# Patient Record
Sex: FEMALE | Race: BLACK OR AFRICAN AMERICAN | NOT HISPANIC OR LATINO | Employment: OTHER | URBAN - METROPOLITAN AREA
[De-identification: names, ages, dates, MRNs, and addresses within clinical notes are randomized per-mention and may not be internally consistent; named-entity substitution may affect disease eponyms.]

---

## 2017-01-11 ENCOUNTER — HOSPITAL ENCOUNTER (EMERGENCY)
Facility: HOSPITAL | Age: 37
Discharge: HOME | End: 2017-01-12
Attending: EMERGENCY MEDICINE
Payer: MEDICARE

## 2017-01-11 DIAGNOSIS — N30.01 ACUTE CYSTITIS WITH HEMATURIA: Primary | ICD-10-CM

## 2017-01-11 LAB
B-HCG UR QL: NEGATIVE
BACTERIA #/AREA URNS HPF: ABNORMAL /HPF
BILIRUB UR QL STRIP: NEGATIVE
CLARITY UR: ABNORMAL
COLOR UR: YELLOW
CTP QC/QA: YES
GLUCOSE UR QL STRIP: NEGATIVE
HGB UR QL STRIP: ABNORMAL
HYALINE CASTS #/AREA URNS LPF: 0 /LPF
KETONES UR QL STRIP: NEGATIVE
LEUKOCYTE ESTERASE UR QL STRIP: ABNORMAL
MICROSCOPIC COMMENT: ABNORMAL
NITRITE UR QL STRIP: NEGATIVE
PH UR STRIP: 6 [PH] (ref 5–8)
PROT UR QL STRIP: ABNORMAL
RBC #/AREA URNS HPF: >100 /HPF (ref 0–4)
SP GR UR STRIP: 1.01 (ref 1–1.03)
SQUAMOUS #/AREA URNS HPF: 10 /HPF
URN SPEC COLLECT METH UR: ABNORMAL
UROBILINOGEN UR STRIP-ACNC: NEGATIVE EU/DL
WBC #/AREA URNS HPF: >100 /HPF (ref 0–5)

## 2017-01-11 PROCEDURE — 25000003 PHARM REV CODE 250: Performed by: EMERGENCY MEDICINE

## 2017-01-11 PROCEDURE — 81000 URINALYSIS NONAUTO W/SCOPE: CPT

## 2017-01-11 PROCEDURE — 96367 TX/PROPH/DG ADDL SEQ IV INF: CPT

## 2017-01-11 PROCEDURE — 96376 TX/PRO/DX INJ SAME DRUG ADON: CPT

## 2017-01-11 PROCEDURE — 63600175 PHARM REV CODE 636 W HCPCS: Performed by: EMERGENCY MEDICINE

## 2017-01-11 PROCEDURE — 96361 HYDRATE IV INFUSION ADD-ON: CPT

## 2017-01-11 PROCEDURE — 96375 TX/PRO/DX INJ NEW DRUG ADDON: CPT

## 2017-01-11 PROCEDURE — 81025 URINE PREGNANCY TEST: CPT | Performed by: EMERGENCY MEDICINE

## 2017-01-11 PROCEDURE — 99285 EMERGENCY DEPT VISIT HI MDM: CPT | Mod: 25

## 2017-01-11 PROCEDURE — 96365 THER/PROPH/DIAG IV INF INIT: CPT

## 2017-01-11 RX ORDER — HYDROMORPHONE HYDROCHLORIDE 2 MG/ML
1 INJECTION, SOLUTION INTRAMUSCULAR; INTRAVENOUS; SUBCUTANEOUS
Status: COMPLETED | OUTPATIENT
Start: 2017-01-11 | End: 2017-01-11

## 2017-01-11 RX ORDER — ONDANSETRON 2 MG/ML
4 INJECTION INTRAMUSCULAR; INTRAVENOUS
Status: COMPLETED | OUTPATIENT
Start: 2017-01-11 | End: 2017-01-11

## 2017-01-11 RX ORDER — CIPROFLOXACIN 2 MG/ML
400 INJECTION, SOLUTION INTRAVENOUS
Status: COMPLETED | OUTPATIENT
Start: 2017-01-12 | End: 2017-01-12

## 2017-01-11 RX ADMIN — HYDROMORPHONE HYDROCHLORIDE 1 MG: 2 INJECTION INTRAMUSCULAR; INTRAVENOUS; SUBCUTANEOUS at 11:01

## 2017-01-11 RX ADMIN — ONDANSETRON 4 MG: 2 INJECTION INTRAMUSCULAR; INTRAVENOUS at 11:01

## 2017-01-11 RX ADMIN — SODIUM CHLORIDE 1000 ML: 0.9 INJECTION, SOLUTION INTRAVENOUS at 11:01

## 2017-01-11 NOTE — ED AVS SNAPSHOT
[facility]  [address] [place] [address]               Cesilia Dozier   2017 11:25 PM   ED    Description:  Female : 1980   Department:  [facility]           Your Care was Coordinated By:     Provider Role From To    Estefanía Jim MD Attending Provider 17 [phone] --      Reason for Visit     Flank Pain           Diagnoses this Visit        Comments    Acute cystitis with hematuria    -  Primary       ED Disposition     None           To Do List           Follow-up Information     Follow up with Lulu Ball MD. Schedule an appointment as soon as possible for a visit in 2 days.    Specialty:  Urology    Contact information:    [address] [facility]  [address]  [place] [address]  [phone]         These Medications        Disp Refills Start End    ciprofloxacin HCl (CIPRO) 500 MG tablet 20 tablet 0 2017    Take 1 tablet (500 mg total) by mouth 2 (two) times daily. - Oral    Pharmacy: [facility] [address] Ph #: [phone]       oxycodone-acetaminophen (PERCOCET) 5-325 mg per tablet 18 tablet 0 2017     Take 1 tablet by mouth every 4 (four) hours as needed for Pain (Do not drink alcohol or drive while taking this medication. Do not take with other sedating medications.). - Oral    Pharmacy: [facility] [address] Ph #: [phone]       promethazine (PHENERGAN) 25 MG tablet 15 tablet 0 2017     Take 1 tablet (25 mg total) by mouth every 6 (six) hours as needed for Nausea. - Oral    Pharmacy: [facility] [address] Ph #: [phone]         [facility] On [facility]     [facility] On Call Nurse Care Line -  Assistance  Registered nurses in the [facility] provide clinical advisement, health education,  appointment booking, and other advisory services.  Call for this free service at [phone].             Medications           Message regarding Medications     Verify the changes and/or additions to your medication regime listed below are the same as discussed with your clinician today.  If any of these changes or additions are incorrect, please notify your healthcare provider.        START taking these NEW medications        Refills    ciprofloxacin HCl (CIPRO) 500 MG tablet 0    Sig: Take 1 tablet (500 mg total) by mouth 2 (two) times daily.    Class: Print    Route: Oral    oxycodone-acetaminophen (PERCOCET) 5-325 mg per tablet 0    Sig: Take 1 tablet by mouth every 4 (four) hours as needed for Pain (Do not drink alcohol or drive while taking this medication. Do not take with other sedating medications.).    Class: Print    Route: Oral    promethazine (PHENERGAN) 25 MG tablet 0    Sig: Take 1 tablet (25 mg total) by mouth every 6 (six) hours as needed for Nausea.    Class: Print    Route: Oral      These medications were administered today        Dose Freq    hydromorphone (PF) injection 1 mg 1 mg ED 1 Time    Sig: Inject 0.5 mLs (1 mg total) into the vein ED 1 Time.    Class: Normal    Route: Intravenous    ondansetron injection 4 mg 4 mg ED 1 Time    Sig: Inject 4 mg into the vein ED 1 Time.    Class: Normal    Route: Intravenous    sodium chloride 0.9% bolus 1,000 mL 1,000 mL ED 1 Time    Sig: Inject 1,000 mLs into the vein ED 1 Time.    Class: Normal    Route: Intravenous    ciprofloxacin (CIPRO)400mg/200ml D5W IVPB 400 mg 400 mg ED 1 Time    Starting on: 1/12/2017    Sig: Inject 200 mLs (400 mg total) into the vein ED 1 Time.    Class: Normal    Route: Intravenous    hydromorphone (PF) injection 1 mg 1 mg ED 1 Time    Sig: Inject 0.5 mLs (1 mg total) into the vein ED 1 Time.    Class: Normal    Route: Intravenous    promethazine (PHENERGAN) 25 mg in dextrose 5 % 50 mL IVPB 25 mg ED 1 Time    Sig: Inject  "25 mg into the vein ED 1 Time.    Class: Normal    Route: Intravenous      STOP taking these medications     hydrocodone-acetaminophen 5-325mg (NORCO) 5-325 mg per tablet Take 1 tablet by mouth every 4 (four) hours as needed.    oxycodone-acetaminophen (PERCOCET)  mg per tablet Take 1 tablet by mouth every 4 (four) hours as needed.           Verify that the below list of medications is an accurate representation of the medications you are currently taking.  If none reported, the list may be blank. If incorrect, please contact your healthcare provider. Carry this list with you in case of emergency.           Current Medications     ciprofloxacin HCl (CIPRO) 500 MG tablet Take 1 tablet (500 mg total) by mouth 2 (two) times daily.    ferrous gluconate (FERGON) 324 MG tablet Take 1 tablet (324 mg total) by mouth 2 (two) times daily with meals.    oxybutynin (DITROPAN) 5 MG Tab Take 1 tablet (5 mg total) by mouth 3 (three) times daily.    oxycodone-acetaminophen (PERCOCET) 5-325 mg per tablet Take 1 tablet by mouth every 4 (four) hours as needed for Pain (Do not drink alcohol or drive while taking this medication. Do not take with other sedating medications.).    promethazine (PHENERGAN) 25 MG suppository Place 1 suppository (25 mg total) rectally every 6 (six) hours as needed for Nausea.    promethazine (PHENERGAN) 25 MG tablet Take 1 tablet (25 mg total) by mouth every 6 (six) hours as needed for Nausea.    promethazine (PHENERGAN) 25 MG tablet Take 1 tablet (25 mg total) by mouth every 6 (six) hours as needed for Nausea.           Clinical Reference Information           Your Vitals Were     BP Pulse Temp Resp Height Weight    109/73 106 99.1 °F (37.3 °C) (Oral) 18 5' 6" (1.676 m) 74.8 kg (165 lb)    SpO2 BMI             100% 26.63 kg/m2         Allergies as of 1/12/2017        Reactions    Pcn [Penicillins] Anaphylaxis      Immunizations Administered on Date of Encounter - 1/12/2017     None      ED Micro, Lab, " POCT     Start Ordered       Status Ordering Provider    01/11/17 2339 01/11/17 2339  CBC auto differential  STAT      Final result     01/11/17 2339 01/11/17 2339  Comprehensive metabolic panel  STAT      Final result     01/11/17 2339 01/11/17 2339    STAT,   Status:  Canceled      Canceled     01/11/17 2252 01/11/17 2252  Urinalysis  Once      Final result     01/11/17 2252 01/11/17 2252  POCT urine pregnancy  Once     Comments:  For women of childbearing age w/o hysterectomy.    Final result     01/11/17 2252 01/11/17 2252  Urinalysis Microscopic  Once      Final result       ED Imaging Orders     Start Ordered       Status Ordering Provider    01/11/17 2340 01/11/17 2339  US Retroperitoneal Complete  1 time imaging      Final result       Discharge References/Attachments     URINARY TRACT INFECTIONS (UTIS), UNDERSTANDING (ENGLISH)      Your Scheduled Appointments     Jan 24, 2017  3:00 PM CST   Consult with Turner Bocanegra MD   [facility] ([facility])    [address] [place] [address], [address] [id number] [phone]               [facility] complies with applicable Federal civil rights laws and does not discriminate on the basis of race, color, national origin, age, disability, or sex.        Language Assistance Services     ATTENTION: Language assistance services are available, free of charge. Please call [phone].      ATENCIÓN: Si habla español, tiene a batista disposición servicios gratuitos de asistencia lingüística. Llame al [phone].     CHÚ Ý: N?u b?n nói Ti?ng Vi?t, có các d?ch v? h? tr? ngôn ng? mi?n phí dành cho b?n. G?i s? [phone].

## 2017-01-12 VITALS
OXYGEN SATURATION: 100 % | DIASTOLIC BLOOD PRESSURE: 63 MMHG | WEIGHT: 165 LBS | HEART RATE: 92 BPM | HEIGHT: 66 IN | SYSTOLIC BLOOD PRESSURE: 106 MMHG | BODY MASS INDEX: 26.52 KG/M2 | RESPIRATION RATE: 18 BRPM | TEMPERATURE: 99 F

## 2017-01-12 LAB
ALBUMIN SERPL BCP-MCNC: 3.6 G/DL
ALP SERPL-CCNC: 73 U/L
ALT SERPL W/O P-5'-P-CCNC: 10 U/L
ANION GAP SERPL CALC-SCNC: 10 MMOL/L
AST SERPL-CCNC: 13 U/L
BASOPHILS # BLD AUTO: 0.02 K/UL
BASOPHILS NFR BLD: 0.4 %
BILIRUB SERPL-MCNC: 0.5 MG/DL
BUN SERPL-MCNC: 14 MG/DL
CALCIUM SERPL-MCNC: 9.6 MG/DL
CHLORIDE SERPL-SCNC: 108 MMOL/L
CO2 SERPL-SCNC: 26 MMOL/L
CREAT SERPL-MCNC: 1.9 MG/DL
DIFFERENTIAL METHOD: ABNORMAL
EOSINOPHIL # BLD AUTO: 0.3 K/UL
EOSINOPHIL NFR BLD: 5.3 %
ERYTHROCYTE [DISTWIDTH] IN BLOOD BY AUTOMATED COUNT: 14.9 %
EST. GFR  (AFRICAN AMERICAN): 38 ML/MIN/1.73 M^2
EST. GFR  (NON AFRICAN AMERICAN): 33 ML/MIN/1.73 M^2
GLUCOSE SERPL-MCNC: 91 MG/DL
HCT VFR BLD AUTO: 30.4 %
HGB BLD-MCNC: 10.2 G/DL
LYMPHOCYTES # BLD AUTO: 1.6 K/UL
LYMPHOCYTES NFR BLD: 29.4 %
MCH RBC QN AUTO: 31.2 PG
MCHC RBC AUTO-ENTMCNC: 33.6 %
MCV RBC AUTO: 93 FL
MONOCYTES # BLD AUTO: 0.4 K/UL
MONOCYTES NFR BLD: 7.3 %
NEUTROPHILS # BLD AUTO: 3.2 K/UL
NEUTROPHILS NFR BLD: 57.6 %
PLATELET # BLD AUTO: 185 K/UL
PMV BLD AUTO: 10.4 FL
POTASSIUM SERPL-SCNC: 4.4 MMOL/L
PROT SERPL-MCNC: 6.6 G/DL
RBC # BLD AUTO: 3.27 M/UL
SODIUM SERPL-SCNC: 144 MMOL/L
WBC # BLD AUTO: 5.51 K/UL

## 2017-01-12 PROCEDURE — 25000003 PHARM REV CODE 250: Performed by: EMERGENCY MEDICINE

## 2017-01-12 PROCEDURE — 63600175 PHARM REV CODE 636 W HCPCS: Performed by: EMERGENCY MEDICINE

## 2017-01-12 PROCEDURE — 85025 COMPLETE CBC W/AUTO DIFF WBC: CPT

## 2017-01-12 PROCEDURE — 80053 COMPREHEN METABOLIC PANEL: CPT

## 2017-01-12 RX ORDER — OXYCODONE AND ACETAMINOPHEN 5; 325 MG/1; MG/1
1 TABLET ORAL EVERY 4 HOURS PRN
Qty: 18 TABLET | Refills: 0 | Status: SHIPPED | OUTPATIENT
Start: 2017-01-12 | End: 2017-01-18 | Stop reason: SDUPTHER

## 2017-01-12 RX ORDER — PROMETHAZINE HYDROCHLORIDE 25 MG/1
25 TABLET ORAL EVERY 6 HOURS PRN
Qty: 15 TABLET | Refills: 0 | Status: SHIPPED | OUTPATIENT
Start: 2017-01-12 | End: 2017-01-18 | Stop reason: ALTCHOICE

## 2017-01-12 RX ORDER — CIPROFLOXACIN 500 MG/1
500 TABLET ORAL 2 TIMES DAILY
Qty: 20 TABLET | Refills: 0 | Status: SHIPPED | OUTPATIENT
Start: 2017-01-12 | End: 2017-01-22

## 2017-01-12 RX ORDER — HYDROMORPHONE HYDROCHLORIDE 2 MG/ML
1 INJECTION, SOLUTION INTRAMUSCULAR; INTRAVENOUS; SUBCUTANEOUS
Status: COMPLETED | OUTPATIENT
Start: 2017-01-12 | End: 2017-01-12

## 2017-01-12 RX ADMIN — CIPROFLOXACIN 400 MG: 2 INJECTION, SOLUTION INTRAVENOUS at 12:01

## 2017-01-12 RX ADMIN — PROMETHAZINE HYDROCHLORIDE 25 MG: 25 INJECTION INTRAMUSCULAR; INTRAVENOUS at 01:01

## 2017-01-12 RX ADMIN — HYDROMORPHONE HYDROCHLORIDE 1 MG: 2 INJECTION INTRAMUSCULAR; INTRAVENOUS; SUBCUTANEOUS at 01:01

## 2017-01-12 NOTE — ED TRIAGE NOTES
Pt c/o left lower back pain that started today. Pt also reports lower abd pain. Pt thinks the pain ir r/t her bladder. Pt reports having to force herself to urinate. Pt also c/o N&V. Pain rating 10/10

## 2017-01-12 NOTE — ED PROVIDER NOTES
"Encounter Date: 1/11/2017    SCRIBE #1 NOTE: I, Shelbi Villela, am scribing for, and in the presence of,  Estefanía Jim MD. I have scribed the following portions of the note - Other sections scribed: HPI, ROS.       History     Chief Complaint   Patient presents with    Flank Pain     left lower back with pain after emptying bladder x 1 day     Review of patient's allergies indicates:   Allergen Reactions    Pcn [penicillins] Anaphylaxis     HPI Comments: CC: Flank Pain    HPI: 36 year old female with a history of cervical cancer, pyelonephritis, hydronephrosis, and PONV presents to the ED with a sudden onset of left flank pain which started today.  Patient also complains of suprapubic pain described as a "pressure" as well as difficulty urinating and dysuria since earlier today.  Patient states her pain is severe 10/10 and worse with palpation. Patient otherwise denies fever, chest pain, shortness of breath, nausea, vomiting, and diarrhea at this time. Symptoms are acute and constant. No prior treatment.    The history is provided by the patient.     Past Medical History   Diagnosis Date    Cervical cancer      cervical    Hydronephrosis     PONV (postoperative nausea and vomiting)     Pyelonephritis      No past medical history pertinent negatives.  Past Surgical History   Procedure Laterality Date    Hysterectomy       Partial    Tubal ligation      Ureteral stent placement  07/2016    Colonoscopy N/A 9/22/2016     Procedure: COLONOSCOPY;  Surgeon: Joe Moe MD;  Location: [facility];  Service: Endoscopy;  Laterality: N/A;     Family History   Problem Relation Age of Onset    No Known Problems Mother     Drug abuse Father     No Known Problems Sister     Asthma Brother      Social History   Substance Use Topics    Smoking status: Never Smoker    Smokeless tobacco: Never Used    Alcohol use No     Review of Systems   Constitutional: Negative for fever.   HENT: Negative for sore throat.  "   Eyes: Negative for visual disturbance.   Respiratory: Negative for shortness of breath.    Cardiovascular: Negative for chest pain.   Gastrointestinal: Positive for abdominal pain (suprapubic). Negative for diarrhea, nausea and vomiting.   Genitourinary: Positive for difficulty urinating, dysuria and flank pain.   Musculoskeletal: Negative for neck pain.   Skin: Negative for rash.   Neurological: Negative for headaches.       Physical Exam   Initial Vitals   BP Pulse Resp Temp SpO2   01/11/17 2251 01/11/17 2251 01/11/17 2251 01/11/17 2251 01/11/17 2251   112/77 117 18 99.1 °F (37.3 °C) 97 %     Physical Exam    Nursing note and vitals reviewed.  Constitutional: She appears well-developed and well-nourished.   HENT:   Head: Normocephalic and atraumatic.   Eyes: EOM are normal. Pupils are equal, round, and reactive to light.   Neck: Normal range of motion. Neck supple.   Cardiovascular: Tachycardia present.  Exam reveals no gallop and no friction rub.    No murmur heard.  Pulmonary/Chest: Breath sounds normal. No respiratory distress. She has no wheezes. She has no rhonchi. She has no rales. She exhibits no tenderness.   Abdominal: Soft. Normal appearance. There is tenderness in the suprapubic area.   Musculoskeletal: Normal range of motion.   Left CVA tenderness   Neurological: She is alert and oriented to person, place, and time.   Skin: Skin is warm and dry.   Psychiatric: She has a normal mood and affect. Her behavior is normal. Judgment and thought content normal.         ED Course   Procedures  Labs Reviewed   URINALYSIS - Abnormal; Notable for the following:        Result Value    Appearance, UA Cloudy (*)     Protein, UA 2+ (*)     Occult Blood UA 3+ (*)     Leukocytes, UA 3+ (*)     All other components within normal limits   URINALYSIS MICROSCOPIC - Abnormal; Notable for the following:     RBC, UA >100 (*)     WBC, UA >100 (*)     Bacteria, UA Moderate (*)     All other components within normal limits    CBC W/ AUTO DIFFERENTIAL - Abnormal; Notable for the following:     RBC 3.27 (*)     Hemoglobin 10.2 (*)     Hematocrit 30.4 (*)     MCH 31.2 (*)     RDW 14.9 (*)     All other components within normal limits   COMPREHENSIVE METABOLIC PANEL - Abnormal; Notable for the following:     Creatinine 1.9 (*)     eGFR if  38 (*)     eGFR if non  33 (*)     All other components within normal limits   POCT URINE PREGNANCY             Medical Decision Making:   History:   Old Medical Records: I decided to obtain old medical records.  Initial Assessment:   This is an emergent evaluation of a 36-year-old woman who presented to the emergency department today secondary to urinary frequency, dysuria, and suprapubic abdominal pain.  Differential Diagnosis:   Differential diagnoses includes recurrence of her urinary tract infection, cystitis, pyelonephritis, amongst others.  Independently Interpreted Test(s):   I have ordered and independently interpreted EKG Reading(s) - see summary below       <> Summary of EKG Reading(s): Sinus tachycardia at 112 bpm without STEMI  Clinical Tests:   Lab Tests: Ordered and Reviewed  The following lab test(s) were unremarkable: CBC, Urinalysis, CMP and UPT  Radiological Study: Ordered and Reviewed  Medical Tests: Ordered and Reviewed  ED Management:  On physical examination, patient was in no acute distress.  Lung sounds were clear to auscultation bilaterally and heart sounds revealed tachycardia.  Abdomen was soft, nondistended, with tenderness over the suprapubic abdomen only.  There is mild tenderness over the left CVA region of the back.  There was no tenderness over McBurney's or Barrett's points.  EKG showed sinus tachycardia without evidence for STEMI.  Labs showed a mild anemia with a hemoglobin and hematocrit 10.2 and 30.4.  CMP showed an elevated creatinine at 1.9 however in review of her previous records this shows a similar value.  Urinalysis is  concerning for urinary tract infection with 3+ leukocytes, moderate bacteria, greater than 100 red blood cells and greater than 100 white blood cells.  This was sent for a urine culture and patient was treated with IV ciprofloxacin in the emergency department.  Retroperitoneal ultrasound showed bilateral ureteral stents in place with mild right-sided hydronephrosis versus similar to recent CT scan.  There was no left hydronephrosis.  Patient reported improvement in symptoms in the emergency department after receiving pain medication and antibiotics.  She will be discharged home therefore with oral ciprofloxacin, close follow-up with her urologist-she was advised to call the morning to make an appointment.  She was additionally given strict return precautions including any development of fever, chills, worsening symptoms, inability urinate, or for any other concerns.    Estefanía Jim MD  2:59 AM  1/12/2017              Scribe Attestation:   Scribe #1: I performed the above scribed service and the documentation accurately describes the services I performed. I attest to the accuracy of the note.    Attending Attestation:           Physician Attestation for Scribe:  Physician Attestation Statement for Scribe #1: I, Estefanía Jim MD, reviewed documentation, as scribed by Shelbi Villela in my presence, and it is both accurate and complete.                 ED Course     Clinical Impression:   The encounter diagnosis was Acute cystitis with hematuria.    Disposition:   Disposition: Discharged  Condition: Stable       Estefanía Jim MD  01/12/17 0259

## 2017-01-16 ENCOUNTER — HOSPITAL ENCOUNTER (EMERGENCY)
Facility: HOSPITAL | Age: 37
Discharge: HOME | End: 2017-01-16
Attending: EMERGENCY MEDICINE
Payer: MEDICAID

## 2017-01-16 VITALS
WEIGHT: 165 LBS | HEIGHT: 66 IN | DIASTOLIC BLOOD PRESSURE: 65 MMHG | RESPIRATION RATE: 18 BRPM | SYSTOLIC BLOOD PRESSURE: 109 MMHG | TEMPERATURE: 99 F | OXYGEN SATURATION: 100 % | HEART RATE: 88 BPM | BODY MASS INDEX: 26.52 KG/M2

## 2017-01-16 DIAGNOSIS — T83.84XA: ICD-10-CM

## 2017-01-16 LAB
B-HCG UR QL: NEGATIVE
BACTERIA #/AREA URNS HPF: ABNORMAL /HPF
BILIRUB UR QL STRIP: NEGATIVE
CLARITY UR: ABNORMAL
COLOR UR: YELLOW
CTP QC/QA: YES
GLUCOSE UR QL STRIP: NEGATIVE
HGB UR QL STRIP: ABNORMAL
HYALINE CASTS #/AREA URNS LPF: 0 /LPF
KETONES UR QL STRIP: NEGATIVE
LEUKOCYTE ESTERASE UR QL STRIP: ABNORMAL
MICROSCOPIC COMMENT: ABNORMAL
NITRITE UR QL STRIP: NEGATIVE
PH UR STRIP: 6 [PH] (ref 5–8)
PROT UR QL STRIP: ABNORMAL
RBC #/AREA URNS HPF: >100 /HPF (ref 0–4)
SP GR UR STRIP: 1.01 (ref 1–1.03)
SQUAMOUS #/AREA URNS HPF: 4 /HPF
URN SPEC COLLECT METH UR: ABNORMAL
UROBILINOGEN UR STRIP-ACNC: NEGATIVE EU/DL
WBC #/AREA URNS HPF: >100 /HPF (ref 0–5)
WBC CLUMPS URNS QL MICRO: ABNORMAL

## 2017-01-16 PROCEDURE — 87086 URINE CULTURE/COLONY COUNT: CPT

## 2017-01-16 PROCEDURE — 99284 EMERGENCY DEPT VISIT MOD MDM: CPT | Mod: 25

## 2017-01-16 PROCEDURE — 96372 THER/PROPH/DIAG INJ SC/IM: CPT

## 2017-01-16 PROCEDURE — 25000003 PHARM REV CODE 250: Performed by: EMERGENCY MEDICINE

## 2017-01-16 PROCEDURE — 63600175 PHARM REV CODE 636 W HCPCS: Performed by: EMERGENCY MEDICINE

## 2017-01-16 PROCEDURE — 87205 SMEAR GRAM STAIN: CPT

## 2017-01-16 PROCEDURE — 81000 URINALYSIS NONAUTO W/SCOPE: CPT

## 2017-01-16 PROCEDURE — 81025 URINE PREGNANCY TEST: CPT | Performed by: EMERGENCY MEDICINE

## 2017-01-16 RX ORDER — OXYCODONE HYDROCHLORIDE 5 MG/1
10 TABLET ORAL
Status: COMPLETED | OUTPATIENT
Start: 2017-01-16 | End: 2017-01-16

## 2017-01-16 RX ORDER — HYDROMORPHONE HYDROCHLORIDE 2 MG/ML
2 INJECTION, SOLUTION INTRAMUSCULAR; INTRAVENOUS; SUBCUTANEOUS
Status: COMPLETED | OUTPATIENT
Start: 2017-01-16 | End: 2017-01-16

## 2017-01-16 RX ADMIN — HYDROMORPHONE HYDROCHLORIDE 2 MG: 2 INJECTION INTRAMUSCULAR; INTRAVENOUS; SUBCUTANEOUS at 06:01

## 2017-01-16 RX ADMIN — OXYCODONE HYDROCHLORIDE 10 MG: 5 TABLET ORAL at 08:01

## 2017-01-16 NOTE — ED AVS SNAPSHOT
[facility]  [address] [place] [address]               Cesilia Dozier   2017  5:59 PM   ED    Description:  Female : 1980   Department:  [facility]           Your Care was Coordinated By:     Provider Role From To    Mushtaq William MD Attending Provider 17 1806 --    SHELBI Page Physician Assistant 17 1806 17 1808      Reason for Visit     Flank Pain           Diagnoses this Visit        Comments    Pain due to ureteral stent           ED Disposition     ED Disposition Condition Comment    Discharge             To Do List           Follow-up Information     Schedule an appointment as soon as possible for a visit with Lulu Ball MD.    Specialty:  Urology    Contact information:    [address] [facility]  [address]  [place] [address]  [phone]          Follow up with [facility].    Specialty:  Emergency Medicine    Why:  As needed, If symptoms worsen    Contact information:    [address] [place] [id number] [phone]      [facility]     [facility] Care [facility] -  Assistance  Registered nurses in the [facility] provide clinical advisement, health education, appointment booking, and other advisory services.  Call for this free service at [phone].             Medications           Message regarding Medications     Verify the changes and/or additions to your medication regime listed below are the same as discussed with your clinician today.  If any of these changes or additions are incorrect, please notify your healthcare provider.        These medications were administered today        Dose Freq    hydromorphone (PF) injection 2 mg 2 mg ED 1 Time    Sig: Inject 1 mL (2 mg total) into the muscle ED 1 Time.    Class: Normal    Route: Intramuscular    oxycodone immediate release tablet 10 mg 10 mg ED 1 Time    Sig: Take 2 tablets  "(10 mg total) by mouth ED 1 Time.    Class: Normal    Route: Oral           Verify that the below list of medications is an accurate representation of the medications you are currently taking.  If none reported, the list may be blank. If incorrect, please contact your healthcare provider. Carry this list with you in case of emergency.           Current Medications     ciprofloxacin HCl (CIPRO) 500 MG tablet Take 1 tablet (500 mg total) by mouth 2 (two) times daily.    ferrous gluconate (FERGON) 324 MG tablet Take 1 tablet (324 mg total) by mouth 2 (two) times daily with meals.    oxycodone-acetaminophen (PERCOCET) 5-325 mg per tablet Take 1 tablet by mouth every 4 (four) hours as needed for Pain (Do not drink alcohol or drive while taking this medication. Do not take with other sedating medications.).    promethazine (PHENERGAN) 25 MG suppository Place 1 suppository (25 mg total) rectally every 6 (six) hours as needed for Nausea.    promethazine (PHENERGAN) 25 MG tablet Take 1 tablet (25 mg total) by mouth every 6 (six) hours as needed for Nausea.    oxybutynin (DITROPAN) 5 MG Tab Take 1 tablet (5 mg total) by mouth 3 (three) times daily.    oxycodone immediate release tablet 10 mg Take 2 tablets (10 mg total) by mouth ED 1 Time.           Clinical Reference Information           Your Vitals Were     BP Pulse Temp Resp Height Weight    109/65 (BP Location: Left arm, Patient Position: Sitting, BP Method: Automatic) 88 98.5 °F (36.9 °C) (Oral) 18 5' 6" (1.676 m) 74.8 kg (165 lb)    SpO2 BMI             100% 26.63 kg/m2         Allergies as of 1/16/2017        Reactions    Pcn [Penicillins] Anaphylaxis      Immunizations Administered on Date of Encounter - 1/16/2017     None      ED Micro, Lab, POCT     Start Ordered       Status Ordering Provider    01/16/17 1811 01/16/17 1810  Gram stain  STAT      In process     01/16/17 1811 01/16/17 1810  Urine culture  STAT      In process     01/16/17 1808 01/16/17 1807  " "Urinalysis  STAT      Final result     01/16/17 1807 01/16/17 1807  Urinalysis Microscopic  Once      Final result     01/16/17 1614 01/16/17 1613  POCT urine pregnancy  Once      Final result       ED Imaging Orders     Start Ordered       Status Ordering Provider    01/16/17 1823 01/16/17 1814  US Retroperitoneal Complete  1 time imaging      Final result     01/16/17 1814 01/16/17 1814    1 time imaging,   Status:  Canceled      Canceled         Discharge Instructions       Return to the Emergency Department of any acute worsening of your symptoms or for any other concern.     You should return to the ED for fever/chills, shortness of breath, chest pain, weakness or "passing out".     Pt should take all medications as prescribed.    Pt should follow up with PCP as soon as possible.    The risks associated with not taking your medications as prescribed and not following up with your Primary Care doctor or sub specialist includes worsening of your condition, pain, disability, loss of function or livelihood, and death      FRANCESCA William M.D. 8:15 PM 1/16/2017        Discharge References/Attachments     URETERAL STENTS (ENGLISH)      Your Scheduled Appointments     Jan 24, 2017  3:00 PM CST   Consult with Turner Bocanegra MD   [facility] ([facility])    [address] [place] [address], [address] [address] [phone]               [facility] complies with applicable Federal civil rights laws and does not discriminate on the basis of race, color, national origin, age, disability, or sex.        Language Assistance Services     ATTENTION: Language assistance services are available, free of charge. Please call [phone].      ATENCIÓN: Si habla español, tiene a batista disposición servicios gratuitos de asistencia lingüística. Llame al [phone].     CHÚ Ý: N?u b?n nói Ti?ng Vi?t, có các d?ch v? h? tr? ngôn ng? mi?n phí dành cho b?n. G?i s? [phone].        "

## 2017-01-17 ENCOUNTER — TELEPHONE (OUTPATIENT)
Dept: UROLOGY | Facility: HOSPITAL | Age: 37
End: 2017-01-17

## 2017-01-17 LAB
GRAM STN SPEC: NORMAL

## 2017-01-17 NOTE — ED TRIAGE NOTES
Pt co pain in left flank and lower abd today despite taking two percocet at a time- now with NV some pressure with urination

## 2017-01-17 NOTE — DISCHARGE INSTRUCTIONS
"Return to the Emergency Department of any acute worsening of your symptoms or for any other concern.     You should return to the ED for fever/chills, shortness of breath, chest pain, weakness or "passing out".     Pt should take all medications as prescribed.    Pt should follow up with PCP as soon as possible.    The risks associated with not taking your medications as prescribed and not following up with your Primary Care doctor or sub specialist includes worsening of your condition, pain, disability, loss of function or livelihood, and death      FRANCESCA William M.D. 8:15 PM 1/16/2017      "

## 2017-01-17 NOTE — TELEPHONE ENCOUNTER
----- Message from Mushtaq William MD sent at 1/16/2017  8:16 PM CST -----  Pt has come in to the ED several times for stent pain. US shows stents in place. UA looks infected, but urine culture never grows out anything. I deferred abx at this time. Pt is at high risk of returning to the ED. Please advise.     Thanks,  Best William

## 2017-01-17 NOTE — ED PROVIDER NOTES
"Encounter Date: 1/16/2017    SCRIBE #1 NOTE: I, Mireille Carvalhoter, am scribing for, and in the presence of,  Mushtaq William MD. I have scribed the following portions of the note - Other sections scribed: HPI/ROS.       History     Chief Complaint   Patient presents with    Flank Pain     "For the past 2 days the left part of my side is really in pain and the bottom part of my abdomen."      Review of patient's allergies indicates:   Allergen Reactions    Pcn [penicillins] Anaphylaxis     HPI Comments: CC: Flank Pain    HPI: This 35 yo F who has history of hydronephrosis, PONV, cervical cancer, pyelonephritis presents to the ED for evaluation of acute onset left side flank pain with associated lower abdominal pain and pressure when urinating for 2 days. She reports dark urine that looks similar to the color of a flat diet coke. The pain is severe and constant. She attempted treatment with percocet at 0800 this morning with no relief. The pt reports that she has been seen in the ED multiple times for similar symptoms. She notes that her stents are still bothering her. She was told that the next available appointment at the Urologist wasn't until the 24th. The pt denies fever, chills, N/V/D, hematuria, and any other associated symptoms. No alleviating or exacerbating factors reported.     The history is provided by the patient. No  was used.     Past Medical History   Diagnosis Date    Cervical cancer      cervical    Hydronephrosis     PONV (postoperative nausea and vomiting)     Pyelonephritis      No past medical history pertinent negatives.  Past Surgical History   Procedure Laterality Date    Hysterectomy       Partial    Tubal ligation      Ureteral stent placement  07/2016    Colonoscopy N/A 9/22/2016     Procedure: COLONOSCOPY;  Surgeon: Joe Moe MD;  Location: [facility];  Service: Endoscopy;  Laterality: N/A;     Family History   Problem Relation Age of Onset    No " Known Problems Mother     Drug abuse Father     No Known Problems Sister     Asthma Brother      Social History   Substance Use Topics    Smoking status: Never Smoker    Smokeless tobacco: Never Used    Alcohol use No     Review of Systems   Constitutional: Negative for chills and fever.   HENT: Negative for congestion and sore throat.    Eyes: Negative for visual disturbance.   Respiratory: Negative for cough and shortness of breath.    Cardiovascular: Negative for chest pain.   Gastrointestinal: Positive for abdominal pain (lower). Negative for diarrhea, nausea and vomiting.   Genitourinary: Positive for flank pain (left side). Negative for frequency and hematuria.        (+) pressure with urination  (+) dark urine   Musculoskeletal: Negative for myalgias.   Skin: Negative for rash.   Neurological: Negative for headaches.       Physical Exam   Initial Vitals   BP Pulse Resp Temp SpO2   01/16/17 1559 01/16/17 1559 01/16/17 1559 01/16/17 1559 01/16/17 1559   121/74 115 18 98.7 °F (37.1 °C) 100 %     Physical Exam    Vitals reviewed.  Constitutional: She appears well-developed and well-nourished.   HENT:   Head: Normocephalic and atraumatic.   Nose: Nose normal.   Mouth/Throat: No oropharyngeal exudate.   Eyes: EOM are normal. Pupils are equal, round, and reactive to light.   Neck: Normal range of motion. Neck supple. No JVD present.   Cardiovascular: Regular rhythm and normal heart sounds. Exam reveals no gallop and no friction rub.    No murmur heard.  Pulmonary/Chest: Breath sounds normal. No stridor. No respiratory distress. She has no wheezes. She has no rhonchi. She has no rales. She exhibits no tenderness.   Abdominal: Soft. Bowel sounds are normal. She exhibits no distension and no mass. There is no tenderness. There is CVA tenderness (left). There is no rebound and no guarding.   Musculoskeletal: Normal range of motion. She exhibits no edema or tenderness.   Neurological: She is alert and oriented to  person, place, and time. She has normal strength. No sensory deficit.   Skin: Skin is warm and dry.   Psychiatric: She has a normal mood and affect. Thought content normal.         ED Course   Procedures  Labs Reviewed   URINALYSIS - Abnormal; Notable for the following:        Result Value    Appearance, UA Cloudy (*)     Protein, UA 2+ (*)     Occult Blood UA 3+ (*)     Leukocytes, UA 3+ (*)     All other components within normal limits   URINALYSIS MICROSCOPIC - Abnormal; Notable for the following:     RBC, UA >100 (*)     WBC, UA >100 (*)     WBC Clumps, UA Moderate (*)     Bacteria, UA Few (*)     All other components within normal limits   GRAM STAIN   CULTURE, URINE   POCT URINE PREGNANCY       Medical decision-making:    The patient received a medical screening exam. If performed, the EKG was independently evaluated by me and is pending final cardiology evaluation.  If performed, all radiographic studies were independently evaluated by me and are pending final radiology evaluation. If labs were ordered, they were reviewed. Vital signs are independently assessed by me.  If performed, the pulse oximetry was independently evaluated by me.  I decided to obtain the patient's past medical record.  If available, I reviewed the patient's past medical record, including most recent labs and radiology reports.      Patient with past medical history of ureteral strictures presented to the emergency department for evaluation of left flank pain.   Patient has urostomy stents in place.  Patient has had several emergency department evaluations since her discharge in mid December for similar complaints.  It is felt that the patient is experiencing pain from her stents.  Patient has had CT scans and ultrasounds have showed and confirmed stent placement.  Patient has had multiple urine cultures, all of which revealed no growth to date.  Patient has been on antibiotic therapy.  Today, her urine once again appears infected.   However, given the consistent negative urine cultures.  I will defer antibiotics to urology.  Repeat urine cultures sent.  Ultrasound confirms the stent placement.  No greater significant hydronephrosis than previously noted.  I discussed the case with Dr. Smith who is on-call for urology.  He did not recommend any acute intervention.  Recommended taking her medications as prescribed and to follow-up in clinic.  Patient's pain was significantly improved in the emergency department.  I encouraged her medication compliance and close follow-up.  Patient stated understanding and was comfortable with that plan.    The results and physical exam findings were reviewed with the patient. Pt agrees with assessment, disposition and treatment plan and has no further questions or complaints at this time.    FRANCESCA William M.D. 9:58 PM 1/16/2017                    Scribe Attestation:   Scribe #1: I performed the above scribed service and the documentation accurately describes the services I performed. I attest to the accuracy of the note.    Attending Attestation:           Physician Attestation for Scribe:  Physician Attestation Statement for Scribe #1: I, Mushtaq William MD, reviewed documentation, as scribed by Mireille Will in my presence, and it is both accurate and complete.                 ED Course     Clinical Impression:   The encounter diagnosis was Pain due to ureteral stent.          Mushtaq William MD  01/16/17 4448

## 2017-01-18 ENCOUNTER — HOSPITAL ENCOUNTER (EMERGENCY)
Facility: HOSPITAL | Age: 37
Discharge: HOME | End: 2017-01-18
Attending: EMERGENCY MEDICINE
Payer: MEDICARE

## 2017-01-18 VITALS
HEIGHT: 67 IN | RESPIRATION RATE: 12 BRPM | HEART RATE: 90 BPM | OXYGEN SATURATION: 100 % | TEMPERATURE: 99 F | DIASTOLIC BLOOD PRESSURE: 74 MMHG | BODY MASS INDEX: 26.68 KG/M2 | WEIGHT: 170 LBS | SYSTOLIC BLOOD PRESSURE: 120 MMHG

## 2017-01-18 DIAGNOSIS — N13.30 HYDRONEPHROSIS, UNSPECIFIED HYDRONEPHROSIS TYPE: ICD-10-CM

## 2017-01-18 DIAGNOSIS — R10.9 LEFT FLANK PAIN: ICD-10-CM

## 2017-01-18 DIAGNOSIS — R10.30 LOWER ABDOMINAL PAIN: Primary | ICD-10-CM

## 2017-01-18 LAB
ANION GAP SERPL CALC-SCNC: 10 MMOL/L
B-HCG UR QL: NEGATIVE
BACTERIA #/AREA URNS HPF: ABNORMAL /HPF
BACTERIA UR CULT: NORMAL
BASOPHILS # BLD AUTO: 0 K/UL
BASOPHILS NFR BLD: 0.5 %
BILIRUB UR QL STRIP: NEGATIVE
BUN SERPL-MCNC: 14 MG/DL
CALCIUM SERPL-MCNC: 10 MG/DL
CHLORIDE SERPL-SCNC: 106 MMOL/L
CLARITY UR: ABNORMAL
CO2 SERPL-SCNC: 25 MMOL/L
COLOR UR: YELLOW
CREAT SERPL-MCNC: 1.8 MG/DL
CTP QC/QA: YES
DIFFERENTIAL METHOD: ABNORMAL
EOSINOPHIL # BLD AUTO: 0.3 K/UL
EOSINOPHIL NFR BLD: 5.2 %
ERYTHROCYTE [DISTWIDTH] IN BLOOD BY AUTOMATED COUNT: 17.2 %
EST. GFR  (AFRICAN AMERICAN): 41 ML/MIN/1.73 M^2
EST. GFR  (NON AFRICAN AMERICAN): 35 ML/MIN/1.73 M^2
GLUCOSE SERPL-MCNC: 87 MG/DL
GLUCOSE UR QL STRIP: NEGATIVE
HCT VFR BLD AUTO: 32.3 %
HGB BLD-MCNC: 10.9 G/DL
HGB UR QL STRIP: ABNORMAL
HYALINE CASTS #/AREA URNS LPF: 0 /LPF
KETONES UR QL STRIP: NEGATIVE
LEUKOCYTE ESTERASE UR QL STRIP: ABNORMAL
LYMPHOCYTES # BLD AUTO: 1.1 K/UL
LYMPHOCYTES NFR BLD: 18.5 %
MCH RBC QN AUTO: 31.4 PG
MCHC RBC AUTO-ENTMCNC: 33.8 %
MCV RBC AUTO: 93 FL
MICROSCOPIC COMMENT: ABNORMAL
MONOCYTES # BLD AUTO: 0.2 K/UL
MONOCYTES NFR BLD: 4.2 %
NEUTROPHILS # BLD AUTO: 4.1 K/UL
NEUTROPHILS NFR BLD: 71.6 %
NITRITE UR QL STRIP: NEGATIVE
PH UR STRIP: 8 [PH] (ref 5–8)
PLATELET # BLD AUTO: 261 K/UL
PLATELET BLD QL SMEAR: ABNORMAL
PMV BLD AUTO: 8.2 FL
POTASSIUM SERPL-SCNC: 4.8 MMOL/L
PROT UR QL STRIP: ABNORMAL
RBC # BLD AUTO: 3.48 M/UL
RBC #/AREA URNS HPF: 40 /HPF (ref 0–4)
SODIUM SERPL-SCNC: 141 MMOL/L
SP GR UR STRIP: 1.01 (ref 1–1.03)
SQUAMOUS #/AREA URNS HPF: 2 /HPF
URN SPEC COLLECT METH UR: ABNORMAL
UROBILINOGEN UR STRIP-ACNC: NEGATIVE EU/DL
WBC # BLD AUTO: 5.8 K/UL
WBC #/AREA URNS HPF: 10 /HPF (ref 0–5)

## 2017-01-18 PROCEDURE — 81025 URINE PREGNANCY TEST: CPT | Performed by: EMERGENCY MEDICINE

## 2017-01-18 PROCEDURE — 25000003 PHARM REV CODE 250: Performed by: EMERGENCY MEDICINE

## 2017-01-18 PROCEDURE — 63600175 PHARM REV CODE 636 W HCPCS: Performed by: EMERGENCY MEDICINE

## 2017-01-18 PROCEDURE — 96372 THER/PROPH/DIAG INJ SC/IM: CPT

## 2017-01-18 PROCEDURE — 85025 COMPLETE CBC W/AUTO DIFF WBC: CPT

## 2017-01-18 PROCEDURE — 36415 COLL VENOUS BLD VENIPUNCTURE: CPT

## 2017-01-18 PROCEDURE — 80048 BASIC METABOLIC PNL TOTAL CA: CPT

## 2017-01-18 PROCEDURE — 99284 EMERGENCY DEPT VISIT MOD MDM: CPT | Mod: 25

## 2017-01-18 PROCEDURE — 81000 URINALYSIS NONAUTO W/SCOPE: CPT

## 2017-01-18 RX ORDER — ONDANSETRON 4 MG/1
4 TABLET, ORALLY DISINTEGRATING ORAL
Status: COMPLETED | OUTPATIENT
Start: 2017-01-18 | End: 2017-01-18

## 2017-01-18 RX ORDER — ONDANSETRON 4 MG/1
4 TABLET, ORALLY DISINTEGRATING ORAL EVERY 8 HOURS PRN
Qty: 12 TABLET | Refills: 0 | Status: SHIPPED | OUTPATIENT
Start: 2017-01-18 | End: 2017-02-20

## 2017-01-18 RX ORDER — ONDANSETRON 2 MG/ML
4 INJECTION INTRAMUSCULAR; INTRAVENOUS
Status: DISCONTINUED | OUTPATIENT
Start: 2017-01-18 | End: 2017-01-18

## 2017-01-18 RX ORDER — OXYCODONE AND ACETAMINOPHEN 5; 325 MG/1; MG/1
1 TABLET ORAL EVERY 6 HOURS PRN
Qty: 15 TABLET | Refills: 0 | Status: SHIPPED | OUTPATIENT
Start: 2017-01-18 | End: 2017-02-03

## 2017-01-18 RX ORDER — HYDROMORPHONE HYDROCHLORIDE 1 MG/ML
1 INJECTION, SOLUTION INTRAMUSCULAR; INTRAVENOUS; SUBCUTANEOUS EVERY 30 MIN PRN
Status: DISCONTINUED | OUTPATIENT
Start: 2017-01-18 | End: 2017-01-18

## 2017-01-18 RX ORDER — HYDROMORPHONE HYDROCHLORIDE 1 MG/ML
1 INJECTION, SOLUTION INTRAMUSCULAR; INTRAVENOUS; SUBCUTANEOUS
Status: COMPLETED | OUTPATIENT
Start: 2017-01-18 | End: 2017-01-18

## 2017-01-18 RX ADMIN — ONDANSETRON 4 MG: 4 TABLET, ORALLY DISINTEGRATING ORAL at 04:01

## 2017-01-18 RX ADMIN — HYDROMORPHONE HYDROCHLORIDE 1 MG: 1 INJECTION, SOLUTION INTRAMUSCULAR; INTRAVENOUS; SUBCUTANEOUS at 04:01

## 2017-01-18 NOTE — ED PROVIDER NOTES
Encounter Date: 1/18/2017    SCRIBE #1 NOTE: I, Satya Stallworth, am scribing for, and in the presence of, Dr. Burns.       History     Chief Complaint   Patient presents with    Flank Pain     L flank pain since this am with low abdominal pain.     Review of patient's allergies indicates:   Allergen Reactions    Pcn [penicillins] Anaphylaxis     HPI Comments: 01/18/2017  2:46 PM     Chief Complaint: L Flank pain      Cesilia Dozier is a 36 y.o. female with a PMHx of Cervical cancer; Hydronephrosis; PONV; and Pyelonephritis who is presenting to the ED with a gradual onset of acute on chronic L flank pain beginning 5 hrs ago. The pt reported she has been hospitalized multiple times for similar symptoms but her symptoms don't improve after discharge. She stated she visited the ED 2 days ago for similar symptoms but her symptoms didn't improve. The pt noted her pain is similar to previous Pyelonephritis exacerbations but her current symptoms are worse than usual. Associated symptoms of suprapubic abdominal pain, nausea, dysuria, yellow vomiting, chills and decreased appetite. She denied hematuria and fever. The pt provided percocet didn't improve her symptoms. She has a past surgical history that includes Hysterectomy; Tubal ligation; Ureteral stent placement; and Colonoscopy.    The history is provided by the patient.     Past Medical History   Diagnosis Date    Cervical cancer      cervical    Hydronephrosis     PONV (postoperative nausea and vomiting)     Pyelonephritis      No past medical history pertinent negatives.  Past Surgical History   Procedure Laterality Date    Hysterectomy       Partial    Tubal ligation      Ureteral stent placement  07/2016    Colonoscopy N/A 9/22/2016     Procedure: COLONOSCOPY;  Surgeon: Joe Moe MD;  Location: [facility];  Service: Endoscopy;  Laterality: N/A;     Family History   Problem Relation Age of Onset    No Known Problems Mother     Drug abuse Father      No Known Problems Sister     Asthma Brother      Social History   Substance Use Topics    Smoking status: Never Smoker    Smokeless tobacco: Never Used    Alcohol use No     Review of Systems   Constitutional: Positive for appetite change (decreased) and chills. Negative for fever.   HENT: Negative for sore throat.    Respiratory: Negative for shortness of breath.    Cardiovascular: Negative for chest pain.   Gastrointestinal: Positive for abdominal pain (suprapubic), nausea and vomiting (yellow).   Genitourinary: Positive for dysuria and flank pain (left). Negative for hematuria.   Musculoskeletal: Negative for back pain.   Skin: Negative for rash.   Neurological: Negative for weakness.   Hematological: Does not bruise/bleed easily.       Physical Exam   Initial Vitals   BP Pulse Resp Temp SpO2   01/18/17 1337 01/18/17 1337 01/18/17 1337 01/18/17 1337 01/18/17 1337   120/74 90 12 98.5 °F (36.9 °C) 100 %     Physical Exam    Nursing note and vitals reviewed.  Constitutional: She appears well-developed.   HENT:   Head: Normocephalic and atraumatic.   Mouth/Throat: Oropharynx is clear and moist.   Eyes: Conjunctivae are normal.   Neck: Neck supple.   Cardiovascular: Normal rate, regular rhythm, normal heart sounds and intact distal pulses. Exam reveals no gallop and no friction rub.    No murmur heard.  Pulses:       Radial pulses are 2+ on the right side, and 2+ on the left side.        Dorsalis pedis pulses are 2+ on the right side, and 2+ on the left side.        Posterior tibial pulses are 2+ on the right side, and 2+ on the left side.   Pulmonary/Chest: Breath sounds normal. She has no wheezes. She has no rhonchi. She has no rales.   Abdominal: Soft. She exhibits no distension.   Minor L flank TTP  Mild lower abdominal tenderness with no involuntary guarding and no palpable hernia present   Musculoskeletal: Normal range of motion.   Neurological: She is alert and oriented to person, place, and time.    Skin: No rash noted. No erythema.   Psychiatric: She has a normal mood and affect.         ED Course   Procedures  Labs Reviewed   URINALYSIS - Abnormal; Notable for the following:        Result Value    Appearance, UA Hazy (*)     Protein, UA 1+ (*)     Occult Blood UA 3+ (*)     Leukocytes, UA 2+ (*)     All other components within normal limits   URINALYSIS MICROSCOPIC - Abnormal; Notable for the following:     RBC, UA 40 (*)     WBC, UA 10 (*)     Bacteria, UA Few (*)     All other components within normal limits   CBC W/ AUTO DIFFERENTIAL - Abnormal; Notable for the following:     RBC 3.48 (*)     Hemoglobin 10.9 (*)     Hematocrit 32.3 (*)     MCH 31.4 (*)     RDW 17.2 (*)     MPV 8.2 (*)     Mono # 0.2 (*)     All other components within normal limits   BASIC METABOLIC PANEL - Abnormal; Notable for the following:     Creatinine 1.8 (*)     eGFR if  41 (*)     eGFR if non  35 (*)     All other components within normal limits   POCT URINE PREGNANCY                        Scribe Attestation:   Scribe #1: I performed the above scribed service and the documentation accurately describes the services I performed. I attest to the accuracy of the note.    Attending Attestation:           Physician Attestation for Scribe:  Physician Attestation Statement for Scribe #1: I, Dr. Burns, reviewed documentation, as scribed by Satya Stallworth in my presence, and it is both accurate and complete.         Cesilia Dozier is a 36 y.o. female presenting with persistent left sided and left flank abdominal pain as well as bilateral lower abdominal pain consistent with discomfort from known ureteral stents in place secondary to hydronephrosis from sequela of prior radiation therapy for cervical cancer.  She has planned follow-up with urology.  Pain was ultimately controlled with intramuscular hydromorphone as peripheral IV access was unable to be easily obtained.  Patient opted to forego IV fluids  as she is tolerating liquids here and IV access is difficult.  Other labs show no sign of significant dehydration.  CT suggests nonspecific colitis.  This may be monitored and do not think further emergent treatment is indicated.  Renal stents appear in position with no change in no hydronephrosis.  No sign of other emergent intra-abdominal process such as diverticulitis, abscess, perforation, obstruction.  Prescriptions for additional opioids and antiemetics pending urology follow-up given.  Mild positive markers and urine noted but patient has had numerous similar false positives with corresponding negative urine cultures.  I doubt pyelonephritis and do not think antibiotics are indicated.  Detailed return precautions reviewed        ED Course     Clinical Impression:   The primary encounter diagnosis was Lower abdominal pain. Diagnoses of Left flank pain and Hydronephrosis, unspecified hydronephrosis type were also pertinent to this visit.          Piotr Burns MD  01/18/17 5904

## 2017-01-18 NOTE — ED AVS SNAPSHOT
[facility] [address]  [place] [address]               Cesilia Dozier   2017  1:44 PM   ED    Description:  Female : 1980   Department:  [facility]           Your Care was Coordinated By:     Provider Role From To    Piotr Burns MD Attending Provider 17 1410 --      Reason for Visit     Flank Pain           Diagnoses this Visit        Comments    Lower abdominal pain    -  Primary     Left flank pain         Hydronephrosis, unspecified hydronephrosis type           ED Disposition     ED Disposition Condition Comment    Discharge             To Do List           Follow-up Information     Follow up with Aleisha Walsh MD.    Specialty:  Urology    Why:  or preferably previously planned urologist, 1 week    Contact information:    [address] [facility]  [address]  [place] [address]  [phone]         These Medications        Disp Refills Start End    oxycodone-acetaminophen (PERCOCET) 5-325 mg per tablet 15 tablet 0 2017     Take 1 tablet by mouth every 6 (six) hours as needed for Pain. Take no more than 4 grams of tylenol for each 24 hour period.  This medication contains tylenol. - Oral    Pharmacy: [facility] [address] [place] [address] Ph #: [phone]       ondansetron (ZOFRAN-ODT) 4 MG TbDL 12 tablet 0 2017     Take 1 tablet (4 mg total) by mouth every 8 (eight) hours as needed. - Oral    Pharmacy: [facility] [address] [place] [address] Ph #: [phone]         [facility]     [facility] -  Assistance  Registered nurses in the [facility] provide clinical advisement, health education, appointment booking, and other advisory services.  Call for this free service at [phone].             Medications           Message regarding Medications     Verify  the changes and/or additions to your medication regime listed below are the same as discussed with your clinician today.  If any of these changes or additions are incorrect, please notify your healthcare provider.        START taking these NEW medications        Refills    oxycodone-acetaminophen (PERCOCET) 5-325 mg per tablet 0    Sig: Take 1 tablet by mouth every 6 (six) hours as needed for Pain. Take no more than 4 grams of tylenol for each 24 hour period.  This medication contains tylenol.    Class: Print    Route: Oral    ondansetron (ZOFRAN-ODT) 4 MG TbDL 0    Sig: Take 1 tablet (4 mg total) by mouth every 8 (eight) hours as needed.    Class: Print    Route: Oral      These medications were administered today        Dose Freq    hydromorphone (PF) injection 1 mg 1 mg ED 1 Time    Sig: Inject 1 mL (1 mg total) into the muscle ED 1 Time.    Class: Normal    Route: Intramuscular    ondansetron disintegrating tablet 4 mg 4 mg ED 1 Time    Sig: Take 1 tablet (4 mg total) by mouth ED 1 Time.    Class: Normal    Route: Oral      STOP taking these medications     ferrous gluconate (FERGON) 324 MG tablet Take 1 tablet (324 mg total) by mouth 2 (two) times daily with meals.    promethazine (PHENERGAN) 25 MG suppository Place 1 suppository (25 mg total) rectally every 6 (six) hours as needed for Nausea.    promethazine (PHENERGAN) 25 MG tablet Take 1 tablet (25 mg total) by mouth every 6 (six) hours as needed for Nausea.           Verify that the below list of medications is an accurate representation of the medications you are currently taking.  If none reported, the list may be blank. If incorrect, please contact your healthcare provider. Carry this list with you in case of emergency.           Current Medications     ciprofloxacin HCl (CIPRO) 500 MG tablet Take 1 tablet (500 mg total) by mouth 2 (two) times daily.    hydromorphone (PF) injection 1 mg Inject 1 mL (1 mg total) into the muscle ED 1 Time.    ondansetron  "(ZOFRAN-ODT) 4 MG TbDL Take 1 tablet (4 mg total) by mouth every 8 (eight) hours as needed.    ondansetron disintegrating tablet 4 mg Take 1 tablet (4 mg total) by mouth ED 1 Time.    oxybutynin (DITROPAN) 5 MG Tab Take 1 tablet (5 mg total) by mouth 3 (three) times daily.    oxycodone-acetaminophen (PERCOCET) 5-325 mg per tablet Take 1 tablet by mouth every 6 (six) hours as needed for Pain. Take no more than 4 grams of tylenol for each 24 hour period.  This medication contains tylenol.           Clinical Reference Information           Your Vitals Were     BP Pulse Temp Resp Height Weight    120/74 (BP Location: Right arm, Patient Position: Sitting) 90 98.5 °F (36.9 °C) (Oral) 12 5' 7" (1.702 m) 77.1 kg (170 lb)    SpO2 BMI             100% 26.63 kg/m2         Allergies as of 1/18/2017        Reactions    Pcn [Penicillins] Anaphylaxis      Immunizations Administered on Date of Encounter - 1/18/2017     None      ED Micro, Lab, POCT     Start Ordered       Status Ordering Provider    01/18/17 1500 01/18/17 1500  CBC auto differential  STAT      Preliminary result     01/18/17 1500 01/18/17 1500  Basic metabolic panel  STAT      Final result     01/18/17 1403 01/18/17 1402  Urinalysis  STAT      Final result     01/18/17 1402 01/18/17 1402  Urinalysis Microscopic  Once      Final result     01/18/17 1401 01/18/17 1400  POCT urine pregnancy  Once      Final result       ED Imaging Orders     Start Ordered       Status Ordering Provider    01/18/17 1500 01/18/17 1500  CT Renal Stone Study ABD Pelvis WO  1 time imaging      Final result       Discharge References/Attachments     URETERAL STENTS (ENGLISH)      Your Scheduled Appointments     Jan 24, 2017  3:00 PM CST   Consult with Turner Bocanegra MD   [facility]    [address] [place] [address], [address] [id number] [phone]               [facility] complies with applicable Federal civil rights laws and does not " discriminate on the basis of race, color, national origin, age, disability, or sex.        Language Assistance Services     ATTENTION: Language assistance services are available, free of charge. Please call [phone].      ATENCIÓN: Si juventino sarabia, tiene a batista disposición servicios gratuitos de asistencia lingüística. Llame al [phone].     CHÚ Ý: N?u b?n nói Ti?ng Vi?t, có các d?ch v? h? tr? ngôn ng? mi?n phí dành cho b?n. G?i s? [phone].

## 2017-01-24 ENCOUNTER — OFFICE VISIT (OUTPATIENT)
Dept: UROLOGY | Facility: CLINIC | Age: 37
End: 2017-01-24
Attending: UROLOGY
Payer: MEDICAID

## 2017-01-24 VITALS
BODY MASS INDEX: 25.45 KG/M2 | SYSTOLIC BLOOD PRESSURE: 107 MMHG | HEART RATE: 97 BPM | DIASTOLIC BLOOD PRESSURE: 75 MMHG | WEIGHT: 162.13 LBS | HEIGHT: 67 IN

## 2017-01-24 DIAGNOSIS — C53.9 CERVICAL CARCINOMA: ICD-10-CM

## 2017-01-24 DIAGNOSIS — N13.30 BILATERAL HYDRONEPHROSIS: Primary | ICD-10-CM

## 2017-01-24 LAB
BILIRUB SERPL-MCNC: ABNORMAL MG/DL
BLOOD URINE, POC: 250
COLOR, POC UA: ABNORMAL
GLUCOSE UR QL STRIP: ABNORMAL
KETONES UR QL STRIP: ABNORMAL
LEUKOCYTE ESTERASE URINE, POC: ABNORMAL
NITRITE, POC UA: ABNORMAL
PH, POC UA: 8
PROTEIN, POC: ABNORMAL
SPECIFIC GRAVITY, POC UA: 1
UROBILINOGEN, POC UA: ABNORMAL

## 2017-01-24 PROCEDURE — 99214 OFFICE O/P EST MOD 30 MIN: CPT | Mod: S$PBB,,, | Performed by: UROLOGY

## 2017-01-24 PROCEDURE — 81002 URINALYSIS NONAUTO W/O SCOPE: CPT | Mod: PBBFAC | Performed by: UROLOGY

## 2017-01-24 PROCEDURE — 99213 OFFICE O/P EST LOW 20 MIN: CPT | Mod: PBBFAC | Performed by: UROLOGY

## 2017-01-24 PROCEDURE — 99999 PR PBB SHADOW E&M-EST. PATIENT-LVL III: CPT | Mod: PBBFAC,,, | Performed by: UROLOGY

## 2017-01-24 RX ORDER — TOLTERODINE 4 MG/1
4 CAPSULE, EXTENDED RELEASE ORAL DAILY
Qty: 30 CAPSULE | Refills: 11 | Status: SHIPPED | OUTPATIENT
Start: 2017-01-24 | End: 2017-07-03

## 2017-01-24 RX ORDER — TAMSULOSIN HYDROCHLORIDE 0.4 MG/1
0.4 CAPSULE ORAL DAILY
Qty: 30 CAPSULE | Refills: 11 | Status: SHIPPED | OUTPATIENT
Start: 2017-01-24 | End: 2017-02-20

## 2017-01-24 NOTE — PROGRESS NOTES
"Subjective:      Cesilia Dozier is a 36 y.o. female who returns today regarding her ureteral stents.    She has h/o cervical cancer, treated elsewhere, w/ subsequent bilateral hydro treated in the past w/ PCNs and most recently w/ stents. Stents last changed by Dr. Ball at [facility] in December. Etiology and location of obstruction is unclear.    Recently she has had severe pain d/t stents described as left flank pain and lower pelvic pain/pressure. Symptoms are not relieved w/ percocet. She has not been on ACH. She has had 2 CTs in ER since last stent placement that confirmed good position.    The following portions of the patient's history were reviewed and updated as appropriate: allergies, current medications, past family history, past medical history, past social history, past surgical history and problem list.    Review of Systems  A comprehensive multipoint review of systems was negative except as otherwise stated in the HPI.     Objective:   Vitals:   Visit Vitals    /75 (BP Location: Left arm, Patient Position: Sitting, BP Method: Automatic)    Pulse 97    Ht 5' 7" (1.702 m)    Wt 73.5 kg (162 lb 2.4 oz)    BMI 25.4 kg/m2       Physical Exam   General: alert and oriented, no acute distress  Respiratory: Symmetric expansion, non-labored breathing  Cardiovascular: no peripheral edema  Abdomen: soft, non distended  Skin: normal coloration and turgor, no rashes, no suspicious skin lesions noted  Neuro: no gross deficits  Psych: normal judgment and insight, normal mood/affect and non-anxious    Lab Review   Urinalysis demonstrates positive for red blood cells  Lab Results   Component Value Date    WBC 5.80 01/18/2017    HGB 10.9 (L) 01/18/2017    HCT 32.3 (L) 01/18/2017    MCV 93 01/18/2017     01/18/2017     Lab Results   Component Value Date    CREATININE 1.8 (H) 01/18/2017    BUN 14 01/18/2017       Imaging (all images personally reviewed; agree with report below)  Results for orders placed " during the hospital encounter of 01/18/17   CT Renal Stone Study ABD Pelvis WO    Narrative CT Abdomen and Pelvis without contrast    Comparison: 12/31/2016    Technique:  Helically acquired axial images of the abdomen and pelvis were acquired without contrast.   Reformatted coronal and sagittal images provided.    FINDINGS: The liver, spleen, gallbladder, pancreas, and adrenal glands are unremarkable.    Bilateral ureteral stents are present and remain appropriately positioned.  There is mild bilateral hydronephrosis which is unchanged.  No renal or ureteral stones are present.  The urinary bladder is unremarkable.    Small bowel is normal in caliber.  There is a small fat containing periumbilical hernia.  The appendix is normal.    The colon is largely contracted, however there is relative mild mural thickening of the transverse and descending colon, associated with pericolonic fat stranding most pronounced about the splenic flexure, compatible with a nonspecific colitis.    No free air, free fluid, or lymphadenopathy.    There has been a hysterectomy.  The bones are unremarkable.    Impression 1.  Nonspecific colitis, most pronounced along the splenic flexure.  2.  Bilateral ureteral stents in stable position, with unchanged mild bilateral hydronephrosis.      Electronically signed by: Varun Martins MD  Date:     01/18/17  Time:    15:36           Assessment and Plan:   1. Bilateral hydronephrosis  - Reinforced need for stents for now to preserve renal function  - Due for change in March; will schedule next visit  - Long term, would likely benefit from reimplant, though she will need further workup prior to proceeding.    2. Stent pain/Bladder spasms  - Likely 2/2 bladder spasms  - Discouraged narcotics which are ineffective and can make spasms worse d/t constipation  - Detrol 4mg daily  - Flomax 0.4mg daily    3. Cervical carcinoma  - Ambulatory consult to Gynecologic Oncology  - Will need to be cleared of  need for additional treatment before we can proceed w/ possible reconstructive surgery    FU 1 month

## 2017-01-31 ENCOUNTER — TELEPHONE (OUTPATIENT)
Dept: UROLOGY | Facility: CLINIC | Age: 37
End: 2017-01-31

## 2017-01-31 NOTE — TELEPHONE ENCOUNTER
----- Message from Michelle Carlson sent at 1/31/2017 12:49 PM CST -----  Contact: pt  _  1st Request  _  2nd Request  _  3rd Request        Who: pt    Why: tolterodine (DETROL LA) 4 MG 24 hr capsule -is not helping for bladder spasms. She is in pain    What Number to Call Back:[phone]    When to Expect a call back: (Before the end of the day)   -- if the call is after 12:00, the call back will be tomorrow.

## 2017-01-31 NOTE — TELEPHONE ENCOUNTER
Unable to speak with pt but she stated that the rx medication to help with bladder spasms isn't helping. Please advise.

## 2017-02-01 NOTE — TELEPHONE ENCOUNTER
Spoke with pt informed her as to what  . Pt agreed and said she will continue taking the medication and give it more time. Pt also states that if she has any changes or concerns she will give a call back.

## 2017-02-02 ENCOUNTER — HOSPITAL ENCOUNTER (EMERGENCY)
Facility: HOSPITAL | Age: 37
Discharge: HOME | End: 2017-02-03
Attending: EMERGENCY MEDICINE
Payer: MEDICAID

## 2017-02-02 ENCOUNTER — TELEPHONE (OUTPATIENT)
Dept: UROLOGY | Facility: CLINIC | Age: 37
End: 2017-02-02

## 2017-02-02 DIAGNOSIS — R10.9 CHRONIC FLANK PAIN: Primary | ICD-10-CM

## 2017-02-02 DIAGNOSIS — G89.29 CHRONIC FLANK PAIN: Primary | ICD-10-CM

## 2017-02-02 DIAGNOSIS — T83.84XA: ICD-10-CM

## 2017-02-02 DIAGNOSIS — N18.4 CKD (CHRONIC KIDNEY DISEASE) STAGE 4, GFR 15-29 ML/MIN: ICD-10-CM

## 2017-02-02 PROCEDURE — 96375 TX/PRO/DX INJ NEW DRUG ADDON: CPT

## 2017-02-02 PROCEDURE — 81025 URINE PREGNANCY TEST: CPT | Performed by: EMERGENCY MEDICINE

## 2017-02-02 PROCEDURE — 96366 THER/PROPH/DIAG IV INF ADDON: CPT

## 2017-02-02 PROCEDURE — 96376 TX/PRO/DX INJ SAME DRUG ADON: CPT

## 2017-02-02 PROCEDURE — 87086 URINE CULTURE/COLONY COUNT: CPT

## 2017-02-02 PROCEDURE — 81000 URINALYSIS NONAUTO W/SCOPE: CPT

## 2017-02-02 PROCEDURE — 96365 THER/PROPH/DIAG IV INF INIT: CPT

## 2017-02-02 PROCEDURE — 99284 EMERGENCY DEPT VISIT MOD MDM: CPT | Mod: 25

## 2017-02-02 RX ORDER — HYDROMORPHONE HYDROCHLORIDE 2 MG/ML
1 INJECTION, SOLUTION INTRAMUSCULAR; INTRAVENOUS; SUBCUTANEOUS
Status: COMPLETED | OUTPATIENT
Start: 2017-02-02 | End: 2017-02-03

## 2017-02-02 NOTE — TELEPHONE ENCOUNTER
I spoke to pt.  She is crying and states her pain is a 10.  I instructed her to go to [facility] ER for evaluation.  She has bilateral ureteral stents and cervical cancer.  She agreed to go to ER.

## 2017-02-02 NOTE — TELEPHONE ENCOUNTER
----- Message from Michelle Carlson sent at 2/2/2017  4:02 PM CST -----  Contact: pt  _  1st Request  _  2nd Request  _  3rd Request        Who: pt    Why: pt is urinating blood with pain. She says that the pain is a 10. Leaving an urgent message. Please call the pt and give advise    What Number to Call Back:pt [phone]    When to Expect a call back: (Before the end of the day)   -- if the call is after 12:00, the call back will be tomorrow.

## 2017-02-02 NOTE — ED AVS SNAPSHOT
[facility]  [place] [address]               Cesilia Dozier   2017 10:53 PM   ED    Description:  Female : 1980   Department:  [facility]           Your Care was Coordinated By:     Provider Role From To    Shaniqua Perkins MD Attending Provider [phone] --      Reason for Visit     Hematuria           Diagnoses this Visit        Comments    Chronic flank pain    -  Primary     Pain due to ureteral stent         CKD (chronic kidney disease) stage 4, GFR 15-29 ml/min           ED Disposition     ED Disposition Condition Comment    Discharge             To Do List           Follow-up Information     Follow up with Turner Bocanegra MD In 3 days.    Specialty:  Urology    Contact information:    [address] [facility] [address]  [facility] [id number] [phone]         These Medications        Disp Refills Start End    oxycodone-acetaminophen (PERCOCET) 5-325 mg per tablet 20 tablet 0 2/3/2017     Take 1 tablet by mouth every 6 (six) hours as needed for Pain. Take no more than 4 grams of tylenol for each 24 hour period.  This medication contains tylenol. - Oral    Pharmacy: [facility]  [facility] [facility]  [place] Ph #: [phone]       promethazine (PHENERGAN) 25 MG tablet 15 tablet 0 2/3/2017     Take 1 tablet (25 mg total) by mouth every 6 (six) hours as needed for Nausea. - Oral    Pharm[facility]: [facility] [address] [facility]  [place] Ph #: [phone]         [facility] On Call     [facility] -  Assistance  Registered nurses in the [facility] provide clinical advisement, health education, appointment booking, and other advisory services.  Call for this free service at [phone].             Medications           Message regarding Medications     Verify the changes and/or  additions to your medication regime listed below are the same as discussed with your clinician today.  If any of these changes or additions are incorrect, please notify your healthcare provider.        START taking these NEW medications        Refills    promethazine (PHENERGAN) 25 MG tablet 0    Sig: Take 1 tablet (25 mg total) by mouth every 6 (six) hours as needed for Nausea.    Class: Print    Route: Oral      These medications were administered today        Dose Freq    sodium chloride 0.9% bolus 1,000 mL 1,000 mL ED 1 Time    Sig: Inject 1,000 mLs into the vein ED 1 Time.    Class: Normal    Route: Intravenous    promethazine (PHENERGAN) 25 mg in dextrose 5 % 50 mL IVPB 25 mg ED 1 Time    Sig: Inject 25 mg into the vein ED 1 Time.    Class: Normal    Route: Intravenous    hydromorphone (PF) injection 1 mg 1 mg ED 1 Time    Sig: Inject 0.5 mLs (1 mg total) into the vein ED 1 Time.    Class: Normal    Route: Intravenous    hydromorphone (PF) injection 1 mg 1 mg ED 1 Time    Sig: Inject 0.5 mLs (1 mg total) into the vein ED 1 Time.    Class: Normal    Route: Intravenous           Verify that the below list of medications is an accurate representation of the medications you are currently taking.  If none reported, the list may be blank. If incorrect, please contact your healthcare provider. Carry this list with you in case of emergency.           Current Medications     tolterodine (DETROL LA) 4 MG 24 hr capsule Take 1 capsule (4 mg total) by mouth once daily.    hydromorphone (PF) injection 1 mg Inject 0.5 mLs (1 mg total) into the vein ED 1 Time.    ondansetron (ZOFRAN-ODT) 4 MG TbDL Take 1 tablet (4 mg total) by mouth every 8 (eight) hours as needed.    oxycodone-acetaminophen (PERCOCET) 5-325 mg per tablet Take 1 tablet by mouth every 6 (six) hours as needed for Pain. Take no more than 4 grams of tylenol for each 24 hour period.  This medication contains tylenol.    promethazine (PHENERGAN) 25 MG tablet Take 1  "tablet (25 mg total) by mouth every 6 (six) hours as needed for Nausea.    tamsulosin (FLOMAX) 0.4 mg Cp24 Take 1 capsule (0.4 mg total) by mouth once daily.           Clinical Reference Information           Your Vitals Were     BP Pulse Temp Resp Height Weight    118/79 79 97.9 °F (36.6 °C) 18 5' 8" (1.727 m) 77.1 kg (170 lb)    SpO2 BMI             98% 25.85 kg/m2         Allergies as of 2/3/2017        Reactions    Pcn [Penicillins] Anaphylaxis      Immunizations Administered on Date of Encounter - 2/3/2017     None      ED Micro, Lab, POCT     Start Ordered       Status Ordering Provider    02/02/17 2312 02/02/17 2312  CBC auto differential  STAT      Final result     02/02/17 2312 02/02/17 2312  Comprehensive metabolic panel  STAT      Final result     02/02/17 2312 02/02/17 2312  Lipase  STAT      Final result     02/02/17 2243 02/02/17 2242  POCT urine pregnancy  Once      Final result     02/02/17 2243 02/02/17 2242  Urinalysis  STAT      Final result     02/02/17 2243 02/02/17 2242  Urine culture **CANNOT BE ORDERED STAT**  Once      In process     02/02/17 2242 02/02/17 2242  Urinalysis Microscopic  Once      Final result       ED Imaging Orders     Start Ordered       Status Ordering Provider    02/03/17 0012 02/03/17 0011  X-Ray Abdomen Flat And Erect  1 time imaging      Final result       Discharge References/Attachments     URETERAL STENTS (ENGLISH)      Your Scheduled Appointments     Mar 02, 2017  9:00 AM CST   Established Patient Visit with Turner Bocanegra MD   [facility] ([place])    [address] [place] [address], [address]  [facility] [id number] [phone]               [facility] complies with applicable Federal civil rights laws and does not discriminate on the basis of race, color, national origin, age, disability, or sex.        Language Assistance Services     ATTENTION: Language assistance services are available, free of charge. Please call [phone].  "     ATENCIÓN: Si habla español, tiene a batista disposición servicios gratuitos de asistencia lingüística. Llame al [phone].     CHÚ Ý: N?u b?n nói Ti?ng Vi?t, có các d?ch v? h? tr? ngôn ng? mi?n phí dành cho b?n. G?i s? [phone].

## 2017-02-03 VITALS
WEIGHT: 170 LBS | HEART RATE: 82 BPM | SYSTOLIC BLOOD PRESSURE: 119 MMHG | RESPIRATION RATE: 16 BRPM | HEIGHT: 68 IN | BODY MASS INDEX: 25.76 KG/M2 | DIASTOLIC BLOOD PRESSURE: 75 MMHG | OXYGEN SATURATION: 98 % | TEMPERATURE: 98 F

## 2017-02-03 LAB
ALBUMIN SERPL BCP-MCNC: 3.8 G/DL
ALP SERPL-CCNC: 66 U/L
ALT SERPL W/O P-5'-P-CCNC: 10 U/L
ANION GAP SERPL CALC-SCNC: 9 MMOL/L
AST SERPL-CCNC: 13 U/L
B-HCG UR QL: NEGATIVE
BACTERIA #/AREA URNS HPF: ABNORMAL /HPF
BASOPHILS # BLD AUTO: 0.04 K/UL
BASOPHILS NFR BLD: 0.6 %
BILIRUB SERPL-MCNC: 0.5 MG/DL
BILIRUB UR QL STRIP: NEGATIVE
BUN SERPL-MCNC: 18 MG/DL
CALCIUM SERPL-MCNC: 9.1 MG/DL
CHLORIDE SERPL-SCNC: 108 MMOL/L
CLARITY UR: ABNORMAL
CO2 SERPL-SCNC: 25 MMOL/L
COLOR UR: ABNORMAL
CREAT SERPL-MCNC: 1.9 MG/DL
CTP QC/QA: YES
DIFFERENTIAL METHOD: ABNORMAL
EOSINOPHIL # BLD AUTO: 0.4 K/UL
EOSINOPHIL NFR BLD: 5.4 %
ERYTHROCYTE [DISTWIDTH] IN BLOOD BY AUTOMATED COUNT: 14.9 %
EST. GFR  (AFRICAN AMERICAN): 38 ML/MIN/1.73 M^2
EST. GFR  (NON AFRICAN AMERICAN): 33 ML/MIN/1.73 M^2
GLUCOSE SERPL-MCNC: 93 MG/DL
GLUCOSE UR QL STRIP: NEGATIVE
HCT VFR BLD AUTO: 31.9 %
HGB BLD-MCNC: 10.6 G/DL
HGB UR QL STRIP: ABNORMAL
HYALINE CASTS #/AREA URNS LPF: 0 /LPF
KETONES UR QL STRIP: NEGATIVE
LEUKOCYTE ESTERASE UR QL STRIP: ABNORMAL
LIPASE SERPL-CCNC: 25 U/L
LYMPHOCYTES # BLD AUTO: 1.4 K/UL
LYMPHOCYTES NFR BLD: 20.8 %
MCH RBC QN AUTO: 31.7 PG
MCHC RBC AUTO-ENTMCNC: 33.2 %
MCV RBC AUTO: 96 FL
MICROSCOPIC COMMENT: ABNORMAL
MONOCYTES # BLD AUTO: 0.5 K/UL
MONOCYTES NFR BLD: 7.1 %
NEUTROPHILS # BLD AUTO: 4.4 K/UL
NEUTROPHILS NFR BLD: 65.9 %
NITRITE UR QL STRIP: NEGATIVE
PH UR STRIP: 6 [PH] (ref 5–8)
PLATELET # BLD AUTO: 206 K/UL
PMV BLD AUTO: 10.1 FL
POTASSIUM SERPL-SCNC: 4.3 MMOL/L
PROT SERPL-MCNC: 6.9 G/DL
PROT UR QL STRIP: ABNORMAL
RBC # BLD AUTO: 3.34 M/UL
RBC #/AREA URNS HPF: >100 /HPF (ref 0–4)
SODIUM SERPL-SCNC: 142 MMOL/L
SP GR UR STRIP: 1.01 (ref 1–1.03)
URN SPEC COLLECT METH UR: ABNORMAL
UROBILINOGEN UR STRIP-ACNC: NEGATIVE EU/DL
WBC # BLD AUTO: 6.64 K/UL
WBC #/AREA URNS HPF: >100 /HPF (ref 0–5)
WBC CLUMPS URNS QL MICRO: ABNORMAL

## 2017-02-03 PROCEDURE — 85025 COMPLETE CBC W/AUTO DIFF WBC: CPT

## 2017-02-03 PROCEDURE — 25000003 PHARM REV CODE 250: Performed by: EMERGENCY MEDICINE

## 2017-02-03 PROCEDURE — 63600175 PHARM REV CODE 636 W HCPCS: Performed by: EMERGENCY MEDICINE

## 2017-02-03 PROCEDURE — 83690 ASSAY OF LIPASE: CPT

## 2017-02-03 PROCEDURE — 80053 COMPREHEN METABOLIC PANEL: CPT

## 2017-02-03 RX ORDER — PROMETHAZINE HYDROCHLORIDE 25 MG/1
25 TABLET ORAL EVERY 6 HOURS PRN
Qty: 15 TABLET | Refills: 0 | Status: SHIPPED | OUTPATIENT
Start: 2017-02-03 | End: 2017-02-20

## 2017-02-03 RX ORDER — HYDROMORPHONE HYDROCHLORIDE 2 MG/ML
1 INJECTION, SOLUTION INTRAMUSCULAR; INTRAVENOUS; SUBCUTANEOUS
Status: COMPLETED | OUTPATIENT
Start: 2017-02-03 | End: 2017-02-03

## 2017-02-03 RX ORDER — OXYCODONE AND ACETAMINOPHEN 5; 325 MG/1; MG/1
1 TABLET ORAL EVERY 6 HOURS PRN
Qty: 20 TABLET | Refills: 0 | Status: SHIPPED | OUTPATIENT
Start: 2017-02-03 | End: 2017-02-20

## 2017-02-03 RX ADMIN — PROMETHAZINE HYDROCHLORIDE 25 MG: 25 INJECTION INTRAMUSCULAR; INTRAVENOUS at 12:02

## 2017-02-03 RX ADMIN — SODIUM CHLORIDE 1000 ML: 0.9 INJECTION, SOLUTION INTRAVENOUS at 12:02

## 2017-02-03 RX ADMIN — HYDROMORPHONE HYDROCHLORIDE 1 MG: 2 INJECTION INTRAMUSCULAR; INTRAVENOUS; SUBCUTANEOUS at 02:02

## 2017-02-03 RX ADMIN — HYDROMORPHONE HYDROCHLORIDE 1 MG: 2 INJECTION INTRAMUSCULAR; INTRAVENOUS; SUBCUTANEOUS at 12:02

## 2017-02-03 NOTE — ED TRIAGE NOTES
Yesterday pt noticed blood in urine, pt has stents in.  Pt states that she is having pain a lower abdominal and left side kidney area.  Pt has stent placement exchanges about every three months.  Dr. Bocanegra is her perm Urologist.  Dr. Ball replaced stents in December.  Pt has had N/V d/t pain.  Pt had history of cervical cancer and tumors were on the ureters.  Stents are there to keep patent

## 2017-02-03 NOTE — ED PROVIDER NOTES
"Encounter Date: 2/2/2017       History     Chief Complaint   Patient presents with    Hematuria     " I have been urinating blood and the bottom of my stomach hurts where my bladder sits at."     Review of patient's allergies indicates:   Allergen Reactions    Pcn [penicillins] Anaphylaxis     HPI Comments: This is an emergent evaluation of a 36-year-old female who presents with suprapubic pain and left-sided flank pain which is acute in onset and severe, sharp, constant.  Patient reports hematuria associated with her pain.  This began yesterday.  She has a history of bilateral ureteral stents for persistent hydronephrosis secondary to cervical cancer with mass.  Patient is now status post hysterectomy with vaginal cough but does still have bilateral ovaries.  She has had ureteral stents for some time now.  She thinks they were last changed in November or December.  She has seen urologist Dr. Bocanegra and Dr. Ball. Patient is not currently on any medications but until 2 weeks ago was on Percocet for pain.  She finished a course of ciprofloxacin around 2 weeks ago.  Her only other medication change was the addition of told her didn't around 2 weeks ago by urology.    Sexually active with 1 male partner ×15 years.    The history is provided by the patient.     Past Medical History   Diagnosis Date    Cervical cancer      cervical    Hydronephrosis     PONV (postoperative nausea and vomiting)     Pyelonephritis      No past medical history pertinent negatives.  Past Surgical History   Procedure Laterality Date    Hysterectomy       Partial    Tubal ligation      Ureteral stent placement  07/2016    Colonoscopy N/A 9/22/2016     Procedure: COLONOSCOPY;  Surgeon: Joe Moe MD;  Location: [facility];  Service: Endoscopy;  Laterality: N/A;     Family History   Problem Relation Age of Onset    No Known Problems Mother     Drug abuse Father     No Known Problems Sister     Asthma Brother      Social " History   Substance Use Topics    Smoking status: Never Smoker    Smokeless tobacco: Never Used    Alcohol use No     Review of Systems   Constitutional: Positive for chills.   HENT: Negative for rhinorrhea and sore throat.    Eyes: Negative for visual disturbance.   Respiratory: Negative for cough and shortness of breath.    Cardiovascular: Negative for chest pain.   Gastrointestinal: Positive for abdominal pain and nausea.   Genitourinary: Positive for dysuria, hematuria and pelvic pain.   Musculoskeletal: Positive for back pain (L sided low). Negative for neck stiffness.   Skin: Negative for rash.   Neurological: Negative for weakness and light-headedness.       Physical Exam   Initial Vitals   BP Pulse Resp Temp SpO2   02/02/17 2240 02/02/17 2240 02/02/17 2240 02/02/17 2240 02/02/17 2240   121/77 98 18 98.6 °F (37 °C) 96 %     Physical Exam    Nursing note and vitals reviewed.  Constitutional: She appears well-developed and well-nourished. She is not diaphoretic. No distress.   Uncomfortable appearing female.  Awake and alert.   HENT:   Head: Normocephalic and atraumatic.   Mouth/Throat: Oropharynx is clear and moist.   Eyes: Conjunctivae and EOM are normal. Pupils are equal, round, and reactive to light.   Neck: Normal range of motion. Neck supple.   Cardiovascular: Normal rate, regular rhythm, normal heart sounds and intact distal pulses. Exam reveals no gallop and no friction rub.    No murmur heard.  Pulmonary/Chest: Breath sounds normal. No respiratory distress. She has no wheezes. She has no rhonchi. She has no rales.   Abdominal: Soft. Bowel sounds are normal. She exhibits no distension. There is tenderness (suprapubic and left CVA). There is no rebound and no guarding.   Musculoskeletal: Normal range of motion. She exhibits no edema or tenderness.   Neurological: She is alert and oriented to person, place, and time. She has normal strength.   Skin: Skin is warm and dry. No erythema. No pallor.  "  Psychiatric: She has a normal mood and affect.         ED Course   Procedures  Labs Reviewed   CULTURE, URINE   URINALYSIS   CBC W/ AUTO DIFFERENTIAL   COMPREHENSIVE METABOLIC PANEL   LIPASE   POCT URINE PREGNANCY             Medical Decision Making:   History:   Old Medical Records: I decided to obtain old medical records.  Old Records Summarized: records from clinic visits and records from previous admission(s).       <> Summary of Records: From most recent hospitalization:  "37yo F with history of stage IV cervical cancer (in remission), drug-induced constipation, and internal hemorrhoids who was admitted to [facility] on 11/10/2016 (discharged 12/01) for bilateral ureteral obstruction with associated hydronephrosis. Had bilateral ureteral stent exchanged then. She presented to [facility] ER on 12/12 with complaints of severe L flank pain and nausea. She says this is consistent with stent obstruction symptoms in the past... Has complicated urologic history. She has previously been treated in [place], then [place], and now today at [facility]. She has h/o cervical cancer with chemo and XRT about two years ago. She subsequently developed ureteral strictures though the background of this is a bit unclear. She has had PCNs in place before, but not recently. She most recently was receiving care in [place] and had her stents changed an upsized to 7Fr on 11/10/16 by Dr Greco. She underwent Mag-3 renal scan AFTER that (11/28/16), which continued to show R > L obstruction."  Initial Assessment:   36-year-old female with a history of ureteral stents presents with suprapubic pain, hematuria, left flank pain, subjective fevers, nausea.  Differential Diagnosis:   Differential includes cystitis, pyelonephritis, pain from stents, dehydration, acute kidney injury, narcotic tolerance, other.  Independently Interpreted Test(s):   I have ordered and independently interpreted X-rays - see prior notes.  Clinical Tests:   Lab Tests: " Ordered and Reviewed  The following lab test(s) were unremarkable: CBC, CMP, Urinalysis, Lipase and UPT  Radiological Study: Ordered and Reviewed  ED Management:  This patient has multiple prior visits for similar symptoms but rarely is admitted.  Her urinalysis generally shows leukocytes without nitrites and cultures have been negative going back to Sept 2016 (when she grew out E faecalis).  Most recent CT abdom/pelvis on 1/18/17 showed stable hydronephrosis.  Today patient was given IV Dilaudid and Phenergan.  She was also given a normal saline 1 L bolus.  Her x-ray today shows her ureteral stents are in appropriate position.  Her labs show stable chronic CKD.  I am holding antibiotics given her prior negative cultures.  She saw urology on 1/24/17.  She needs to make a follow-up appointment with that service.  She has multiple narcotic scripts on the [facility] website.  I suspect some of her pain today is due to narcotic tolerance.  She may benefit from follow-up to pain management.  I have encouraged her to call urology office later today to discuss her ED visit.  I will discharge her on a small quantity of narcotics.  The patient understands this plan.  She understands return precautions.                   ED Course     Clinical Impression:   The primary encounter diagnosis was Chronic flank pain. Diagnoses of Pain due to ureteral stent and CKD (chronic kidney disease) stage 4, GFR 15-29 ml/min were also pertinent to this visit.          Shaniqua Perkins MD  02/03/17 0247       Shaniqua Perkins MD  02/03/17 0247

## 2017-02-05 LAB — BACTERIA UR CULT: NORMAL

## 2017-02-20 ENCOUNTER — HOSPITAL ENCOUNTER (EMERGENCY)
Facility: HOSPITAL | Age: 37
Discharge: HOME | End: 2017-02-20
Attending: EMERGENCY MEDICINE
Payer: MEDICARE

## 2017-02-20 VITALS
SYSTOLIC BLOOD PRESSURE: 118 MMHG | BODY MASS INDEX: 25.71 KG/M2 | HEIGHT: 66 IN | HEART RATE: 89 BPM | OXYGEN SATURATION: 99 % | TEMPERATURE: 98 F | RESPIRATION RATE: 18 BRPM | DIASTOLIC BLOOD PRESSURE: 82 MMHG | WEIGHT: 160 LBS

## 2017-02-20 DIAGNOSIS — R10.9 LEFT FLANK PAIN: Primary | ICD-10-CM

## 2017-02-20 DIAGNOSIS — T83.84XA PAIN DUE TO URETERAL STENT, INITIAL ENCOUNTER: ICD-10-CM

## 2017-02-20 DIAGNOSIS — R82.71 BACTERIA IN URINE: ICD-10-CM

## 2017-02-20 DIAGNOSIS — R11.2 NON-INTRACTABLE VOMITING WITH NAUSEA, UNSPECIFIED VOMITING TYPE: ICD-10-CM

## 2017-02-20 LAB
ALBUMIN SERPL BCP-MCNC: 4.3 G/DL
ALP SERPL-CCNC: 96 U/L
ALT SERPL W/O P-5'-P-CCNC: 14 U/L
ANION GAP SERPL CALC-SCNC: 11 MMOL/L
AST SERPL-CCNC: 16 U/L
BACTERIA #/AREA URNS HPF: ABNORMAL /HPF
BASOPHILS # BLD AUTO: 0.05 K/UL
BASOPHILS NFR BLD: 0.7 %
BILIRUB SERPL-MCNC: 0.3 MG/DL
BILIRUB UR QL STRIP: NEGATIVE
BUN SERPL-MCNC: 23 MG/DL
CALCIUM SERPL-MCNC: 10 MG/DL
CHLORIDE SERPL-SCNC: 104 MMOL/L
CLARITY UR: ABNORMAL
CO2 SERPL-SCNC: 26 MMOL/L
COLOR UR: YELLOW
CREAT SERPL-MCNC: 2 MG/DL
DIFFERENTIAL METHOD: ABNORMAL
EOSINOPHIL # BLD AUTO: 0.4 K/UL
EOSINOPHIL NFR BLD: 5.4 %
ERYTHROCYTE [DISTWIDTH] IN BLOOD BY AUTOMATED COUNT: 13.6 %
EST. GFR  (AFRICAN AMERICAN): 36 ML/MIN/1.73 M^2
EST. GFR  (NON AFRICAN AMERICAN): 31 ML/MIN/1.73 M^2
GLUCOSE SERPL-MCNC: 80 MG/DL
GLUCOSE UR QL STRIP: NEGATIVE
HCT VFR BLD AUTO: 37.3 %
HGB BLD-MCNC: 12.5 G/DL
HGB UR QL STRIP: ABNORMAL
HYALINE CASTS #/AREA URNS LPF: 0 /LPF
KETONES UR QL STRIP: NEGATIVE
LEUKOCYTE ESTERASE UR QL STRIP: ABNORMAL
LIPASE SERPL-CCNC: 42 U/L
LYMPHOCYTES # BLD AUTO: 1.7 K/UL
LYMPHOCYTES NFR BLD: 23.2 %
MCH RBC QN AUTO: 31.9 PG
MCHC RBC AUTO-ENTMCNC: 33.5 %
MCV RBC AUTO: 95 FL
MICROSCOPIC COMMENT: ABNORMAL
MONOCYTES # BLD AUTO: 0.5 K/UL
MONOCYTES NFR BLD: 6.8 %
NEUTROPHILS # BLD AUTO: 4.6 K/UL
NEUTROPHILS NFR BLD: 63.8 %
NITRITE UR QL STRIP: NEGATIVE
NON-SQ EPI CELLS #/AREA URNS HPF: 1 /HPF
PH UR STRIP: 7 [PH] (ref 5–8)
PLATELET # BLD AUTO: 319 K/UL
PMV BLD AUTO: 10.6 FL
POTASSIUM SERPL-SCNC: 4.8 MMOL/L
PROT SERPL-MCNC: 8.6 G/DL
PROT UR QL STRIP: ABNORMAL
RBC # BLD AUTO: 3.92 M/UL
RBC #/AREA URNS HPF: 10 /HPF (ref 0–4)
SODIUM SERPL-SCNC: 141 MMOL/L
SP GR UR STRIP: 1.01 (ref 1–1.03)
SQUAMOUS #/AREA URNS HPF: 5 /HPF
URN SPEC COLLECT METH UR: ABNORMAL
UROBILINOGEN UR STRIP-ACNC: NEGATIVE EU/DL
WBC # BLD AUTO: 7.17 K/UL
WBC #/AREA URNS HPF: >100 /HPF (ref 0–5)
WBC CLUMPS URNS QL MICRO: ABNORMAL
YEAST URNS QL MICRO: ABNORMAL

## 2017-02-20 PROCEDURE — 96374 THER/PROPH/DIAG INJ IV PUSH: CPT

## 2017-02-20 PROCEDURE — 99284 EMERGENCY DEPT VISIT MOD MDM: CPT | Mod: 25

## 2017-02-20 PROCEDURE — 81000 URINALYSIS NONAUTO W/SCOPE: CPT

## 2017-02-20 PROCEDURE — 85025 COMPLETE CBC W/AUTO DIFF WBC: CPT

## 2017-02-20 PROCEDURE — 87186 SC STD MICRODIL/AGAR DIL: CPT

## 2017-02-20 PROCEDURE — 63600175 PHARM REV CODE 636 W HCPCS: Performed by: PHYSICIAN ASSISTANT

## 2017-02-20 PROCEDURE — 96376 TX/PRO/DX INJ SAME DRUG ADON: CPT

## 2017-02-20 PROCEDURE — 25000003 PHARM REV CODE 250: Performed by: PHYSICIAN ASSISTANT

## 2017-02-20 PROCEDURE — 87086 URINE CULTURE/COLONY COUNT: CPT

## 2017-02-20 PROCEDURE — 80053 COMPREHEN METABOLIC PANEL: CPT

## 2017-02-20 PROCEDURE — 87088 URINE BACTERIA CULTURE: CPT

## 2017-02-20 PROCEDURE — 96361 HYDRATE IV INFUSION ADD-ON: CPT

## 2017-02-20 PROCEDURE — 87077 CULTURE AEROBIC IDENTIFY: CPT

## 2017-02-20 PROCEDURE — 83690 ASSAY OF LIPASE: CPT

## 2017-02-20 RX ORDER — ONDANSETRON 2 MG/ML
8 INJECTION INTRAMUSCULAR; INTRAVENOUS
Status: COMPLETED | OUTPATIENT
Start: 2017-02-20 | End: 2017-02-20

## 2017-02-20 RX ORDER — HYDROMORPHONE HYDROCHLORIDE 2 MG/ML
1 INJECTION, SOLUTION INTRAMUSCULAR; INTRAVENOUS; SUBCUTANEOUS
Status: COMPLETED | OUTPATIENT
Start: 2017-02-20 | End: 2017-02-20

## 2017-02-20 RX ORDER — OXYCODONE AND ACETAMINOPHEN 5; 325 MG/1; MG/1
1 TABLET ORAL EVERY 6 HOURS PRN
Qty: 12 TABLET | Refills: 0 | Status: SHIPPED | OUTPATIENT
Start: 2017-02-20 | End: 2017-03-02

## 2017-02-20 RX ORDER — CEPHALEXIN 500 MG/1
1000 CAPSULE ORAL EVERY 12 HOURS
Qty: 40 CAPSULE | Refills: 0 | Status: SHIPPED | OUTPATIENT
Start: 2017-02-20 | End: 2017-03-02

## 2017-02-20 RX ORDER — PROMETHAZINE HYDROCHLORIDE 25 MG/1
25 TABLET ORAL EVERY 6 HOURS PRN
Qty: 15 TABLET | Refills: 0 | Status: SHIPPED | OUTPATIENT
Start: 2017-02-20 | End: 2017-03-02

## 2017-02-20 RX ADMIN — SODIUM CHLORIDE 1000 ML: 0.9 INJECTION, SOLUTION INTRAVENOUS at 07:02

## 2017-02-20 RX ADMIN — PROMETHAZINE HYDROCHLORIDE 25 MG: 25 INJECTION, SOLUTION INTRAMUSCULAR; INTRAVENOUS at 09:02

## 2017-02-20 RX ADMIN — HYDROMORPHONE HYDROCHLORIDE 1 MG: 2 INJECTION INTRAMUSCULAR; INTRAVENOUS; SUBCUTANEOUS at 07:02

## 2017-02-20 RX ADMIN — ONDANSETRON 8 MG: 2 INJECTION INTRAMUSCULAR; INTRAVENOUS at 07:02

## 2017-02-20 NOTE — ED AVS SNAPSHOT
[facility]  [place] [address]               Cesilia Dozier   2017  7:00 PM   ED    Description:  Female : 1980   Department:  [facility]           Your Care was Coordinated By:     Provider Role From To    Moises Can MD Attending Provider 17 --    Jakob Szymanski PA-C Physician Assistant 17 --      Reason for Visit     Abdominal Pain           Diagnoses this Visit        Comments    Left flank pain    -  Primary     Bacteria in urine         Pain due to ureteral stent, initial encounter         Non-intractable vomiting with nausea, unspecified vomiting type           ED Disposition     None           To Do List           Follow-up Information     Follow up with Lulu Ball MD. Schedule an appointment as soon as possible for a visit in 1 day.    Specialty:  Urology    Why:  For further evaluation    Contact information:    [address] [facility] [address]  [place] [address]  [phone]          Go to [facility].    Specialty:  Emergency Medicine    Why:  If symptoms worsen    Contact information:    [address] [place] [id number] [phone]       These Medications        Disp Refills Start End    cephALEXin (KEFLEX) 500 MG capsule 40 capsule 0 2017 3/2/2017    Take 2 capsules (1,000 mg total) by mouth every 12 (twelve) hours. - Oral    [facility] [facility] Ph #: [phone]       promethazine (PHENERGAN) 25 MG tablet 15 tablet 0 2017     Take 1 tablet (25 mg total) by mouth every 6 (six) hours as needed for Nausea. - Oral    Pharmacy: [facility] [address] AT [facility] [place] Ph #: [phone]       oxycodone-acetaminophen (PERCOCET) 5-325 mg per tablet 12 tablet 0 2017     Take 1 tablet by mouth  every 6 (six) hours as needed for Pain. - Oral    Pharmacy: [facility] [id number] [place] [address], [place] - [address] AT [facility] [address] [facility] Ph #: [phone]         [facility]     [facility] On [facility] Nurse Care Line - 24/7 Assistance  Registered nurses in the [facility] On Call Center provide clinical advisement, health education, appointment booking, and other advisory services.  Call for this free service at [phone].             Medications           Message regarding Medications     Verify the changes and/or additions to your medication regime listed below are the same as discussed with your clinician today.  If any of these changes or additions are incorrect, please notify your healthcare provider.        START taking these NEW medications        Refills    cephALEXin (KEFLEX) 500 MG capsule 0    Sig: Take 2 capsules (1,000 mg total) by mouth every 12 (twelve) hours.    Class: Print    Route: Oral    promethazine (PHENERGAN) 25 MG tablet 0    Sig: Take 1 tablet (25 mg total) by mouth every 6 (six) hours as needed for Nausea.    Class: Print    Route: Oral    oxycodone-acetaminophen (PERCOCET) 5-325 mg per tablet 0    Sig: Take 1 tablet by mouth every 6 (six) hours as needed for Pain.    Class: Print    Route: Oral      These medications were administered today        Dose Freq    sodium chloride 0.9% bolus 1,000 mL 1,000 mL ED 1 Time    Sig: Inject 1,000 mLs into the vein ED 1 Time.    Class: Normal    Route: Intravenous    ondansetron injection 8 mg 8 mg ED 1 Time    Sig: Inject 8 mg into the vein ED 1 Time.    Class: Normal    Route: Intravenous    hydromorphone (PF) injection 1 mg 1 mg ED 1 Time    Sig: Inject 0.5 mLs (1 mg total) into the vein ED 1 Time.    Class: Normal    Route: Intravenous    promethazine (PHENERGAN) 25 mg in dextrose 5 % 50 mL IVPB 25 mg ED 1 Time    Sig: Inject 25 mg into the vein ED 1 Time.    Class: Normal    Route: Intravenous      STOP taking these medications   "   ondansetron (ZOFRAN-ODT) 4 MG TbDL Take 1 tablet (4 mg total) by mouth every 8 (eight) hours as needed.    tamsulosin (FLOMAX) 0.4 mg Cp24 Take 1 capsule (0.4 mg total) by mouth once daily.           Verify that the below list of medications is an accurate representation of the medications you are currently taking.  If none reported, the list may be blank. If incorrect, please contact your healthcare provider. Carry this list with you in case of emergency.           Current Medications     tolterodine (DETROL [place]) 4 MG 24 hr capsule Take 1 capsule (4 mg total) by mouth once daily.    cephALEXin (KEFLEX) 500 MG capsule Take 2 capsules (1,000 mg total) by mouth every 12 (twelve) hours.    oxycodone-acetaminophen (PERCOCET) 5-325 mg per tablet Take 1 tablet by mouth every 6 (six) hours as needed for Pain.    promethazine (PHENERGAN) 25 MG tablet Take 1 tablet (25 mg total) by mouth every 6 (six) hours as needed for Nausea.           Clinical Reference Information           Your Vitals Were     BP Pulse Temp Resp Height Weight    116/73 93 98.3 °F (36.8 °C) 18 5' 6" (1.676 m) 72.6 kg (160 lb)    SpO2 BMI             97% 25.82 kg/m2         Allergies as of 2/20/2017        Reactions    Pcn [Penicillins] Anaphylaxis      Immunizations Administered on Date of Encounter - 2/20/2017     None      ED Micro, Lab, POCT     Start Ordered       Status Ordering Provider    02/20/17 1920 02/20/17 1920  CBC auto differential  STAT      Final result     02/20/17 1920 02/20/17 1920  Comprehensive metabolic panel  STAT      Final result     02/20/17 1920 02/20/17 1920  Lipase  STAT      Final result     02/20/17 1920 02/20/17 1920  Urine culture **CANNOT BE ORDERED STAT**  Once      In process     02/20/17 1901 02/20/17 1900    Once,   Status:  Canceled      Canceled     02/20/17 1901 02/20/17 1900    Once,   Status:  Canceled      Canceled     02/20/17 1841 02/20/17 1840  Urinalysis  STAT      Final result     02/20/17 1840 02/20/17 " 1840  Urinalysis Microscopic  Once      Final result       ED Imaging Orders     Start Ordered       Status Ordering Provider    02/20/17 1920 02/20/17 1920  CT Abdomen Pelvis  Without Contrast  1 time imaging      Final result         Discharge Instructions         Abdominal Pain    Abdominal pain is pain in the stomach or belly area. Everyone has this pain from time to time. In many cases it goes away on its own. But abdominal pain can sometimes be due to a serious problem, such as appendicitis. So its important to know when to seek help.  Causes of abdominal pain  There are many possible causes of abdominal pain. Common causes in adults include:  · Constipation, diarrhea, or gas  · Stomach acid flowing back up into the esophagus (acid reflux or heartburn)  · Severe acid reflux, called GERD (gastroesophageal reflux disease)  · A sore in the lining of the stomach or small intestine (peptic ulcer)  · Inflammation of the gallbladder, liver, or pancreas  · Gallstones or kidney stones  · Appendicitis   · Intestinal blockage   · An internal organ pushing through a muscle or other tissue (hernia)  · Urinary tract infections  · In women, menstrual cramps, fibroids, or endometriosis  · Inflammation or infection of the intestines  Diagnosing the cause of abdominal pain  Your healthcare provider will do a physical exam help find the cause of your pain. If needed, tests will be ordered. Belly pain has many possible causes. So it can be hard to find the reason for your pain. Giving details about your pain can help. Tell your provider where and when you feel the pain, and what makes it better or worse. Also let your provider know if you have other symptoms such as:  · Fever  · Tiredness  · Upset stomach (nausea)  · Vomiting  · Changes in bathroom habits  Treating abdominal pain  Some causes of pain need emergency medical treatment right away. These include appendicitis or a bowel blockage. Other problems can be treated with  rest, fluids, or medicines. Your healthcare provider can give you specific instructions for treatment or self-care based on what is causing your pain.  If you have vomiting or diarrhea, sip water or other clear fluids. When you are ready to eat solid foods again, start with small amounts of easy-to-digest, low-fat foods. These include apple sauce, toast, or crackers.   When to seek medical care  Call 911 or go to the hospital right away if you:  · Cant pass stool and are vomiting  · Are vomiting blood or have bloody diarrhea or black, tarry diarrhea  · Have chest, neck, or shoulder pain  · Feel like you might pass out  · Have pain in your shoulder blades with nausea  · Have sudden, severe belly pain  · Have new, severe pain unlike any you have felt before  · Have a belly that is rigid, hard, and tender to touch  Call your healthcare provider if you have:  · Pain for more than 5 days  · Bloating for more than 2 days  · Diarrhea for more than 5 days  · A fever of 100.4°F (38.0°C) or higher, or as directed by your provider  · Pain that gets worse  · Weight loss for no reason  · Continued lack of appetite  · Blood in your stool  How to prevent abdominal pain  Here are some tips to help prevent abdominal pain:  · Eat smaller amounts of food at one time.  · Avoid greasy, fried, or other high-fat foods.  · Avoid foods that give you gas.  · Exercise regularly.  · Drink plenty of fluids.  To help prevent GERD symptoms:  · Quit smoking.  · Reduce alcohol and certain foods that increase stomach acid.  · Avoid aspirin and over-the-counter pain and fever medicines (NSAIDS or nonsteroidal anti-inflammatory drugs), if possible  · Lose extra weight.  · Finish eating at least 2 hours before you go to bed or lie down.  · Raise the head of your bed.  Date Last Reviewed: 7/1/2016  © 9275-9180 [employer]. [address] [place] [address], [place] [address]. All rights reserved. This information is not intended as a substitute for  professional medical care. Always follow your healthcare professional's instructions.          Your Scheduled Appointments     Mar 02, 2017  9:00 AM CST   Established Patient Visit with Turner Bocanegra MD   [facility] - Urology ([facility])    [address] [place] [address], [facility] [address]  [facility] [address]   [phone]               [facility] complies with applicable Federal civil rights laws and does not discriminate on the basis of race, color, national origin, age, disability, or sex.        Language Assistance Services     ATTENTION: Language assistance services are available, free of charge. Please call [phone].      ATENCIÓN: Si habla español, tiene a batista disposición servicios gratuitos de asistencia lingüística. Llame al [phone].     CHÚ Ý: N?u b?n nói Ti?ng Vi?t, có các d?ch v? h? tr? ngôn ng? mi?n phí dành cho b?n. G?i s? [phone].

## 2017-02-21 NOTE — DISCHARGE INSTRUCTIONS
Abdominal Pain    Abdominal pain is pain in the stomach or belly area. Everyone has this pain from time to time. In many cases it goes away on its own. But abdominal pain can sometimes be due to a serious problem, such as appendicitis. So its important to know when to seek help.  Causes of abdominal pain  There are many possible causes of abdominal pain. Common causes in adults include:  · Constipation, diarrhea, or gas  · Stomach acid flowing back up into the esophagus (acid reflux or heartburn)  · Severe acid reflux, called GERD (gastroesophageal reflux disease)  · A sore in the lining of the stomach or small intestine (peptic ulcer)  · Inflammation of the gallbladder, liver, or pancreas  · Gallstones or kidney stones  · Appendicitis   · Intestinal blockage   · An internal organ pushing through a muscle or other tissue (hernia)  · Urinary tract infections  · In women, menstrual cramps, fibroids, or endometriosis  · Inflammation or infection of the intestines  Diagnosing the cause of abdominal pain  Your healthcare provider will do a physical exam help find the cause of your pain. If needed, tests will be ordered. Belly pain has many possible causes. So it can be hard to find the reason for your pain. Giving details about your pain can help. Tell your provider where and when you feel the pain, and what makes it better or worse. Also let your provider know if you have other symptoms such as:  · Fever  · Tiredness  · Upset stomach (nausea)  · Vomiting  · Changes in bathroom habits  Treating abdominal pain  Some causes of pain need emergency medical treatment right away. These include appendicitis or a bowel blockage. Other problems can be treated with rest, fluids, or medicines. Your healthcare provider can give you specific instructions for treatment or self-care based on what is causing your pain.  If you have vomiting or diarrhea, sip water or other clear fluids. When you are ready to eat solid foods again,  start with small amounts of easy-to-digest, low-fat foods. These include apple sauce, toast, or crackers.   When to seek medical care  Call 911 or go to the hospital right away if you:  · Cant pass stool and are vomiting  · Are vomiting blood or have bloody diarrhea or black, tarry diarrhea  · Have chest, neck, or shoulder pain  · Feel like you might pass out  · Have pain in your shoulder blades with nausea  · Have sudden, severe belly pain  · Have new, severe pain unlike any you have felt before  · Have a belly that is rigid, hard, and tender to touch  Call your healthcare provider if you have:  · Pain for more than 5 days  · Bloating for more than 2 days  · Diarrhea for more than 5 days  · A fever of 100.4°F (38.0°C) or higher, or as directed by your provider  · Pain that gets worse  · Weight loss for no reason  · Continued lack of appetite  · Blood in your stool  How to prevent abdominal pain  Here are some tips to help prevent abdominal pain:  · Eat smaller amounts of food at one time.  · Avoid greasy, fried, or other high-fat foods.  · Avoid foods that give you gas.  · Exercise regularly.  · Drink plenty of fluids.  To help prevent GERD symptoms:  · Quit smoking.  · Reduce alcohol and certain foods that increase stomach acid.  · Avoid aspirin and over-the-counter pain and fever medicines (NSAIDS or nonsteroidal anti-inflammatory drugs), if possible  · Lose extra weight.  · Finish eating at least 2 hours before you go to bed or lie down.  · Raise the head of your bed.  Date Last Reviewed: 7/1/2016  © 2269-3157 [employer]. [address] [place] [address], [place] [address]. All rights reserved. This information is not intended as a substitute for professional medical care. Always follow your healthcare professional's instructions.

## 2017-02-21 NOTE — ED PROVIDER NOTES
Encounter Date: 2/20/2017    SCRIBE #1 NOTE: I, Kate Turcios, am scribing for, and in the presence of,  Jakob Szymanski PA-C. I have scribed the following portions of the note - Other sections scribed: ROS and HPI.       History     Chief Complaint   Patient presents with    Abdominal Pain     points at suprapubic and also vaginal pain since yesterday denies dischage. PMH hysterectomy     Review of patient's allergies indicates:   Allergen Reactions    Pcn [penicillins] Anaphylaxis     HPI Comments: CC: Abdominal Pain    HPI: This 36 y.o. female with a past medical history of Cervical cancer; Hydronephrosis; PONV ; and Pyelonephritis. presents to the ED complaining of progressively worsening suprapubic abdominal pain, and lower back pain (mostly L side) since yesterday. Her N/V and chills started today. She reports more than 10 episodes of emesis today. She reports chronic hematuria s/c to ureteral stent placement. She denies fever, cough, CP, SOB, dysuria, vaginal bleeding, vaginal discharge or any other associated symptoms. In a monogamous relationship with her .     The history is provided by the patient. No  was used.     Past Medical History   Diagnosis Date    Cervical cancer      cervical    Hydronephrosis     PONV (postoperative nausea and vomiting)     Pyelonephritis      No past medical history pertinent negatives.  Past Surgical History   Procedure Laterality Date    Hysterectomy       Partial    Tubal ligation      Ureteral stent placement  07/2016    Colonoscopy N/A 9/22/2016     Procedure: COLONOSCOPY;  Surgeon: Joe Moe MD;  Location: [facility];  Service: Endoscopy;  Laterality: N/A;     Family History   Problem Relation Age of Onset    No Known Problems Mother     Drug abuse Father     No Known Problems Sister     Asthma Brother      Social History   Substance Use Topics    Smoking status: Never Smoker    Smokeless tobacco: Never Used    Alcohol use  No     Review of Systems   Constitutional: Positive for chills. Negative for fever.   HENT: Negative for congestion.    Eyes: Negative for visual disturbance.   Respiratory: Negative for cough and shortness of breath.    Cardiovascular: Negative for chest pain.   Gastrointestinal: Positive for abdominal pain (suprapubic), nausea and vomiting. Negative for diarrhea.   Genitourinary: Positive for flank pain (chronic L) and hematuria (chronic). Negative for dysuria, frequency, vaginal bleeding and vaginal discharge.   Musculoskeletal: Positive for back pain (lower). Negative for myalgias.   Skin: Negative for rash.   Neurological: Negative for dizziness, weakness, numbness and headaches.       Physical Exam   Initial Vitals   BP Pulse Resp Temp SpO2   02/20/17 1806 02/20/17 1806 02/20/17 1806 02/20/17 1806 02/20/17 1806   111/67 111 18 98.2 °F (36.8 °C) 96 %     Physical Exam    Nursing note and vitals reviewed.  Constitutional: She is not diaphoretic. She appears distressed (actively vomiting upon entering exam room).   HENT:   Head: Normocephalic and atraumatic.   Nose: Nose normal.   Eyes: Conjunctivae and EOM are normal. Right eye exhibits no discharge. Left eye exhibits no discharge.   Neck: Normal range of motion. No tracheal deviation present. No JVD present.   Cardiovascular: Regular rhythm and normal heart sounds. Tachycardia present.  Exam reveals no friction rub.    No murmur heard.  Pulmonary/Chest: Breath sounds normal. No stridor. No respiratory distress. She has no decreased breath sounds. She has no wheezes. She has no rhonchi. She has no rales. She exhibits no tenderness.   Abdominal: Soft. She exhibits no distension. There is tenderness (mild suprapubic). There is no rigidity, no rebound, no guarding, no CVA tenderness, no tenderness at McBurney's point and negative Barrett's sign.   Musculoskeletal: Normal range of motion.   Neurological: She is alert and oriented to person, place, and time.   Skin:  Skin is warm and dry. No rash and no abscess noted. No erythema. No pallor.         ED Course   Procedures  Labs Reviewed   URINALYSIS - Abnormal; Notable for the following:        Result Value    Appearance, UA Cloudy (*)     Protein, UA 2+ (*)     Occult Blood UA 1+ (*)     Leukocytes, UA 3+ (*)     All other components within normal limits   CBC W/ AUTO DIFFERENTIAL - Abnormal; Notable for the following:     RBC 3.92 (*)     MCH 31.9 (*)     All other components within normal limits   COMPREHENSIVE METABOLIC PANEL - Abnormal; Notable for the following:     BUN, Bld 23 (*)     Creatinine 2.0 (*)     Total Protein 8.6 (*)     eGFR if  36 (*)     eGFR if non  31 (*)     All other components within normal limits   URINALYSIS MICROSCOPIC - Abnormal; Notable for the following:     RBC, UA 10 (*)     WBC, UA >100 (*)     WBC Clumps, UA Moderate (*)     Bacteria, UA Moderate (*)     Yeast, UA Rare (*)     Non-Squam Epith 1 (*)     All other components within normal limits   CULTURE, URINE   LIPASE          X-Rays:   Independently Interpreted Readings:   Abdomen:   Abdomen and Pelvis without Contrast - No acute process.      Medical Decision Making:   History:   Old Medical Records: I decided to obtain old medical records.      This is an emergent evaluation of a 36 y.o. female with a PMHx of chronic b/l hydronephrosis with b/l stents s/p hysterectomy from cervical cancer (in remission) presenting to the ED for L flank pain associated with nausea and vomiting. Denies fever. , vitals otherwise WNL, afebrile. Patient is non-toxic appearing but does appear very uncomfortable. Will order labs and imaging for further evaluation.     Given trial of IV NS, Zofran, and Dilaudid in ED while workup is pending. Reports improvement, but not resolution of symptoms. CT abdomen/pelvis shows stable ureteral stents with stable hydronephrosis; no acute findings. No obstruction of stents. UA shows  significant amount of infection, however, this appears chronic with negative cultures in the past; culture pending today. CBC shows no leukocytosis. CMP shows a creatinine of 2, which is stable for this patient. No electrolyte abnormalities. Lipase WNL.    I discuss this case with urologist, Dr. Ball, who advises empiric treatment antibiotics while cultures are pending and outpatient follow up. Patient states she has a urology appointment on 3/3: I recommend closer follow up. She is stable appearing in the ED. Vitals normalized. I am unsure of the etiology of this patient's symptoms, however, I do not believe they are of emergent etiology at this time    Discharged home with Keflex, Percocet, and Phenergan.    I discussed with the patient the diagnosis, treatment plan, indications for return to the emergency department, and for expected follow-up. The patient verbalized an understanding. The patient is asked if there are any questions or concerns. We discuss the case, until all issues are addressed to the patients satisfaction. Patient understands and is agreeable to the plan.     I discussed this patient with Dr. Can who is in agreement with my assessment and plan.           Scribe Attestation:   Scribe #1: I performed the above scribed service and the documentation accurately describes the services I performed. I attest to the accuracy of the note.    Attending Attestation:     Physician Attestation Statement for NP/PA:   I discussed this assessment and plan of this patient with the NP/PA, but I did not personally examine the patient. The face to face encounter was performed by the NP/PA.    Other NP/PA Attestation Additions:      Medical Decision Making: I have reviewed the documentation of the mid-level provider and discussed case in detail.  I agree with the differential diagnosis documentation and treatment plan.       Physician Attestation for Scribe:  Physician Attestation Statement for Scribe #1: I,  Jakob Szymanski PA-C, reviewed documentation, as scribed by Kate Turcios in my presence, and it is both accurate and complete.                 ED Course     Clinical Impression:   The primary encounter diagnosis was Left flank pain. Diagnoses of Bacteria in urine, Pain due to ureteral stent, initial encounter, and Non-intractable vomiting with nausea, unspecified vomiting type were also pertinent to this visit.    Disposition:   Disposition: Discharged  Condition: Stable       Jakob Szymanski PA-C  02/20/17 1137       Moises Can MD  02/24/17 1210

## 2017-02-22 LAB — BACTERIA UR CULT: NORMAL

## 2017-03-01 ENCOUNTER — TELEPHONE (OUTPATIENT)
Dept: UROLOGY | Facility: CLINIC | Age: 37
End: 2017-03-01

## 2017-03-01 NOTE — TELEPHONE ENCOUNTER
Attempt to call pt no answer. A voice message was left informing pt that  was out of the office today and that if she finds that the pain she's in is unbearable that she should go to the E.R

## 2017-03-01 NOTE — TELEPHONE ENCOUNTER
----- Message from Renae Caro sent at 3/1/2017 11:49 AM CST -----  Contact: Patient herself  _  1st Request  X  2nd Request  _  3rd Request    Who: Cesilia Jacoby (mrn# [id number])    Why: Patient called to make aware that she's in extreme pain and she cannot wait until tomorrow to be seen. Please give patient a call at your earliest convenience.  THANKS!    What Number to Call Back:   [phone]    When to Expect a call back: (Before the end of the day)   -- if the call is after 12:00, the call back will be tomorrow.

## 2017-03-02 ENCOUNTER — OFFICE VISIT (OUTPATIENT)
Dept: UROLOGY | Facility: CLINIC | Age: 37
End: 2017-03-02
Attending: UROLOGY
Payer: MEDICAID

## 2017-03-02 VITALS
WEIGHT: 150 LBS | DIASTOLIC BLOOD PRESSURE: 70 MMHG | SYSTOLIC BLOOD PRESSURE: 112 MMHG | BODY MASS INDEX: 24.11 KG/M2 | HEART RATE: 94 BPM | HEIGHT: 66 IN

## 2017-03-02 DIAGNOSIS — R30.0 DYSURIA: Primary | ICD-10-CM

## 2017-03-02 DIAGNOSIS — R10.9 FLANK PAIN: ICD-10-CM

## 2017-03-02 DIAGNOSIS — T83.84XA PAIN DUE TO URETERAL STENT, INITIAL ENCOUNTER: ICD-10-CM

## 2017-03-02 DIAGNOSIS — N13.30 BILATERAL HYDRONEPHROSIS: ICD-10-CM

## 2017-03-02 LAB
BILIRUB SERPL-MCNC: ABNORMAL MG/DL
BLOOD URINE, POC: 250
COLOR, POC UA: YELLOW
GLUCOSE UR QL STRIP: ABNORMAL
KETONES UR QL STRIP: ABNORMAL
LEUKOCYTE ESTERASE URINE, POC: ABNORMAL
NITRITE, POC UA: ABNORMAL
PH, POC UA: 6
PROTEIN, POC: ABNORMAL
SPECIFIC GRAVITY, POC UA: 1.01
UROBILINOGEN, POC UA: ABNORMAL

## 2017-03-02 PROCEDURE — 99214 OFFICE O/P EST MOD 30 MIN: CPT | Mod: S$PBB,,, | Performed by: UROLOGY

## 2017-03-02 PROCEDURE — 87086 URINE CULTURE/COLONY COUNT: CPT

## 2017-03-02 PROCEDURE — 81002 URINALYSIS NONAUTO W/O SCOPE: CPT | Mod: PBBFAC | Performed by: UROLOGY

## 2017-03-02 PROCEDURE — 99999 PR PBB SHADOW E&M-EST. PATIENT-LVL IV: CPT | Mod: PBBFAC,,, | Performed by: UROLOGY

## 2017-03-02 PROCEDURE — 99214 OFFICE O/P EST MOD 30 MIN: CPT | Mod: PBBFAC | Performed by: UROLOGY

## 2017-03-02 RX ORDER — FERROUS SULFATE 325(65) MG
325 TABLET, DELAYED RELEASE (ENTERIC COATED) ORAL
COMMUNITY
End: 2017-05-14

## 2017-03-02 NOTE — PROGRESS NOTES
"Subjective:      Cesilia Dozier is a 36 y.o. female who returns today regarding her ureteral stents.     Ms. Dozier was last seen on 1/24/17. She has h/o cervical cancer, treated elsewhere, w/ subsequent bilateral hydro treated in the past w/ PCNs and most recently w/ stents. Stents last changed by Dr. Ball at [facility] in December. Etiology and location of obstruction is unclear. She has had 2 CTs in ER since last stent placement that confirmed good position.  She had a + urine culture on 2/20/17 and has completed a full course of cipro.       Recently she has had severe pain due to the stents described as left flank pain and lower pelvic pain/pressure (10/10 on the pain scale). She takes detrol daily and percocet when she has it; however her symptoms are not relieved w/ percocet. The patient reports hematuria, frequency, urgency and dysuria. She reports a fever 2 days ago (101F).     The following portions of the patient's history were reviewed and updated as appropriate: allergies, current medications, past family history, past medical history, past social history, past surgical history and problem list.    Review of Systems  A comprehensive multipoint review of systems was negative except as otherwise stated in the HPI.     Objective:   Vitals: /70  Pulse 94  Ht 5' 6" (1.676 m)  Wt 68 kg (150 lb)  BMI 24.21 kg/m2    Physical Exam   General: alert and oriented, no acute distress  Respiratory: Symmetric expansion, non-labored breathing  Cardiovascular: no peripheral edema  Abdomen: soft, non distended; generalized tenderness   Skin: normal coloration and turgor, no rashes, no suspicious skin lesions noted  Neuro: no gross deficits  Psych: normal judgment and insight, normal mood/affect and non-anxious  +CVA tenderness     Lab Review   Urinalysis demonstrates positive for leukocytes, red blood cells  Lab Results   Component Value Date    WBC 7.17 02/20/2017    HGB 12.5 02/20/2017    HCT 37.3 02/20/2017    MCV " "95 02/20/2017     02/20/2017     Lab Results   Component Value Date    CREATININE 2.0 (H) 02/20/2017    BUN 23 (H) 02/20/2017       Imaging   CT abdomen and Pelvis (2/20/17)- images were personally reviewed and agree with findings "No detrimental change or acute process seen on this noncontrast CT. Bilateral ureteral stents in stable position, with unchanged mild bilateral hydronephrosis."    Assessment and Plan:   Cesilia was seen today for follow-up.    Diagnoses and all orders for this visit:    Dysuria  -     Urine culture    Flank pain    Pain due to ureteral stent, initial encounter    Plan: Continue detrol for bladder spasms. Urine culture today. Scheduled stent exchange for 3/7/17.     "

## 2017-03-03 ENCOUNTER — ANESTHESIA EVENT (OUTPATIENT)
Dept: SURGERY | Facility: OTHER | Age: 37
End: 2017-03-03
Payer: MEDICAID

## 2017-03-03 ENCOUNTER — HOSPITAL ENCOUNTER (OUTPATIENT)
Dept: PREADMISSION TESTING | Facility: OTHER | Age: 37
Discharge: HOME | End: 2017-03-03
Attending: UROLOGY
Payer: MEDICAID

## 2017-03-03 VITALS
HEIGHT: 66 IN | WEIGHT: 164.56 LBS | DIASTOLIC BLOOD PRESSURE: 66 MMHG | HEART RATE: 82 BPM | BODY MASS INDEX: 26.45 KG/M2 | TEMPERATURE: 99 F | OXYGEN SATURATION: 100 % | SYSTOLIC BLOOD PRESSURE: 107 MMHG

## 2017-03-03 LAB
BACTERIA UR CULT: NORMAL
BACTERIA UR CULT: NORMAL

## 2017-03-03 RX ORDER — SCOLOPAMINE TRANSDERMAL SYSTEM 1 MG/1
1 PATCH, EXTENDED RELEASE TRANSDERMAL
Status: CANCELLED | OUTPATIENT
Start: 2017-03-03

## 2017-03-03 RX ORDER — FAMOTIDINE 20 MG/1
20 TABLET, FILM COATED ORAL
Status: CANCELLED | OUTPATIENT
Start: 2017-03-03 | End: 2017-03-03

## 2017-03-03 RX ORDER — SODIUM CHLORIDE, SODIUM LACTATE, POTASSIUM CHLORIDE, CALCIUM CHLORIDE 600; 310; 30; 20 MG/100ML; MG/100ML; MG/100ML; MG/100ML
INJECTION, SOLUTION INTRAVENOUS CONTINUOUS
Status: CANCELLED | OUTPATIENT
Start: 2017-03-03

## 2017-03-03 NOTE — IP AVS SNAPSHOT
[facility], [place], operated by [facility]  [address] [place], [place] [address]  Phone: [phone]           Patient Discharge Instructions    Our goal is to set you up for success. This packet includes information on your condition, medications, and your home care. It will help you to care for yourself so you don't get sicker.     Please ask your nurse if you have any questions.        There are many details to remember when preparing for your surgery. Here is what you will need to do, please ask your nurse if there are more specific instructions and if you have any questions:    1. 24 hours before procedure Do not smoke or drink alcoholic beverages 24 hours prior to your procedure    2. Eating before procedure Do not eat or drink anything 8 hours before your procedure - this includes gum, mints, and candy.     3. Day of procedure Please remove all jewelry for the procedure. If you wear contact lenses, dentures, hearing aids or glasses, bring a container to put them in during your surgery and give to a family member for safekeeping.  If your doctor has scheduled you for an overnight stay, bring a small overnight bag with any personal items that you need.    4. After procedure Make arrangements in advance for transportation home by a responsible adult. It is not safe to drive a vehicle during the 24 hours following surgery.     PLEASE NOTE: You may be contacted the day before your surgery to confirm your surgery date and arrival time. The Surgery schedule has many variables which may affect the time of your surgery case. Family members should be available if your surgery time changes.                [facility]  Unless otherwise directed by your provider, please contact [facility], our nurse care line that is available for 24/7 assistance.     [phone] (toll-free)    Registered nurses in the [facility] provide clinical advisement, health education, appointment booking, and other  advisory services.                    ** Verify the list of medication(s) below is accurate and up to date. Carry this with you in case of emergency. If your medications have changed, please notify your healthcare provider.             Medication List      TAKE these medications        Additional Info                      ferrous sulfate 325 (65 FE) MG EC tablet   Refills:  0   Dose:  325 mg    Instructions:  Take 325 mg by mouth 3 (three) times daily with meals.     Begin Date    AM    Noon    PM    Bedtime       tolterodine 4 MG 24 hr capsule   Commonly known as:  DETROL LA   Quantity:  30 capsule   Refills:  11   Dose:  4 mg    Instructions:  Take 1 capsule (4 mg total) by mouth once daily.     Begin Date    AM    Noon    PM    Bedtime                  Please bring to all follow up appointments:    1. A copy of your discharge instructions.  2. All medicines you are currently taking in their original bottles.  3. Identification and insurance card.    Please arrive 15 minutes ahead of scheduled appointment time.    Please call 24 hours in advance if you must reschedule your appointment and/or time.        Your Future Surgeries/Procedures     Mar 07, 2017   Surgery with Turner Bocanegra MD   [facility]    [address] [place] [id number]   [phone]                  Discharge Instructions       PRE-ADMIT TESTING -  [phone]    [address] [place] [address]        OUTPATIENT SURGERY UNIT - [phone]    Your surgery has been scheduled at [facility]. We are pleased to have the opportunity to serve you. For Further Information please call [phone].    On the day of surgery please report to the Information Desk on the 1st floor.    CONTACT YOUR PHYSICIAN'S OFFICE THE DAY PRIOR TO YOUR SURGERY TO OBTAIN YOUR ARRIVAL TIME.     The evening before surgery do not eat anything after 9 p.m. ( this includes hard candy, chewing gum and  mints).  You may have GATORADE, POWERADE AND WATER FROM 9 p.m. until leaving home to come to the hospital.   DO NOT DRINK ANY LIQUIDS ON THE WAY TO THE HOSPITAL.     SPECIAL MEDICATION INSTRUCTIONS: TAKE medications checked off by the Anesthesiologist on your Medication List.    Angiogram Patients: Take medications as instructed by your physician, including aspirin.     Surgery Patients:    If you take ASPIRIN - Your PHYSICIAN/SURGEON will need to inform you IF/OR when you need to stop taking aspirin prior to your surgery.     Do Not take any medications containing IBUPROFEN.  Do Not Wear any make-up or dark nail polish   (especially eye make-up) to surgery. If you come to surgery with makeup on you will be required to remove the makeup or nail polish.    Do not shave your surgical area at least 5 days prior to your surgery. The surgical prep will be performed at the hospital according to Infection Control regulations.    Leave all valuables at home.   Do Not wear any jewelry or watches, including any metal in body piercings.  Contact Lens must be removed before surgery. Either do not wear the contact lens or bring a case and solution for storage.  Please bring a container for eyeglasses or dentures as required.  Bring any paperwork your physician has provided, such as consent forms,  history and physicals, doctor's orders, etc.   Bring comfortable clothes that are loose fitting to wear upon discharge. Take into consideration the type of surgery being performed.  Maintain your diet as advised per your physician the day prior to surgery.      Adequate rest the night before surgery is advised.   Park in the Parking lot behind the hospital or in the [place] [facility] across the street from the parking lot. Parking is complimentary.  If you will be discharged the same day as your procedure, please arrange for a responsible adult to drive you home or to accompany you if traveling by taxi.   YOU WILL NOT BE  "PERMITTED TO DRIVE OR TO LEAVE THE HOSPITAL ALONE AFTER SURGERY.   It is strongly recommended that you arrange for someone to remain with you for the first 24 hrs following your surgery.       Thank you for your cooperation.  The Staff of [facility].        Bathing Instructions                                                                 Please shower the evening before and morning of your procedure with    ANTIBACTERIAL SOAP. ( DIAL, etc )  Concentrate on the surgical area   for at least 3 minutes and rinse completely. Dry off as usual.   Do not use any deodorant, powder, body lotions, perfume, after shave or    cologne.                                                Admission Information     Date & Time Provider Department CSN    3/3/2017  8:30 AM Turner Bocanegra MD [facility] [id number]      Care Providers     Provider Role Specialty Primary office phone    Turner Bocanegra MD Attending Provider Urology [phone]      Your Vitals Were     BP Pulse Temp Height Weight SpO2    107/66 82 98.6 °F (37 °C) (Oral) 5' 6" (1.676 m) 74.6 kg (164 lb 9.2 oz) 100%    BMI                26.56 kg/m2          Recent Lab Values        12/12/2016                          10:54 PM           A1C 5.4           Comment for A1C at 10:54 PM on 12/12/2016:  According to ADA guidelines, hemoglobin A1C <7.0% represents  optimal control in non-pregnant diabetic patients.  Different  metrics may apply to specific populations.   Standards of Medical Care in Diabetes - 2016.  For the purpose of screening for the presence of diabetes:  <5.7%     Consistent with the absence of diabetes  5.7-6.4%  Consistent with increasing risk for diabetes   (prediabetes)  >or=6.5%  Consistent with diabetes  Currently no consensus exists for use of hemoglobin A1C  for diagnosis of diabetes for children.        Allergies as of 3/3/2017        Reactions    Pcn [Penicillins] Anaphylaxis      Advance Directives     An " advance directive is a document which, in the event you are no longer able to make decisions for yourself, tells your healthcare team what kind of treatment you do or do not want to receive, or who you would like to make those decisions for you.  If you do not currently have an advance directive, [facility] encourages you to create one.  For more information call:  [phone]), [phone] ([phone]),  or log on to [email or web address].        Language Assistance Services     ATTENTION: Language assistance services are available, free of charge. Please call [phone].      ATENCIÓN: Si habla cornell, tiene a batista disposición servicios gratuitos de asistencia lingüística. Llame al [phone].     CHÚ Ý: N?u b?n nói Ti?ng Vi?t, có các d?ch v? h? tr? ngôn ng? mi?n phí dành cho b?n. G?i s? [phone].        Chronic Kindey Disease Education              [facility] complies with applicable Federal civil rights laws and does not discriminate on the basis of race, color, national origin, age, disability, or sex.

## 2017-03-03 NOTE — ANESTHESIA PREPROCEDURE EVALUATION
03/03/2017  Cesilia Dozier is a 36 y.o., female.    [facility] Anesthesia Evaluation    I have reviewed the Patient Summary Reports.    I have reviewed the Nursing Notes.   I have reviewed the Medications.     Review of Systems  Anesthesia Hx:  No problems with previous Anesthesia Hx of Anesthetic complications  Denies Family Hx of Anesthesia complications.  Personal Hx of Anesthesia complications, Post-Operative Nausea/Vomiting, in the past, but not with recent anesthetics / prophylaxis   Social:  Non-Smoker    Hematology/Oncology:         -- Anemia (Fe def, has responded to iron supp): --  Cancer in past history:  Oncology Comments: Cervical CA dx'ed 2014  S/p hyst and ureteral stents for tumor invasion  S/p chemo and radiation     EENT/Dental:EENT/Dental Normal   Cardiovascular:  Cardiovascular Normal     Pulmonary:  Pulmonary Normal    Renal/:   Chronic Renal Disease (CKD III-IV) H/o hydronephrosis and acute renal failure in Nove and Dec 2016.  Never dialyzed   Hepatic/GI:  Hepatic/GI Normal    Musculoskeletal:  Musculoskeletal Normal    Neurological:  Neurology Normal    Endocrine:  Endocrine Normal    Dermatological:  Skin Normal    Psych:  Psychiatric Normal           Physical Exam  General:  Well nourished    Airway/Jaw/Neck:  Airway Findings: Mouth Opening: < 3 cm Tongue: Large  Mallampati: III  Improves to II with phonation.  TM Distance: Normal, at least 6 cm  Jaw/Neck Findings:  Neck ROM: Normal ROM      Dental:  Dental Findings: In tact              Anesthesia Plan  Type of Anesthesia, risks & benefits discussed:  Anesthesia Type:  general  Patient's Preference:   Intra-op Monitoring Plan: standard ASA monitors  Intra-op Monitoring Plan Comments:   Post Op Pain Control Plan:   Post Op Pain Control Plan Comments:   Induction:   IV  Beta Blocker:         Informed Consent:  Anesthesia consent signed with  patient.  ASA Score: 2     Day of Surgery Review of History & Physical:    H&P update referred to the surgeon.     Anesthesia Plan Notes: PONV prophylaxis        Ready For Surgery From Anesthesia Perspective.

## 2017-03-03 NOTE — DISCHARGE INSTRUCTIONS
PRE-ADMIT TESTING -  [phone]    [address]  [facility]        OUTPATIENT SURGERY UNIT - [phone]    Your surgery has been scheduled at [facility]. We are pleased to have the opportunity to serve you. For Further Information please call [phone].    On the day of surgery please report to the Information Desk on the 1st floor.    CONTACT YOUR PHYSICIAN'S OFFICE THE DAY PRIOR TO YOUR SURGERY TO OBTAIN YOUR ARRIVAL TIME.     The evening before surgery do not eat anything after 9 p.m. ( this includes hard candy, chewing gum and mints).  You may have GATORADE, POWERADE AND WATER FROM 9 p.m. until leaving home to come to the hospital.   DO NOT DRINK ANY LIQUIDS ON THE WAY TO THE HOSPITAL.     SPECIAL MEDICATION INSTRUCTIONS: TAKE medications checked off by the Anesthesiologist on your Medication List.    Angiogram Patients: Take medications as instructed by your physician, including aspirin.     Surgery Patients:    If you take ASPIRIN - Your PHYSICIAN/SURGEON will need to inform you IF/OR when you need to stop taking aspirin prior to your surgery.     Do Not take any medications containing IBUPROFEN.  Do Not Wear any make-up or dark nail polish   (especially eye make-up) to surgery. If you come to surgery with makeup on you will be required to remove the makeup or nail polish.    Do not shave your surgical area at least 5 days prior to your surgery. The surgical prep will be performed at the hospital according to Infection Control regulations.    Leave all valuables at home.   Do Not wear any jewelry or watches, including any metal in body piercings.  Contact Lens must be removed before surgery. Either do not wear the contact lens or bring a case and solution for storage.  Please bring a container for eyeglasses or dentures as required.  Bring any paperwork your physician has provided, such as consent forms,  history and physicals, doctor's orders, etc.   Bring comfortable  clothes that are loose fitting to wear upon discharge. Take into consideration the type of surgery being performed.  Maintain your diet as advised per your physician the day prior to surgery.      Adequate rest the night before surgery is advised.   Park in the Parking lot behind the hospital or in the [place] [facility] across the street from the parking lot. Parking is complimentary.  If you will be discharged the same day as your procedure, please arrange for a responsible adult to drive you home or to accompany you if traveling by taxi.   YOU WILL NOT BE PERMITTED TO DRIVE OR TO LEAVE THE HOSPITAL ALONE AFTER SURGERY.   It is strongly recommended that you arrange for someone to remain with you for the first 24 hrs following your surgery.       Thank you for your cooperation.  The Staff of [facility].        Bathing Instructions                                                                 Please shower the evening before and morning of your procedure with    ANTIBACTERIAL SOAP. ( DIAL, etc )  Concentrate on the surgical area   for at least 3 minutes and rinse completely. Dry off as usual.   Do not use any deodorant, powder, body lotions, perfume, after shave or    cologne.

## 2017-03-07 ENCOUNTER — ANESTHESIA (OUTPATIENT)
Dept: SURGERY | Facility: OTHER | Age: 37
End: 2017-03-07
Payer: MEDICAID

## 2017-03-07 ENCOUNTER — HOSPITAL ENCOUNTER (OUTPATIENT)
Facility: OTHER | Age: 37
Discharge: HOME | End: 2017-03-07
Attending: UROLOGY | Admitting: UROLOGY
Payer: MEDICAID

## 2017-03-07 ENCOUNTER — SURGERY (OUTPATIENT)
Age: 37
End: 2017-03-07

## 2017-03-07 VITALS
BODY MASS INDEX: 26.45 KG/M2 | SYSTOLIC BLOOD PRESSURE: 104 MMHG | RESPIRATION RATE: 16 BRPM | WEIGHT: 164.56 LBS | TEMPERATURE: 98 F | HEIGHT: 66 IN | OXYGEN SATURATION: 100 % | DIASTOLIC BLOOD PRESSURE: 66 MMHG | HEART RATE: 80 BPM

## 2017-03-07 DIAGNOSIS — N13.5 URETERAL STRICTURE: Primary | ICD-10-CM

## 2017-03-07 DIAGNOSIS — N13.30 BILATERAL HYDRONEPHROSIS: ICD-10-CM

## 2017-03-07 DIAGNOSIS — R30.0 DYSURIA: ICD-10-CM

## 2017-03-07 PROCEDURE — 37000008 HC ANESTHESIA 1ST 15 MINUTES: Performed by: UROLOGY

## 2017-03-07 PROCEDURE — C2617 STENT, NON-COR, TEM W/O DEL: HCPCS | Performed by: UROLOGY

## 2017-03-07 PROCEDURE — 36000706: Performed by: UROLOGY

## 2017-03-07 PROCEDURE — C1769 GUIDE WIRE: HCPCS | Performed by: UROLOGY

## 2017-03-07 PROCEDURE — 63600175 PHARM REV CODE 636 W HCPCS: Performed by: ANESTHESIOLOGY

## 2017-03-07 PROCEDURE — 76000 FLUOROSCOPY <1 HR PHYS/QHP: CPT | Mod: 26,59,, | Performed by: UROLOGY

## 2017-03-07 PROCEDURE — 71000033 HC RECOVERY, INTIAL HOUR: Performed by: UROLOGY

## 2017-03-07 PROCEDURE — 63600175 PHARM REV CODE 636 W HCPCS: Performed by: NURSE ANESTHETIST, CERTIFIED REGISTERED

## 2017-03-07 PROCEDURE — 52332 CYSTOSCOPY AND TREATMENT: CPT | Mod: 50,,, | Performed by: UROLOGY

## 2017-03-07 PROCEDURE — 25000003 PHARM REV CODE 250: Performed by: UROLOGY

## 2017-03-07 PROCEDURE — 71000016 HC POSTOP RECOV ADDL HR: Performed by: UROLOGY

## 2017-03-07 PROCEDURE — 71000015 HC POSTOP RECOV 1ST HR: Performed by: UROLOGY

## 2017-03-07 PROCEDURE — 25000003 PHARM REV CODE 250: Performed by: ANESTHESIOLOGY

## 2017-03-07 PROCEDURE — 37000009 HC ANESTHESIA EA ADD 15 MINS: Performed by: UROLOGY

## 2017-03-07 PROCEDURE — 63600175 PHARM REV CODE 636 W HCPCS: Performed by: UROLOGY

## 2017-03-07 PROCEDURE — 36000707: Performed by: UROLOGY

## 2017-03-07 PROCEDURE — 25000003 PHARM REV CODE 250: Performed by: NURSE ANESTHETIST, CERTIFIED REGISTERED

## 2017-03-07 PROCEDURE — 71000039 HC RECOVERY, EACH ADD'L HOUR: Performed by: UROLOGY

## 2017-03-07 DEVICE — STENT URETERAL UNIV 8FR 22CM: Type: IMPLANTABLE DEVICE | Site: URETER | Status: FUNCTIONAL

## 2017-03-07 RX ORDER — PROMETHAZINE HYDROCHLORIDE 25 MG/ML
INJECTION, SOLUTION INTRAMUSCULAR; INTRAVENOUS
Status: DISCONTINUED | OUTPATIENT
Start: 2017-03-07 | End: 2017-03-07

## 2017-03-07 RX ORDER — LIDOCAINE HCL/PF 100 MG/5ML
SYRINGE (ML) INTRAVENOUS
Status: DISCONTINUED | OUTPATIENT
Start: 2017-03-07 | End: 2017-03-07

## 2017-03-07 RX ORDER — MIDAZOLAM HYDROCHLORIDE 1 MG/ML
INJECTION INTRAMUSCULAR; INTRAVENOUS
Status: DISCONTINUED | OUTPATIENT
Start: 2017-03-07 | End: 2017-03-07

## 2017-03-07 RX ORDER — FAMOTIDINE 20 MG/1
20 TABLET, FILM COATED ORAL
Status: COMPLETED | OUTPATIENT
Start: 2017-03-07 | End: 2017-03-07

## 2017-03-07 RX ORDER — MEPERIDINE HYDROCHLORIDE 50 MG/ML
12.5 INJECTION INTRAMUSCULAR; INTRAVENOUS; SUBCUTANEOUS ONCE AS NEEDED
Status: DISCONTINUED | OUTPATIENT
Start: 2017-03-07 | End: 2017-03-07 | Stop reason: HOSPADM

## 2017-03-07 RX ORDER — FENTANYL CITRATE 50 UG/ML
INJECTION, SOLUTION INTRAMUSCULAR; INTRAVENOUS
Status: DISCONTINUED | OUTPATIENT
Start: 2017-03-07 | End: 2017-03-07

## 2017-03-07 RX ORDER — HYDROCODONE BITARTRATE AND ACETAMINOPHEN 5; 325 MG/1; MG/1
1 TABLET ORAL EVERY 4 HOURS PRN
Status: DISCONTINUED | OUTPATIENT
Start: 2017-03-07 | End: 2017-03-07 | Stop reason: HOSPADM

## 2017-03-07 RX ORDER — OXYCODONE HYDROCHLORIDE 5 MG/1
5 TABLET ORAL
Status: DISCONTINUED | OUTPATIENT
Start: 2017-03-07 | End: 2017-03-07 | Stop reason: HOSPADM

## 2017-03-07 RX ORDER — PROPOFOL 10 MG/ML
VIAL (ML) INTRAVENOUS
Status: DISCONTINUED | OUTPATIENT
Start: 2017-03-07 | End: 2017-03-07

## 2017-03-07 RX ORDER — HYDROCODONE BITARTRATE AND ACETAMINOPHEN 5; 325 MG/1; MG/1
1 TABLET ORAL EVERY 6 HOURS PRN
Qty: 12 TABLET | Refills: 0 | Status: ON HOLD | OUTPATIENT
Start: 2017-03-07 | End: 2017-03-21

## 2017-03-07 RX ORDER — HYDROMORPHONE HYDROCHLORIDE 2 MG/ML
1 INJECTION, SOLUTION INTRAMUSCULAR; INTRAVENOUS; SUBCUTANEOUS EVERY 4 HOURS PRN
Status: DISCONTINUED | OUTPATIENT
Start: 2017-03-07 | End: 2017-03-07 | Stop reason: HOSPADM

## 2017-03-07 RX ORDER — ONDANSETRON 2 MG/ML
4 INJECTION INTRAMUSCULAR; INTRAVENOUS ONCE AS NEEDED
Status: DISCONTINUED | OUTPATIENT
Start: 2017-03-07 | End: 2017-03-07 | Stop reason: HOSPADM

## 2017-03-07 RX ORDER — PHENAZOPYRIDINE HYDROCHLORIDE 200 MG/1
200 TABLET, FILM COATED ORAL ONCE AS NEEDED
Status: DISCONTINUED | OUTPATIENT
Start: 2017-03-07 | End: 2017-03-07 | Stop reason: HOSPADM

## 2017-03-07 RX ORDER — ONDANSETRON 2 MG/ML
INJECTION INTRAMUSCULAR; INTRAVENOUS
Status: DISCONTINUED | OUTPATIENT
Start: 2017-03-07 | End: 2017-03-07

## 2017-03-07 RX ORDER — SODIUM CHLORIDE, SODIUM LACTATE, POTASSIUM CHLORIDE, CALCIUM CHLORIDE 600; 310; 30; 20 MG/100ML; MG/100ML; MG/100ML; MG/100ML
INJECTION, SOLUTION INTRAVENOUS CONTINUOUS
Status: DISCONTINUED | OUTPATIENT
Start: 2017-03-07 | End: 2017-03-07 | Stop reason: HOSPADM

## 2017-03-07 RX ORDER — ATROPA BELLADONNA AND OPIUM 16.2; 6 MG/1; MG/1
60 SUPPOSITORY RECTAL ONCE AS NEEDED
Status: COMPLETED | OUTPATIENT
Start: 2017-03-07 | End: 2017-03-07

## 2017-03-07 RX ORDER — DEXAMETHASONE SODIUM PHOSPHATE 4 MG/ML
INJECTION, SOLUTION INTRA-ARTICULAR; INTRALESIONAL; INTRAMUSCULAR; INTRAVENOUS; SOFT TISSUE
Status: DISCONTINUED | OUTPATIENT
Start: 2017-03-07 | End: 2017-03-07

## 2017-03-07 RX ORDER — GLYCOPYRROLATE 0.2 MG/ML
INJECTION INTRAMUSCULAR; INTRAVENOUS
Status: DISCONTINUED | OUTPATIENT
Start: 2017-03-07 | End: 2017-03-07

## 2017-03-07 RX ORDER — FENTANYL CITRATE 50 UG/ML
25 INJECTION, SOLUTION INTRAMUSCULAR; INTRAVENOUS EVERY 5 MIN PRN
Status: DISCONTINUED | OUTPATIENT
Start: 2017-03-07 | End: 2017-03-07 | Stop reason: HOSPADM

## 2017-03-07 RX ORDER — SODIUM CHLORIDE 9 MG/ML
INJECTION, SOLUTION INTRAVENOUS CONTINUOUS
Status: DISCONTINUED | OUTPATIENT
Start: 2017-03-07 | End: 2017-03-07 | Stop reason: HOSPADM

## 2017-03-07 RX ORDER — CIPROFLOXACIN 2 MG/ML
400 INJECTION, SOLUTION INTRAVENOUS
Status: COMPLETED | OUTPATIENT
Start: 2017-03-08 | End: 2017-03-07

## 2017-03-07 RX ORDER — ACETAMINOPHEN 10 MG/ML
INJECTION, SOLUTION INTRAVENOUS
Status: DISCONTINUED | OUTPATIENT
Start: 2017-03-07 | End: 2017-03-07

## 2017-03-07 RX ORDER — SCOLOPAMINE TRANSDERMAL SYSTEM 1 MG/1
1 PATCH, EXTENDED RELEASE TRANSDERMAL
Status: DISCONTINUED | OUTPATIENT
Start: 2017-03-07 | End: 2017-03-07 | Stop reason: HOSPADM

## 2017-03-07 RX ORDER — ONDANSETRON 2 MG/ML
4 INJECTION INTRAMUSCULAR; INTRAVENOUS EVERY 12 HOURS PRN
Status: DISCONTINUED | OUTPATIENT
Start: 2017-03-07 | End: 2017-03-07 | Stop reason: HOSPADM

## 2017-03-07 RX ORDER — SODIUM CHLORIDE 0.9 % (FLUSH) 0.9 %
3 SYRINGE (ML) INJECTION
Status: DISCONTINUED | OUTPATIENT
Start: 2017-03-07 | End: 2017-03-07 | Stop reason: HOSPADM

## 2017-03-07 RX ORDER — HYDROMORPHONE HYDROCHLORIDE 2 MG/ML
0.4 INJECTION, SOLUTION INTRAMUSCULAR; INTRAVENOUS; SUBCUTANEOUS EVERY 5 MIN PRN
Status: DISCONTINUED | OUTPATIENT
Start: 2017-03-07 | End: 2017-03-07 | Stop reason: HOSPADM

## 2017-03-07 RX ADMIN — FENTANYL CITRATE 25 MCG: 50 INJECTION INTRAMUSCULAR; INTRAVENOUS at 01:03

## 2017-03-07 RX ADMIN — CIPROFLOXACIN 400 MG: 2 INJECTION, SOLUTION INTRAVENOUS at 12:03

## 2017-03-07 RX ADMIN — PROPOFOL 150 MG: 10 INJECTION, EMULSION INTRAVENOUS at 12:03

## 2017-03-07 RX ADMIN — CARBOXYMETHYLCELLULOSE SODIUM 2 DROP: 2.5 SOLUTION/ DROPS OPHTHALMIC at 12:03

## 2017-03-07 RX ADMIN — PROMETHAZINE HYDROCHLORIDE 6.25 MG: 25 INJECTION INTRAMUSCULAR; INTRAVENOUS at 01:03

## 2017-03-07 RX ADMIN — HYDROCODONE BITARTRATE AND ACETAMINOPHEN 1 TABLET: 5; 325 TABLET ORAL at 05:03

## 2017-03-07 RX ADMIN — HYDROMORPHONE HYDROCHLORIDE 1 MG: 2 INJECTION, SOLUTION INTRAMUSCULAR; INTRAVENOUS; SUBCUTANEOUS at 05:03

## 2017-03-07 RX ADMIN — FENTANYL CITRATE 100 MCG: 50 INJECTION, SOLUTION INTRAMUSCULAR; INTRAVENOUS at 12:03

## 2017-03-07 RX ADMIN — HYDROMORPHONE HYDROCHLORIDE 0.4 MG: 2 INJECTION, SOLUTION INTRAMUSCULAR; INTRAVENOUS; SUBCUTANEOUS at 02:03

## 2017-03-07 RX ADMIN — ONDANSETRON 4 MG: 2 INJECTION INTRAMUSCULAR; INTRAVENOUS at 01:03

## 2017-03-07 RX ADMIN — ACETAMINOPHEN 1000 MG: 10 INJECTION, SOLUTION INTRAVENOUS at 01:03

## 2017-03-07 RX ADMIN — ATROPA BELLADONNA AND OPIUM 60 MG: 16.2; 6 SUPPOSITORY RECTAL at 02:03

## 2017-03-07 RX ADMIN — OXYCODONE HYDROCHLORIDE 5 MG: 5 TABLET ORAL at 03:03

## 2017-03-07 RX ADMIN — HYDROMORPHONE HYDROCHLORIDE 0.4 MG: 2 INJECTION, SOLUTION INTRAMUSCULAR; INTRAVENOUS; SUBCUTANEOUS at 01:03

## 2017-03-07 RX ADMIN — PROMETHAZINE HYDROCHLORIDE 12.5 MG: 25 INJECTION INTRAMUSCULAR; INTRAVENOUS at 01:03

## 2017-03-07 RX ADMIN — OXYCODONE HYDROCHLORIDE 5 MG: 5 TABLET ORAL at 01:03

## 2017-03-07 RX ADMIN — DEXAMETHASONE SODIUM PHOSPHATE 8 MG: 4 INJECTION, SOLUTION INTRAMUSCULAR; INTRAVENOUS at 12:03

## 2017-03-07 RX ADMIN — FAMOTIDINE 20 MG: 20 TABLET, FILM COATED ORAL at 11:03

## 2017-03-07 RX ADMIN — SCOPALAMINE 1.5 MG: 1 PATCH, EXTENDED RELEASE TRANSDERMAL at 11:03

## 2017-03-07 RX ADMIN — SODIUM CHLORIDE, SODIUM LACTATE, POTASSIUM CHLORIDE, AND CALCIUM CHLORIDE: 600; 310; 30; 20 INJECTION, SOLUTION INTRAVENOUS at 12:03

## 2017-03-07 RX ADMIN — FENTANYL CITRATE 25 MCG: 50 INJECTION INTRAMUSCULAR; INTRAVENOUS at 04:03

## 2017-03-07 RX ADMIN — GLYCOPYRROLATE 0.2 MG: 0.2 INJECTION, SOLUTION INTRAMUSCULAR; INTRAVENOUS at 01:03

## 2017-03-07 RX ADMIN — MIDAZOLAM HYDROCHLORIDE 2 MG: 1 INJECTION, SOLUTION INTRAMUSCULAR; INTRAVENOUS at 12:03

## 2017-03-07 RX ADMIN — LIDOCAINE HYDROCHLORIDE 100 MG: 20 INJECTION, SOLUTION INTRAVENOUS at 12:03

## 2017-03-07 NOTE — PLAN OF CARE
Problem: Patient Care Overview  Goal: Individualization & Mutuality  Patient prefers to have Darlin Green () present for discharge teaching. Please contact them @ [phone].

## 2017-03-07 NOTE — ANESTHESIA POSTPROCEDURE EVALUATION
"Anesthesia Post Evaluation    Patient: Cesilia Dozier    Procedure(s) Performed: Procedure(s) (LRB):  CYSTOSCOPY WITH STENT PLACEMENT (Bilateral)    Final Anesthesia Type: general  Patient location during evaluation: PACU  Patient participation: Yes- Able to Participate  Level of consciousness: awake and alert  Post-procedure vital signs: reviewed and stable  Pain management: adequate  Airway patency: patent  PONV status at discharge: No PONV  Anesthetic complications: no      Cardiovascular status: blood pressure returned to baseline  Respiratory status: unassisted and spontaneous ventilation  Hydration status: euvolemic  Follow-up not needed.        Visit Vitals    /73    Pulse 70    Temp 36.6 °C (97.8 °F)    Resp 16    Ht 5' 6" (1.676 m)    Wt 74.6 kg (164 lb 9.2 oz)    SpO2 100%    BMI 26.56 kg/m2       Pain/Robbie Score: Pain Assessment Performed: Yes (3/7/2017  3:47 PM)  Presence of Pain: complains of pain/discomfort (3/7/2017  3:47 PM)  Pain Rating Prior to Med Admin: 8 (3/7/2017  4:42 PM)  Robbie Score: 10 (3/7/2017  3:47 PM)      "

## 2017-03-07 NOTE — BRIEF OP NOTE
[facility]  Brief Operative Note     SUMMARY     Surgery Date: 3/7/2017     Surgeon(s) and Role:     * Turner Bocanegra MD - Primary    Assisting Surgeon: None    Pre-op Diagnosis:  Bilateral hydronephrosis [N13.30]    Post-op Diagnosis:  Post-Op Diagnosis Codes:     * Bilateral hydronephrosis [N13.30]    Procedure(s) (LRB):  CYSTOSCOPY WITH STENT PLACEMENT (Bilateral)    Anesthesia: General/MAC    Description of the findings of the procedure: Stents removed and replaced with 8x22 JJ ureteral stents bilaterally.     Estimated Blood Loss: 0cc         Specimens:   Specimen     None          Discharge Note    SUMMARY     Admit Date: 3/7/2017    Discharge Date and Time: 3/7/2017    Hospital Course: Patient was admitted for elective outpatient procedure, which was uncomplicated and well tolerated.      Final Diagnosis: Post-Op Diagnosis Codes:     * Bilateral hydronephrosis [N13.30]    Disposition: Home or Self Care    Follow Up/Patient Instructions:     Medications:  Reconciled Home Medications:   Current Discharge Medication List      START taking these medications    Details   hydrocodone-acetaminophen 5-325mg (NORCO) 5-325 mg per tablet Take 1 tablet by mouth every 6 (six) hours as needed for Pain.  Qty: 12 tablet, Refills: 0         CONTINUE these medications which have NOT CHANGED    Details   ferrous sulfate 325 (65 FE) MG EC tablet Take 325 mg by mouth 3 (three) times daily with meals.      tolterodine (DETROL LA) 4 MG 24 hr capsule Take 1 capsule (4 mg total) by mouth once daily.  Qty: 30 capsule, Refills: 11    Associated Diagnoses: Bilateral hydronephrosis             Discharge Procedure Orders  Diet general     Activity as tolerated       Follow-up Information     Follow up with Turner Bocangera MD In 2 months.    Specialty:  Urology    Why:  For post-operative follow-up    Contact information:    [address]  [facility] [address]  [phone]

## 2017-03-07 NOTE — INTERVAL H&P NOTE
The patient has been examined and the H&P has been reviewed:    I concur with the findings and no changes have occurred since H&P was written.    Anesthesia/Surgery risks, benefits and alternative options discussed and understood by patient/family.          Active Hospital Problems    Diagnosis  POA    Dysuria [R30.0]  Yes      Resolved Hospital Problems    Diagnosis Date Resolved POA   No resolved problems to display.

## 2017-03-07 NOTE — TRANSFER OF CARE
"Anesthesia Transfer of Care Note    Patient: Cesilia Dozier    Procedure(s) Performed: Procedure(s) (LRB):  CYSTOSCOPY WITH STENT PLACEMENT (Bilateral)    Patient location: PACU    Anesthesia Type: general    Transport from OR: Transported from OR on 2-3 L/min O2 by NC with adequate spontaneous ventilation    Post pain: adequate analgesia    Post assessment: no apparent anesthetic complications    Post vital signs: stable    Level of consciousness: responds to stimulation    Nausea/Vomiting: no nausea/vomiting    Complications: none          Last vitals:   Visit Vitals    /69 (BP Location: Left arm, Patient Position: Lying, BP Method: Automatic)    Pulse 75    Temp 36.8 °C (98.2 °F) (Oral)    Resp 16    Ht 5' 6" (1.676 m)    Wt 74.6 kg (164 lb 9.2 oz)    SpO2 100%    BMI 26.56 kg/m2     "

## 2017-03-07 NOTE — DISCHARGE INSTRUCTIONS
Anesthesia: After Your Surgery  Youve just had surgery. During surgery, you received medication called anesthesia to keep you comfortable and pain-free. After surgery, you may experience some pain or nausea. This is common. Here are some tips for feeling better and recovering after surgery.    Going home  Your doctor or nurse will show you how to take care of yourself when you go home. He or she will also answer your questions. Have an adult family member or friend drive you home. For the first 24 hours after your surgery:  · Do not drive or use heavy equipment.  · Do not make important decisions or sign legal documents.  · Avoid alcohol.  · Have someone stay with you, if needed. He or she can watch for problems and help keep you safe.  Be sure to keep all follow-up appointments with your doctor. And rest after your procedure for as long as your doctor tells you to.    Coping with pain  If you have pain after surgery, pain medication will help you feel better. Take it as directed, before pain becomes severe. Also, ask your doctor or pharmacist about other ways to control pain, such as with heat, ice, and relaxation. And follow any other instructions your surgeon or nurse gives you.    Tips for taking pain medication  To get the best relief possible, remember these points:  · Pain medications can upset your stomach. Taking them with a little food may help.  · Most pain relievers taken by mouth need at least 20 to 30 minutes to take effect.  · Taking medication on a schedule can help you remember to take it. Try to time your medication so that you can take it before beginning an activity, such as dressing, walking, or sitting down for dinner.  · Constipation is a common side effect of pain medications. Contact your doctor before taking any medications like laxatives or stool softeners to help relieve constipation. Also ask about any dietary restrictions, because drinking lots of fluids and eating foods like fruits  and vegetables that are high in fiber can also help. Remember, dont take laxatives unless your surgeon has prescribed them.  · Mixing alcohol and pain medication can cause dizziness and slow your breathing. It can even be fatal. Dont drink alcohol while taking pain medication.  · Pain medication can slow your reflexes. Dont drive or operate machinery while taking pain medication.  If your health care provider tells you to take acetaminophen to help relieve your pain, ask him or her how much you are supposed to take each day. (Acetaminophen is the generic name for Tylenol and other brand-name pain relievers.) Acetaminophen or other pain relievers may interact with your prescription medicines or other over-the-counter (OTC) drugs. Some prescription medications contain acetaminophen along with other active ingredients. Using both prescription and OTC acetaminophen for pain can cause you to overdose. The FDA recommends that you read the labels on your OTC medications carefully. This will help you to clearly understand the list of active ingredients, dosing instructions, and any warnings. It may also help you avoid taking too much acetaminophen. If you have questions or don't understand the information, ask your pharmacist or health care provider to explain it to you before you take the OTC medication.    Managing nausea  Some people have an upset stomach after surgery. This is often due to anesthesia, pain, pain medications, or the stress of surgery. The following tips will help you manage nausea and get good nutrition as you recover. If you were on a special diet before surgery, ask your doctor if you should follow it during recovery. These tips may help:  · Dont push yourself to eat. Your body will tell you when to eat and how much.  · Start off with clear liquids and soup. They are easier to digest.  · Progress to semi-solid foods (mashed potatoes, applesauce, and gelatin) as you feel ready.  · Slowly move to  solid foods. Dont eat fatty, rich, or spicy foods at first.  · Dont force yourself to have three large meals a day. Instead, eat smaller amounts more often.  · Take pain medications with a small amount of solid food, such as crackers or toast to avoid nausea.      Call your surgeon if  · You still have pain an hour after taking medication (it may not be strong enough).  · You feel too sleepy, dizzy, or groggy (medication may be too strong).  · You have side effects like nausea, vomiting, or skin changes (rash, itching, or hives).   © 6991-6100 [employer]. [address] [place] [address], [place] [address]. All rights reserved. This information is not intended as a substitute for professional medical care. Always follow your healthcare professional's instructions.                  Ureteral Stents  A ureteral stent is a soft plastic tube with holes in it. Its temporarily inserted into a ureter to help drain urine into the bladder. One end goes in the kidney. The other end goes in the bladder. A coil on each end holds the stent in place. The stent cant be seen from outside the body. It shouldnt interfere with your normal routine. Your stent will be put in by a urologist (doctor trained in treating the urinary tract) or another specialist. The procedure is done in a hospital or surgery center. Youll likely go home the same day.  When Is a Ureteral Stent Used?  A ureteral stent may be used:  · To bypass a blockage in a kidney or ureter.  · During kidney stone removal.  · To let a ureter heal after surgery.    While You Have a Stent  · Some discomfort is normal. Certain movements may trigger pain or a feeling that you need to urinate. You may also feel mild soreness or pressure before or during urination. These symptoms will go away a few days after the stent is removed.  · Medication to control pain or bladder spasms or to prevent infection may be prescribed. Take this as directed.  · Drink plenty of fluids  to help flush out your urinary tract.  · Your urine may be slightly pink or red. This is due to bleeding caused by minor irritation from the stent. This may happen on and off while you have the stent.  · As with any synthetic device placed in the body, there is a risk of infection. The stent may have to be removed if this happens.   How Long Will You Need a Stent?  The stent is often taken out after the blockage in the ureter is treated or the ureter has healed. This may take 1 week to 2 weeks, or longer. If a stent is needed for a long time, it may need to be changed every few months.  Call Your Doctor  Contact your doctor right away if:  · Your urine contains blood clots or you see a large amount of blood-tinged urine.   · You have symptoms similar to those you had before the stent was placed.   · You constantly leak urine.  · You have a fever over 100.4°F (38°C), chills, nausea, or vomiting.  · Your pain is not relieved with medication.  · The end of the stent comes out of the urethra.   Date Last Reviewed: 11/26/2014  © 3136-3819 [employer]. [address] [place] [address], [place] [address]. All rights reserved. This information is not intended as a substitute for professional medical care. Always follow your healthcare professional's instructions.

## 2017-03-07 NOTE — H&P (VIEW-ONLY)
"Subjective:      Cesilia Dozier is a 36 y.o. female who returns today regarding her ureteral stents.     Ms. Dozier was last seen on 1/24/17. She has h/o cervical cancer, treated elsewhere, w/ subsequent bilateral hydro treated in the past w/ PCNs and most recently w/ stents. Stents last changed by Dr. Ball at [facility] in December. Etiology and location of obstruction is unclear. She has had 2 CTs in ER since last stent placement that confirmed good position.  She had a + urine culture on 2/20/17 and has completed a full course of cipro.       Recently she has had severe pain due to the stents described as left flank pain and lower pelvic pain/pressure (10/10 on the pain scale). She takes detrol daily and percocet when she has it; however her symptoms are not relieved w/ percocet. The patient reports hematuria, frequency, urgency and dysuria. She reports a fever 2 days ago (101F).     The following portions of the patient's history were reviewed and updated as appropriate: allergies, current medications, past family history, past medical history, past social history, past surgical history and problem list.    Review of Systems  A comprehensive multipoint review of systems was negative except as otherwise stated in the HPI.     Objective:   Vitals: /70  Pulse 94  Ht 5' 6" (1.676 m)  Wt 68 kg (150 lb)  BMI 24.21 kg/m2    Physical Exam   General: alert and oriented, no acute distress  Respiratory: Symmetric expansion, non-labored breathing  Cardiovascular: no peripheral edema  Abdomen: soft, non distended; generalized tenderness   Skin: normal coloration and turgor, no rashes, no suspicious skin lesions noted  Neuro: no gross deficits  Psych: normal judgment and insight, normal mood/affect and non-anxious  +CVA tenderness     Lab Review   Urinalysis demonstrates positive for leukocytes, red blood cells  Lab Results   Component Value Date    WBC 7.17 02/20/2017    HGB 12.5 02/20/2017    HCT 37.3 02/20/2017    MCV " "95 02/20/2017     02/20/2017     Lab Results   Component Value Date    CREATININE 2.0 (H) 02/20/2017    BUN 23 (H) 02/20/2017       Imaging   CT abdomen and Pelvis (2/20/17)- images were personally reviewed and agree with findings "No detrimental change or acute process seen on this noncontrast CT. Bilateral ureteral stents in stable position, with unchanged mild bilateral hydronephrosis."    Assessment and Plan:   Cesilia was seen today for follow-up.    Diagnoses and all orders for this visit:    Dysuria  -     Urine culture    Flank pain    Pain due to ureteral stent, initial encounter    Plan: Continue detrol for bladder spasms. Urine culture today. Scheduled stent exchange for 3/7/17.     "

## 2017-03-07 NOTE — IP AVS SNAPSHOT
[facility] [place] [facility] ([facility])  [address] [place] [id number]  Phone: [phone]           Patient Discharge Instructions     Our goal is to set you up for success. This packet includes information on your condition, medications, and your home care. It will help you to care for yourself so you don't get sicker and need to go back to the hospital.     Please ask your nurse if you have any questions.        There are many details to remember when preparing to leave the hospital. Here is what you will need to do:    1. Take your medicine. If you are prescribed medications, review your Medication List in the following pages. You may have new medications to  at the pharmacy and others that you'll need to stop taking. Review the instructions for how and when to take your medications. Talk with your doctor or nurses if you are unsure of what to do.     2. Go to your follow-up appointments. Specific follow-up information is listed in the following pages. Your may be contacted by a transition nurse or clinical provider about future appointments. Be sure we have all of the phone numbers to reach you, if needed. Please contact your provider's office if you are unable to make an appointment.     3. Watch for warning signs. Your doctor or nurse will give you detailed warning signs to watch for and when to call for assistance. These instructions may also include educational information about your condition. If you experience any of warning signs to your health, call your doctor.               ** Verify the list of medication(s) below is accurate and up to date. Carry this with you in case of emergency. If your medications have changed, please notify your healthcare provider.             Medication List      START taking these medications        Additional Info                      hydrocodone-acetaminophen 5-325mg 5-325 mg per tablet   Commonly known as:  NORCO   Quantity:  12 tablet   Refills:  0    Dose:  1 tablet    Instructions:  Take 1 tablet by mouth every 6 (six) hours as needed for Pain.     Begin Date    AM    Noon    PM    Bedtime         CONTINUE taking these medications        Additional Info                      ferrous sulfate 325 (65 FE) MG EC tablet   Refills:  0   Dose:  325 mg    Instructions:  Take 325 mg by mouth 3 (three) times daily with meals.     Begin Date    AM    Noon    PM    Bedtime       tolterodine 4 MG 24 hr capsule   Commonly known as:  DETROL LA   Quantity:  30 capsule   Refills:  11   Dose:  4 mg    Instructions:  Take 1 capsule (4 mg total) by mouth once daily.     Begin Date    AM    Noon    PM    Bedtime            Where to Get Your Medications      These medications were sent to [id number]  [facility] - [address] [facility] [address]  [address] [address]    Hours:  24-hours Phone:  [phone]     hydrocodone-acetaminophen 5-325mg 5-325 mg per tablet                  Please bring to all follow up appointments:    1. A copy of your discharge instructions.  2. All medicines you are currently taking in their original bottles.  3. Identification and insurance card.    Please arrive 15 minutes ahead of scheduled appointment time.    Please call 24 hours in advance if you must reschedule your appointment and/or time.        Follow-up Information     Follow up with Turner Bocanegra MD In 2 months.    Specialty:  Urology    Why:  For post-operative follow-up    Contact information:    [address] [place] [address] [address]  [facility] [address]  [phone]          Discharge Instructions     Future Orders    Activity as tolerated     Diet general     Questions:    Total calories:      Fat restriction, if any:      Protein restriction, if any:      Na restriction, if any:      Fluid restriction:      Additional restrictions:          Discharge Instructions         Anesthesia: After Your Surgery  Youve just had surgery. During  surgery, you received medication called anesthesia to keep you comfortable and pain-free. After surgery, you may experience some pain or nausea. This is common. Here are some tips for feeling better and recovering after surgery.    Going home  Your doctor or nurse will show you how to take care of yourself when you go home. He or she will also answer your questions. Have an adult family member or friend drive you home. For the first 24 hours after your surgery:  · Do not drive or use heavy equipment.  · Do not make important decisions or sign legal documents.  · Avoid alcohol.  · Have someone stay with you, if needed. He or she can watch for problems and help keep you safe.  Be sure to keep all follow-up appointments with your doctor. And rest after your procedure for as long as your doctor tells you to.    Coping with pain  If you have pain after surgery, pain medication will help you feel better. Take it as directed, before pain becomes severe. Also, ask your doctor or pharmacist about other ways to control pain, such as with heat, ice, and relaxation. And follow any other instructions your surgeon or nurse gives you.    Tips for taking pain medication  To get the best relief possible, remember these points:  · Pain medications can upset your stomach. Taking them with a little food may help.  · Most pain relievers taken by mouth need at least 20 to 30 minutes to take effect.  · Taking medication on a schedule can help you remember to take it. Try to time your medication so that you can take it before beginning an activity, such as dressing, walking, or sitting down for dinner.  · Constipation is a common side effect of pain medications. Contact your doctor before taking any medications like laxatives or stool softeners to help relieve constipation. Also ask about any dietary restrictions, because drinking lots of fluids and eating foods like fruits and vegetables that are high in fiber can also help. Remember,  dont take laxatives unless your surgeon has prescribed them.  · Mixing alcohol and pain medication can cause dizziness and slow your breathing. It can even be fatal. Dont drink alcohol while taking pain medication.  · Pain medication can slow your reflexes. Dont drive or operate machinery while taking pain medication.  If your health care provider tells you to take acetaminophen to help relieve your pain, ask him or her how much you are supposed to take each day. (Acetaminophen is the generic name for Tylenol and other brand-name pain relievers.) Acetaminophen or other pain relievers may interact with your prescription medicines or other over-the-counter (OTC) drugs. Some prescription medications contain acetaminophen along with other active ingredients. Using both prescription and OTC acetaminophen for pain can cause you to overdose. The FDA recommends that you read the labels on your OTC medications carefully. This will help you to clearly understand the list of active ingredients, dosing instructions, and any warnings. It may also help you avoid taking too much acetaminophen. If you have questions or don't understand the information, ask your pharmacist or health care provider to explain it to you before you take the OTC medication.    Managing nausea  Some people have an upset stomach after surgery. This is often due to anesthesia, pain, pain medications, or the stress of surgery. The following tips will help you manage nausea and get good nutrition as you recover. If you were on a special diet before surgery, ask your doctor if you should follow it during recovery. These tips may help:  · Dont push yourself to eat. Your body will tell you when to eat and how much.  · Start off with clear liquids and soup. They are easier to digest.  · Progress to semi-solid foods (mashed potatoes, applesauce, and gelatin) as you feel ready.  · Slowly move to solid foods. Dont eat fatty, rich, or spicy foods at  first.  · Dont force yourself to have three large meals a day. Instead, eat smaller amounts more often.  · Take pain medications with a small amount of solid food, such as crackers or toast to avoid nausea.      Call your surgeon if  · You still have pain an hour after taking medication (it may not be strong enough).  · You feel too sleepy, dizzy, or groggy (medication may be too strong).  · You have side effects like nausea, vomiting, or skin changes (rash, itching, or hives).   © 9556-7062 [employer]. [address] [place] [address]. All rights reserved. This information is not intended as a substitute for professional medical care. Always follow your healthcare professional's instructions.                  Ureteral Stents  A ureteral stent is a soft plastic tube with holes in it. Its temporarily inserted into a ureter to help drain urine into the bladder. One end goes in the kidney. The other end goes in the bladder. A coil on each end holds the stent in place. The stent cant be seen from outside the body. It shouldnt interfere with your normal routine. Your stent will be put in by a urologist (doctor trained in treating the urinary tract) or another specialist. The procedure is done in a hospital or surgery center. Youll likely go home the same day.  When Is a Ureteral Stent Used?  A ureteral stent may be used:  · To bypass a blockage in a kidney or ureter.  · During kidney stone removal.  · To let a ureter heal after surgery.    While You Have a Stent  · Some discomfort is normal. Certain movements may trigger pain or a feeling that you need to urinate. You may also feel mild soreness or pressure before or during urination. These symptoms will go away a few days after the stent is removed.  · Medication to control pain or bladder spasms or to prevent infection may be prescribed. Take this as directed.  · Drink plenty of fluids to help flush out your urinary tract.  · Your urine  "may be slightly pink or red. This is due to bleeding caused by minor irritation from the stent. This may happen on and off while you have the stent.  · As with any synthetic device placed in the body, there is a risk of infection. The stent may have to be removed if this happens.   How Long Will You Need a Stent?  The stent is often taken out after the blockage in the ureter is treated or the ureter has healed. This may take 1 week to 2 weeks, or longer. If a stent is needed for a long time, it may need to be changed every few months.  Call Your Doctor  Contact your doctor right away if:  · Your urine contains blood clots or you see a large amount of blood-tinged urine.   · You have symptoms similar to those you had before the stent was placed.   · You constantly leak urine.  · You have a fever over 100.4°F (38°C), chills, nausea, or vomiting.  · Your pain is not relieved with medication.  · The end of the stent comes out of the urethra.   Date Last Reviewed: 11/26/2014  © 8762-7279 [employer]. [address] [place] [address]. All rights reserved. This information is not intended as a substitute for professional medical care. Always follow your healthcare professional's instructions.            Primary Diagnosis     Your primary diagnosis was:  Swollen Kidney      Admission Information     Date & Time Provider Department CSN    3/7/2017 10:04 AM Turner Bocanegra MD [facility] [id number]      Care Providers     Provider Role Specialty Primary office phone    Turner Bocanegra MD Attending Provider Urology [phone]    Turner Bocanegra MD Surgeon  Urology [phone]      Your Vitals Were     BP Pulse Temp Resp Height Weight    105/73 70 97.8 °F (36.6 °C) 16 5' 6" (1.676 m) 74.6 kg (164 lb 9.2 oz)    SpO2 BMI             100% 26.56 kg/m2         Recent Lab Values        12/12/2016                          10:54 PM           A1C 5.4           Comment for A1C at 10:54 " PM on 12/12/2016:  According to ADA guidelines, hemoglobin A1C <7.0% represents  optimal control in non-pregnant diabetic patients.  Different  metrics may apply to specific populations.   Standards of Medical Care in Diabetes - 2016.  For the purpose of screening for the presence of diabetes:  <5.7%     Consistent with the absence of diabetes  5.7-6.4%  Consistent with increasing risk for diabetes   (prediabetes)  >or=6.5%  Consistent with diabetes  Currently no consensus exists for use of hemoglobin A1C  for diagnosis of diabetes for children.        Allergies as of 3/7/2017        Reactions    Pcn [Penicillins] Anaphylaxis      [facility]     [facility] Care [facility] - 24/7 Assistance  Unless otherwise directed by your provider, please contact [facility], our nurse care line that is available for 24/7 assistance.     Registered nurses in the [facility] provide clinical advisement, health education, appointment booking, and other advisory services.  Call for this free service at [phone].        Advance Directives     An advance directive is a document which, in the event you are no longer able to make decisions for yourself, tells your healthcare team what kind of treatment you do or do not want to receive, or who you would like to make those decisions for you.  If you do not currently have an advance directive, [facility] encourages you to create one.  For more information call:  [phone]), [phone] ([phone]),  or log on to [email or web address].        Language Assistance Services     ATTENTION: Language assistance services are available, free of charge. Please call [phone].      ATENCIÓN: Si habla español, tiene a batista disposición servicios gratuitos de asistencia lingüística. Llame al [phone].     CHÚ Ý: N?u b?n nói Ti?ng Vi?t, có các d?ch v? h? tr? ngôn ng? mi?n phí dành cho b?n. G?i s? [phone].        Chronic Kindey Disease  Education [facility] complies with applicable Federal civil rights laws and does not discriminate on the basis of race, color, national origin, age, disability, or sex.

## 2017-03-08 NOTE — OP NOTE
DATE OF PROCEDURE:  03/07/2017.    PREOPERATIVE DIAGNOSIS:  Bilateral hydronephrosis.    POSTOPERATIVE DIAGNOSIS:  Bilateral hydronephrosis.    PROCEDURE PERFORMED:  Bilateral ureteral stent exchange.    SURGEON:  Turner Bocanegra M.D.    ANESTHESIA:  General.    COMPLICATIONS:  None.    ESTIMATED BLOOD LOSS:  Zero.    SPECIMEN:  None.    INDICATIONS FOR PROCEDURE:  Mrs. Dozier is a 36-year-old female with a history of   cervical cancer treated with radiation therapy with subsequent bilateral   ureteral obstruction managed with bilateral ureteral stents.  These were last   changed in December by Dr. Ball.  She returns now for routine exchange.  Due   to increased discomfort and pain I told we would try for shorter stents   today.    PROCEDURE DESCRIPTION:  The patient was brought to the OR and placed under   general anesthesia.  She was placed in dorsal lithotomy position.  She was   prepped and draped in sterile fashion.  Timeout was performed.  Rigid cystoscopy   was performed.  The bladder had grossly normal appearance except for some   expected inflammatory changes from chronic stents.  The left ureteral orifice   was identified.  It was grasped with the stent grasper and the distal tip   brought to the urethral meatus.  Of note, the right stent also pulled out to the   urethral meatus at this time.  The left ureteral stent was cannulated with the   sensor wire which was advanced into the renal pelvis under fluoroscopic   guidance.  The stent was then removed.    The cystoscope was replaced over the wire.  Under direct visual and fluoroscopic   guidance, an 8 x 22 double-J ureteral stent was placed over the wire.  The wire   was removed with appropriate proximal and distal curls confirmed   cystoscopically and fluoroscopically.  An identical procedure was then performed   on the right side.  Again, an 8 x 22 double-J stent was placed with appropriate   proximal and distal curls.  Strings were removed prior to  stent placement.  The   bladder was then drained and the cystoscope removed.  The patient was awoken   and transferred to PACU in stable condition.      BRAYDON/  dd: 03/07/2017 13:36:40 (CST)  td: 03/07/2017 18:31:48 (CST)  Doc ID   #[id number]  Job ID #[id number]    CC:

## 2017-03-18 ENCOUNTER — HOSPITAL ENCOUNTER (OUTPATIENT)
Facility: HOSPITAL | Age: 37
Discharge: HOME | End: 2017-03-21
Attending: EMERGENCY MEDICINE | Admitting: SPECIALIST
Payer: MEDICAID

## 2017-03-18 DIAGNOSIS — R10.2 PELVIC PAIN: ICD-10-CM

## 2017-03-18 LAB
BACTERIA #/AREA URNS HPF: ABNORMAL /HPF
BASOPHILS # BLD AUTO: 0 K/UL
BASOPHILS NFR BLD: 0.5 %
BILIRUB UR QL STRIP: NEGATIVE
CLARITY UR: ABNORMAL
COLOR UR: YELLOW
DIFFERENTIAL METHOD: ABNORMAL
EOSINOPHIL # BLD AUTO: 0.5 K/UL
EOSINOPHIL NFR BLD: 4.4 %
ERYTHROCYTE [DISTWIDTH] IN BLOOD BY AUTOMATED COUNT: 13.5 %
GLUCOSE UR QL STRIP: NEGATIVE
HCT VFR BLD AUTO: 35.7 %
HGB BLD-MCNC: 11.8 G/DL
HGB UR QL STRIP: ABNORMAL
HYALINE CASTS #/AREA URNS LPF: 0 /LPF
KETONES UR QL STRIP: NEGATIVE
LEUKOCYTE ESTERASE UR QL STRIP: ABNORMAL
LYMPHOCYTES # BLD AUTO: 1.4 K/UL
LYMPHOCYTES NFR BLD: 13.1 %
MCH RBC QN AUTO: 32.1 PG
MCHC RBC AUTO-ENTMCNC: 33.2 %
MCV RBC AUTO: 97 FL
MICROSCOPIC COMMENT: ABNORMAL
MONOCYTES # BLD AUTO: 0.5 K/UL
MONOCYTES NFR BLD: 4.5 %
NEUTROPHILS # BLD AUTO: 8.4 K/UL
NEUTROPHILS NFR BLD: 77.5 %
NITRITE UR QL STRIP: NEGATIVE
PH UR STRIP: 6 [PH] (ref 5–8)
PLATELET # BLD AUTO: 245 K/UL
PMV BLD AUTO: 8.8 FL
PROT UR QL STRIP: ABNORMAL
RBC # BLD AUTO: 3.69 M/UL
RBC #/AREA URNS HPF: 78 /HPF (ref 0–4)
SP GR UR STRIP: 1.02 (ref 1–1.03)
SQUAMOUS #/AREA URNS HPF: 9 /HPF
URN SPEC COLLECT METH UR: ABNORMAL
UROBILINOGEN UR STRIP-ACNC: NEGATIVE EU/DL
WBC # BLD AUTO: 10.8 K/UL
WBC #/AREA URNS HPF: >100 /HPF (ref 0–5)

## 2017-03-18 PROCEDURE — 25000003 PHARM REV CODE 250: Performed by: EMERGENCY MEDICINE

## 2017-03-18 PROCEDURE — 96375 TX/PRO/DX INJ NEW DRUG ADDON: CPT

## 2017-03-18 PROCEDURE — 85025 COMPLETE CBC W/AUTO DIFF WBC: CPT

## 2017-03-18 PROCEDURE — 81000 URINALYSIS NONAUTO W/SCOPE: CPT

## 2017-03-18 PROCEDURE — 96361 HYDRATE IV INFUSION ADD-ON: CPT

## 2017-03-18 PROCEDURE — 36415 COLL VENOUS BLD VENIPUNCTURE: CPT

## 2017-03-18 PROCEDURE — 99284 EMERGENCY DEPT VISIT MOD MDM: CPT | Mod: 25

## 2017-03-18 PROCEDURE — 87086 URINE CULTURE/COLONY COUNT: CPT

## 2017-03-18 PROCEDURE — 80048 BASIC METABOLIC PNL TOTAL CA: CPT

## 2017-03-18 PROCEDURE — 63600175 PHARM REV CODE 636 W HCPCS: Performed by: EMERGENCY MEDICINE

## 2017-03-18 RX ORDER — KETOROLAC TROMETHAMINE 30 MG/ML
30 INJECTION, SOLUTION INTRAMUSCULAR; INTRAVENOUS
Status: COMPLETED | OUTPATIENT
Start: 2017-03-18 | End: 2017-03-18

## 2017-03-18 RX ORDER — SODIUM CHLORIDE 9 MG/ML
1000 INJECTION, SOLUTION INTRAVENOUS
Status: COMPLETED | OUTPATIENT
Start: 2017-03-18 | End: 2017-03-19

## 2017-03-18 RX ADMIN — KETOROLAC TROMETHAMINE 30 MG: 30 INJECTION, SOLUTION INTRAMUSCULAR at 11:03

## 2017-03-18 RX ADMIN — SODIUM CHLORIDE 1000 ML: 0.9 INJECTION, SOLUTION INTRAVENOUS at 11:03

## 2017-03-18 NOTE — IP AVS SNAPSHOT
[address] [place] [address]  Phone: [phone]           Patient Discharge Instructions     Our goal is to set you up for success. This packet includes information on your condition, medications, and your home care. It will help you to care for yourself so you don't get sicker and need to go back to the hospital.     Please ask your nurse if you have any questions.        There are many details to remember when preparing to leave the hospital. Here is what you will need to do:    1. Take your medicine. If you are prescribed medications, review your Medication List in the following pages. You may have new medications to  at the pharmacy and others that you'll need to stop taking. Review the instructions for how and when to take your medications. Talk with your doctor or nurses if you are unsure of what to do.     2. Go to your follow-up appointments. Specific follow-up information is listed in the following pages. Your may be contacted by a transition nurse or clinical provider about future appointments. Be sure we have all of the phone numbers to reach you, if needed. Please contact your provider's office if you are unable to make an appointment.     3. Watch for warning signs. Your doctor or nurse will give you detailed warning signs to watch for and when to call for assistance. These instructions may also include educational information about your condition. If you experience any of warning signs to your health, call your doctor.               [facility]  Unless otherwise directed by your provider, please contact [facility], our nurse care line that is available for 24/7 assistance.     [phone] (toll-free)    Registered nurses in the [facility] provide clinical advisement, health education, appointment booking, and other advisory services.                    ** Verify the list of medication(s) below is accurate and up to date.  Carry this with you in case of emergency. If your medications have changed, please notify your healthcare provider.             Medication List      START taking these medications        Additional Info                      cefaclor 500 mg 12 hr tablet   Commonly known as:  CECLOR CD   Quantity:  20 tablet   Refills:  0   Dose:  500 mg    Instructions:  Take 1 tablet (500 mg total) by mouth every 12 (twelve) hours.     Begin Date    AM    Noon    PM    Bedtime       ondansetron 8 MG Tbdl   Commonly known as:  ZOFRAN-ODT   Quantity:  20 tablet   Refills:  0   Dose:  8 mg    Instructions:  Take 1 tablet (8 mg total) by mouth every 6 (six) hours as needed (nausea).     Begin Date    AM    Noon    PM    Bedtime         CONTINUE taking these medications        Additional Info                      ferrous sulfate 325 (65 FE) MG EC tablet   Refills:  0   Dose:  325 mg    Instructions:  Take 325 mg by mouth 3 (three) times daily with meals.     Begin Date    AM    Noon    PM    Bedtime       hydrocodone-acetaminophen 5-325mg 5-325 mg per tablet   Commonly known as:  NORCO   Quantity:  12 tablet   Refills:  0   Dose:  1 tablet    Last time this was given:  1 tablet on 3/19/2017  2:20 AM   Instructions:  Take 1 tablet by mouth every 6 (six) hours as needed for Pain.     Begin Date    AM    Noon    PM    Bedtime       tolterodine 4 MG 24 hr capsule   Commonly known as:  DETROL LA   Quantity:  30 capsule   Refills:  11   Dose:  4 mg    Instructions:  Take 1 capsule (4 mg total) by mouth once daily.     Begin Date    AM    Noon    PM    Bedtime            Where to Get Your Medications      You can get these medications from any pharmacy     Bring a paper prescription for each of these medications     cefaclor 500 mg 12 hr tablet    hydrocodone-acetaminophen 5-325mg 5-325 mg per tablet    ondansetron 8 MG Tbdl                  Please bring to all follow up appointments:    1. A copy of your discharge instructions.  2. All  "medicines you are currently taking in their original bottles.  3. Identification and insurance card.    Please arrive 15 minutes ahead of scheduled appointment time.    Please call 24 hours in advance if you must reschedule your appointment and/or time.        Follow-up Information     Follow up with Turner Bocanegra MD. Call in 1 day.    Specialty:  Urology    Why:  to let him know you were in the hospital with kidney infection    Contact information:    [address] [place] [address] [address]  [facility] [address]  [phone]          Follow up with Try to find a gynecologist/oncologist in this area to follow up on your cervical cancer.        Follow up with Harsha Sheppard MD On 4/3/2019.    Specialty:  Internal Medicine    Why:  hospital follow up with new PCP scheduled on 4-3-17 @ 2:45 p.m.     Contact information:    [address] [place] [address] [address]  [facility] [id number] [phone]          Discharge Instructions     Future Orders    Activity as tolerated     Diet general     Questions:    Total calories:      Fat restriction, if any:      Protein restriction, if any:      Na restriction, if any:      Fluid restriction:      Additional restrictions:          Primary Diagnosis     Your primary diagnosis was:  Acute Pyelonephritis      Admission Information     Date & Time Provider Department CSN    3/18/2017 10:45 PM Jan Norris MD [facility] [id number]      Care Providers     Provider Role Specialty Primary office phone    Jan Norris MD Attending Provider Hospitalist [phone]    Angel Luis Dominguez MD Consulting Physician  Urology [phone]    Dina Greco MD Consulting Physician  Urology  [phone]      Your Vitals Were     BP Pulse Temp Resp Height Weight    96/54 96 98.6 °F (37 °C) (Oral) 18 5' 7" (1.702 m) 74.8 kg (165 lb)    SpO2 BMI             99% 25.84 kg/m2         Recent Lab Values        12/12/2016                          10:54 PM           A1C 5.4           Comment " for A1C at 10:54 PM on 12/12/2016:  According to ADA guidelines, hemoglobin A1C <7.0% represents  optimal control in non-pregnant diabetic patients.  Different  metrics may apply to specific populations.   Standards of Medical Care in Diabetes - 2016.  For the purpose of screening for the presence of diabetes:  <5.7%     Consistent with the absence of diabetes  5.7-6.4%  Consistent with increasing risk for diabetes   (prediabetes)  >or=6.5%  Consistent with diabetes  Currently no consensus exists for use of hemoglobin A1C  for diagnosis of diabetes for children.        Allergies as of 3/21/2017        Reactions    Pcn [Penicillins] Anaphylaxis      Advance Directives     An advance directive is a document which, in the event you are no longer able to make decisions for yourself, tells your healthcare team what kind of treatment you do or do not want to receive, or who you would like to make those decisions for you.  If you do not currently have an advance directive, [facility] encourages you to create one.  For more information call:  [phone]), [phone] ([phone]),  or log on to [email or web address].        Language Assistance Services     ATTENTION: Language assistance services are available, free of charge. Please call [phone].      ATENCIÓN: Si habla español, tiene a batista disposición servicios gratuitos de asistencia lingüística. Llame al [phone].     CHÚ Ý: N?u b?n nói Ti?ng Vi?t, có các d?ch v? h? tr? ngôn ng? mi?n phí dành cho b?n. G?i s? [phone].        Chronic Kindey Disease Education              [facility] complies with applicable Federal civil rights laws and does not discriminate on the basis of race, color, national origin, age, disability, or sex.

## 2017-03-19 PROBLEM — N30.00 ACUTE CYSTITIS: Status: ACTIVE | Noted: 2017-03-19

## 2017-03-19 PROBLEM — N10 ACUTE PYELONEPHRITIS: Status: ACTIVE | Noted: 2017-03-19

## 2017-03-19 PROBLEM — R10.2 PELVIC PAIN: Status: ACTIVE | Noted: 2017-03-19

## 2017-03-19 LAB
ANION GAP SERPL CALC-SCNC: 12 MMOL/L
BUN SERPL-MCNC: 20 MG/DL
CALCIUM SERPL-MCNC: 9.7 MG/DL
CHLORIDE SERPL-SCNC: 106 MMOL/L
CO2 SERPL-SCNC: 24 MMOL/L
CREAT SERPL-MCNC: 2.3 MG/DL
EST. GFR  (AFRICAN AMERICAN): 30 ML/MIN/1.73 M^2
EST. GFR  (NON AFRICAN AMERICAN): 26 ML/MIN/1.73 M^2
GLUCOSE SERPL-MCNC: 98 MG/DL
POTASSIUM SERPL-SCNC: 4.2 MMOL/L
SODIUM SERPL-SCNC: 142 MMOL/L

## 2017-03-19 PROCEDURE — 63600175 PHARM REV CODE 636 W HCPCS: Performed by: EMERGENCY MEDICINE

## 2017-03-19 PROCEDURE — 25000003 PHARM REV CODE 250: Performed by: PHYSICIAN ASSISTANT

## 2017-03-19 PROCEDURE — 63600175 PHARM REV CODE 636 W HCPCS: Performed by: SPECIALIST

## 2017-03-19 PROCEDURE — 25000003 PHARM REV CODE 250: Performed by: NURSE PRACTITIONER

## 2017-03-19 PROCEDURE — 63600175 PHARM REV CODE 636 W HCPCS: Performed by: NURSE PRACTITIONER

## 2017-03-19 PROCEDURE — G0378 HOSPITAL OBSERVATION PER HR: HCPCS

## 2017-03-19 PROCEDURE — 63600175 PHARM REV CODE 636 W HCPCS: Performed by: PHYSICIAN ASSISTANT

## 2017-03-19 PROCEDURE — 96365 THER/PROPH/DIAG IV INF INIT: CPT

## 2017-03-19 RX ORDER — AMOXICILLIN 250 MG
1 CAPSULE ORAL 2 TIMES DAILY
Status: DISCONTINUED | OUTPATIENT
Start: 2017-03-19 | End: 2017-03-21 | Stop reason: HOSPADM

## 2017-03-19 RX ORDER — POTASSIUM CHLORIDE 20 MEQ/15ML
40 SOLUTION ORAL
Status: DISCONTINUED | OUTPATIENT
Start: 2017-03-19 | End: 2017-03-21 | Stop reason: HOSPADM

## 2017-03-19 RX ORDER — HYDROCODONE BITARTRATE AND ACETAMINOPHEN 5; 325 MG/1; MG/1
1 TABLET ORAL EVERY 6 HOURS PRN
Status: DISCONTINUED | OUTPATIENT
Start: 2017-03-19 | End: 2017-03-21 | Stop reason: HOSPADM

## 2017-03-19 RX ORDER — HYDROMORPHONE HYDROCHLORIDE 2 MG/ML
1 INJECTION, SOLUTION INTRAMUSCULAR; INTRAVENOUS; SUBCUTANEOUS EVERY 6 HOURS PRN
Status: DISCONTINUED | OUTPATIENT
Start: 2017-03-19 | End: 2017-03-19

## 2017-03-19 RX ORDER — METOCLOPRAMIDE HYDROCHLORIDE 5 MG/ML
10 INJECTION INTRAMUSCULAR; INTRAVENOUS
Status: COMPLETED | OUTPATIENT
Start: 2017-03-19 | End: 2017-03-19

## 2017-03-19 RX ORDER — SODIUM CHLORIDE 9 MG/ML
1000 INJECTION, SOLUTION INTRAVENOUS CONTINUOUS
Status: ACTIVE | OUTPATIENT
Start: 2017-03-19 | End: 2017-03-19

## 2017-03-19 RX ORDER — MORPHINE SULFATE 4 MG/ML
8 INJECTION, SOLUTION INTRAMUSCULAR; INTRAVENOUS
Status: COMPLETED | OUTPATIENT
Start: 2017-03-19 | End: 2017-03-19

## 2017-03-19 RX ORDER — IBUPROFEN 200 MG
24 TABLET ORAL
Status: DISCONTINUED | OUTPATIENT
Start: 2017-03-19 | End: 2017-03-21 | Stop reason: HOSPADM

## 2017-03-19 RX ORDER — CEFTRIAXONE 1 G/50ML
1 INJECTION, SOLUTION INTRAVENOUS
Status: COMPLETED | OUTPATIENT
Start: 2017-03-19 | End: 2017-03-19

## 2017-03-19 RX ORDER — IBUPROFEN 200 MG
16 TABLET ORAL
Status: DISCONTINUED | OUTPATIENT
Start: 2017-03-19 | End: 2017-03-21 | Stop reason: HOSPADM

## 2017-03-19 RX ORDER — HYDROMORPHONE HYDROCHLORIDE 2 MG/ML
1 INJECTION, SOLUTION INTRAMUSCULAR; INTRAVENOUS; SUBCUTANEOUS EVERY 4 HOURS PRN
Status: DISCONTINUED | OUTPATIENT
Start: 2017-03-19 | End: 2017-03-21 | Stop reason: HOSPADM

## 2017-03-19 RX ORDER — ONDANSETRON 2 MG/ML
4 INJECTION INTRAMUSCULAR; INTRAVENOUS EVERY 6 HOURS PRN
Status: DISCONTINUED | OUTPATIENT
Start: 2017-03-19 | End: 2017-03-21 | Stop reason: HOSPADM

## 2017-03-19 RX ORDER — POTASSIUM CHLORIDE 20 MEQ/15ML
60 SOLUTION ORAL
Status: DISCONTINUED | OUTPATIENT
Start: 2017-03-19 | End: 2017-03-21 | Stop reason: HOSPADM

## 2017-03-19 RX ORDER — ACETAMINOPHEN 325 MG/1
650 TABLET ORAL EVERY 6 HOURS PRN
Status: DISCONTINUED | OUTPATIENT
Start: 2017-03-19 | End: 2017-03-21 | Stop reason: HOSPADM

## 2017-03-19 RX ORDER — GLUCAGON 1 MG
1 KIT INJECTION
Status: DISCONTINUED | OUTPATIENT
Start: 2017-03-19 | End: 2017-03-21 | Stop reason: HOSPADM

## 2017-03-19 RX ADMIN — PROMETHAZINE HYDROCHLORIDE 6.25 MG: 25 INJECTION, SOLUTION INTRAMUSCULAR; INTRAVENOUS at 11:03

## 2017-03-19 RX ADMIN — HYDROMORPHONE HYDROCHLORIDE 1 MG: 2 INJECTION, SOLUTION INTRAMUSCULAR; INTRAVENOUS; SUBCUTANEOUS at 05:03

## 2017-03-19 RX ADMIN — ONDANSETRON 4 MG: 2 INJECTION INTRAMUSCULAR; INTRAVENOUS at 02:03

## 2017-03-19 RX ADMIN — ONDANSETRON 4 MG: 2 INJECTION INTRAMUSCULAR; INTRAVENOUS at 04:03

## 2017-03-19 RX ADMIN — SODIUM CHLORIDE 1000 ML: 0.9 INJECTION, SOLUTION INTRAVENOUS at 10:03

## 2017-03-19 RX ADMIN — HYDROMORPHONE HYDROCHLORIDE 1 MG: 2 INJECTION, SOLUTION INTRAMUSCULAR; INTRAVENOUS; SUBCUTANEOUS at 10:03

## 2017-03-19 RX ADMIN — SODIUM CHLORIDE 1000 ML: 0.9 INJECTION, SOLUTION INTRAVENOUS at 02:03

## 2017-03-19 RX ADMIN — HYDROCODONE BITARTRATE AND ACETAMINOPHEN 1 TABLET: 5; 325 TABLET ORAL at 02:03

## 2017-03-19 RX ADMIN — MORPHINE SULFATE 8 MG: 4 INJECTION INTRAVENOUS at 01:03

## 2017-03-19 RX ADMIN — PROMETHAZINE HYDROCHLORIDE 6.25 MG: 25 INJECTION, SOLUTION INTRAMUSCULAR; INTRAVENOUS at 10:03

## 2017-03-19 RX ADMIN — METOCLOPRAMIDE 10 MG: 5 INJECTION, SOLUTION INTRAMUSCULAR; INTRAVENOUS at 01:03

## 2017-03-19 RX ADMIN — STANDARDIZED SENNA CONCENTRATE AND DOCUSATE SODIUM 1 TABLET: 8.6; 5 TABLET, FILM COATED ORAL at 08:03

## 2017-03-19 RX ADMIN — HYDROMORPHONE HYDROCHLORIDE 1 MG: 2 INJECTION, SOLUTION INTRAMUSCULAR; INTRAVENOUS; SUBCUTANEOUS at 04:03

## 2017-03-19 RX ADMIN — CEFTRIAXONE 1 G: 1 INJECTION, SOLUTION INTRAVENOUS at 12:03

## 2017-03-19 RX ADMIN — HYDROMORPHONE HYDROCHLORIDE 1 MG: 2 INJECTION, SOLUTION INTRAMUSCULAR; INTRAVENOUS; SUBCUTANEOUS at 09:03

## 2017-03-19 RX ADMIN — PROMETHAZINE HYDROCHLORIDE 6.25 MG: 25 INJECTION, SOLUTION INTRAMUSCULAR; INTRAVENOUS at 05:03

## 2017-03-19 RX ADMIN — PROMETHAZINE HYDROCHLORIDE 6.25 MG: 25 INJECTION, SOLUTION INTRAMUSCULAR; INTRAVENOUS at 04:03

## 2017-03-19 NOTE — PLAN OF CARE
Problem: Patient Care Overview  Goal: Plan of Care Review  Outcome: Ongoing (interventions implemented as appropriate)  Patient receiving IV dilaudid and phenergan q 6 hours prn, patient's diet advanced to regular, tolerating well, consult called to Dr. Dominguez, no falls at this time, bed in the lowest and locked position, side rails up x2, call light in reach, will continue to monitor.

## 2017-03-19 NOTE — H&P
[facility] Hospital Medicine  History & Physical    Patient Name: Cesilia Dozier  MRN: [id number]  Admission Date: 3/18/2017  Attending Physician: Rafat Piña Jr., MD   Primary Care Provider: Edilberto Szymanski NP         Patient information was obtained from patient and ER records.     Subjective:     Principal Problem:Acute pyelonephritis    Chief Complaint:   Chief Complaint   Patient presents with    Hip Pain    Flank Pain    Dysuria     Ureter tents replaced 2 weeks ago         HPI: Mrs. Dozier is a 36-year-old female with a history of cervical CA treated with radiation therapy with subsequent bilateral ureteral obstruction managed with bilateral ureteral stents.  She was admitted to the service of hospital medicine with acute cystitis.  She presented to the ED with complaint of worsening of chronic LEFT flank pain since 1600 today.  The pain was associated with nausea, vomiting, subjective fever, and chills.  She also reports sharp pains across her lower abdomen when urinating, as well as increased pelvic pressure with urination.  She denies dysuria, constipation, or diarrhea.  She does endorse occasional blood in stool that is a chronic issue for which she has been evaluated by GI and told that blood in stool would be an expected finding. She recently underwent stent exchange per Dr. Bocanegra on 3/7.  She reports continued but slightly improved flank pain since IV medication in ED.  Her nausea and vomiting has also improved somewhat.  Other pertinent medical history as below:    Past Medical History:   Diagnosis Date    Anemia     Cervical cancer     cervical    Encounter for blood transfusion     Hydronephrosis     PONV (postoperative nausea and vomiting)     Pyelonephritis        Past Surgical History:   Procedure Laterality Date    COLONOSCOPY N/A 9/22/2016    Procedure: COLONOSCOPY;  Surgeon: Joe Moe MD;  Location: [facility];  Service: Endoscopy;  Laterality: N/A;     HYSTERECTOMY      Partial    TUBAL LIGATION      URETERAL STENT PLACEMENT  07/2016       Review of patient's allergies indicates:   Allergen Reactions    Pcn [penicillins] Anaphylaxis       No current facility-administered medications on file prior to encounter.      Current Outpatient Prescriptions on File Prior to Encounter   Medication Sig    ferrous sulfate 325 (65 FE) MG EC tablet Take 325 mg by mouth 3 (three) times daily with meals.    hydrocodone-acetaminophen 5-325mg (NORCO) 5-325 mg per tablet Take 1 tablet by mouth every 6 (six) hours as needed for Pain.    tolterodine (DETROL LA) 4 MG 24 hr capsule Take 1 capsule (4 mg total) by mouth once daily.     Family History     Problem Relation (Age of Onset)    Asthma Brother    Drug abuse Father    No Known Problems Mother, Sister        Social History Main Topics    Smoking status: Never Smoker    Smokeless tobacco: Never Used    Alcohol use No    Drug use: No    Sexual activity: Not Currently     Review of Systems   Constitutional: Positive for chills and fever. Negative for activity change and fatigue.   HENT: Negative for congestion, postnasal drip, sinus pressure, sore throat and trouble swallowing.    Eyes: Negative for photophobia and visual disturbance.   Respiratory: Negative for cough, chest tightness, shortness of breath and wheezing.    Cardiovascular: Negative for chest pain, palpitations and leg swelling.   Gastrointestinal: Positive for abdominal pain, blood in stool (chronic issue; only occasional.), nausea and vomiting. Negative for abdominal distention, constipation and diarrhea.   Genitourinary: Positive for flank pain and pelvic pain. Negative for difficulty urinating, dysuria, frequency, hematuria and urgency.   Musculoskeletal: Positive for back pain. Negative for arthralgias, gait problem, myalgias and neck pain.   Neurological: Positive for headaches. Negative for dizziness, weakness and light-headedness.    Psychiatric/Behavioral: Negative for agitation and confusion. The patient is not nervous/anxious.      Objective:     Vital Signs (Most Recent):  Temp: 97.6 °F (36.4 °C) (03/19/17 0156)  Pulse: 84 (03/19/17 0156)  Resp: 16 (03/19/17 0156)  BP: 110/69 (03/19/17 0156)  SpO2: 100 % (03/19/17 0156) Vital Signs (24h Range):  Temp:  [97.6 °F (36.4 °C)-97.8 °F (36.6 °C)] 97.6 °F (36.4 °C)  Pulse:  [84-88] 84  Resp:  [16] 16  SpO2:  [99 %-100 %] 100 %  BP: (110-131)/(69-85) 110/69     Weight: 74.8 kg (165 lb)  Body mass index is 25.84 kg/(m^2).    Physical Exam   Constitutional: She is oriented to person, place, and time. She appears well-developed and well-nourished. No distress.   HENT:   Head: Normocephalic and atraumatic.   Eyes: Conjunctivae and EOM are normal. Pupils are equal, round, and reactive to light.   Neck: Normal range of motion. Neck supple. No thyromegaly present.   Cardiovascular: Normal rate, regular rhythm, normal heart sounds and intact distal pulses.    Pulmonary/Chest: Effort normal and breath sounds normal. No respiratory distress.   Abdominal: Soft. Bowel sounds are normal. She exhibits no distension. There is tenderness (suprapubic). There is no rebound and no guarding.   Musculoskeletal: Normal range of motion. She exhibits tenderness (LEFT CVA tenderness). She exhibits no edema or deformity.   Neurological: She is alert and oriented to person, place, and time.   Skin: Skin is warm and dry. No erythema.   Psychiatric: She has a normal mood and affect. Her behavior is normal. Judgment and thought content normal.        Significant Labs:   CBC:   Recent Labs  Lab 03/18/17  2330   WBC 10.80   HGB 11.8*   HCT 35.7*        CMP:   Recent Labs  Lab 03/18/17  2330      K 4.2      CO2 24   GLU 98   BUN 20   CREATININE 2.3*   CALCIUM 9.7   ANIONGAP 12   EGFRNONAA 26*     Lactic Acid: No results for input(s): LACTATE in the last 48 hours.  Urine Studies:   Recent Labs  Lab 03/18/17  9251    COLORU Yellow   APPEARANCEUA Cloudy*   PHUR 6.0   SPECGRAV 1.025   PROTEINUA 2+*   GLUCUA Negative   KETONESU Negative   BILIRUBINUA Negative   OCCULTUA 3+*   NITRITE Negative   UROBILINOGEN Negative   LEUKOCYTESUR 2+*   RBCUA 78*   WBCUA >100*   BACTERIA Moderate*   SQUAMEPITHEL 9   HYALINECASTS 0     Microbiology Results (last 7 days)     Procedure Component Value Units Date/Time    Urine culture [[id number]] Collected:  03/18/17 2325    Order Status:  Sent Specimen:  Urine from Urine Updated:  03/18/17 2326            Assessment/Plan:     * Acute pyelonephritis  IV Rocephin, IV hydration.  Follow urine culture.  Consult urology for evaluation as UTI is complicated by presence of bilateral ureteral stents.      TUNG (acute kidney injury)  Mild TUNG likely due to dehydration.  Will provide IV hydration and follow BUN/Cr.  Strict I&O.  Avoid non-essential nephrotoxins and renal dose medications as appropriate.      Ureteral stricture s/p bilateral ureteral stents secondary to XRT for cervical cancer  Stents exchanged 3/7 with Dr. Bocanegra at [facility].  Urology to see-NPO until then as patient conveys previous presentations have required stent exchange.      CKD (chronic kidney disease) stage 4, GFR 15-29 ml/min  Now with TUNG.  Avoid non-essential nephrotoxins, follow BUN/Cr.  Renal dose medications as appropriate.      VTE Risk Mitigation         Ordered     Low Risk of VTE  Early ambulation.     03/19/17 0205        Sole Barlow NP  Department of Hospital Medicine   [facility]    I have seen and examined the patient. I reviewed the notes, assessments, and/or procedures performed by Sole Barlow, I concur with her documentation of Cesilia Dozier.    Addendum:   Admitted with pyelonephritis.   IVF and IV abx.   Urology consulted since pt has hx of urethral stent.   Follow cultures.   Continue supportive care.     Rafat Piña MD

## 2017-03-19 NOTE — ASSESSMENT & PLAN NOTE
IV Rocephin, IV hydration.  Follow urine culture.  Consult urology for evaluation as UTI is complicated by presence of bilateral ureteral stents.

## 2017-03-19 NOTE — ASSESSMENT & PLAN NOTE
Mild TUNG likely due to dehydration.  Will provide IV hydration and follow BUN/Cr.  Strict I&O.  Avoid non-essential nephrotoxins and renal dose medications as appropriate.

## 2017-03-19 NOTE — NURSING
Pt arrived via w/c accompanied by ed tech, ambulated to bed w/o diffic, belonging at bedside, changed into gown, warm blankets given, c/o pelvic/flank pain 8/10, mayra stents in place, hat in toilet for strict I/O, IV to L hand patent, no redness/edema. VSS, NAD, Will continue to monitor.

## 2017-03-19 NOTE — ASSESSMENT & PLAN NOTE
Stents exchanged 3/7 with Dr. Bocanegra at [facility].  Urology to see-NPO until then as patient conveys previous presentations have required stent exchange.

## 2017-03-19 NOTE — ASSESSMENT & PLAN NOTE
Now with TUNG.  Avoid non-essential nephrotoxins, follow BUN/Cr.  Renal dose medications as appropriate.

## 2017-03-19 NOTE — SUBJECTIVE & OBJECTIVE
Past Medical History:   Diagnosis Date    Anemia     Cervical cancer     cervical    Encounter for blood transfusion     Hydronephrosis     PONV (postoperative nausea and vomiting)     Pyelonephritis        Past Surgical History:   Procedure Laterality Date    COLONOSCOPY N/A 9/22/2016    Procedure: COLONOSCOPY;  Surgeon: Joe Moe MD;  Location: [facility];  Service: Endoscopy;  Laterality: N/A;    HYSTERECTOMY      Partial    TUBAL LIGATION      URETERAL STENT PLACEMENT  07/2016       Review of patient's allergies indicates:   Allergen Reactions    Pcn [penicillins] Anaphylaxis       No current facility-administered medications on file prior to encounter.      Current Outpatient Prescriptions on File Prior to Encounter   Medication Sig    ferrous sulfate 325 (65 FE) MG EC tablet Take 325 mg by mouth 3 (three) times daily with meals.    hydrocodone-acetaminophen 5-325mg (NORCO) 5-325 mg per tablet Take 1 tablet by mouth every 6 (six) hours as needed for Pain.    tolterodine (DETROL [place]) 4 MG 24 hr capsule Take 1 capsule (4 mg total) by mouth once daily.     Family History     Problem Relation (Age of Onset)    Asthma Brother    Drug abuse Father    No Known Problems Mother, Sister        Social History Main Topics    Smoking status: Never Smoker    Smokeless tobacco: Never Used    Alcohol use No    Drug use: No    Sexual activity: Not Currently     Review of Systems   Constitutional: Positive for chills and fever. Negative for activity change and fatigue.   HENT: Negative for congestion, postnasal drip, sinus pressure, sore throat and trouble swallowing.    Eyes: Negative for photophobia and visual disturbance.   Respiratory: Negative for cough, chest tightness, shortness of breath and wheezing.    Cardiovascular: Negative for chest pain, palpitations and leg swelling.   Gastrointestinal: Positive for abdominal pain, blood in stool (chronic issue; only occasional.), nausea and vomiting.  Negative for abdominal distention, constipation and diarrhea.   Genitourinary: Positive for flank pain and pelvic pain. Negative for difficulty urinating, dysuria, frequency, hematuria and urgency.   Musculoskeletal: Positive for back pain. Negative for arthralgias, gait problem, myalgias and neck pain.   Neurological: Positive for headaches. Negative for dizziness, weakness and light-headedness.   Psychiatric/Behavioral: Negative for agitation and confusion. The patient is not nervous/anxious.      Objective:     Vital Signs (Most Recent):  Temp: 97.6 °F (36.4 °C) (03/19/17 0156)  Pulse: 84 (03/19/17 0156)  Resp: 16 (03/19/17 0156)  BP: 110/69 (03/19/17 0156)  SpO2: 100 % (03/19/17 0156) Vital Signs (24h Range):  Temp:  [97.6 °F (36.4 °C)-97.8 °F (36.6 °C)] 97.6 °F (36.4 °C)  Pulse:  [84-88] 84  Resp:  [16] 16  SpO2:  [99 %-100 %] 100 %  BP: (110-131)/(69-85) 110/69     Weight: 74.8 kg (165 lb)  Body mass index is 25.84 kg/(m^2).    Physical Exam   Constitutional: She is oriented to person, place, and time. She appears well-developed and well-nourished. No distress.   HENT:   Head: Normocephalic and atraumatic.   Eyes: Conjunctivae and EOM are normal. Pupils are equal, round, and reactive to light.   Neck: Normal range of motion. Neck supple. No thyromegaly present.   Cardiovascular: Normal rate, regular rhythm, normal heart sounds and intact distal pulses.    Pulmonary/Chest: Effort normal and breath sounds normal. No respiratory distress.   Abdominal: Soft. Bowel sounds are normal. She exhibits no distension. There is tenderness (suprapubic). There is no rebound and no guarding.   Musculoskeletal: Normal range of motion. She exhibits tenderness (LEFT CVA tenderness). She exhibits no edema or deformity.   Neurological: She is alert and oriented to person, place, and time.   Skin: Skin is warm and dry. No erythema.   Psychiatric: She has a normal mood and affect. Her behavior is normal. Judgment and thought content  normal.        Significant Labs:   CBC:   Recent Labs  Lab 03/18/17 2330   WBC 10.80   HGB 11.8*   HCT 35.7*        CMP:   Recent Labs  Lab 03/18/17  2330      K 4.2      CO2 24   GLU 98   BUN 20   CREATININE 2.3*   CALCIUM 9.7   ANIONGAP 12   EGFRNONAA 26*     Lactic Acid: No results for input(s): LACTATE in the last 48 hours.  Urine Studies:   Recent Labs  Lab 03/18/17 2325   COLORU Yellow   APPEARANCEUA Cloudy*   PHUR 6.0   SPECGRAV 1.025   PROTEINUA 2+*   GLUCUA Negative   KETONESU Negative   BILIRUBINUA Negative   OCCULTUA 3+*   NITRITE Negative   UROBILINOGEN Negative   LEUKOCYTESUR 2+*   RBCUA 78*   WBCUA >100*   BACTERIA Moderate*   SQUAMEPITHEL 9   HYALINECASTS 0     Microbiology Results (last 7 days)     Procedure Component Value Units Date/Time    Urine culture [[id number]] Collected:  03/18/17 2325    Order Status:  Sent Specimen:  Urine from Urine Updated:  03/18/17 2326

## 2017-03-19 NOTE — PLAN OF CARE
Problem: Patient Care Overview  Goal: Individualization & Mutuality  Outcome: Ongoing (interventions implemented as appropriate)  No falls/trauma noted this shift, IV infusing, IV antbx given, tolerating diet, pain controlled with PRN pain meds, frequent checks done q2hrs, strict I/O. Pt in bed, lowest position, call light within reach, side rails up x2. No needs noted, will continue to monitor.

## 2017-03-19 NOTE — ED PROVIDER NOTES
Encounter Date: 3/18/2017       History     Chief Complaint   Patient presents with    Hip Pain    Flank Pain    Dysuria     Ureter tents replaced 2 weeks ago      Review of patient's allergies indicates:   Allergen Reactions    Pcn [penicillins] Anaphylaxis     HPI Comments: Chief complaint: Side and abdominal pain    History of present illness:Cesilia Dozier is a 37 y.o. female who presents with  one-day history of left flank and left lower quadrant pain with associated urinary hesitancy and dysuria.  She had bilateral ureteral stents placed on March 7.  She denies any fever night sweats or chills.    The history is provided by the patient.     Past Medical History:   Diagnosis Date    Anemia     Cervical cancer     cervical    Encounter for blood transfusion     Hydronephrosis     PONV (postoperative nausea and vomiting)     Pyelonephritis      Past Surgical History:   Procedure Laterality Date    COLONOSCOPY N/A 9/22/2016    Procedure: COLONOSCOPY;  Surgeon: Joe Moe MD;  Location: [facility];  Service: Endoscopy;  Laterality: N/A;    HYSTERECTOMY      Partial    TUBAL LIGATION      URETERAL STENT PLACEMENT  07/2016     Family History   Problem Relation Age of Onset    No Known Problems Mother     Drug abuse Father     No Known Problems Sister     Asthma Brother      Social History   Substance Use Topics    Smoking status: Never Smoker    Smokeless tobacco: Never Used    Alcohol use No     Review of Systems   Constitutional: Negative for activity change, appetite change and fever.   HENT: Negative for voice change.    Eyes: Negative for visual disturbance.   Respiratory: Negative for apnea and shortness of breath.    Cardiovascular: Negative for chest pain.   Gastrointestinal: Positive for abdominal pain. Negative for vomiting.   Genitourinary: Positive for difficulty urinating, dysuria and flank pain. Negative for decreased urine volume.   Musculoskeletal: Negative for back pain and  neck pain.   Skin: Negative for color change.   Neurological: Negative for weakness and headaches.   Hematological: Does not bruise/bleed easily.   Psychiatric/Behavioral: Negative for confusion.       Physical Exam   Initial Vitals   BP Pulse Resp Temp SpO2   03/18/17 2243 03/18/17 2243 03/18/17 2243 03/18/17 2243 03/18/17 2243   131/85 86 16 97.7 °F (36.5 °C) 99 %     Physical Exam    Nursing note and vitals reviewed.  Constitutional: She appears well-developed and well-nourished.   HENT:   Head: Normocephalic and atraumatic.   Eyes: Conjunctivae are normal.   Neck: Normal range of motion. Neck supple.   Cardiovascular: Normal rate.   Pulmonary/Chest: Effort normal and breath sounds normal. No respiratory distress.   Abdominal: Soft. She exhibits no distension. There is tenderness ( left lower moderate tenderness). There is no rebound and no guarding.   Musculoskeletal: Normal range of motion.   Neurological: She is alert and oriented to person, place, and time.   Skin: Skin is warm and dry. No erythema.   Psychiatric: She has a normal mood and affect.         ED Course   Procedures  Labs Reviewed   URINALYSIS - Abnormal; Notable for the following:        Result Value    Appearance, UA Cloudy (*)     Protein, UA 2+ (*)     Occult Blood UA 3+ (*)     Leukocytes, UA 2+ (*)     All other components within normal limits   BASIC METABOLIC PANEL - Abnormal; Notable for the following:     Creatinine 2.3 (*)     eGFR if  30 (*)     eGFR if non  26 (*)     All other components within normal limits   CBC W/ AUTO DIFFERENTIAL - Abnormal; Notable for the following:     RBC 3.69 (*)     Hemoglobin 11.8 (*)     Hematocrit 35.7 (*)     MCH 32.1 (*)     MPV 8.8 (*)     Gran # 8.4 (*)     Gran% 77.5 (*)     Lymph% 13.1 (*)     All other components within normal limits   URINALYSIS MICROSCOPIC - Abnormal; Notable for the following:     RBC, UA 78 (*)     WBC, UA >100 (*)     Bacteria, UA Moderate (*)      All other components within normal limits   CULTURE, URINE             Medical Decision Making:   History:   Old Records Summarized: records from previous admission(s).  Clinical Tests:   Lab Tests: Ordered and Reviewed  The following lab test(s) were unremarkable: CBC, CMP and Urinalysis  ED Management:  Cesilia Dozier is a 37 y.o. female who presents with  left flank and abdominal pain and is found to have significant pyuria and bacteriuria.  Symptoms are concerning with an indwelling stent and intractable pain.  She will be admitted for intravenous antibiotics and anti-emetics and analgesics.  Other:   I have discussed this case with another health care provider.       <> Summary of the Discussion: Discussed with Reid who will admit on behalf of Dr. Piña                   ED Course     Clinical Impression:   The encounter diagnosis was Pelvic pain.          Baljit Carey III, MD  03/19/17 0251

## 2017-03-19 NOTE — PROGRESS NOTES
SSC met with patient at bedside to complete discharge planning assessment.  Patient verified all demographic information on facesheet is correct.  However, patient physical address is here in [place], [place].  Patient would like to keep address listed on facesheet as is.  Patient verified she has no PCP at this time.  Patient would like help finding new PCP in [place], [place].                Discharge Planning Assessment    Patient Identification  Name: Cesilia Dozier  Age: 37 y.o.   Gender: female.  Admitting Diagnosis: Acute pyelonephritis  PCP: Edilberto Szymanski NP   PCP Address: [address] / [place] [address]  PCP Phone Number: [phone]     Telephone Contacts  Extended Emergency Contact Information  Primary Emergency Contact: Darlin Dozier  Address: [address]           [place] [address] [facility]  Home Phone: [phone]  Mobile Phone: [phone]  Relation: Spouse  Preferred language: English    Alert/Orientation: yes, xs4   If patient is unable to sign for self, who is handling affairs?: spouse Darlin Dozier  Is this person the HPOA? no  Does patient have a living will? no    Arrived from: home  Lives with: spouse and 2 children  Support System: family  Who do you want involved in your discharge planning? spouse  Are they available to help you at discharge? yes  Prior Level of Functioning: independent  Were you able to care for yourself at home? yes  Who assists with Medications, Meal Prep, Housekeeping, Laundry, Shopping, MD Appts?: self    Have you been in the hospital in the last 30 days?: no  If so, were you admitted for the same reason? n/a  If not, why were you in the hospital? n/a    Services received prior to admit:  none  DME used at home: none    Capacity for self-care: independent  Services patient will need at discharge: none  DME pt will need at discharge: none    Pharmacy most often used: [facility] [place]  Able to afford meds?: yes, if  reasonable    How do you get to your MD appointments / Do you have transportation or depend on someone else:  self  Are you taking Coumadin at home?:   no If so, who monitors your INR: n/a  Are you on Dialysis?:  no If so where do you attend dialysis: n/a    Community Resources & Referrals Needed: none      Discharge plan 1:  Home with family  Discharge plan 2:

## 2017-03-20 LAB
ALBUMIN SERPL BCP-MCNC: 3.7 G/DL
ALP SERPL-CCNC: 68 U/L
ALT SERPL W/O P-5'-P-CCNC: 6 U/L
ANION GAP SERPL CALC-SCNC: 9 MMOL/L
AST SERPL-CCNC: 11 U/L
BACTERIA UR CULT: NORMAL
BACTERIA UR CULT: NORMAL
BASOPHILS # BLD AUTO: 0 K/UL
BASOPHILS NFR BLD: 0.1 %
BILIRUB SERPL-MCNC: 0.4 MG/DL
BUN SERPL-MCNC: 16 MG/DL
CALCIUM SERPL-MCNC: 9.3 MG/DL
CHLORIDE SERPL-SCNC: 107 MMOL/L
CO2 SERPL-SCNC: 21 MMOL/L
CREAT SERPL-MCNC: 2 MG/DL
DIFFERENTIAL METHOD: ABNORMAL
EOSINOPHIL # BLD AUTO: 0.4 K/UL
EOSINOPHIL NFR BLD: 6.9 %
ERYTHROCYTE [DISTWIDTH] IN BLOOD BY AUTOMATED COUNT: 13 %
EST. GFR  (AFRICAN AMERICAN): 36 ML/MIN/1.73 M^2
EST. GFR  (NON AFRICAN AMERICAN): 31 ML/MIN/1.73 M^2
GLUCOSE SERPL-MCNC: 82 MG/DL
HCT VFR BLD AUTO: 31.7 %
HGB BLD-MCNC: 10.7 G/DL
LYMPHOCYTES # BLD AUTO: 1.4 K/UL
LYMPHOCYTES NFR BLD: 23.5 %
MAGNESIUM SERPL-MCNC: 1.6 MG/DL
MCH RBC QN AUTO: 32.5 PG
MCHC RBC AUTO-ENTMCNC: 33.8 %
MCV RBC AUTO: 96 FL
MONOCYTES # BLD AUTO: 0.4 K/UL
MONOCYTES NFR BLD: 6.5 %
NEUTROPHILS # BLD AUTO: 3.9 K/UL
NEUTROPHILS NFR BLD: 63 %
PHOSPHATE SERPL-MCNC: 3.9 MG/DL
PLATELET # BLD AUTO: 209 K/UL
PMV BLD AUTO: 8.9 FL
POTASSIUM SERPL-SCNC: 4.1 MMOL/L
PROT SERPL-MCNC: 6.8 G/DL
RBC # BLD AUTO: 3.3 M/UL
SODIUM SERPL-SCNC: 137 MMOL/L
WBC # BLD AUTO: 6.1 K/UL

## 2017-03-20 PROCEDURE — 80053 COMPREHEN METABOLIC PANEL: CPT

## 2017-03-20 PROCEDURE — 63600175 PHARM REV CODE 636 W HCPCS: Performed by: SPECIALIST

## 2017-03-20 PROCEDURE — 25000003 PHARM REV CODE 250: Performed by: PHYSICIAN ASSISTANT

## 2017-03-20 PROCEDURE — G0378 HOSPITAL OBSERVATION PER HR: HCPCS

## 2017-03-20 PROCEDURE — 36415 COLL VENOUS BLD VENIPUNCTURE: CPT

## 2017-03-20 PROCEDURE — 83735 ASSAY OF MAGNESIUM: CPT

## 2017-03-20 PROCEDURE — 85025 COMPLETE CBC W/AUTO DIFF WBC: CPT

## 2017-03-20 PROCEDURE — 63600175 PHARM REV CODE 636 W HCPCS: Performed by: NURSE PRACTITIONER

## 2017-03-20 PROCEDURE — 84100 ASSAY OF PHOSPHORUS: CPT

## 2017-03-20 PROCEDURE — 63600175 PHARM REV CODE 636 W HCPCS: Performed by: PHYSICIAN ASSISTANT

## 2017-03-20 PROCEDURE — 25000003 PHARM REV CODE 250: Performed by: NURSE PRACTITIONER

## 2017-03-20 RX ADMIN — HYDROMORPHONE HYDROCHLORIDE 1 MG: 2 INJECTION, SOLUTION INTRAMUSCULAR; INTRAVENOUS; SUBCUTANEOUS at 04:03

## 2017-03-20 RX ADMIN — PROMETHAZINE HYDROCHLORIDE 6.25 MG: 25 INJECTION, SOLUTION INTRAMUSCULAR; INTRAVENOUS at 09:03

## 2017-03-20 RX ADMIN — ONDANSETRON 4 MG: 2 INJECTION INTRAMUSCULAR; INTRAVENOUS at 06:03

## 2017-03-20 RX ADMIN — HYDROMORPHONE HYDROCHLORIDE 1 MG: 2 INJECTION, SOLUTION INTRAMUSCULAR; INTRAVENOUS; SUBCUTANEOUS at 09:03

## 2017-03-20 RX ADMIN — HYDROMORPHONE HYDROCHLORIDE 1 MG: 2 INJECTION, SOLUTION INTRAMUSCULAR; INTRAVENOUS; SUBCUTANEOUS at 12:03

## 2017-03-20 RX ADMIN — PROMETHAZINE HYDROCHLORIDE 6.25 MG: 25 INJECTION, SOLUTION INTRAMUSCULAR; INTRAVENOUS at 08:03

## 2017-03-20 RX ADMIN — STANDARDIZED SENNA CONCENTRATE AND DOCUSATE SODIUM 1 TABLET: 8.6; 5 TABLET, FILM COATED ORAL at 08:03

## 2017-03-20 RX ADMIN — STANDARDIZED SENNA CONCENTRATE AND DOCUSATE SODIUM 1 TABLET: 8.6; 5 TABLET, FILM COATED ORAL at 09:03

## 2017-03-20 RX ADMIN — CEFTRIAXONE 1 G: 1 INJECTION, SOLUTION INTRAVENOUS at 12:03

## 2017-03-20 RX ADMIN — HYDROMORPHONE HYDROCHLORIDE 1 MG: 2 INJECTION, SOLUTION INTRAMUSCULAR; INTRAVENOUS; SUBCUTANEOUS at 05:03

## 2017-03-20 NOTE — ASSESSMENT & PLAN NOTE
Stents exchanged 3/7 with Dr. Bocanegra at [facility].  Will see what Dr. Greco says.

## 2017-03-20 NOTE — CONSULTS
DATE OF CONSULTATION:  03/20/2017.    ATTENDING PHYSICIAN:  Jan Norris M.D.    CONSULTANT:  Dina Greco M.D.    HISTORY OF PRESENT ILLNESS:    This 37-year-old female was admitted   on 03/18/2017, with complaint of fever, abdominal pain and bilateral flank pain.    This patient is familiar to me, I had seen her in the office in the past the   last time being last year in November 2016.  She has a history of cervical   carcinoma for which she underwent radiation therapy and this has resulted in   bilateral ureteral obstruction due to extrinsic compression for which she has   required bilateral ureteral stent placement.  I had last placed stents on   November 2016, and since then she has been followed up by an urologist on the   [place], more specifically she saw Dr. Bocanegra on 03/07/2017, which was the last   time that both ureteral stents were changed.  She has had recurring problems   with abdominal pain and it must also be noted since she moved to this area last   year at least in October or early of 2016, with a history of cervical carcinoma   she is yet to follow up with a gynecologist or an oncologist.  I discussed with   her today, she did mention that she is yet to see one in this area since she has   moved to this area.  And I had reiterated to her that she did need to follow up with   a gynecologist and or oncologist.  On current admission she did undergo a KUB   which revealed both ureteral stents to be in place.  Urine culture did show   multiple organisms, none in predominance.  WBC is normal at 6.1, BUN of 16,   creatinine 2.0.    MEDICAL HISTORY:  Include that of anemia, pyelonephritis.    PAST SURGICAL HISTORY:  Include colonoscopies, hysterectomy, tubal ligation,   ureteral stent placement, multiple ureteral stent placement as described above.    ALLERGIES:  INCLUDE PENICILLIN.    MEDICATIONS:  That she has been on include ferrous sulfate, Norco, Detrol-LA.    FAMILY HISTORY:  Asthma or  drug abuse.    SOCIAL HISTORY:  No tobacco.  No alcohol use.    REVIEW OF SYSTEMS:  GENERAL:  Positive for fever and chills.  HEAD AND NECK:  No headaches.  No visual problems.  CARDIORESPIRATORY:  No chest pain or shortness of breath  GASTROINTESTINAL:  Generalized abdominal pain as described above.    PHYSICAL EXAMINATION:  GENERAL:  The patient is alert, oriented, awake, appears to be in some degree of   discomfort.  CHEST:  Clear.  HEART:  Regular, no murmur.  ABDOMEN:  Soft with generalized tenderness especially in the suprapubic area and   over the flank and areas of upper abdominal areas.    FINAL IMPRESSION:  Generalized abdominal pain with significant history of   cervical carcinoma, bilateral ureteral obstruction with bilateral ureteral   stents in place with a history of hydronephrosis as a result of ureteral   obstruction requiring stent to be changed on a regular basis.  She did undergo a   CT scan of the abdomen and pelvis without contrast on 02/20/2017, which   revealed no detrimental change compared to prior imaging studies.  There is some   mild bilateral hydronephrosis.    RECOMMENDATIONS:  In view of the change in her symptoms, I recommend that we   obtain another CT scan without contrast to determine if there has been any   significant changes.  Otherwise, continue with current management.  We will   follow with you and make further recommendations as necessary.    Thank you for this consult.      TEREAS  dd: 03/20/2017 16:56:26 (CDT)  td: 03/20/2017 18:34:45 (CDT)  Doc ID   #[id number]  Job ID #[id number]    CC: Dr. Daljit Norris M.D.

## 2017-03-20 NOTE — PROGRESS NOTES
[facility] Hospital Medicine  Progress Note    Patient Name: Cesilia Dozier  MRN: [id number]  Patient Class: OP- Observation   Admission Date: 3/18/2017  Length of Stay: 0 days  Attending Physician: Jan Norris MD  Primary Care Provider: Primary Doctor No        Subjective:     Principal Problem:Acute pyelonephritis    HPI:  Mrs. Dozier is a 36-year-old female with a history of cervical CA treated with radiation therapy with subsequent bilateral ureteral obstruction managed with bilateral ureteral stents.  She was admitted to the service of hospital medicine with acute cystitis.  She presented to the ED with complaint of worsening of chronic LEFT flank pain since 1600 today.  The pain was associated with nausea, vomiting, subjective fever, and chills.  She also reports sharp pains across her lower abdomen when urinating, as well as increased pelvic pressure with urination.  She denies dysuria, constipation, or diarrhea.  She does endorse occasional blood in stool that is a chronic issue for which she has been evaluated by GI and told that blood in stool would be an expected finding. She recently underwent stent exchange per Dr. Bocanegra on 3/7.  She reports continued but slightly improved flank pain since IV medication in ED.  Her nausea and vomiting has also improved somewhat.  Other pertinent medical history as below:    Hospital Course:  Admitted with pyelonephritis.  IV antibiotics given.  Urologist consulted b/c of the stents.      Interval History:  No fevers.  Seen by urologist.    Review of Systems   Constitutional: Negative for fever.   Cardiovascular: Negative for chest pain.   Gastrointestinal: Negative for nausea.     Objective:     Vital Signs (Most Recent):  Temp: 97.2 °F (36.2 °C) (03/20/17 1253)  Pulse: 92 (03/20/17 1253)  Resp: 17 (03/20/17 1253)  BP: 98/68 (03/20/17 1253)  SpO2: 99 % (03/20/17 1253) Vital Signs (24h Range):  Temp:  [97.2 °F (36.2 °C)-98.4 °F (36.9 °C)] 97.2 °F  (36.2 °C)  Pulse:  [75-98] 92  Resp:  [16-18] 17  SpO2:  [94 %-100 %] 99 %  BP: ()/(58-76) 98/68     Weight: 74.8 kg (165 lb)  Body mass index is 25.84 kg/(m^2).    Intake/Output Summary (Last 24 hours) at 03/20/17 1829  Last data filed at 03/20/17 0500   Gross per 24 hour   Intake              340 ml   Output                0 ml   Net              340 ml      Physical Exam   Constitutional: She is oriented to person, place, and time. She appears well-developed and well-nourished. No distress.   HENT:   Head: Normocephalic and atraumatic.   Eyes: Conjunctivae and EOM are normal. Pupils are equal, round, and reactive to light.   Neck: Normal range of motion. Neck supple. No thyromegaly present.   Cardiovascular: Normal rate, regular rhythm, normal heart sounds and intact distal pulses.    Pulmonary/Chest: Effort normal and breath sounds normal. No respiratory distress.   Abdominal: Soft. Bowel sounds are normal. She exhibits no distension. There is tenderness (suprapubic). There is no rebound and no guarding.   Musculoskeletal: Normal range of motion. She exhibits tenderness (LEFT CVA tenderness). She exhibits no edema or deformity.   Neurological: She is alert and oriented to person, place, and time.   Skin: Skin is warm and dry. No erythema.   Psychiatric: She has a normal mood and affect. Her behavior is normal. Judgment and thought content normal.       Significant Labs: All pertinent labs within the past 24 hours have been reviewed.    Significant Imaging: I have reviewed all pertinent imaging results/findings within the past 24 hours.    Assessment/Plan:      * Acute pyelonephritis  IV Rocephin, IV hydration.  Follow urine culture.        Ureteral stricture s/p bilateral ureteral stents secondary to XRT for cervical cancer  Stents exchanged 3/7 with Dr. Bocanegra at [facility].  Will see what Dr. Greco says.      CKD (chronic kidney disease) stage 4, GFR 15-29 ml/min  GFR at baseline.  Monitor Cr level and  urine output.  Dose medications for GFR of 15 to 29.      TUNG (acute kidney injury), resolved as of 3/20/2017  Mild TUNG likely due to dehydration.  Will provide IV hydration and follow BUN/Cr.  Strict I&O.  Avoid non-essential nephrotoxins and renal dose medications as appropriate.      VTE Risk Mitigation         Ordered     Low Risk of VTE  Once      03/19/17 0205          Jan Norris MD  Department of Hospital Medicine   [facility]

## 2017-03-20 NOTE — SUBJECTIVE & OBJECTIVE
Interval History:  No fevers.  Seen by urologist.    Review of Systems   Constitutional: Negative for fever.   Cardiovascular: Negative for chest pain.   Gastrointestinal: Negative for nausea.     Objective:     Vital Signs (Most Recent):  Temp: 97.2 °F (36.2 °C) (03/20/17 1253)  Pulse: 92 (03/20/17 1253)  Resp: 17 (03/20/17 1253)  BP: 98/68 (03/20/17 1253)  SpO2: 99 % (03/20/17 1253) Vital Signs (24h Range):  Temp:  [97.2 °F (36.2 °C)-98.4 °F (36.9 °C)] 97.2 °F (36.2 °C)  Pulse:  [75-98] 92  Resp:  [16-18] 17  SpO2:  [94 %-100 %] 99 %  BP: ()/(58-76) 98/68     Weight: 74.8 kg (165 lb)  Body mass index is 25.84 kg/(m^2).    Intake/Output Summary (Last 24 hours) at 03/20/17 1829  Last data filed at 03/20/17 0500   Gross per 24 hour   Intake              340 ml   Output                0 ml   Net              340 ml      Physical Exam   Constitutional: She is oriented to person, place, and time. She appears well-developed and well-nourished. No distress.   HENT:   Head: Normocephalic and atraumatic.   Eyes: Conjunctivae and EOM are normal. Pupils are equal, round, and reactive to light.   Neck: Normal range of motion. Neck supple. No thyromegaly present.   Cardiovascular: Normal rate, regular rhythm, normal heart sounds and intact distal pulses.    Pulmonary/Chest: Effort normal and breath sounds normal. No respiratory distress.   Abdominal: Soft. Bowel sounds are normal. She exhibits no distension. There is tenderness (suprapubic). There is no rebound and no guarding.   Musculoskeletal: Normal range of motion. She exhibits tenderness (LEFT CVA tenderness). She exhibits no edema or deformity.   Neurological: She is alert and oriented to person, place, and time.   Skin: Skin is warm and dry. No erythema.   Psychiatric: She has a normal mood and affect. Her behavior is normal. Judgment and thought content normal.       Significant Labs: All pertinent labs within the past 24 hours have been reviewed.    Significant  Imaging: I have reviewed all pertinent imaging results/findings within the past 24 hours.

## 2017-03-20 NOTE — PLAN OF CARE
Met with pt to complete her assessment.  Pt provided me with her physical address however wanted to leave her mailing address as [place].  Updated admissions.  Pt denies having a PCP in [place] stating the PCP on her demographic sheet was her PCP in [place].  Pt reports moving back to this area two weeks ago and that she will need a PCP.  Pt receives food stamps and states that her spouse receives SSI.  Pt's discharge disposition is home with no anticipated needs.  Pt verified that her spouse would provide transportation home for her.  Plan for  to find pt a PCP upon her hospital discharge.       03/20/17 1130   Discharge Assessment   Assessment Type Discharge Planning Assessment   Confirmed/corrected address and phone number on facesheet? Yes  (Correct physical address is [address], [place] [address].  Pt wants to keep her mailing address as the address in [place], [place].  )   Assessment information obtained from? Patient   Communicated expected length of stay with patient/caregiver no   Type of Healthcare Directive Received (Denies.  Pt is  and spouse is next of kin.)   If Healthcare Directive is received, is it scanned into Epic? no (comment)   Prior to hospitilization cognitive status: Alert/Oriented   Prior to hospitalization functional status: Independent   Current cognitive status: Alert/Oriented   Current Functional Status: Independent   Arrived From home or self-care   Lives With spouse;child(garo), dependent  (spouse and 16 y/o daughter and 13 y/o son)   Able to Return to Prior Arrangements yes   Is patient able to care for self after discharge? Yes   Who are your caregiver(s) and their phone number(s)? (spouse Darlin Dozier, [phone])   Patient's perception of discharge disposition home or selfcare   Readmission Within The Last 30 Days no previous admission in last 30 days   Patient currently being followed by outpatient case management? No   Patient currently receives home  health services? No   Does the patient currently use HME? No   Patient currently receives private duty nursing? No   Patient currently receives any other outside agency services? No   Equipment Currently Used at Home none   Do you have any problems affording any of your prescribed medications? No  (pharmacy is [facility] on [address])   Is the patient taking medications as prescribed? yes   Do you have any financial concerns preventing you from receiving the healthcare you need? No   Does the patient have transportation to healthcare appointments? Yes   Transportation Available family or friend will provide   On Dialysis? No   Does the patient receive services at the Coumadin Clinic? No   Discharge Plan A Home with family   Patient/Family In Agreement With Plan yes

## 2017-03-20 NOTE — PLAN OF CARE
Problem: Patient Care Overview  Goal: Plan of Care Review  Outcome: Ongoing (interventions implemented as appropriate)  Patient oriented to room, call light within reach, wheels locked, side rails x 2, instructed to call for assistance prn. POC reviewed with patient, all questions answered, verbalized understanding. Patient remained free of fall/injury. Patient ambulates in room independently. Comfort level established, patient self report when pain level increased, c/o pain controlled with prn medication. No c/o nausea or vomiting. Patient repositioned independently, no new breakdown noted. Will continue to monitor.

## 2017-03-21 VITALS
DIASTOLIC BLOOD PRESSURE: 54 MMHG | WEIGHT: 165 LBS | HEIGHT: 67 IN | TEMPERATURE: 99 F | OXYGEN SATURATION: 99 % | SYSTOLIC BLOOD PRESSURE: 96 MMHG | HEART RATE: 96 BPM | BODY MASS INDEX: 25.9 KG/M2 | RESPIRATION RATE: 18 BRPM

## 2017-03-21 LAB
ANION GAP SERPL CALC-SCNC: 10 MMOL/L
BUN SERPL-MCNC: 14 MG/DL
CALCIUM SERPL-MCNC: 9.9 MG/DL
CHLORIDE SERPL-SCNC: 104 MMOL/L
CO2 SERPL-SCNC: 23 MMOL/L
CREAT SERPL-MCNC: 2 MG/DL
EST. GFR  (AFRICAN AMERICAN): 36 ML/MIN/1.73 M^2
EST. GFR  (NON AFRICAN AMERICAN): 31 ML/MIN/1.73 M^2
GLUCOSE SERPL-MCNC: 94 MG/DL
POTASSIUM SERPL-SCNC: 4.2 MMOL/L
SODIUM SERPL-SCNC: 137 MMOL/L

## 2017-03-21 PROCEDURE — 63600175 PHARM REV CODE 636 W HCPCS: Performed by: SPECIALIST

## 2017-03-21 PROCEDURE — 25000003 PHARM REV CODE 250: Performed by: NURSE PRACTITIONER

## 2017-03-21 PROCEDURE — 80048 BASIC METABOLIC PNL TOTAL CA: CPT

## 2017-03-21 PROCEDURE — 36415 COLL VENOUS BLD VENIPUNCTURE: CPT

## 2017-03-21 PROCEDURE — 63600175 PHARM REV CODE 636 W HCPCS: Performed by: PHYSICIAN ASSISTANT

## 2017-03-21 PROCEDURE — 63600175 PHARM REV CODE 636 W HCPCS: Performed by: NURSE PRACTITIONER

## 2017-03-21 PROCEDURE — G0378 HOSPITAL OBSERVATION PER HR: HCPCS

## 2017-03-21 PROCEDURE — 25000003 PHARM REV CODE 250: Performed by: PHYSICIAN ASSISTANT

## 2017-03-21 RX ORDER — HYDROCODONE BITARTRATE AND ACETAMINOPHEN 5; 325 MG/1; MG/1
1 TABLET ORAL EVERY 6 HOURS PRN
Qty: 12 TABLET | Refills: 0 | Status: SHIPPED | OUTPATIENT
Start: 2017-03-21 | End: 2017-04-07

## 2017-03-21 RX ORDER — ONDANSETRON 8 MG/1
8 TABLET, ORALLY DISINTEGRATING ORAL EVERY 6 HOURS PRN
Qty: 20 TABLET | Refills: 0 | Status: SHIPPED | OUTPATIENT
Start: 2017-03-21 | End: 2017-04-07

## 2017-03-21 RX ORDER — CEFACLOR 500 MG/1
500 TABLET, FILM COATED, EXTENDED RELEASE ORAL EVERY 12 HOURS
Qty: 20 TABLET | Refills: 0 | Status: SHIPPED | OUTPATIENT
Start: 2017-03-21 | End: 2017-03-31

## 2017-03-21 RX ORDER — LACTULOSE 10 G/15ML
10 SOLUTION ORAL EVERY 6 HOURS PRN
Status: DISCONTINUED | OUTPATIENT
Start: 2017-03-21 | End: 2017-03-21 | Stop reason: HOSPADM

## 2017-03-21 RX ADMIN — STANDARDIZED SENNA CONCENTRATE AND DOCUSATE SODIUM 1 TABLET: 8.6; 5 TABLET, FILM COATED ORAL at 09:03

## 2017-03-21 RX ADMIN — HYDROMORPHONE HYDROCHLORIDE 1 MG: 2 INJECTION, SOLUTION INTRAMUSCULAR; INTRAVENOUS; SUBCUTANEOUS at 09:03

## 2017-03-21 RX ADMIN — HYDROCODONE BITARTRATE AND ACETAMINOPHEN 1 TABLET: 5; 325 TABLET ORAL at 04:03

## 2017-03-21 RX ADMIN — HYDROMORPHONE HYDROCHLORIDE 1 MG: 2 INJECTION, SOLUTION INTRAMUSCULAR; INTRAVENOUS; SUBCUTANEOUS at 01:03

## 2017-03-21 RX ADMIN — CEFTRIAXONE 1 G: 1 INJECTION, SOLUTION INTRAVENOUS at 01:03

## 2017-03-21 RX ADMIN — PROMETHAZINE HYDROCHLORIDE 6.25 MG: 25 INJECTION, SOLUTION INTRAMUSCULAR; INTRAVENOUS at 03:03

## 2017-03-21 RX ADMIN — PROMETHAZINE HYDROCHLORIDE 6.25 MG: 25 INJECTION, SOLUTION INTRAMUSCULAR; INTRAVENOUS at 09:03

## 2017-03-21 RX ADMIN — HYDROMORPHONE HYDROCHLORIDE 1 MG: 2 INJECTION, SOLUTION INTRAMUSCULAR; INTRAVENOUS; SUBCUTANEOUS at 05:03

## 2017-03-21 RX ADMIN — ONDANSETRON 4 MG: 2 INJECTION INTRAMUSCULAR; INTRAVENOUS at 01:03

## 2017-03-21 NOTE — PLAN OF CARE
Problem: Patient Care Overview  Goal: Plan of Care Review  Outcome: Ongoing (interventions implemented as appropriate)  POC reviewed with pt, pt verbalized understanding. Safety maintained throughout shift, bed locked and in lowest position, call light in reach, Side rails up X 2. Non skid sock on when OOB. Pt remained free of fall/trauma. Pt self reports of pain treated with IV pain medication as ordered. Pt reports nausea, treated with IV Zofran and IV Phenergan. No reports of vomiting this shift. Pt had 3 BM this shift. Pt ambulating independently to bathroom.  Pt tolerating meals wells, no reports of nausea and vomiting. IVF abx infused as ordered. Will continue to monitor.

## 2017-03-21 NOTE — PROGRESS NOTES
Pt is requesting a PCP in [place].  Scheduled a new pt appointment on 4-3-17 @ 2:45 p.m. Plan for pt to call medicaid to provide her with referral names for a  gyno/oncologist who is a provider for her insurance.  Updated the AVS and pt's nurse

## 2017-03-22 NOTE — PROGRESS NOTES
Discharge instructions given. Pt verbalized understanding. New scripts given. Peripheral IV removed. Pt ambulated off the unit with family. All belongings sent.

## 2017-03-22 NOTE — DISCHARGE SUMMARY
[facility] Hospital Medicine  Discharge Summary      Patient Name: Cesilia Dozier  MRN: [id number]  Admission Date: 3/18/2017  Hospital Length of Stay: 0 days  Discharge Date and Time: 3/21/2017  Attending Physician: Jan Norris MD   Discharging Provider: Jan Norris MD  Primary Care Provider: Primary Doctor No        HPI: Mrs. Dozier is a 36-year-old female with a history of cervical CA treated with radiation therapy with subsequent bilateral ureteral obstruction managed with bilateral ureteral stents. She was admitted to the service of hospital medicine with acute cystitis. She presented to the ED with complaint of worsening of chronic LEFT flank pain since 1600 today. The pain was associated with nausea, vomiting, subjective fever, and chills. She also reports sharp pains across her lower abdomen when urinating, as well as increased pelvic pressure with urination. She denies dysuria, constipation, or diarrhea. She does endorse occasional blood in stool that is a chronic issue for which she has been evaluated by GI and told that blood in stool would be an expected finding. She recently underwent stent exchange per Dr. Bocanegra on 3/7. She reports continued but slightly improved flank pain since IV medication in ED. Her nausea and vomiting has also improved somewhat    * No surgery found *      Indwelling Lines/Drains at time of discharge:   Lines/Drains/Airways     Drain                 Ureteral Drain/Stent 11/07/16 1835 Left ureter 6 Fr. 134 days         Ureteral Drain/Stent 11/15/16 0816 Left ureter 6 Fr. 126 days              Hospital Course:   Pt was admitted with pyelonephritis.  Was put on ceftriaxone empirically.  CT abd showed the ureteral stents in place and only mild residual hydronephrosis.  Urologist consulted -- recommended just antibiotic.  Urine culture resulted as polymicrobial.  Her condition improved.  Was send home with script for cefaclor for 10  days.    PE:  Constitutional: She is oriented to person, place, and time. She appears well-developed and well-nourished. No distress.   HENT:   Head: Normocephalic and atraumatic.   Eyes: Conjunctivae and EOM are normal. Pupils are equal, round, and reactive to light.   Neck: Normal range of motion. Neck supple. No thyromegaly present.   Cardiovascular: Normal rate, regular rhythm, normal heart sounds and intact distal pulses.   Pulmonary/Chest: Effort normal and breath sounds normal. No respiratory distress.   Abdominal: Soft. Bowel sounds are normal. She exhibits no distension. There is no tenderness.  There is no rebound and no guarding.        Consults:   Consults         Status Ordering Provider     Inpatient consult to Urology  Once     Provider:  MD Jonathan Lopez FEIZAL          Significant Diagnostic Studies:   BMP  Lab Results   Component Value Date     03/21/2017    K 4.2 03/21/2017     03/21/2017    CO2 23 03/21/2017    BUN 14 03/21/2017    CREATININE 2.0 (H) 03/21/2017    CALCIUM 9.9 03/21/2017    ANIONGAP 10 03/21/2017    ESTGFRAFRICA 36 (A) 03/21/2017    EGFRNONAA 31 (A) 03/21/2017     Lab Results   Component Value Date    WBC 6.10 03/20/2017    HGB 10.7 (L) 03/20/2017    HCT 31.7 (L) 03/20/2017    MCV 96 03/20/2017     03/20/2017         Pending Diagnostic Studies:     None        Final Active Diagnoses:    Diagnosis Date Noted POA    PRINCIPAL PROBLEM:  Acute pyelonephritis [N10] 03/19/2017 Yes    CKD (chronic kidney disease) stage 4, GFR 15-29 ml/min [N18.4] 12/13/2016 Yes    Ureteral stricture s/p bilateral ureteral stents secondary to XRT for cervical cancer [N13.5] 09/16/2016 Yes      Problems Resolved During this Admission:    Diagnosis Date Noted Date Resolved POA    TUNG (acute kidney injury) [N17.9] 09/13/2016 03/20/2017 Yes      Discharged Condition: stable    Disposition: Home or Self Care    Follow Up:  Follow-up Information     Follow up  with Turner Bocanegra MD. Call in 1 day.    Specialty:  Urology    Why:  to let him know you were in the hospital with kidney infection    Contact information:    [address]   [address]  [place] [address]  [phone]          Follow up with Try to find a gynecologist/oncologist in this area to follow up on your cervical cancer.        Follow up with Harsha Sheppard MD On 4/3/2019.    Specialty:  Internal Medicine    Why:  hospital follow up with new PCP scheduled on 4-3-17 @ 2:45 p.m.     Contact information:    [address] [place] [address] [address] [place] [address]  [phone]          Patient Instructions:     Diet general     Activity as tolerated       Medications:  Reconciled Home Medications:   Current Discharge Medication List      START taking these medications    Details   cefaclor (CECLOR CD) 500 mg 12 hr tablet Take 1 tablet (500 mg total) by mouth every 12 (twelve) hours.  Qty: 20 tablet, Refills: 0      ondansetron (ZOFRAN-ODT) 8 MG TbDL Take 1 tablet (8 mg total) by mouth every 6 (six) hours as needed (nausea).  Qty: 20 tablet, Refills: 0         CONTINUE these medications which have CHANGED    Details   hydrocodone-acetaminophen 5-325mg (NORCO) 5-325 mg per tablet Take 1 tablet by mouth every 6 (six) hours as needed for Pain.  Qty: 12 tablet, Refills: 0         CONTINUE these medications which have NOT CHANGED    Details   ferrous sulfate 325 (65 FE) MG EC tablet Take 325 mg by mouth 3 (three) times daily with meals.      tolterodine (DETROL [place]) 4 MG 24 hr capsule Take 1 capsule (4 mg total) by mouth once daily.  Qty: 30 capsule, Refills: 11    Associated Diagnoses: Bilateral hydronephrosis           Time spent on the discharge of patient: 19 minutes    Jan Norris MD  Department of Hospital Medicine  [facility]

## 2017-03-22 NOTE — PLAN OF CARE
03/22/17 1024   Final Note   Assessment Type Final Discharge Note   Discharge Disposition Home   Discharge planning education complete? Yes

## 2017-04-07 ENCOUNTER — HOSPITAL ENCOUNTER (INPATIENT)
Facility: HOSPITAL | Age: 37
LOS: 9 days | Discharge: HOME | DRG: 690 | End: 2017-04-17
Attending: EMERGENCY MEDICINE | Admitting: INTERNAL MEDICINE
Payer: MEDICAID

## 2017-04-07 DIAGNOSIS — N10 ACUTE PYELONEPHRITIS: ICD-10-CM

## 2017-04-07 DIAGNOSIS — E87.6 HYPOKALEMIA: ICD-10-CM

## 2017-04-07 DIAGNOSIS — C53.9 MALIGNANT NEOPLASM OF CERVIX, UNSPECIFIED SITE: ICD-10-CM

## 2017-04-07 DIAGNOSIS — N39.0 URINARY TRACT INFECTION WITHOUT HEMATURIA, SITE UNSPECIFIED: ICD-10-CM

## 2017-04-07 DIAGNOSIS — N13.5 URETERAL STRICTURE: ICD-10-CM

## 2017-04-07 DIAGNOSIS — N12 PYELONEPHRITIS: Primary | ICD-10-CM

## 2017-04-07 DIAGNOSIS — R10.9 FLANK PAIN: ICD-10-CM

## 2017-04-07 DIAGNOSIS — D50.9 IRON DEFICIENCY ANEMIA, UNSPECIFIED IRON DEFICIENCY ANEMIA TYPE: ICD-10-CM

## 2017-04-07 DIAGNOSIS — N18.4 CKD (CHRONIC KIDNEY DISEASE) STAGE 4, GFR 15-29 ML/MIN: ICD-10-CM

## 2017-04-07 PROCEDURE — 87086 URINE CULTURE/COLONY COUNT: CPT

## 2017-04-07 PROCEDURE — 81000 URINALYSIS NONAUTO W/SCOPE: CPT

## 2017-04-07 PROCEDURE — 99284 EMERGENCY DEPT VISIT MOD MDM: CPT | Mod: 25

## 2017-04-07 PROCEDURE — 96368 THER/DIAG CONCURRENT INF: CPT

## 2017-04-07 PROCEDURE — 96376 TX/PRO/DX INJ SAME DRUG ADON: CPT

## 2017-04-07 PROCEDURE — 12000002 HC ACUTE/MED SURGE SEMI-PRIVATE ROOM

## 2017-04-07 PROCEDURE — 96375 TX/PRO/DX INJ NEW DRUG ADDON: CPT

## 2017-04-07 PROCEDURE — 96365 THER/PROPH/DIAG IV INF INIT: CPT

## 2017-04-07 RX ORDER — HYDROMORPHONE HYDROCHLORIDE 1 MG/ML
1 INJECTION, SOLUTION INTRAMUSCULAR; INTRAVENOUS; SUBCUTANEOUS
Status: COMPLETED | OUTPATIENT
Start: 2017-04-08 | End: 2017-04-08

## 2017-04-07 NOTE — IP AVS SNAPSHOT
[address] [place] [address]  Phone: [phone]           Patient Discharge Instructions   Our goal is to set you up for success. This packet includes information on your condition, medications, and your home care.  It will help you care for yourself to prevent having to return to the hospital.     Please ask your nurse if you have any questions.      There are many details to remember when preparing to leave the hospital. Here is what you will need to do:    1. Take your medicine. If you are prescribed medications, review your Medication List on the following pages. You may have new medications to  at the pharmacy and others that you'll need to stop taking. Review the instructions for how and when to take your medications. Talk with your doctor or nurses if you are unsure of what to do.     2. Go to your follow-up appointments. Specific follow-up information is listed in the following pages. Your may be contacted by a nurse or clinical provider about future appointments. Be sure we have all of the phone numbers to reach you. Please contact your provider's office if you are unable to make an appointment.     3. Watch for warning signs. Your doctor or nurse will give you detailed warning signs to watch for and when to call for assistance. These instructions may also include educational information about your condition. If you experience any of warning signs to your health, call your doctor.           [facility]  Unless otherwise directed by your provider, please   contact [facility], our nurse care line   that is available for 24/7 assistance.     [phone] (toll-free)     Registered nurses in the [facility]   provide: appointment scheduling, clinical advisement, health education, and other advisory services.                  ** Verify the list of medication(s) below is accurate and up to date. Carry this with you in case of  emergency. If your medications have changed, please notify your healthcare provider.             Medication List      START taking these medications        Additional Info                      cyclobenzaprine 10 MG tablet   Commonly known as:  FLEXERIL   Quantity:  14 tablet   Refills:  0   Dose:  10 mg    Instructions:  Take 1 tablet (10 mg total) by mouth 2 (two) times daily as needed for Muscle spasms. Watch for sedation - avoid driving, going to heights or operating heavy machinery     Begin Date    AM    Noon    PM    Bedtime       fluconazole 100 MG tablet   Commonly known as:  DIFLUCAN   Quantity:  10 tablet   Refills:  0   Dose:  100 mg    Last time this was given:  200 mg on 4/17/2017  8:21 AM   Instructions:  Take 1 tablet (100 mg total) by mouth once daily.     Begin Date    AM    Noon    PM    Bedtime       levoFLOXacin 250 MG tablet   Commonly known as:  LEVAQUIN   Quantity:  10 tablet   Refills:  0   Dose:  250 mg    Instructions:  Take 1 tablet (250 mg total) by mouth once daily.     Begin Date    AM    Noon    PM    Bedtime       oxycodone-acetaminophen  mg per tablet   Commonly known as:  PERCOCET   Quantity:  20 tablet   Refills:  0   Dose:  1 tablet    Last time this was given:  1 tablet on 4/16/2017 12:43 PM   Instructions:  Take 1 tablet by mouth every 6 (six) hours as needed.     Begin Date    AM    Noon    PM    Bedtime         CHANGE how you take these medications        Additional Info                      tolterodine 4 MG 24 hr capsule   Commonly known as:  DETROL [place]   Quantity:  30 capsule   Refills:  11   Dose:  4 mg   What changed:  when to take this    Instructions:  Take 1 capsule (4 mg total) by mouth once daily.     Begin Date    AM    Noon    PM    Bedtime         CONTINUE taking these medications        Additional Info                      ferrous sulfate 325 (65 FE) MG EC tablet   Refills:  0   Dose:  325 mg    Last time this was given:  325 mg on 4/17/2017  8:21 AM    Instructions:  Take 325 mg by mouth 3 (three) times daily with meals.     Begin Date    AM    Noon    PM    Bedtime         STOP taking these medications     hydrocodone-acetaminophen 5-325mg 5-325 mg per tablet   Commonly known as:  NORCO       ondansetron 8 MG Tbdl   Commonly known as:  ZOFRAN-ODT            Where to Get Your Medications      You can get these medications from any pharmacy     Bring a paper prescription for each of these medications     cyclobenzaprine 10 MG tablet    fluconazole 100 MG tablet    levoFLOXacin 250 MG tablet    oxycodone-acetaminophen  mg per tablet                  Please bring to all follow up appointments:    1. A copy of your discharge instructions.  2. All medicines you are currently taking in their original bottles.  3. Identification and insurance card.    Please arrive 15 minutes ahead of scheduled appointment time.    Please call 24 hours in advance if you must reschedule your appointment and/or time.        Your Scheduled Appointments     Apr 24, 2017  4:00 PM CDT   Established Patient Visit with Turner Bocanegra MD   [facility] ([facility])    [address] [place] [address] [phone]              Follow-up Information     Follow up with Harsha Sheppard MD In 1 week.    Specialty:  Internal Medicine    Contact information:    [address] [place] [address] [address] [place]  [facility] [address] [phone]          Follow up with Turner Bocanegra MD On 4/24/2017.    Specialty:  Urology    Why:  @4:00pm     Contact information:    [address] [place] [address] [phone]          Please follow up.    Contact information:    Follow-up with Pain management at earliest.    Have BMP checked in 3 days.         Please follow up.    Contact information:    Follow up with Oncologic gynecologist in 1-2 weeks        Follow up with [facility].    Specialty:  Pain Medicine    Why:  they will contact you with an  "appointment, if you do not hear from them in 2-3 days call to follow up     Contact information:    [address]  [facility] [address]  [phone]          Discharge Instructions     Future Orders    Call MD for:     Comments:    For worsening symptoms, chest pain, shortness of breath, increased abdominal pain, high grade fever, stroke or stroke like symptoms, immediately go to the nearest Emergency Room or call 911 as soon as possible.    Diet general     Comments:    Cardiac/ 2 gram sodium low cholesterol diet    Questions:    Total calories:      Fat restriction, if any:      Protein restriction, if any:      Na restriction, if any:      Fluid restriction:      Additional restrictions:      Other restrictions (specify):     Comments:    Fall precautions      Discharge References/Attachments     HYDRONEPHROSIS, WHEN YOUR CHILD HAS (ENGLISH)        Primary Diagnosis     Your primary diagnosis was:  Kidney Infection      Admission Information     Date & Time Provider Department CSN    4/7/2017 11:25 PM Cathy Nicholson MD [facility] [id number]      Care Providers     Provider Role Specialty Primary office phone    Cathy Nicholson MD Attending Provider Internal Medicine [phone]    Tylor Lemon MD Consulting Physician  Urology [phone]    Dina Greco MD Consulting Physician  Urology  [phone]      Your Vitals Were     BP Pulse Temp Resp Height Weight    110/72 95 98.7 °F (37.1 °C) (Oral) 148 5' 6" (1.676 m) 79.4 kg (175 lb)    SpO2 BMI             99% 28.25 kg/m2         Recent Lab Values        12/12/2016                          10:54 PM           A1C 5.4           Comment for A1C at 10:54 PM on 12/12/2016:  According to ADA guidelines, hemoglobin A1C <7.0% represents  optimal control in non-pregnant diabetic patients.  Different  metrics may apply to specific populations.   Standards of Medical Care in Diabetes - 2016.  For the purpose of screening for the presence of " diabetes:  <5.7%     Consistent with the absence of diabetes  5.7-6.4%  Consistent with increasing risk for diabetes   (prediabetes)  >or=6.5%  Consistent with diabetes  Currently no consensus exists for use of hemoglobin A1C  for diagnosis of diabetes for children.        Allergies as of 4/17/2017        Reactions    Pcn [Penicillins] Anaphylaxis      Advance Directives     An advance directive is a document which, in the event you are no longer able to make decisions for yourself, tells your healthcare team what kind of treatment you do or do not want to receive, or who you would like to make those decisions for you.  If you do not currently have an advance directive, [facility] encourages you to create one.  For more information call:  [phone]), [phone] ([phone]),  or log on to [email or web address].        Language Assistance Services     ATTENTION: Language assistance services are available, free of charge. Please call [phone].      ATENCIÓN: Si habla español, tiene a batista disposición servicios gratuitos de asistencia lingüística. Llame al [phone].     CHÚ Ý: N?u b?n nói Ti?ng Vi?t, có các d?ch v? h? tr? ngôn ng? mi?n phí dành cho b?n. G?i s? [phone].        Chronic Kindey Disease Education             [facility] Sign-Up     Activating your [facility] account is as easy as 1-2-3!     1) Visit [email or web address], select Sign Up Now, enter this activation code and your date of birth, then select Next.  Activation code not generated  Current Patient Portal Status: Account disabled      2) Create a username and password to use when you visit [facility] in the future and select a security question in case you lose your password and select Next.    3) Enter your e-mail address and click Sign Up!    Additional Information  If you have questions, please e-mail [email or web address] or call [phone] to talk to our [facility] staff. Remember, [facility] is NOT to be used for  urgent needs. For medical emergencies, dial 911.          [facility] complies with applicable Federal civil rights laws and does not discriminate on the basis of race, color, national origin, age, disability, or sex.

## 2017-04-08 PROBLEM — R30.0 DYSURIA: Status: RESOLVED | Noted: 2017-03-07 | Resolved: 2017-04-08

## 2017-04-08 PROBLEM — E87.6 HYPOKALEMIA: Status: ACTIVE | Noted: 2017-04-08

## 2017-04-08 PROBLEM — R10.2 PELVIC PAIN: Status: RESOLVED | Noted: 2017-03-19 | Resolved: 2017-04-08

## 2017-04-08 PROBLEM — N12 PYELONEPHRITIS: Status: ACTIVE | Noted: 2017-04-08

## 2017-04-08 LAB
ALBUMIN SERPL BCP-MCNC: 3.6 G/DL
ALP SERPL-CCNC: 68 U/L
ALT SERPL W/O P-5'-P-CCNC: 17 U/L
ANION GAP SERPL CALC-SCNC: 10 MMOL/L
AST SERPL-CCNC: 20 U/L
BACTERIA #/AREA URNS HPF: ABNORMAL /HPF
BASOPHILS # BLD AUTO: 0.1 K/UL
BASOPHILS NFR BLD: 1.1 %
BILIRUB SERPL-MCNC: 0.4 MG/DL
BILIRUB UR QL STRIP: NEGATIVE
BUN SERPL-MCNC: 10 MG/DL
CALCIUM SERPL-MCNC: 9.2 MG/DL
CHLORIDE SERPL-SCNC: 110 MMOL/L
CLARITY UR: ABNORMAL
CO2 SERPL-SCNC: 21 MMOL/L
COLOR UR: YELLOW
CREAT SERPL-MCNC: 1.9 MG/DL
DIFFERENTIAL METHOD: ABNORMAL
EOSINOPHIL # BLD AUTO: 0.9 K/UL
EOSINOPHIL NFR BLD: 12.8 %
ERYTHROCYTE [DISTWIDTH] IN BLOOD BY AUTOMATED COUNT: 13 %
EST. GFR  (AFRICAN AMERICAN): 38 ML/MIN/1.73 M^2
EST. GFR  (NON AFRICAN AMERICAN): 33 ML/MIN/1.73 M^2
GLUCOSE SERPL-MCNC: 102 MG/DL
GLUCOSE UR QL STRIP: NEGATIVE
HCT VFR BLD AUTO: 34.2 %
HGB BLD-MCNC: 11.2 G/DL
HGB UR QL STRIP: ABNORMAL
HYALINE CASTS #/AREA URNS LPF: 0 /LPF
KETONES UR QL STRIP: NEGATIVE
LEUKOCYTE ESTERASE UR QL STRIP: ABNORMAL
LYMPHOCYTES # BLD AUTO: 1.4 K/UL
LYMPHOCYTES NFR BLD: 18.9 %
MCH RBC QN AUTO: 31.7 PG
MCHC RBC AUTO-ENTMCNC: 32.7 %
MCV RBC AUTO: 97 FL
MICROSCOPIC COMMENT: ABNORMAL
MONOCYTES # BLD AUTO: 0.4 K/UL
MONOCYTES NFR BLD: 5.9 %
NEUTROPHILS # BLD AUTO: 4.5 K/UL
NEUTROPHILS NFR BLD: 61.3 %
NITRITE UR QL STRIP: NEGATIVE
PH UR STRIP: 6 [PH] (ref 5–8)
PLATELET # BLD AUTO: 246 K/UL
PMV BLD AUTO: 8.9 FL
POTASSIUM SERPL-SCNC: 3.4 MMOL/L
PROT SERPL-MCNC: 6.8 G/DL
PROT UR QL STRIP: ABNORMAL
RBC # BLD AUTO: 3.53 M/UL
RBC #/AREA URNS HPF: 10 /HPF (ref 0–4)
SODIUM SERPL-SCNC: 141 MMOL/L
SP GR UR STRIP: 1.01 (ref 1–1.03)
SQUAMOUS #/AREA URNS HPF: 2 /HPF
URN SPEC COLLECT METH UR: ABNORMAL
UROBILINOGEN UR STRIP-ACNC: NEGATIVE EU/DL
WBC # BLD AUTO: 7.3 K/UL
WBC #/AREA URNS HPF: >100 /HPF (ref 0–5)

## 2017-04-08 PROCEDURE — 25000003 PHARM REV CODE 250: Performed by: EMERGENCY MEDICINE

## 2017-04-08 PROCEDURE — 63600175 PHARM REV CODE 636 W HCPCS: Performed by: EMERGENCY MEDICINE

## 2017-04-08 PROCEDURE — 25000003 PHARM REV CODE 250: Performed by: NURSE PRACTITIONER

## 2017-04-08 PROCEDURE — 80053 COMPREHEN METABOLIC PANEL: CPT

## 2017-04-08 PROCEDURE — 12000002 HC ACUTE/MED SURGE SEMI-PRIVATE ROOM

## 2017-04-08 PROCEDURE — 36415 COLL VENOUS BLD VENIPUNCTURE: CPT

## 2017-04-08 PROCEDURE — 63600175 PHARM REV CODE 636 W HCPCS: Performed by: NURSE PRACTITIONER

## 2017-04-08 PROCEDURE — 85025 COMPLETE CBC W/AUTO DIFF WBC: CPT

## 2017-04-08 RX ORDER — FERROUS SULFATE 325(65) MG
325 TABLET, DELAYED RELEASE (ENTERIC COATED) ORAL
Status: DISCONTINUED | OUTPATIENT
Start: 2017-04-08 | End: 2017-04-17 | Stop reason: HOSPADM

## 2017-04-08 RX ORDER — ONDANSETRON 2 MG/ML
4 INJECTION INTRAMUSCULAR; INTRAVENOUS EVERY 8 HOURS PRN
Status: DISCONTINUED | OUTPATIENT
Start: 2017-04-08 | End: 2017-04-17 | Stop reason: HOSPADM

## 2017-04-08 RX ORDER — AMOXICILLIN 250 MG
1 CAPSULE ORAL DAILY PRN
Status: DISCONTINUED | OUTPATIENT
Start: 2017-04-08 | End: 2017-04-16

## 2017-04-08 RX ORDER — ACETAMINOPHEN 325 MG/1
650 TABLET ORAL EVERY 6 HOURS PRN
Status: DISCONTINUED | OUTPATIENT
Start: 2017-04-08 | End: 2017-04-15

## 2017-04-08 RX ORDER — POTASSIUM CHLORIDE 20 MEQ/1
40 TABLET, EXTENDED RELEASE ORAL DAILY
Status: DISCONTINUED | OUTPATIENT
Start: 2017-04-08 | End: 2017-04-16

## 2017-04-08 RX ORDER — HYDROMORPHONE HYDROCHLORIDE 2 MG/ML
1 INJECTION, SOLUTION INTRAMUSCULAR; INTRAVENOUS; SUBCUTANEOUS
Status: DISCONTINUED | OUTPATIENT
Start: 2017-04-08 | End: 2017-04-14

## 2017-04-08 RX ORDER — POTASSIUM CHLORIDE 20 MEQ/1
40 TABLET, EXTENDED RELEASE ORAL ONCE
Status: COMPLETED | OUTPATIENT
Start: 2017-04-08 | End: 2017-04-08

## 2017-04-08 RX ORDER — SODIUM CHLORIDE 9 MG/ML
INJECTION, SOLUTION INTRAVENOUS CONTINUOUS
Status: DISCONTINUED | OUTPATIENT
Start: 2017-04-08 | End: 2017-04-16

## 2017-04-08 RX ORDER — ZOLPIDEM TARTRATE 5 MG/1
5 TABLET ORAL NIGHTLY PRN
Status: DISCONTINUED | OUTPATIENT
Start: 2017-04-08 | End: 2017-04-17 | Stop reason: HOSPADM

## 2017-04-08 RX ORDER — HYDROMORPHONE HYDROCHLORIDE 1 MG/ML
1 INJECTION, SOLUTION INTRAMUSCULAR; INTRAVENOUS; SUBCUTANEOUS
Status: COMPLETED | OUTPATIENT
Start: 2017-04-08 | End: 2017-04-08

## 2017-04-08 RX ADMIN — HYDROMORPHONE HYDROCHLORIDE 1 MG: 2 INJECTION INTRAMUSCULAR; INTRAVENOUS; SUBCUTANEOUS at 04:04

## 2017-04-08 RX ADMIN — HYDROMORPHONE HYDROCHLORIDE 1 MG: 2 INJECTION INTRAMUSCULAR; INTRAVENOUS; SUBCUTANEOUS at 07:04

## 2017-04-08 RX ADMIN — HYDROMORPHONE HYDROCHLORIDE 1 MG: 2 INJECTION INTRAMUSCULAR; INTRAVENOUS; SUBCUTANEOUS at 10:04

## 2017-04-08 RX ADMIN — PROMETHAZINE HYDROCHLORIDE 25 MG: 25 INJECTION, SOLUTION INTRAMUSCULAR; INTRAVENOUS at 12:04

## 2017-04-08 RX ADMIN — HYDROMORPHONE HYDROCHLORIDE 1 MG: 1 INJECTION, SOLUTION INTRAMUSCULAR; INTRAVENOUS; SUBCUTANEOUS at 12:04

## 2017-04-08 RX ADMIN — FERROUS SULFATE TAB EC 325 MG (65 MG FE EQUIVALENT) 325 MG: 325 (65 FE) TABLET DELAYED RESPONSE at 05:04

## 2017-04-08 RX ADMIN — CEFTRIAXONE 1 G: 1 INJECTION, SOLUTION INTRAVENOUS at 12:04

## 2017-04-08 RX ADMIN — CEFTRIAXONE 1 G: 1 INJECTION, SOLUTION INTRAVENOUS at 11:04

## 2017-04-08 RX ADMIN — POTASSIUM CHLORIDE 40 MEQ: 1500 TABLET, EXTENDED RELEASE ORAL at 12:04

## 2017-04-08 RX ADMIN — PROMETHAZINE HYDROCHLORIDE 25 MG: 25 INJECTION, SOLUTION INTRAMUSCULAR; INTRAVENOUS at 07:04

## 2017-04-08 RX ADMIN — FERROUS SULFATE TAB EC 325 MG (65 MG FE EQUIVALENT) 325 MG: 325 (65 FE) TABLET DELAYED RESPONSE at 07:04

## 2017-04-08 RX ADMIN — HYDROMORPHONE HYDROCHLORIDE 1 MG: 2 INJECTION INTRAMUSCULAR; INTRAVENOUS; SUBCUTANEOUS at 03:04

## 2017-04-08 RX ADMIN — HYDROMORPHONE HYDROCHLORIDE 1 MG: 1 INJECTION, SOLUTION INTRAMUSCULAR; INTRAVENOUS; SUBCUTANEOUS at 01:04

## 2017-04-08 RX ADMIN — PROMETHAZINE HYDROCHLORIDE 25 MG: 25 INJECTION, SOLUTION INTRAMUSCULAR; INTRAVENOUS at 06:04

## 2017-04-08 RX ADMIN — HYDROMORPHONE HYDROCHLORIDE 1 MG: 2 INJECTION INTRAMUSCULAR; INTRAVENOUS; SUBCUTANEOUS at 01:04

## 2017-04-08 RX ADMIN — SODIUM CHLORIDE 1000 ML: 0.9 INJECTION, SOLUTION INTRAVENOUS at 12:04

## 2017-04-08 RX ADMIN — SODIUM CHLORIDE: 0.9 INJECTION, SOLUTION INTRAVENOUS at 07:04

## 2017-04-08 RX ADMIN — ZOLPIDEM TARTRATE 5 MG: 5 TABLET, FILM COATED ORAL at 10:04

## 2017-04-08 RX ADMIN — FERROUS SULFATE TAB EC 325 MG (65 MG FE EQUIVALENT) 325 MG: 325 (65 FE) TABLET DELAYED RESPONSE at 12:04

## 2017-04-08 RX ADMIN — ONDANSETRON 4 MG: 2 INJECTION INTRAMUSCULAR; INTRAVENOUS at 05:04

## 2017-04-08 NOTE — PROGRESS NOTES
"0215 Patient arrived via wheelchair. AOx4. VSS. IV intact, sluggish, and positional. IVF set to infuse. Patient oriented to room, wheels locked, rails x2, call light within easy reach, instructed to call prn.     Patient c/o 7/10 mayra flank pain. Patient states "a combination of phenergan and dilaudid is the only thing that really works."      0300 M. WINSTON Fortune notified of patient request for dilaudid, phenergan, and a sleep aid.   "

## 2017-04-08 NOTE — ED NOTES
Assumed care from:  BN:  Patient awake, alert and oriented x 3, skin warm and dry, in NAD with NS infusing.

## 2017-04-08 NOTE — NURSING
15:44- Dr. Greco notified by phone regarding Urology consult. MD informed me that he was not on call. Need to notify Dr. Lemon.  Dr. Lemon paged. Awaiting returned call.     16:43 - Tried calling Dr. Lemon by cell. No answer. Voice messaging full . Unable to leave message.    16:55 - Dr. Cyr informed regarding Urology consult situation. Dr. Cyr stated  not to worry about consult today will see if needed tomorrow. Did not realize urology was consulted.

## 2017-04-08 NOTE — PLAN OF CARE
Problem: Patient Care Overview  Goal: Plan of Care Review  Outcome: Ongoing (interventions implemented as appropriate)  Patient aao x 4. Complains of pain throughout shift. Controlled with IV medication. Reports nausea and vomiting twice today. Full relief obtained with Phenergan. Patient ambulates in room independently. Patient remains free from fall and injury. Vitals stable. Afebrile. Bed in lowest position and call light within reach. Will continue to monitor.

## 2017-04-08 NOTE — PLAN OF CARE
Problem: Patient Care Overview  Goal: Plan of Care Review  Outcome: Ongoing (interventions implemented as appropriate)  Patient oriented to room, call light within reach, wheels locked, side rails x 2, instructed to call for assistance prn. POC reviewed with patient, all questions answered, verbalized understanding. Patient remained free of fall/injury, non-skid socks on when OOB. IV bolus infused per order. Comfort level established, patient request dilaudid and phenergan frequently, moderate relief reached. Patient repositioned independently, ambulating in room with no deficit noted. No breakdown noted. Will continue to monitor.

## 2017-04-08 NOTE — ED NOTES
Presents to the ER with c/o pelvic and bilateral lower back pain that has been persistent for the past days. Associated complaints are nausea and diarrhea. Patient is actively vomiting at the bedside. Patient reports a history of renal stents. Patient had stage 4 cervical cancer, reports being in remission for the past 2 years and Has had problems with kidney infections since. Mucous membranes are pink and moist. Skin is warm, dry and intact. Lungs are clear bilaterally, respirations are regular and unlabored. Denies cough, congestion, rhinorrhea or SOB. BS active x4, no tenderness with palpation, abd is soft and not distended. Denies any appetite or activity change. S1S2, capillary refill is < 2 seconds. Denies dysuria, difficulty urinating, frequency, numbness, tingling or weakness. JOSE HSU

## 2017-04-08 NOTE — PLAN OF CARE
04/08/17 0400   Patient Assessment/Suction   Level of Consciousness (AVPU) alert   Respiratory Effort Normal;Unlabored   Expansion/Accessory Muscles/Retractions no use of accessory muscles;expansion symmetric   PRE-TX-O2-ETCO2   O2 Device (Oxygen Therapy) room air   SpO2 99 %   Pulse 80   Pt tolerates RA well.

## 2017-04-08 NOTE — ED PROVIDER NOTES
Encounter Date: 4/7/2017    SCRIBE #1 NOTE: Angelique LOVE, erum scribing for, and in the presence of, Dr. Jain.       History     Chief Complaint   Patient presents with    Flank Pain     Davon lower pelvic pain and lt lower back pain.  Hx of renal stents.  Typical of chronic pain    Nausea    Diarrhea     Review of patient's allergies indicates:   Allergen Reactions    Pcn [penicillins] Anaphylaxis     HPI Comments: 4/7/2017  11:46 PM     Chief Complaint: Flank pain    The patient is a 37 y.o. female with PMHx of cervical cancer, hydronephrosis, PONV, pyelonephritis and anemia who presents with flank pain. Patient c/o gradual onset of progressively worsening moderate sharp pains to the left lower abdomen radiating to the left flank that started yesterday. Associated with vomiting and constant nausea. Pt states she cannot keep anything down. Patient states her current presentation of pain is similar to when stents are clogged or infection is present. Denies any fever, muscle aches, dysuria or hematuria. Pt is currently in remission for cervical cancer. Shx of hysterectomy, tubal ligation, ureteral stent placement and colonoscopy.    The history is provided by the patient.     Past Medical History:   Diagnosis Date    Anemia     Cervical cancer     cervical    Encounter for blood transfusion     Hydronephrosis     PONV (postoperative nausea and vomiting)     Pyelonephritis      Past Surgical History:   Procedure Laterality Date    COLONOSCOPY N/A 9/22/2016    Procedure: COLONOSCOPY;  Surgeon: Joe Moe MD;  Location: [facility];  Service: Endoscopy;  Laterality: N/A;    HYSTERECTOMY      Partial    TUBAL LIGATION      URETERAL STENT PLACEMENT  07/2016     Family History   Problem Relation Age of Onset    No Known Problems Mother     Drug abuse Father     No Known Problems Sister     Asthma Brother      Social History   Substance Use Topics    Smoking status: Never Smoker    Smokeless  tobacco: Never Used    Alcohol use No     Review of Systems   Constitutional: Positive for appetite change. Negative for fever.   Gastrointestinal: Positive for abdominal pain, nausea and vomiting.   Genitourinary: Positive for flank pain. Negative for dysuria, hematuria and vaginal discharge.   Musculoskeletal: Negative for myalgias.   All other systems reviewed and are negative.      Physical Exam   Initial Vitals   BP Pulse Resp Temp SpO2   04/07/17 2321 04/07/17 2321 04/07/17 2321 04/07/17 2321 04/07/17 2321   141/92 90 16 98.1 °F (36.7 °C) 98 %     Physical Exam    Nursing note and vitals reviewed.  Constitutional: She appears well-developed and well-nourished. She is not diaphoretic. No distress.   Appears nauseous.   HENT:   Head: Normocephalic.   Mouth/Throat: Mucous membranes are normal.   Neck: Normal range of motion.   Cardiovascular: Normal rate, regular rhythm, normal heart sounds and intact distal pulses. Exam reveals no gallop and no friction rub.    No murmur heard.  Pulmonary/Chest: Breath sounds normal. She has no wheezes. She has no rhonchi. She has no rales.   Abdominal: Soft. She exhibits no distension. There is tenderness (mild lower abdominal ttp). There is no rebound (no rebound) and no guarding (no guarding).   No CVA TTP.   Musculoskeletal: Normal range of motion. She exhibits no edema.   Neurological: She is alert and oriented to person, place, and time.   Skin: Skin is dry. No rash noted.   Psychiatric: She has a normal mood and affect.         ED Course   Procedures  Labs Reviewed   URINALYSIS             Medical Decision Making:   History:   Old Medical Records: I decided to obtain old medical records.  Clinical Tests:   Lab Tests: Ordered and Reviewed            Scribe Attestation:   Scribe #1: I performed the above scribed service and the documentation accurately describes the services I performed. I attest to the accuracy of the note.    Attending Attestation:           Physician  Attestation for Scribe:  Physician Attestation Statement for Scribe #1: I, Dr. Jain, reviewed documentation, as scribed by Angelique Man in my presence, and it is both accurate and complete.                 ED Course   Comment By Radha mauricio by phone Zechariah Jain MD 04/08 6208     Clinical Impression:   The encounter diagnosis was Pyelonephritis.          37-year-old female with a history of cervical cancer and resultant ureteral obstruction presents with left flank pain nausea vomiting and lower abdominal pain similar to prior episodes of pyelonephritis.  No peritoneal signs in the ER.  Patient has had numerous CT scans in the last year.  I did review several of the most recent CT scans and there were no acute findings.  These were done for similar symptoms according to the patient.  I do not think it is reasonable to add to the patient's already significant radiation burn with another CT with the yield is going to be very low.  Urinalysis demonstrates infection consistent with pyelonephritis.  Patient will be admitted to the hospital for further treatment. No sign sepsis.      Zechariah Jain MD  04/08/17 5867

## 2017-04-08 NOTE — ED NOTES
To Room:  418 by W/C:   AA & O x 3, skin warm and dry, in NAD, Saline Lock in place and patent, site clean and dry with NS on pump at 250cc/hr.

## 2017-04-08 NOTE — H&P
[facility] Hospital Medicine  History & Physical    Patient Name: Cesilia Dozier  MRN: [id number]  Admission Date: 4/7/2017  Attending Physician: Cathy Nicholson MD   Primary Care Provider: Harsha Sheppard MD         Patient information was obtained from patient and ER records.     Subjective:     Principal Problem:Pyelonephritis    Chief Complaint:   Chief Complaint   Patient presents with    Flank Pain     Davon lower pelvic pain and lt lower back pain.  Hx of renal stents.  Typical of chronic pain    Nausea    Diarrhea        HPI: Ms. Dozier is a 38yo AAF with a PMH cervical CA, Hydronephrosis, and recurrent pyelonephritis. She is currently in remission for Cervical CA and was last discharged from the hospital 3/22/17 after being treated for pyelonephritis. She presents to the hospital today with c/o left flank pain that started yesterday. Its sharp and originated in the LLQ with radiation to the left flank. It feels the same as when she has a infection or occluded stent. Associated symptoms are N/V. Denies any fever, muscle aches, dysuria or hematuria.           No new subjective & objective note has been filed under this hospital service since the last note was generated.    Assessment/Plan:     * Pyelonephritis  IV Rocephin  Culture urine  Consult Urology  IV narcotic pain management  IV Antiemetics      CKD (chronic kidney disease) stage 4, GFR 15-29 ml/min  Stable.  Renal dose medications  IVF hydration to maintain perfusion  Monitor labs      Hypokalemia  Replace K  Monitor labs      Iron deficiency anemia  Stable. Continue chronic Fe supplementation      VTE Risk Mitigation         Ordered     Low Risk of VTE  Once      04/08/17 0209        Marnie Fortune NP  Department of Hospital Medicine   [facility]

## 2017-04-09 LAB
ALBUMIN SERPL BCP-MCNC: 3.5 G/DL
ALP SERPL-CCNC: 60 U/L
ALT SERPL W/O P-5'-P-CCNC: 12 U/L
ANION GAP SERPL CALC-SCNC: 6 MMOL/L
AST SERPL-CCNC: 13 U/L
BACTERIA #/AREA URNS HPF: ABNORMAL /HPF
BACTERIA UR CULT: NORMAL
BACTERIA UR CULT: NORMAL
BASOPHILS # BLD AUTO: 0.1 K/UL
BASOPHILS NFR BLD: 1 %
BILIRUB SERPL-MCNC: 0.4 MG/DL
BILIRUB UR QL STRIP: NEGATIVE
BUN SERPL-MCNC: 8 MG/DL
CALCIUM SERPL-MCNC: 8.9 MG/DL
CHLORIDE SERPL-SCNC: 106 MMOL/L
CLARITY UR: ABNORMAL
CO2 SERPL-SCNC: 26 MMOL/L
COLOR UR: YELLOW
CREAT SERPL-MCNC: 2 MG/DL
DIFFERENTIAL METHOD: ABNORMAL
EOSINOPHIL # BLD AUTO: 0.7 K/UL
EOSINOPHIL NFR BLD: 10.2 %
ERYTHROCYTE [DISTWIDTH] IN BLOOD BY AUTOMATED COUNT: 13.5 %
EST. GFR  (AFRICAN AMERICAN): 36 ML/MIN/1.73 M^2
EST. GFR  (NON AFRICAN AMERICAN): 31 ML/MIN/1.73 M^2
GLUCOSE SERPL-MCNC: 79 MG/DL
GLUCOSE UR QL STRIP: NEGATIVE
HCT VFR BLD AUTO: 30.8 %
HGB BLD-MCNC: 10.4 G/DL
HGB UR QL STRIP: ABNORMAL
HYALINE CASTS #/AREA URNS LPF: 0 /LPF
KETONES UR QL STRIP: NEGATIVE
LEUKOCYTE ESTERASE UR QL STRIP: ABNORMAL
LYMPHOCYTES # BLD AUTO: 1.3 K/UL
LYMPHOCYTES NFR BLD: 19.7 %
MCH RBC QN AUTO: 32.4 PG
MCHC RBC AUTO-ENTMCNC: 33.8 %
MCV RBC AUTO: 96 FL
MICROSCOPIC COMMENT: ABNORMAL
MONOCYTES # BLD AUTO: 0.7 K/UL
MONOCYTES NFR BLD: 10.4 %
NEUTROPHILS # BLD AUTO: 3.7 K/UL
NEUTROPHILS NFR BLD: 58.7 %
NITRITE UR QL STRIP: NEGATIVE
PH UR STRIP: 6 [PH] (ref 5–8)
PLATELET # BLD AUTO: 190 K/UL
PMV BLD AUTO: 8.9 FL
POTASSIUM SERPL-SCNC: 4.8 MMOL/L
PROT SERPL-MCNC: 6.4 G/DL
PROT UR QL STRIP: ABNORMAL
RBC # BLD AUTO: 3.2 M/UL
RBC #/AREA URNS HPF: 4 /HPF (ref 0–4)
SODIUM SERPL-SCNC: 138 MMOL/L
SP GR UR STRIP: 1.01 (ref 1–1.03)
URN SPEC COLLECT METH UR: ABNORMAL
UROBILINOGEN UR STRIP-ACNC: NEGATIVE EU/DL
WBC # BLD AUTO: 6.4 K/UL
WBC #/AREA URNS HPF: >100 /HPF (ref 0–5)

## 2017-04-09 PROCEDURE — 80053 COMPREHEN METABOLIC PANEL: CPT

## 2017-04-09 PROCEDURE — 63600175 PHARM REV CODE 636 W HCPCS: Performed by: NURSE PRACTITIONER

## 2017-04-09 PROCEDURE — 25000003 PHARM REV CODE 250: Performed by: FAMILY MEDICINE

## 2017-04-09 PROCEDURE — 25000003 PHARM REV CODE 250: Performed by: NURSE PRACTITIONER

## 2017-04-09 PROCEDURE — 81000 URINALYSIS NONAUTO W/SCOPE: CPT

## 2017-04-09 PROCEDURE — 36415 COLL VENOUS BLD VENIPUNCTURE: CPT

## 2017-04-09 PROCEDURE — 12000002 HC ACUTE/MED SURGE SEMI-PRIVATE ROOM

## 2017-04-09 PROCEDURE — 85025 COMPLETE CBC W/AUTO DIFF WBC: CPT

## 2017-04-09 RX ADMIN — FERROUS SULFATE TAB EC 325 MG (65 MG FE EQUIVALENT) 325 MG: 325 (65 FE) TABLET DELAYED RESPONSE at 05:04

## 2017-04-09 RX ADMIN — HYDROMORPHONE HYDROCHLORIDE 1 MG: 2 INJECTION INTRAMUSCULAR; INTRAVENOUS; SUBCUTANEOUS at 11:04

## 2017-04-09 RX ADMIN — POTASSIUM CHLORIDE 40 MEQ: 1500 TABLET, EXTENDED RELEASE ORAL at 08:04

## 2017-04-09 RX ADMIN — HYDROMORPHONE HYDROCHLORIDE 1 MG: 2 INJECTION INTRAMUSCULAR; INTRAVENOUS; SUBCUTANEOUS at 08:04

## 2017-04-09 RX ADMIN — HYDROMORPHONE HYDROCHLORIDE 1 MG: 2 INJECTION INTRAMUSCULAR; INTRAVENOUS; SUBCUTANEOUS at 02:04

## 2017-04-09 RX ADMIN — HYDROMORPHONE HYDROCHLORIDE 1 MG: 2 INJECTION INTRAMUSCULAR; INTRAVENOUS; SUBCUTANEOUS at 04:04

## 2017-04-09 RX ADMIN — FERROUS SULFATE TAB EC 325 MG (65 MG FE EQUIVALENT) 325 MG: 325 (65 FE) TABLET DELAYED RESPONSE at 01:04

## 2017-04-09 RX ADMIN — HYDROMORPHONE HYDROCHLORIDE 1 MG: 2 INJECTION INTRAMUSCULAR; INTRAVENOUS; SUBCUTANEOUS at 05:04

## 2017-04-09 RX ADMIN — PROMETHAZINE HYDROCHLORIDE 25 MG: 25 INJECTION, SOLUTION INTRAMUSCULAR; INTRAVENOUS at 11:04

## 2017-04-09 RX ADMIN — CEFTRIAXONE 1 G: 1 INJECTION, SOLUTION INTRAVENOUS at 11:04

## 2017-04-09 RX ADMIN — ZOLPIDEM TARTRATE 5 MG: 5 TABLET, FILM COATED ORAL at 11:04

## 2017-04-09 RX ADMIN — SODIUM CHLORIDE: 0.9 INJECTION, SOLUTION INTRAVENOUS at 11:04

## 2017-04-09 RX ADMIN — SODIUM CHLORIDE 500 ML: 0.9 INJECTION, SOLUTION INTRAVENOUS at 03:04

## 2017-04-09 RX ADMIN — FERROUS SULFATE TAB EC 325 MG (65 MG FE EQUIVALENT) 325 MG: 325 (65 FE) TABLET DELAYED RESPONSE at 08:04

## 2017-04-09 RX ADMIN — PROMETHAZINE HYDROCHLORIDE 25 MG: 25 INJECTION, SOLUTION INTRAMUSCULAR; INTRAVENOUS at 05:04

## 2017-04-09 NOTE — PLAN OF CARE
Problem: Patient Care Overview  Goal: Plan of Care Review  Outcome: Ongoing (interventions implemented as appropriate)  Pt VSS and afebrile, free of falls, trauma, injury, and skin breakdown, pain and nausea controlled with medication, positions self in bed, ambulates to RR with standby assist. Pt in low locked bed, call light in reach, safety precautions maintained.

## 2017-04-09 NOTE — PROGRESS NOTES
Progress Note    Admit Date: 4/7/2017   LOS: 1 day     SUBJECTIVE:     HPI: Ms. Dozier is a 36yo AAF with a PMH cervical CA, Hydronephrosis, and recurrent pyelonephritis. She is currently in remission for Cervical CA and was last discharged from the hospital 3/22/17 after being treated for pyelonephritis. She presents to the hospital today with c/o left flank pain that started yesterday. Its sharp and originated in the LLQ with radiation to the left flank. It feels the same as when she has a infection or occluded stent. Associated symptoms are N/V. Denies any fever, muscle aches, dysuria or hematuria.     Interval history   Patient seen and examined this morning. Patient continues to complain of flank pain. Pain is under moderate control with current pain medications. Otherwise patient dose feel improved over the past 24 hours.     Scheduled Meds:   cefTRIAXone (ROCEPHIN) IVPB  1 g Intravenous Q24H    ferrous sulfate  325 mg Oral TID WM    potassium chloride  40 mEq Oral Daily     Continuous Infusions:   sodium chloride 0.9% 75 mL/hr at 04/08/17 0715     PRN Meds:acetaminophen, HYDROmorphone, ondansetron, promethazine (PHENERGAN) IVPB, senna-docusate 8.6-50 mg, zolpidem    Review of patient's allergies indicates:   Allergen Reactions    Pcn [penicillins] Anaphylaxis       Review of Systems  Constitutional: negative  Respiratory: negative  Cardiovascular: negative  Genitourinary:positive for dysuria  Neurological: negative  Behavioral/Psych: negative    OBJECTIVE:     Vital Signs (Most Recent)  Temp: 98.8 °F (37.1 °C) (04/09/17 0800)  Pulse: 103 (04/09/17 0800)  Resp: 18 (04/09/17 0800)  BP: (!) 97/57 (04/09/17 0800)  SpO2: (!) 93 % (04/09/17 0800)    Vital Signs Range (Last 24H):  Temp:  [97.1 °F (36.2 °C)-99.5 °F (37.5 °C)]   Pulse:  []   Resp:  [18]   BP: ()/(53-68)   SpO2:  [93 %-100 %]     I & O (Last 24H):  Intake/Output Summary (Last 24 hours) at 04/09/17 1215  Last data filed at 04/08/17  1800   Gross per 24 hour   Intake          1286.25 ml   Output                0 ml   Net          1286.25 ml     Physical Exam:  General: well developed, well nourished  Lungs:  clear to auscultation bilaterally and normal respiratory effort  Cardiovascular: Heart: regular rate and rhythm, S1, S2 normal, no murmur, click, rub or gallop. Chest Wall: no tenderness. Extremities: no cyanosis or edema, or clubbing. Pulses: 2+ and symmetric.    Laboratory:  CBC:   Recent Labs  Lab 04/08/17  0037   WBC 7.30   RBC 3.53*   HGB 11.2*   HCT 34.2*      MCV 97   MCH 31.7*   MCHC 32.7     BMP:   Recent Labs  Lab 04/08/17  0037         K 3.4*      CO2 21*   BUN 10   CREATININE 1.9*   CALCIUM 9.2       Recent Labs  Lab 04/07/17  2352   COLORU Yellow   SPECGRAV 1.015   PHUR 6.0   PROTEINUA 2+*   BACTERIA Moderate*   NITRITE Negative   LEUKOCYTESUR 3+*   UROBILINOGEN Negative   HYALINECASTS 0       Diagnostic Results:  reviewed    ASSESSMENT/PLAN:     * Pyelonephritis  IV Rocephin  Culture urine  IV narcotic pain management  IV Antiemetics        CKD (chronic kidney disease) stage 4, GFR 15-29 ml/min  Stable.  Renal dose medications  IVF hydration to maintain perfusion  Monitor labs        Hypokalemia  Replace K  Monitor labs        Iron deficiency anemia  Stable. Continue chronic Fe supplementation    VTE Risk Mitigation           Ordered       Low Risk of VTE Once      04/08/17 0209       Luis Enrique Cyr MD  [facility]  4/9/2017 12:17 PM

## 2017-04-09 NOTE — PLAN OF CARE
Problem: Patient Care Overview  Goal: Plan of Care Review  Outcome: Ongoing (interventions implemented as appropriate)  Patient aao x 4. Complain of pain in left flank. IV pain medication given. Moderate relief obtained. POC reviewed with patient. Verbalized understanding. Patient has intermittent nausea. Full relief obtained with prn medication. No vomiting reported. Tolerating regular diet. Patient ambulates in room. Patient remained free from fall and injury. Bed in lowest position and call light within reach.

## 2017-04-10 LAB
ALBUMIN SERPL BCP-MCNC: 3.5 G/DL
ALP SERPL-CCNC: 68 U/L
ALT SERPL W/O P-5'-P-CCNC: 13 U/L
ANION GAP SERPL CALC-SCNC: 9 MMOL/L
AST SERPL-CCNC: 17 U/L
BASOPHILS # BLD AUTO: 0 K/UL
BASOPHILS NFR BLD: 0.6 %
BILIRUB SERPL-MCNC: 0.4 MG/DL
BUN SERPL-MCNC: 6 MG/DL
CALCIUM SERPL-MCNC: 8.9 MG/DL
CHLORIDE SERPL-SCNC: 110 MMOL/L
CO2 SERPL-SCNC: 20 MMOL/L
CREAT SERPL-MCNC: 1.8 MG/DL
DIFFERENTIAL METHOD: ABNORMAL
EOSINOPHIL # BLD AUTO: 0.6 K/UL
EOSINOPHIL NFR BLD: 11.8 %
ERYTHROCYTE [DISTWIDTH] IN BLOOD BY AUTOMATED COUNT: 13.4 %
EST. GFR  (AFRICAN AMERICAN): 41 ML/MIN/1.73 M^2
EST. GFR  (NON AFRICAN AMERICAN): 35 ML/MIN/1.73 M^2
GLUCOSE SERPL-MCNC: 89 MG/DL
HCT VFR BLD AUTO: 30.7 %
HGB BLD-MCNC: 10.4 G/DL
LYMPHOCYTES # BLD AUTO: 1.4 K/UL
LYMPHOCYTES NFR BLD: 26.1 %
MCH RBC QN AUTO: 32.2 PG
MCHC RBC AUTO-ENTMCNC: 33.7 %
MCV RBC AUTO: 96 FL
MONOCYTES # BLD AUTO: 0.5 K/UL
MONOCYTES NFR BLD: 10 %
NEUTROPHILS # BLD AUTO: 2.7 K/UL
NEUTROPHILS NFR BLD: 51.5 %
PLATELET # BLD AUTO: 137 K/UL
PMV BLD AUTO: 9.9 FL
POTASSIUM SERPL-SCNC: 4.4 MMOL/L
PROT SERPL-MCNC: 6.7 G/DL
RBC # BLD AUTO: 3.21 M/UL
SODIUM SERPL-SCNC: 139 MMOL/L
WBC # BLD AUTO: 5.3 K/UL

## 2017-04-10 PROCEDURE — 36415 COLL VENOUS BLD VENIPUNCTURE: CPT

## 2017-04-10 PROCEDURE — 63600175 PHARM REV CODE 636 W HCPCS: Performed by: INTERNAL MEDICINE

## 2017-04-10 PROCEDURE — 80053 COMPREHEN METABOLIC PANEL: CPT

## 2017-04-10 PROCEDURE — 25000003 PHARM REV CODE 250: Performed by: FAMILY MEDICINE

## 2017-04-10 PROCEDURE — 12000002 HC ACUTE/MED SURGE SEMI-PRIVATE ROOM

## 2017-04-10 PROCEDURE — 76937 US GUIDE VASCULAR ACCESS: CPT

## 2017-04-10 PROCEDURE — 85025 COMPLETE CBC W/AUTO DIFF WBC: CPT

## 2017-04-10 PROCEDURE — C1751 CATH, INF, PER/CENT/MIDLINE: HCPCS

## 2017-04-10 PROCEDURE — 63600175 PHARM REV CODE 636 W HCPCS: Performed by: NURSE PRACTITIONER

## 2017-04-10 PROCEDURE — 25000003 PHARM REV CODE 250: Performed by: EMERGENCY MEDICINE

## 2017-04-10 PROCEDURE — 36569 INSJ PICC 5 YR+ W/O IMAGING: CPT

## 2017-04-10 PROCEDURE — 25000003 PHARM REV CODE 250: Performed by: INTERNAL MEDICINE

## 2017-04-10 PROCEDURE — 25000003 PHARM REV CODE 250: Performed by: NURSE PRACTITIONER

## 2017-04-10 PROCEDURE — 99232 SBSQ HOSP IP/OBS MODERATE 35: CPT | Mod: ,,, | Performed by: INTERNAL MEDICINE

## 2017-04-10 RX ORDER — LIDOCAINE HYDROCHLORIDE 10 MG/ML
1 INJECTION, SOLUTION EPIDURAL; INFILTRATION; INTRACAUDAL; PERINEURAL ONCE
Status: COMPLETED | OUTPATIENT
Start: 2017-04-10 | End: 2017-04-10

## 2017-04-10 RX ORDER — ENOXAPARIN SODIUM 100 MG/ML
40 INJECTION SUBCUTANEOUS
Status: DISCONTINUED | OUTPATIENT
Start: 2017-04-10 | End: 2017-04-17 | Stop reason: HOSPADM

## 2017-04-10 RX ORDER — PANTOPRAZOLE SODIUM 40 MG/1
40 TABLET, DELAYED RELEASE ORAL DAILY
Status: DISCONTINUED | OUTPATIENT
Start: 2017-04-10 | End: 2017-04-17 | Stop reason: HOSPADM

## 2017-04-10 RX ADMIN — HYDROMORPHONE HYDROCHLORIDE 1 MG: 2 INJECTION INTRAMUSCULAR; INTRAVENOUS; SUBCUTANEOUS at 02:04

## 2017-04-10 RX ADMIN — FERROUS SULFATE TAB EC 325 MG (65 MG FE EQUIVALENT) 325 MG: 325 (65 FE) TABLET DELAYED RESPONSE at 05:04

## 2017-04-10 RX ADMIN — PROMETHAZINE HYDROCHLORIDE 25 MG: 25 INJECTION, SOLUTION INTRAMUSCULAR; INTRAVENOUS at 05:04

## 2017-04-10 RX ADMIN — SODIUM CHLORIDE: 0.9 INJECTION, SOLUTION INTRAVENOUS at 05:04

## 2017-04-10 RX ADMIN — HYDROMORPHONE HYDROCHLORIDE 1 MG: 2 INJECTION INTRAMUSCULAR; INTRAVENOUS; SUBCUTANEOUS at 05:04

## 2017-04-10 RX ADMIN — CEFTRIAXONE 1 G: 1 INJECTION, SOLUTION INTRAVENOUS at 11:04

## 2017-04-10 RX ADMIN — POTASSIUM CHLORIDE 40 MEQ: 1500 TABLET, EXTENDED RELEASE ORAL at 08:04

## 2017-04-10 RX ADMIN — ENOXAPARIN SODIUM 40 MG: 100 INJECTION SUBCUTANEOUS at 11:04

## 2017-04-10 RX ADMIN — HYDROMORPHONE HYDROCHLORIDE 1 MG: 2 INJECTION INTRAMUSCULAR; INTRAVENOUS; SUBCUTANEOUS at 08:04

## 2017-04-10 RX ADMIN — LIDOCAINE HYDROCHLORIDE 10 MG: 10 INJECTION, SOLUTION EPIDURAL; INFILTRATION; INTRACAUDAL; PERINEURAL at 11:04

## 2017-04-10 RX ADMIN — ACETAMINOPHEN 650 MG: 325 TABLET, FILM COATED ORAL at 11:04

## 2017-04-10 RX ADMIN — FERROUS SULFATE TAB EC 325 MG (65 MG FE EQUIVALENT) 325 MG: 325 (65 FE) TABLET DELAYED RESPONSE at 11:04

## 2017-04-10 RX ADMIN — PROMETHAZINE HYDROCHLORIDE 25 MG: 25 INJECTION, SOLUTION INTRAMUSCULAR; INTRAVENOUS at 02:04

## 2017-04-10 RX ADMIN — HYDROMORPHONE HYDROCHLORIDE 1 MG: 2 INJECTION INTRAMUSCULAR; INTRAVENOUS; SUBCUTANEOUS at 11:04

## 2017-04-10 RX ADMIN — PANTOPRAZOLE SODIUM 40 MG: 40 TABLET, DELAYED RELEASE ORAL at 11:04

## 2017-04-10 RX ADMIN — FERROUS SULFATE TAB EC 325 MG (65 MG FE EQUIVALENT) 325 MG: 325 (65 FE) TABLET DELAYED RESPONSE at 08:04

## 2017-04-10 NOTE — CONSULTS
HISTORY OF PRESENT ILLNESS:  This 37-year-old female was admitted on 04/07/2017   with a 1-2 day history of experiencing left flank to left lower quadrant pain,   which had significantly worsened, for which she is currently being admitted with   acute left pyelonephritis.  This patient had been seen by me in the past.  She   has a history of cervical carcinoma, which has resulted in bilateral ureteral   obstruction due to extrinsic compression and has been treated with ureteral   stent, which is changed every 2 to 3 months.  She was first seen by me in   October 2016 when she had moved to this area from [place] to [place], [place], where she had been treated in the past and she had undergone stent   changed by me last time on 11/28/2016, but since then she has been following up   with Dr. Bocanegra at [facility] in [place] and most recently he changed   her stents on 03/07/2017 and she is scheduled to see him for a followup   appointment to change the stent again next month.  Overall, she has been   tolerating the stents quite well until her current problem occurred.  On   questioning the patient, she is yet to follow up with an oncologist or with a   gynecologist since she has moved to this area, but she states she does have an   appointment with a gynecologist and an oncologist next month.    MEDICAL HISTORY:  Essentially unchanged as clearly documented on her records.    PHYSICAL EXAMINATION:  ABDOMEN:  She does have some tenderness in the left flank and left lower   quadrant, but she does mention that she is feeling somewhat better today since   she has been started on IV Rocephin.    LABORATORY DATA:  Urine culture from 04/08/2017 reveals multiple organisms, none   in predominance.  WBC is 5.3, hemoglobin 10.4, hematocrit 30.7, and platelets   137.  BUN of 6 and creatinine 1.8 and in fact, she has been running in this   range over the past few weeks.    CT scan of abdomen and pelvis on 03/20/2017  without contrast revealed both   pigtail stents to be in good position, some mild residual hydronephrosis;   otherwise essentially negative CT scan.    FINAL IMPRESSION:  Bilateral ureteral obstruction with ureteral stents in place   as a result of extrinsic compression from cervical carcinoma, evidence of   development of acute pyelonephritis, for which reason for current admission and   appears to be responding well to antibiotics so far.    RECOMMENDATIONS:  We will continue with current treatment plan.  We will obtain   a KUB just to be sure that the stents continue to be in good position and there   has been no migration of the stents.  Otherwise, the patient is instructed to   continue to keep her followup appointment with Dr. Bocanegra as she has already   scheduled and her appointments with the other physicians as described above.    Thank you for this consult.      TERESA  dd: 04/10/2017 14:28:19 (CDT)  td: 04/10/2017 15:13:24 (CDT)  Doc ID   #[id number]  Job ID #[id number]    CC: TOPHER BOCANEGRA M.D.

## 2017-04-10 NOTE — PROGRESS NOTES
Placed Midline (18g x 8cm) to Right Basilic Vein  using guided ultrasound. Educated patient and staff on care and maintenance of line.     ALYSA Ramirez RN

## 2017-04-10 NOTE — PLAN OF CARE
The pt was awake and alert and able to verify all info on the face sheet as correct. She lives at home with her spouse, Darlin and two teenage sons. She arrived form home and as here on 3/18/17. She does not use any DME or HH and reports independence in ADL's. She uses [facility] pharmacy on [address] She does not have any questions or concerns at this time and I wrote my name and phone number on the pts white board. Ondina Haas LMSW     04/10/17 1200   Discharge Assessment   Assessment Type Discharge Planning Assessment   Confirmed/corrected address and phone number on facesheet? Yes   Assessment information obtained from? Patient   Communicated expected length of stay with patient/caregiver yes   Type of Healthcare Directive Received (spouse Darlin Dozier [phone])   If Healthcare Directive is received, is it scanned into Epic? no (comment)   Prior to hospitilization cognitive status: Alert/Oriented   Prior to hospitalization functional status: Independent   Current cognitive status: Alert/Oriented   Current Functional Status: Independent   Arrived From home or self-care   Lives With spouse   Able to Return to Prior Arrangements yes   Is patient able to care for self after discharge? Yes   How many people do you have in your home that can help with your care after discharge? 2   Who are your caregiver(s) and their phone number(s)? Spouse and two teenage sons    Readmission Within The Last 30 Days no previous admission in last 30 days   Patient currently being followed by outpatient case management? No   Patient currently receives home health services? No   Does the patient currently use HME? No   Patient currently receives private duty nursing? No   Patient currently receives any other outside agency services? No   Equipment Currently Used at Home none   Do you have any problems affording any of your prescribed medications? No  ([facility] [address] )   Is the patient taking medications as prescribed? yes   Do  you have any financial concerns preventing you from receiving the healthcare you need? No  (Medicaid )   Does the patient have transportation to healthcare appointments? Yes   Transportation Available car;family or friend will provide   On Dialysis? No   Does the patient receive services at the Coumadin Clinic? No   Are there any open cases? No   Discharge Plan A Home   Discharge Plan B Home with family   Patient/Family In Agreement With Plan yes

## 2017-04-10 NOTE — PLAN OF CARE
Problem: Patient Care Overview  Goal: Plan of Care Review  Outcome: Ongoing (interventions implemented as appropriate)  Patient remains free of injury/fall. Left flank pain controlled with IV pain med. Nausea controlled with IV anti-emetic. VSS.

## 2017-04-10 NOTE — PLAN OF CARE
Problem: Patient Care Overview  Goal: Plan of Care Review  Outcome: Ongoing (interventions implemented as appropriate)  Patient receiving IV antibiotics q 24 hours, patient seen by urologist this shift, NS infusing at 150 mL/hr, pain managed with PRN medication per orders, VSS, no falls at this time, bed in the lowest and locked position, side rails up x2, call light in reach, will continue to monitor.

## 2017-04-10 NOTE — PHYSICIAN QUERY
PT Name: Cesilia Dozier  MR #: [id number]     Physician Query Form - Documentation Clarification      CDS/: Alva Rucker               Contact information:[phone]    This form is a permanent document in the medical record.     Query Date: April 10, 2017    By submitting this query, we are merely seeking further clarification of documentation. Please utilize your independent clinical judgment when addressing the question(s) below.    The Medical record reflects the following:    Supporting Clinical Findings Location in Medical Record   Ms. Dozier is a 38yo AAF with a PMH cervical CA, Hydronephrosis, and recurrent pyelonephritis.      * Pyelonephritis   IV Rocephin   Culture urine   Consult Urology   IV narcotic pain management   IV Antiemetics      H&P                                                                                      Doctor, Please specify diagnosis or diagnoses associated with above clinical findings.    -Acute  -Chronic  -Other    Provider Use Only      Please specify the Acuity of Pyelonephritis        Acute                                                                                                             [  ] Clinically undetermined

## 2017-04-10 NOTE — PROGRESS NOTES
Progress Note  Hospital Medicine  Patient Name:Cesilia Dozeir  MRN:  [id number]  Patient Class: IP- Inpatient  Admit Date: 4/7/2017  Length of Stay: 2 days  Expected Discharge Date:   Attending Physician: Cathy Nicholson MD  Primary Care Provider:  Harsha Sheppard MD    SUBJECTIVE:     Principal Problem: Pyelonephritis  Initial history of present illness: Ms. Dozier is a 38yo AAF with a PMH cervical CA, Hydronephrosis, and recurrent pyelonephritis. She is currently in remission for Cervical CA and was last discharged from the hospital 3/22/17 after being treated for pyelonephritis. She presents to the hospital today with c/o left flank pain that started yesterday. Its sharp and originated in the LLQ with radiation to the left flank. It feels the same as when she has a infection or occluded stent. Associated symptoms are N/V. Denies any fever, muscle aches, dysuria or hematuria.     PMH/PSH/SH/FH/Meds: reviewed.    Symptoms/Review of Systems:  Reporting left flank pain and nausea with supra-pubic discomfort. No shortness of breath, cough, chest pain or headache, fever.  Diet:  Adequate intake.    Activity level: Normal.    Pain:  As above      OBJECTIVE:   Vital Signs (Most Recent):      Temp: 98.6 °F (37 °C) (04/10/17 0800)  Pulse: 87 (04/10/17 0800)  Resp: 16 (04/10/17 0800)  BP: 114/67 (04/10/17 0800)  SpO2: 100 % (04/10/17 0800)       Vital Signs Range (Last 24H):  Temp:  [98.6 °F (37 °C)-99.4 °F (37.4 °C)]   Pulse:  [77-92]   Resp:  [16-20]   BP: ()/(49-67)   SpO2:  [98 %-100 %]     Weight: 79.4 kg (175 lb)  Body mass index is 28.25 kg/(m^2).    Intake/Output Summary (Last 24 hours) at 04/10/17 0907  Last data filed at 04/10/17 0614   Gross per 24 hour   Intake          5921.25 ml   Output                0 ml   Net          5921.25 ml     Physical Examination:  General appearance: well developed, appears stated age  Head: normocephalic, atraumatic  Eyes:  conjunctivae/corneas clear. PERRL.  Nose: Nares normal.  Septum midline.  Throat: lips, mucosa, and tongue normal; teeth and gums normal, no throat erythema Neck: supple, symmetrical, trachea midline, no JVD and thyroid not enlarged, symmetric, no tenderness/mass/nodules  Lungs:  clear to auscultation bilaterally and normal respiratory effort  Chest wall: no tenderness  Heart: regular rate and rhythm, S1, S2 normal, no murmur, click, rub or gallop  Abdomen: soft, non-tender non-distented; bowel sounds normal; no masses,  no organomegaly  Extremities: no cyanosis or edema, or clubbing  Pulses: 2+ and symmetric  Skin: Skin color, texture, turgor normal. No rashes or lesions.  Lymph nodes: Cervical, supraclavicular, and axillary nodes normal.  Neurologic: Normal strength and tone. No focal numbness or weakness. CNII-XII intact.     CBC:    Recent Labs  Lab 04/08/17 0037 04/09/17  1220 04/10/17  0543   WBC 7.30 6.40 5.30   RBC 3.53* 3.20* 3.21*   HGB 11.2* 10.4* 10.4*   HCT 34.2* 30.8* 30.7*    190 137*   MCV 97 96 96   MCH 31.7* 32.4* 32.2*   MCHC 32.7 33.8 33.7   BMP    Recent Labs  Lab 04/08/17 0037 04/09/17  1220 04/10/17  0543    79 89    138 139   K 3.4* 4.8 4.4    106 110   CO2 21* 26 20*   BUN 10 8 6   CREATININE 1.9* 2.0* 1.8*   CALCIUM 9.2 8.9 8.9      Diagnostic Results:  Microbiology Results (last 7 days)     Procedure Component Value Units Date/Time    Urine culture [[id number]] Collected:  04/07/17 2352    Order Status:  Completed Specimen:  Urine from Urine, Clean Catch Updated:  04/09/17 1353     Urine Culture, Routine Multiple organisms isolated. None in predominance.  Repeat if     Urine Culture, Routine clinically necessary.         Assessment/Plan:   * Pyelonephritis  IV Rocephin  Culture urine  IV narcotic pain management  IV Antiemetics  Await urology evaluation. Patient had a recent ureteric stent exchanged at [facility], [place].      CKD (chronic kidney disease) stage 4, GFR 15-29 ml/min  Stable.  Renal dose medications  IVF  hydration to maintain perfusion  Monitor labs      Hypokalemia  Replace K  Monitor labs      Iron deficiency anemia  Stable. Continue chronic Fe supplementation    VTE Risk Mitigation         Ordered     enoxaparin injection 40 mg  Every 24 hours (non-standard times)     Route:  Subcutaneous        04/10/17 0952     Low Risk of VTE  Once      04/08/17 0209        Cathy Nicholson MD  Department of Hospital Medicine   [facility]

## 2017-04-10 NOTE — PROGRESS NOTES
Contacted primary for midline placement due to difficultly obtaining venous access- okay to proceed with midline per hospital medicine-

## 2017-04-10 NOTE — NURSING
Dr. Lemon notified regarding Urology consult. Stated consult will be done on 4/9/17 unless urgent. Dr. Cyr notified and stated ok to see patient tomorrow.

## 2017-04-11 LAB
ALBUMIN SERPL BCP-MCNC: 3.1 G/DL
ALP SERPL-CCNC: 61 U/L
ALT SERPL W/O P-5'-P-CCNC: 12 U/L
ANION GAP SERPL CALC-SCNC: 7 MMOL/L
AST SERPL-CCNC: 23 U/L
BACTERIA #/AREA URNS HPF: ABNORMAL /HPF
BASOPHILS # BLD AUTO: 0 K/UL
BASOPHILS NFR BLD: 0.5 %
BILIRUB SERPL-MCNC: 0.2 MG/DL
BILIRUB UR QL STRIP: NEGATIVE
BUN SERPL-MCNC: 6 MG/DL
CALCIUM SERPL-MCNC: 8.8 MG/DL
CHLORIDE SERPL-SCNC: 111 MMOL/L
CLARITY UR: ABNORMAL
CO2 SERPL-SCNC: 25 MMOL/L
COLOR UR: YELLOW
CREAT SERPL-MCNC: 1.7 MG/DL
DIFFERENTIAL METHOD: ABNORMAL
EOSINOPHIL # BLD AUTO: 0.5 K/UL
EOSINOPHIL NFR BLD: 8.8 %
ERYTHROCYTE [DISTWIDTH] IN BLOOD BY AUTOMATED COUNT: 13.3 %
EST. GFR  (AFRICAN AMERICAN): 43 ML/MIN/1.73 M^2
EST. GFR  (NON AFRICAN AMERICAN): 38 ML/MIN/1.73 M^2
GLUCOSE SERPL-MCNC: 90 MG/DL
GLUCOSE UR QL STRIP: NEGATIVE
HCT VFR BLD AUTO: 28.6 %
HGB BLD-MCNC: 9.5 G/DL
HGB UR QL STRIP: ABNORMAL
HYALINE CASTS #/AREA URNS LPF: 0 /LPF
KETONES UR QL STRIP: NEGATIVE
LEUKOCYTE ESTERASE UR QL STRIP: ABNORMAL
LYMPHOCYTES # BLD AUTO: 1 K/UL
LYMPHOCYTES NFR BLD: 18.3 %
MCH RBC QN AUTO: 32.1 PG
MCHC RBC AUTO-ENTMCNC: 33.3 %
MCV RBC AUTO: 96 FL
MICROSCOPIC COMMENT: ABNORMAL
MONOCYTES # BLD AUTO: 0.5 K/UL
MONOCYTES NFR BLD: 9.6 %
NEUTROPHILS # BLD AUTO: 3.3 K/UL
NEUTROPHILS NFR BLD: 62.8 %
NITRITE UR QL STRIP: NEGATIVE
PH UR STRIP: 6 [PH] (ref 5–8)
PLATELET # BLD AUTO: 154 K/UL
PMV BLD AUTO: 9.1 FL
POTASSIUM SERPL-SCNC: 4.3 MMOL/L
PROT SERPL-MCNC: 6 G/DL
PROT UR QL STRIP: ABNORMAL
RBC # BLD AUTO: 2.97 M/UL
RBC #/AREA URNS HPF: >100 /HPF (ref 0–4)
SODIUM SERPL-SCNC: 143 MMOL/L
SP GR UR STRIP: 1.01 (ref 1–1.03)
URN SPEC COLLECT METH UR: ABNORMAL
UROBILINOGEN UR STRIP-ACNC: NEGATIVE EU/DL
WBC # BLD AUTO: 5.3 K/UL
WBC #/AREA URNS HPF: >100 /HPF (ref 0–5)

## 2017-04-11 PROCEDURE — 25000003 PHARM REV CODE 250: Performed by: FAMILY MEDICINE

## 2017-04-11 PROCEDURE — 63600175 PHARM REV CODE 636 W HCPCS: Performed by: INTERNAL MEDICINE

## 2017-04-11 PROCEDURE — 80053 COMPREHEN METABOLIC PANEL: CPT

## 2017-04-11 PROCEDURE — 87106 FUNGI IDENTIFICATION YEAST: CPT

## 2017-04-11 PROCEDURE — 81000 URINALYSIS NONAUTO W/SCOPE: CPT

## 2017-04-11 PROCEDURE — 36415 COLL VENOUS BLD VENIPUNCTURE: CPT

## 2017-04-11 PROCEDURE — 63600175 PHARM REV CODE 636 W HCPCS: Performed by: EMERGENCY MEDICINE

## 2017-04-11 PROCEDURE — 63600175 PHARM REV CODE 636 W HCPCS: Performed by: NURSE PRACTITIONER

## 2017-04-11 PROCEDURE — 87088 URINE BACTERIA CULTURE: CPT

## 2017-04-11 PROCEDURE — 99232 SBSQ HOSP IP/OBS MODERATE 35: CPT | Mod: ,,, | Performed by: INTERNAL MEDICINE

## 2017-04-11 PROCEDURE — 25000003 PHARM REV CODE 250: Performed by: NURSE PRACTITIONER

## 2017-04-11 PROCEDURE — 85025 COMPLETE CBC W/AUTO DIFF WBC: CPT

## 2017-04-11 PROCEDURE — 87086 URINE CULTURE/COLONY COUNT: CPT

## 2017-04-11 PROCEDURE — 99233 SBSQ HOSP IP/OBS HIGH 50: CPT | Mod: ,,, | Performed by: UROLOGY

## 2017-04-11 PROCEDURE — 12000002 HC ACUTE/MED SURGE SEMI-PRIVATE ROOM

## 2017-04-11 PROCEDURE — 25000003 PHARM REV CODE 250: Performed by: INTERNAL MEDICINE

## 2017-04-11 RX ORDER — VALACYCLOVIR HYDROCHLORIDE 500 MG/1
2000 TABLET, FILM COATED ORAL EVERY 12 HOURS
Status: COMPLETED | OUTPATIENT
Start: 2017-04-11 | End: 2017-04-11

## 2017-04-11 RX ADMIN — HYDROMORPHONE HYDROCHLORIDE 1 MG: 2 INJECTION INTRAMUSCULAR; INTRAVENOUS; SUBCUTANEOUS at 03:04

## 2017-04-11 RX ADMIN — FERROUS SULFATE TAB EC 325 MG (65 MG FE EQUIVALENT) 325 MG: 325 (65 FE) TABLET DELAYED RESPONSE at 11:04

## 2017-04-11 RX ADMIN — PANTOPRAZOLE SODIUM 40 MG: 40 TABLET, DELAYED RELEASE ORAL at 08:04

## 2017-04-11 RX ADMIN — HYDROMORPHONE HYDROCHLORIDE 1 MG: 2 INJECTION INTRAMUSCULAR; INTRAVENOUS; SUBCUTANEOUS at 05:04

## 2017-04-11 RX ADMIN — HYDROMORPHONE HYDROCHLORIDE 1 MG: 2 INJECTION INTRAMUSCULAR; INTRAVENOUS; SUBCUTANEOUS at 09:04

## 2017-04-11 RX ADMIN — ZOLPIDEM TARTRATE 5 MG: 5 TABLET, FILM COATED ORAL at 12:04

## 2017-04-11 RX ADMIN — HYDROMORPHONE HYDROCHLORIDE 1 MG: 2 INJECTION INTRAMUSCULAR; INTRAVENOUS; SUBCUTANEOUS at 06:04

## 2017-04-11 RX ADMIN — VALACYCLOVIR 2000 MG: 500 TABLET, FILM COATED ORAL at 09:04

## 2017-04-11 RX ADMIN — PROMETHAZINE HYDROCHLORIDE 25 MG: 25 INJECTION, SOLUTION INTRAMUSCULAR; INTRAVENOUS at 05:04

## 2017-04-11 RX ADMIN — HYDROMORPHONE HYDROCHLORIDE 1 MG: 2 INJECTION INTRAMUSCULAR; INTRAVENOUS; SUBCUTANEOUS at 08:04

## 2017-04-11 RX ADMIN — ENOXAPARIN SODIUM 40 MG: 100 INJECTION SUBCUTANEOUS at 11:04

## 2017-04-11 RX ADMIN — SODIUM CHLORIDE: 0.9 INJECTION, SOLUTION INTRAVENOUS at 06:04

## 2017-04-11 RX ADMIN — PROMETHAZINE HYDROCHLORIDE 25 MG: 25 INJECTION, SOLUTION INTRAMUSCULAR; INTRAVENOUS at 03:04

## 2017-04-11 RX ADMIN — FERROUS SULFATE TAB EC 325 MG (65 MG FE EQUIVALENT) 325 MG: 325 (65 FE) TABLET DELAYED RESPONSE at 08:04

## 2017-04-11 RX ADMIN — VALACYCLOVIR 2000 MG: 500 TABLET, FILM COATED ORAL at 06:04

## 2017-04-11 RX ADMIN — HYDROMORPHONE HYDROCHLORIDE 1 MG: 2 INJECTION INTRAMUSCULAR; INTRAVENOUS; SUBCUTANEOUS at 11:04

## 2017-04-11 RX ADMIN — SODIUM CHLORIDE: 0.9 INJECTION, SOLUTION INTRAVENOUS at 11:04

## 2017-04-11 RX ADMIN — ONDANSETRON 4 MG: 2 INJECTION INTRAMUSCULAR; INTRAVENOUS at 09:04

## 2017-04-11 RX ADMIN — FERROUS SULFATE TAB EC 325 MG (65 MG FE EQUIVALENT) 325 MG: 325 (65 FE) TABLET DELAYED RESPONSE at 06:04

## 2017-04-11 NOTE — PROGRESS NOTES
Progress Note  Hospital Medicine  Patient Name:Cesilia Dozier  MRN:  [id number]  Patient Class: IP- Inpatient  Admit Date: 4/7/2017  Length of Stay: 3 days  Expected Discharge Date:   Attending Physician: Cathy Nicholson MD  Primary Care Provider:  Harsha Sheppard MD    SUBJECTIVE:     Principal Problem: Pyelonephritis  Initial history of present illness: Ms. Dozier is a 38yo AAF with a PMH cervical CA, Hydronephrosis, and recurrent pyelonephritis. She is currently in remission for Cervical CA and was last discharged from the hospital 3/22/17 after being treated for pyelonephritis. She presents to the hospital today with c/o left flank pain that started yesterday. Its sharp and originated in the LLQ with radiation to the left flank. It feels the same as when she has a infection or occluded stent. Associated symptoms are N/V. Denies any fever, muscle aches, dysuria or hematuria.     PMH/PSH/SH/FH/Meds: reviewed.    Symptoms/Review of Systems:  Reporting left flank pain and nausea with supra-pubic discomfort. No significant improvement noted. No shortness of breath, cough, chest pain or headache, fever.  Diet:  Adequate intake.    Activity level: Normal.    Pain:  As above      OBJECTIVE:   Vital Signs (Most Recent):      Temp: 98.8 °F (37.1 °C) (04/10/17 2300)  Pulse: 93 (04/10/17 2300)  Resp: 16 (04/10/17 2300)  BP: 115/67 (04/10/17 2300)  SpO2: 100 % (04/10/17 2300)       Vital Signs Range (Last 24H):  Temp:  [98.6 °F (37 °C)-99.1 °F (37.3 °C)]   Pulse:  []   Resp:  [16]   BP: ()/(51-72)   SpO2:  [95 %-100 %]     Weight: 79.4 kg (175 lb)  Body mass index is 28.25 kg/(m^2).    Intake/Output Summary (Last 24 hours) at 04/11/17 0634  Last data filed at 04/10/17 2348   Gross per 24 hour   Intake             3855 ml   Output                5 ml   Net             3850 ml     Physical Examination:  General appearance: well developed, appears stated age  Head: normocephalic, atraumatic  Eyes:  conjunctivae/corneas  clear. PERRL.  Nose: Nares normal. Septum midline.  Throat: lips, mucosa, and tongue normal; teeth and gums normal, no throat erythema Neck: supple, symmetrical, trachea midline, no JVD and thyroid not enlarged, symmetric, no tenderness/mass/nodules  Lungs:  clear to auscultation bilaterally and normal respiratory effort  Chest wall: no tenderness  Heart: regular rate and rhythm, S1, S2 normal, no murmur, click, rub or gallop  Abdomen: soft, non-tender non-distented; bowel sounds normal; no masses,  no organomegaly  Extremities: no cyanosis or edema, or clubbing  Pulses: 2+ and symmetric  Skin: Skin color, texture, turgor normal. No rashes or lesions.  Lymph nodes: Cervical, supraclavicular, and axillary nodes normal.  Neurologic: Normal strength and tone. No focal numbness or weakness. CNII-XII intact.     CBC:    Recent Labs  Lab 04/09/17  1220 04/10/17  0543 04/11/17  0540   WBC 6.40 5.30 5.30   RBC 3.20* 3.21* 2.97*   HGB 10.4* 10.4* 9.5*   HCT 30.8* 30.7* 28.6*    137* 154   MCV 96 96 96   MCH 32.4* 32.2* 32.1*   MCHC 33.8 33.7 33.3   BMP    Recent Labs  Lab 04/08/17  0037 04/09/17  1220 04/10/17  0543    79 89    138 139   K 3.4* 4.8 4.4    106 110   CO2 21* 26 20*   BUN 10 8 6   CREATININE 1.9* 2.0* 1.8*   CALCIUM 9.2 8.9 8.9      Diagnostic Results:  KUB: Normal Bilateral ureteral stents in place otherwise negative abdomen.  Microbiology Results (last 7 days)     Procedure Component Value Units Date/Time    Urine culture [[id number]] Collected:  04/07/17 9451    Order Status:  Completed Specimen:  Urine from Urine, Clean Catch Updated:  04/09/17 1353     Urine Culture, Routine Multiple organisms isolated. None in predominance.  Repeat if     Urine Culture, Routine clinically necessary.         Assessment/Plan:   * Pyelonephritis  IV Rocephin  Culture urine  IV narcotic pain management  IV Antiemetics  Follow urology recommendations.      CKD (chronic kidney disease) stage 4, GFR  15-29 ml/min  Stable.  Renal dose medications  IVF hydration to maintain perfusion  Monitor labs      Hypokalemia - corrected  Monitor labs      Iron deficiency anemia  Stable. Continue chronic Fe supplementation    VTE Risk Mitigation         Ordered     enoxaparin injection 40 mg  Every 24 hours (non-standard times)     Route:  Subcutaneous        04/10/17 0952     Low Risk of VTE  Once      04/08/17 0209        Cathy Nicholson MD  Department of Hospital Medicine   [facility]

## 2017-04-11 NOTE — PLAN OF CARE
Problem: Patient Care Overview  Goal: Plan of Care Review  Outcome: Ongoing (interventions implemented as appropriate)  VSS. AAOx4. POC/Meds reviewed, pt verbalized understanding. IV fluids/IV abx infusing per MD order. PRN dilaudid given for complaints of flank pain. PRN ambien administered per request for sleep aide. Tolerating diet. No falls/injuries thus far. Hourly rounding performed, safety maintained. Bed in lowest position, wheels locked, SR upx2, call light in easy reach. Will continue to monitor.

## 2017-04-11 NOTE — NURSING
"Pt with concerns of beginning form of fever blister to right lower lip. States she gets these when her body runs "fever on the inside". Temp checked, WNL. Marnie Fortune NP assessed/spoke with pt, vacyclovir ordered. Will continue to monitor.   "

## 2017-04-11 NOTE — PLAN OF CARE
Problem: Patient Care Overview  Goal: Plan of Care Review  Outcome: Ongoing (interventions implemented as appropriate)  Pt pain is controled with dilaudid IV, IV fluids infusing, tolerating diet, ambulates and voids, absent from injury, intermittent nausea, IV abx nad noted will cont to monitor

## 2017-04-12 LAB
ALBUMIN SERPL BCP-MCNC: 3.2 G/DL
ALP SERPL-CCNC: 61 U/L
ALT SERPL W/O P-5'-P-CCNC: 10 U/L
ANION GAP SERPL CALC-SCNC: 9 MMOL/L
AST SERPL-CCNC: 18 U/L
BASOPHILS # BLD AUTO: 0 K/UL
BASOPHILS NFR BLD: 0.1 %
BILIRUB SERPL-MCNC: 0.5 MG/DL
BUN SERPL-MCNC: 8 MG/DL
CALCIUM SERPL-MCNC: 8.8 MG/DL
CHLORIDE SERPL-SCNC: 109 MMOL/L
CO2 SERPL-SCNC: 20 MMOL/L
CREAT SERPL-MCNC: 1.7 MG/DL
DIFFERENTIAL METHOD: ABNORMAL
EOSINOPHIL # BLD AUTO: 0.3 K/UL
EOSINOPHIL NFR BLD: 5.1 %
ERYTHROCYTE [DISTWIDTH] IN BLOOD BY AUTOMATED COUNT: 12.4 %
EST. GFR  (AFRICAN AMERICAN): 43 ML/MIN/1.73 M^2
EST. GFR  (NON AFRICAN AMERICAN): 38 ML/MIN/1.73 M^2
GLUCOSE SERPL-MCNC: 120 MG/DL
HCT VFR BLD AUTO: 31.1 %
HGB BLD-MCNC: 10.6 G/DL
LYMPHOCYTES # BLD AUTO: 0.7 K/UL
LYMPHOCYTES NFR BLD: 9.8 %
MCH RBC QN AUTO: 32.4 PG
MCHC RBC AUTO-ENTMCNC: 34 %
MCV RBC AUTO: 95 FL
MONOCYTES # BLD AUTO: 0.8 K/UL
MONOCYTES NFR BLD: 11.6 %
NEUTROPHILS # BLD AUTO: 5 K/UL
NEUTROPHILS NFR BLD: 73.4 %
PLATELET # BLD AUTO: 162 K/UL
PMV BLD AUTO: 9.2 FL
POTASSIUM SERPL-SCNC: 4.3 MMOL/L
PROT SERPL-MCNC: 6.4 G/DL
RBC # BLD AUTO: 3.26 M/UL
SODIUM SERPL-SCNC: 138 MMOL/L
WBC # BLD AUTO: 6.8 K/UL

## 2017-04-12 PROCEDURE — 36430 TRANSFUSION BLD/BLD COMPNT: CPT

## 2017-04-12 PROCEDURE — 63600175 PHARM REV CODE 636 W HCPCS: Performed by: INTERNAL MEDICINE

## 2017-04-12 PROCEDURE — 80053 COMPREHEN METABOLIC PANEL: CPT

## 2017-04-12 PROCEDURE — 85025 COMPLETE CBC W/AUTO DIFF WBC: CPT

## 2017-04-12 PROCEDURE — 25000003 PHARM REV CODE 250: Performed by: FAMILY MEDICINE

## 2017-04-12 PROCEDURE — 25000003 PHARM REV CODE 250: Performed by: EMERGENCY MEDICINE

## 2017-04-12 PROCEDURE — 12000002 HC ACUTE/MED SURGE SEMI-PRIVATE ROOM

## 2017-04-12 PROCEDURE — 63600175 PHARM REV CODE 636 W HCPCS: Performed by: EMERGENCY MEDICINE

## 2017-04-12 PROCEDURE — 99232 SBSQ HOSP IP/OBS MODERATE 35: CPT | Mod: ,,, | Performed by: INTERNAL MEDICINE

## 2017-04-12 PROCEDURE — 36415 COLL VENOUS BLD VENIPUNCTURE: CPT

## 2017-04-12 PROCEDURE — 25000003 PHARM REV CODE 250: Performed by: NURSE PRACTITIONER

## 2017-04-12 PROCEDURE — 99233 SBSQ HOSP IP/OBS HIGH 50: CPT | Mod: ,,, | Performed by: UROLOGY

## 2017-04-12 PROCEDURE — 63600175 PHARM REV CODE 636 W HCPCS: Performed by: NURSE PRACTITIONER

## 2017-04-12 PROCEDURE — 25000003 PHARM REV CODE 250: Performed by: INTERNAL MEDICINE

## 2017-04-12 PROCEDURE — 87040 BLOOD CULTURE FOR BACTERIA: CPT

## 2017-04-12 RX ADMIN — ACETAMINOPHEN 650 MG: 325 TABLET, FILM COATED ORAL at 12:04

## 2017-04-12 RX ADMIN — ENOXAPARIN SODIUM 40 MG: 100 INJECTION SUBCUTANEOUS at 10:04

## 2017-04-12 RX ADMIN — PANTOPRAZOLE SODIUM 40 MG: 40 TABLET, DELAYED RELEASE ORAL at 08:04

## 2017-04-12 RX ADMIN — FERROUS SULFATE TAB EC 325 MG (65 MG FE EQUIVALENT) 325 MG: 325 (65 FE) TABLET DELAYED RESPONSE at 11:04

## 2017-04-12 RX ADMIN — HYDROMORPHONE HYDROCHLORIDE 1 MG: 2 INJECTION INTRAMUSCULAR; INTRAVENOUS; SUBCUTANEOUS at 11:04

## 2017-04-12 RX ADMIN — SODIUM CHLORIDE 1000 ML: 0.9 INJECTION, SOLUTION INTRAVENOUS at 05:04

## 2017-04-12 RX ADMIN — FERROUS SULFATE TAB EC 325 MG (65 MG FE EQUIVALENT) 325 MG: 325 (65 FE) TABLET DELAYED RESPONSE at 08:04

## 2017-04-12 RX ADMIN — CEFTRIAXONE 1 G: 1 INJECTION, SOLUTION INTRAVENOUS at 12:04

## 2017-04-12 RX ADMIN — POTASSIUM CHLORIDE 40 MEQ: 1500 TABLET, EXTENDED RELEASE ORAL at 08:04

## 2017-04-12 RX ADMIN — CEFTRIAXONE 1 G: 1 INJECTION, SOLUTION INTRAVENOUS at 11:04

## 2017-04-12 RX ADMIN — ZOLPIDEM TARTRATE 5 MG: 5 TABLET, FILM COATED ORAL at 12:04

## 2017-04-12 RX ADMIN — PROMETHAZINE HYDROCHLORIDE 25 MG: 25 INJECTION, SOLUTION INTRAMUSCULAR; INTRAVENOUS at 05:04

## 2017-04-12 RX ADMIN — HYDROMORPHONE HYDROCHLORIDE 1 MG: 2 INJECTION INTRAMUSCULAR; INTRAVENOUS; SUBCUTANEOUS at 10:04

## 2017-04-12 RX ADMIN — ZOLPIDEM TARTRATE 5 MG: 5 TABLET, FILM COATED ORAL at 11:04

## 2017-04-12 RX ADMIN — SODIUM CHLORIDE: 0.9 INJECTION, SOLUTION INTRAVENOUS at 10:04

## 2017-04-12 RX ADMIN — HYDROMORPHONE HYDROCHLORIDE 1 MG: 2 INJECTION INTRAMUSCULAR; INTRAVENOUS; SUBCUTANEOUS at 01:04

## 2017-04-12 RX ADMIN — ONDANSETRON 4 MG: 2 INJECTION INTRAMUSCULAR; INTRAVENOUS at 06:04

## 2017-04-12 RX ADMIN — HYDROMORPHONE HYDROCHLORIDE 1 MG: 2 INJECTION INTRAMUSCULAR; INTRAVENOUS; SUBCUTANEOUS at 12:04

## 2017-04-12 RX ADMIN — FERROUS SULFATE TAB EC 325 MG (65 MG FE EQUIVALENT) 325 MG: 325 (65 FE) TABLET DELAYED RESPONSE at 05:04

## 2017-04-12 RX ADMIN — HYDROMORPHONE HYDROCHLORIDE 1 MG: 2 INJECTION INTRAMUSCULAR; INTRAVENOUS; SUBCUTANEOUS at 08:04

## 2017-04-12 RX ADMIN — HYDROMORPHONE HYDROCHLORIDE 1 MG: 2 INJECTION INTRAMUSCULAR; INTRAVENOUS; SUBCUTANEOUS at 06:04

## 2017-04-12 RX ADMIN — HYDROMORPHONE HYDROCHLORIDE 1 MG: 2 INJECTION INTRAMUSCULAR; INTRAVENOUS; SUBCUTANEOUS at 04:04

## 2017-04-12 NOTE — PROGRESS NOTES
Urology Consult Progress Note-[place]  Staff: Nico/Angus/Ion/Angelica    Referring physician or department:     Subjective:      Cesilia Dozier is a 37 y.o. female with h/o cercial cancer, b hydro and recurrent pyelo with stents in place, last changed 3/7/17. Seen by  yesterday for persistent left flank pain despite stents being in place. She says whenever she has pain this severe she usually has an infection.     A kub was obtained to ensure the stents were in good position which they are.   Abdi was placed prior to rbus, some hematuria present.   rbus shows mild hydro on left and no hydro on right  Today she states that she is till having left flank pain and groin pain. +frequency.      Objective:     Temp:  [98.8 °F (37.1 °C)-101.1 °F (38.4 °C)] 99.2 °F (37.3 °C)  Pulse:  [] 107  Resp:  [16-18] 16  SpO2:  [98 %-100 %] 99 %  BP: ()/(50-70) 107/64  I/O last 3 completed shifts:  In: 5142.5 [P.O.:900; I.V.:3092.5; IV Piggyback:1150]  Out: 1407 [Urine:1407]       UOP - nr/5/nr      Physical Exam   Nursing note and vitals reviewed.  Constitutional: Pt is oriented to person, place, and time. Pt appears well-developed and well-nourished.   HENT:   Head: Normocephalic and atraumatic.   Eyes: Pupils are equal, round, and reactive to light.   Cardiovascular: Normal rate and regular rhythm.    Pulmonary/Chest: Effort normal.   Abdominal: Soft.   Musculoskeletal: Normal range of motion.   Neurological: Pt is alert and oriented to person, place, and time.   Skin: Skin is intact.     Psychiatric: Pt has a normal mood and affect.       Labs:     ua 4/11/17: cath urine - 3+leuk/blood (stent in place)    Recent Labs  Lab 04/10/17  0543 04/11/17  0540 04/12/17  0120    143 138   K 4.4 4.3 4.3    111* 109   CO2 20* 25 20*   BUN 6 6 8   CREATININE 1.8* 1.7* 1.7*   CALCIUM 8.9 8.8 8.8       Recent Labs  Lab 04/10/17  0543 04/11/17  0540 04/12/17  0120   WBC 5.30 5.30 6.80   HGB  10.4* 9.5* 10.6*   HCT 30.7* 28.6* 31.1*   * 154 162     Urine culture  4/11/17 cath culture pending- pt has been on abx.   4/7/17 Multiple organisms  3/18/17multiple organisms    Radiology:   rbus 4/12/17  No hydro on right  Mild hydro on left  Right stent identified in pelvis  Left stent not identified in pelvis (but appears in good position on kub)    kub 4/10/17  b stents in place in correct position    ctrss 3/20/17  b stents in place with mild residual hydro    Assessment:     Cesilia Dozier is a 37 y.o. female with b hydro secondary to tx for cervical cancer, stents in place now with flank pain.      on rocephin     Plan:     1. Stents in good position, rbus with expected results.   2. Pt still c/o pelvic pain and left flank pain.   3. Cr at baseline.   3. Pt can f/u With dr nieto as scheduled next month for scheduled stent exchange  4. Needs fu with gyn onc as well.   5. Dc munoz (placed to ensure maximal drainage of stents prior to rbus)    Aleisha Walsh MD  [facility] (Angus/Ion/Nico)   Office: [phone]

## 2017-04-12 NOTE — PLAN OF CARE
Problem: Patient Care Overview  Goal: Plan of Care Review  Outcome: Ongoing (interventions implemented as appropriate)  Patient receiving IV antibiotics on q 24 hrs, standard precautions maintained, patients pain and nausea managed with PRN medications per orders, patient is free from falls at this time, bed in the lowest and locked position, side rails up x2, call light in reach, will continue to monitor.

## 2017-04-12 NOTE — PLAN OF CARE
Notified Sole Barlow NP of pt. Elevated temp, 101.1. Blood cultures ordered at this time. Will cont. To monitor.

## 2017-04-12 NOTE — PLAN OF CARE
Problem: Patient Care Overview  Goal: Plan of Care Review  Outcome: Ongoing (interventions implemented as appropriate)  Hourly rounding utilizied. Pt. Able to verbalize needs. POC discussed with pt. She verbalized understanding. Abdi cath draining cloudy pink urine. Pain well controlled with IV Dilaudid. Intermittent nausea well controlled with IV Zofran. Pt. Reports BM this shift. Did spike temp with midnight VS, 101.1. Blood cultures ordered at this time. Will cont. To monitor.

## 2017-04-12 NOTE — PROGRESS NOTES
Progress Note  Hospital Medicine  Patient Name:Cesilia Dozier  MRN:  [id number]  Patient Class: IP- Inpatient  Admit Date: 4/7/2017  Length of Stay: 4 days  Expected Discharge Date:   Attending Physician: Cathy Nicholson MD  Primary Care Provider:  Harsha Sheppard MD    SUBJECTIVE:     Principal Problem: Pyelonephritis  Initial history of present illness: Ms. Dozier is a 38yo AAF with a PMH cervical CA, Hydronephrosis, and recurrent pyelonephritis. She is currently in remission for Cervical CA and was last discharged from the hospital 3/22/17 after being treated for pyelonephritis. She presents to the hospital today with c/o left flank pain that started yesterday. Its sharp and originated in the LLQ with radiation to the left flank. It feels the same as when she has a infection or occluded stent. Associated symptoms are N/V. Denies any fever, muscle aches, dysuria or hematuria.     PMH/PSH/SH/FH/Meds: reviewed.    Symptoms/Review of Systems:  Patient spiked fever 101.1F last night. Blood cultures obtained. Abdi placed and renal US is being done as per Dr. Walsh's recommendations. Reporting left flank pain and nausea with supra-pubic discomfort. No significant improvement noted. No shortness of breath, cough, chest pain or headache, fever.  Diet:  Adequate intake.    Activity level: Normal.    Pain:  As above      OBJECTIVE:   Vital Signs (Most Recent):      Temp: 98.8 °F (37.1 °C) (04/12/17 0740)  Pulse: 99 (04/12/17 0740)  Resp: 16 (04/12/17 0740)  BP: (!) 98/56 (04/12/17 0740)  SpO2: 98 % (04/12/17 0740)       Vital Signs Range (Last 24H):  Temp:  [98 °F (36.7 °C)-101.1 °F (38.4 °C)]   Pulse:  []   Resp:  [16-18]   BP: ()/(50-70)   SpO2:  [98 %-100 %]     Weight: 79.4 kg (175 lb)  Body mass index is 28.25 kg/(m^2).    Intake/Output Summary (Last 24 hours) at 04/12/17 0907  Last data filed at 04/12/17 0507   Gross per 24 hour   Intake           4842.5 ml   Output             1404 ml   Net            3438.5 ml     Physical Examination:  General appearance: well developed, appears stated age  Head: normocephalic, atraumatic  Eyes:  conjunctivae/corneas clear. PERRL.  Nose: Nares normal. Septum midline.  Throat: lips, mucosa, and tongue normal; teeth and gums normal, no throat erythema Neck: supple, symmetrical, trachea midline, no JVD and thyroid not enlarged, symmetric, no tenderness/mass/nodules  Lungs:  clear to auscultation bilaterally and normal respiratory effort  Chest wall: no tenderness  Heart: regular rate and rhythm, S1, S2 normal, no murmur, click, rub or gallop  Abdomen: soft, non-tender non-distented; bowel sounds normal; no masses,  no organomegaly  Extremities: no cyanosis or edema, or clubbing  Pulses: 2+ and symmetric  Skin: Skin color, texture, turgor normal. No rashes or lesions.  Lymph nodes: Cervical, supraclavicular, and axillary nodes normal.  Neurologic: Normal strength and tone. No focal numbness or weakness. CNII-XII intact.     CBC:    Recent Labs  Lab 04/10/17  0543 04/11/17  0540 04/12/17  0120   WBC 5.30 5.30 6.80   RBC 3.21* 2.97* 3.26*   HGB 10.4* 9.5* 10.6*   HCT 30.7* 28.6* 31.1*   * 154 162   MCV 96 96 95   MCH 32.2* 32.1* 32.4*   MCHC 33.7 33.3 34.0   BMP    Recent Labs  Lab 04/10/17  0543 04/11/17  0540 04/12/17  0120   GLU 89 90 120*    143 138   K 4.4 4.3 4.3    111* 109   CO2 20* 25 20*   BUN 6 6 8   CREATININE 1.8* 1.7* 1.7*   CALCIUM 8.9 8.8 8.8      Diagnostic Results:  KUB: Normal Bilateral ureteral stents in place otherwise negative abdomen.  Microbiology Results (last 7 days)     Procedure Component Value Units Date/Time    Blood culture [[id number]] Collected:  04/12/17 0120    Order Status:  Sent Specimen:  Blood from Peripheral, Left  Hand Updated:  04/12/17 0156    Narrative:       Take 2 sets.  Take both aerobic and anaerobic bottles.    Urine culture [[id number]] Collected:  04/11/17 2051    Order Status:  Sent Specimen:  Urine from Urine,  Catheterized Updated:  04/12/17 0014    Urine culture [[id number]] Collected:  04/07/17 9968    Order Status:  Completed Specimen:  Urine from Urine, Clean Catch Updated:  04/09/17 1353     Urine Culture, Routine Multiple organisms isolated. None in predominance.  Repeat if     Urine Culture, Routine clinically necessary.         Assessment/Plan:   * Pyelonephritis  IV Rocephin  Culture urine  IV narcotic pain management  IV Antiemetics  Follow urology recommendations.      CKD (chronic kidney disease) stage 4, GFR 15-29 ml/min  Stable.  Renal dose medications  IVF hydration to maintain perfusion  Monitor labs      Hypokalemia - corrected  Monitor labs      Iron deficiency anemia  Stable. Continue chronic Fe supplementation    VTE Risk Mitigation         Ordered     enoxaparin injection 40 mg  Every 24 hours (non-standard times)     Route:  Subcutaneous        04/10/17 0952     Low Risk of VTE  Once      04/08/17 0209        Cathy Nicholson MD  Department of Hospital Medicine   [facility]

## 2017-04-12 NOTE — PLAN OF CARE
Pt. Requesting pain medication, bp 92/50. Pain 8/10. Intrigma msg sent to FLORENTIN Barlow NP for possible fluid bolus. Awaiting new orders.

## 2017-04-12 NOTE — PLAN OF CARE
Procedure explained and questions answered regarding munoz placemen. 16 Fr. Munoz cath placed at this time using sterile technique. Balloon inflated with 10ml NS. 175ml of pale pink urine noted to drainage bag, some sediment noted and a Few small clots. Urine specimen collected at this time. Pt. Tolerated well.  Attached to thigh with statlock. Draining to gravity.

## 2017-04-13 LAB
ALBUMIN SERPL BCP-MCNC: 2.7 G/DL
ALP SERPL-CCNC: 52 U/L
ALT SERPL W/O P-5'-P-CCNC: 10 U/L
ANION GAP SERPL CALC-SCNC: 5 MMOL/L
AST SERPL-CCNC: 13 U/L
BACTERIA UR CULT: NORMAL
BASOPHILS # BLD AUTO: 0 K/UL
BASOPHILS NFR BLD: 0.4 %
BILIRUB SERPL-MCNC: 0.4 MG/DL
BUN SERPL-MCNC: 10 MG/DL
CALCIUM SERPL-MCNC: 8.2 MG/DL
CHLORIDE SERPL-SCNC: 113 MMOL/L
CO2 SERPL-SCNC: 23 MMOL/L
CREAT SERPL-MCNC: 1.7 MG/DL
DIFFERENTIAL METHOD: ABNORMAL
EOSINOPHIL # BLD AUTO: 0.5 K/UL
EOSINOPHIL NFR BLD: 12.1 %
ERYTHROCYTE [DISTWIDTH] IN BLOOD BY AUTOMATED COUNT: 13.7 %
EST. GFR  (AFRICAN AMERICAN): 43 ML/MIN/1.73 M^2
EST. GFR  (NON AFRICAN AMERICAN): 38 ML/MIN/1.73 M^2
GLUCOSE SERPL-MCNC: 83 MG/DL
HCT VFR BLD AUTO: 26.5 %
HGB BLD-MCNC: 8.8 G/DL
LYMPHOCYTES # BLD AUTO: 1 K/UL
LYMPHOCYTES NFR BLD: 24.5 %
MCH RBC QN AUTO: 32 PG
MCHC RBC AUTO-ENTMCNC: 33.4 %
MCV RBC AUTO: 96 FL
MONOCYTES # BLD AUTO: 0.5 K/UL
MONOCYTES NFR BLD: 11.6 %
NEUTROPHILS # BLD AUTO: 2 K/UL
NEUTROPHILS NFR BLD: 51.4 %
PLATELET # BLD AUTO: 131 K/UL
PMV BLD AUTO: 9.3 FL
POTASSIUM SERPL-SCNC: 4 MMOL/L
PROT SERPL-MCNC: 5.5 G/DL
RBC # BLD AUTO: 2.77 M/UL
SODIUM SERPL-SCNC: 141 MMOL/L
WBC # BLD AUTO: 4 K/UL

## 2017-04-13 PROCEDURE — 63600175 PHARM REV CODE 636 W HCPCS: Performed by: INTERNAL MEDICINE

## 2017-04-13 PROCEDURE — 36415 COLL VENOUS BLD VENIPUNCTURE: CPT

## 2017-04-13 PROCEDURE — 63600175 PHARM REV CODE 636 W HCPCS: Performed by: NURSE PRACTITIONER

## 2017-04-13 PROCEDURE — 12000002 HC ACUTE/MED SURGE SEMI-PRIVATE ROOM

## 2017-04-13 PROCEDURE — 25000003 PHARM REV CODE 250: Performed by: FAMILY MEDICINE

## 2017-04-13 PROCEDURE — 25000003 PHARM REV CODE 250: Performed by: NURSE PRACTITIONER

## 2017-04-13 PROCEDURE — 80053 COMPREHEN METABOLIC PANEL: CPT

## 2017-04-13 PROCEDURE — 25000003 PHARM REV CODE 250: Performed by: INTERNAL MEDICINE

## 2017-04-13 PROCEDURE — 85025 COMPLETE CBC W/AUTO DIFF WBC: CPT

## 2017-04-13 PROCEDURE — 99232 SBSQ HOSP IP/OBS MODERATE 35: CPT | Mod: ,,, | Performed by: INTERNAL MEDICINE

## 2017-04-13 RX ORDER — FLUCONAZOLE 100 MG/1
100 TABLET ORAL DAILY
Status: DISCONTINUED | OUTPATIENT
Start: 2017-04-13 | End: 2017-04-15

## 2017-04-13 RX ADMIN — HYDROMORPHONE HYDROCHLORIDE 1 MG: 2 INJECTION INTRAMUSCULAR; INTRAVENOUS; SUBCUTANEOUS at 08:04

## 2017-04-13 RX ADMIN — PANTOPRAZOLE SODIUM 40 MG: 40 TABLET, DELAYED RELEASE ORAL at 07:04

## 2017-04-13 RX ADMIN — HYDROMORPHONE HYDROCHLORIDE 1 MG: 2 INJECTION INTRAMUSCULAR; INTRAVENOUS; SUBCUTANEOUS at 06:04

## 2017-04-13 RX ADMIN — FERROUS SULFATE TAB EC 325 MG (65 MG FE EQUIVALENT) 325 MG: 325 (65 FE) TABLET DELAYED RESPONSE at 11:04

## 2017-04-13 RX ADMIN — HYDROMORPHONE HYDROCHLORIDE 1 MG: 2 INJECTION INTRAMUSCULAR; INTRAVENOUS; SUBCUTANEOUS at 05:04

## 2017-04-13 RX ADMIN — ZOLPIDEM TARTRATE 5 MG: 5 TABLET, FILM COATED ORAL at 08:04

## 2017-04-13 RX ADMIN — PROMETHAZINE HYDROCHLORIDE 25 MG: 25 INJECTION, SOLUTION INTRAMUSCULAR; INTRAVENOUS at 06:04

## 2017-04-13 RX ADMIN — FERROUS SULFATE TAB EC 325 MG (65 MG FE EQUIVALENT) 325 MG: 325 (65 FE) TABLET DELAYED RESPONSE at 07:04

## 2017-04-13 RX ADMIN — SODIUM CHLORIDE: 0.9 INJECTION, SOLUTION INTRAVENOUS at 08:04

## 2017-04-13 RX ADMIN — POTASSIUM CHLORIDE 40 MEQ: 1500 TABLET, EXTENDED RELEASE ORAL at 07:04

## 2017-04-13 RX ADMIN — CEFTRIAXONE 1 G: 1 INJECTION, SOLUTION INTRAVENOUS at 11:04

## 2017-04-13 RX ADMIN — FLUCONAZOLE 100 MG: 100 TABLET ORAL at 11:04

## 2017-04-13 RX ADMIN — FERROUS SULFATE TAB EC 325 MG (65 MG FE EQUIVALENT) 325 MG: 325 (65 FE) TABLET DELAYED RESPONSE at 06:04

## 2017-04-13 RX ADMIN — HYDROMORPHONE HYDROCHLORIDE 1 MG: 2 INJECTION INTRAMUSCULAR; INTRAVENOUS; SUBCUTANEOUS at 12:04

## 2017-04-13 RX ADMIN — HYDROMORPHONE HYDROCHLORIDE 1 MG: 2 INJECTION INTRAMUSCULAR; INTRAVENOUS; SUBCUTANEOUS at 03:04

## 2017-04-13 RX ADMIN — ENOXAPARIN SODIUM 40 MG: 100 INJECTION SUBCUTANEOUS at 11:04

## 2017-04-13 NOTE — PROGRESS NOTES
Progress Note  Hospital Medicine  Patient Name:Cesilia Dozier  MRN:  [id number]  Patient Class: IP- Inpatient  Admit Date: 4/7/2017  Length of Stay: 5 days  Expected Discharge Date:   Attending Physician: Cathy Nicholson MD  Primary Care Provider:  Harsha Sheppard MD    SUBJECTIVE:     Principal Problem: Pyelonephritis  Initial history of present illness: Ms. Dozier is a 38yo AAF with a PMH cervical CA, Hydronephrosis, and recurrent pyelonephritis. She is currently in remission for Cervical CA and was last discharged from the hospital 3/22/17 after being treated for pyelonephritis. She presents to the hospital today with c/o left flank pain that started yesterday. Its sharp and originated in the LLQ with radiation to the left flank. It feels the same as when she has a infection or occluded stent. Associated symptoms are N/V. Denies any fever, muscle aches, dysuria or hematuria.     PMH/PSH/SH/FH/Meds: reviewed.    Symptoms/Review of Systems:  Reporting persisting left flank pain and nausea with supra-pubic discomfort. Patient is tearful. No significant improvement noted. No shortness of breath, cough, chest pain or headache, fever.  Diet:  Adequate intake.    Activity level: Normal.    Pain:  As above      OBJECTIVE:   Vital Signs (Most Recent):      Temp: 98.8 °F (37.1 °C) (04/13/17 0737)  Pulse: 103 (04/13/17 0737)  Resp: 17 (04/13/17 0737)  BP: 95/60 (04/13/17 0737)  SpO2: 97 % (04/13/17 0737)       Vital Signs Range (Last 24H):  Temp:  [98.4 °F (36.9 °C)-99.2 °F (37.3 °C)]   Pulse:  []   Resp:  [16-17]   BP: ()/(54-65)   SpO2:  [97 %-100 %]     Weight: 79.4 kg (175 lb)  Body mass index is 28.25 kg/(m^2).    Intake/Output Summary (Last 24 hours) at 04/13/17 0858  Last data filed at 04/13/17 0551   Gross per 24 hour   Intake           3092.5 ml   Output             2250 ml   Net            842.5 ml     Physical Examination:  General appearance: well developed, appears stated age  Head: normocephalic,  atraumatic  Eyes:  conjunctivae/corneas clear. PERRL.  Nose: Nares normal. Septum midline.  Throat: lips, mucosa, and tongue normal; teeth and gums normal, no throat erythema Neck: supple, symmetrical, trachea midline, no JVD and thyroid not enlarged, symmetric, no tenderness/mass/nodules  Lungs:  clear to auscultation bilaterally and normal respiratory effort  Chest wall: no tenderness  Heart: regular rate and rhythm, S1, S2 normal, no murmur, click, rub or gallop  Abdomen: soft, non-tender non-distented; bowel sounds normal; no masses,  no organomegaly  Extremities: no cyanosis or edema, or clubbing  Pulses: 2+ and symmetric  Skin: Skin color, texture, turgor normal. No rashes or lesions.  Lymph nodes: Cervical, supraclavicular, and axillary nodes normal.  Neurologic: Normal strength and tone. No focal numbness or weakness. CNII-XII intact.     CBC:    Recent Labs  Lab 04/11/17  0540 04/12/17  0120 04/13/17  0544   WBC 5.30 6.80 4.00   RBC 2.97* 3.26* 2.77*   HGB 9.5* 10.6* 8.8*   HCT 28.6* 31.1* 26.5*    162 131*   MCV 96 95 96   MCH 32.1* 32.4* 32.0*   MCHC 33.3 34.0 33.4   BMP    Recent Labs  Lab 04/11/17  0540 04/12/17  0120 04/13/17  0544   GLU 90 120* 83    138 141   K 4.3 4.3 4.0   * 109 113*   CO2 25 20* 23   BUN 6 8 10   CREATININE 1.7* 1.7* 1.7*   CALCIUM 8.8 8.8 8.2*      Diagnostic Results:  KUB: Normal Bilateral ureteral stents in place otherwise negative abdomen.    Renal US: Bilateral ureteral stents are noted.  Mild hydronephrosis is seen on the left.  There is increased echogenicity of the renal parenchyma bilaterally suggesting intrinsic renal disease  Microbiology Results (last 7 days)     Procedure Component Value Units Date/Time    Urine culture [[id number]] Collected:  04/11/17 2051    Order Status:  Completed Specimen:  Urine from Urine, Catheterized Updated:  04/13/17 0842     Urine Culture, Routine --     YEAST   > 100,000 cfu/ml  Identification pending      Blood culture  [[id number]] Collected:  04/12/17 0120    Order Status:  Completed Specimen:  Blood from Peripheral, Left  Hand Updated:  04/12/17 1715     Blood Culture, Routine No Growth to date    Narrative:       Take 2 sets.  Take both aerobic and anaerobic bottles.    Urine culture [[id number]] Collected:  04/07/17 2352    Order Status:  Completed Specimen:  Urine from Urine, Clean Catch Updated:  04/09/17 1353     Urine Culture, Routine Multiple organisms isolated. None in predominance.  Repeat if     Urine Culture, Routine clinically necessary.         Assessment/Plan:   * Pyelonephritis   UTI due to yeast  IV Rocephin  Culture urine  IV narcotic pain management  IV Antiemetics  Follow urology recommendations.    Flank pain - unexplained  Continue iV narcotics.  Discussed with patient, she would benefit with pain management specialist upon DC.      CKD (chronic kidney disease) stage 4, GFR 15-29 ml/min  Stable.  Renal dose medications  IVF hydration to maintain perfusion  Monitor labs      Hypokalemia - corrected  Monitor labs      Iron deficiency anemia  Stable. Continue chronic Fe supplementation    VTE Risk Mitigation         Ordered     enoxaparin injection 40 mg  Every 24 hours (non-standard times)     Route:  Subcutaneous        04/10/17 0952     Low Risk of VTE  Once      04/08/17 0209        Cathy Nicholson MD  Department of Hospital Medicine   [facility]

## 2017-04-13 NOTE — PLAN OF CARE
Problem: Patient Care Overview  Goal: Plan of Care Review  Outcome: Ongoing (interventions implemented as appropriate)  Pt AAO, c/o pain moderately controlled with IV and PO pain meds. Warm packs also provided. Tolerating regular diet well. Ambulatory in room and showered today. IVF infusing. Many request for sodas.  All meds and POC reviewed with pt, verbalizes understanding, call light in reach.

## 2017-04-13 NOTE — PROGRESS NOTES
"Called to pt room by pt using call light, stating that her IV was accidentally pulled out. Went to pt room immediately to find pt standing next to sink stating, " I don't now what happened, I was just brushing my teeth and I look down and saw the line out of my arm" Cathter was intact, and out of arm- dripping fluids that were running. Pumped turned off, Placed call to Dr. Nicholson, no new orders noted. He stated that he will be by to see pt in a little while. Dressing removed from arm, site assessed, asymptomatic, pressure dressing applied.   "

## 2017-04-13 NOTE — PLAN OF CARE
Problem: Patient Care Overview  Goal: Plan of Care Review  Outcome: Ongoing (interventions implemented as appropriate)  Pt with uneventful night. POC reviewed, pt verbalized understanding. PRN dilaudid given for complaints of left flank pain. Pt ambulates independently to bathroom, Frequent urination throughout shift. IVF/IVPB administered per MD order-see MAR. Hourly rounding performed, safety maintained. Bed in lowest position, wheels locked, SR upx2, call light in easy reach. Will continue to monitor.

## 2017-04-13 NOTE — PROGRESS NOTES
04/13/17 0500 04/13/17 0515 04/13/17 0551   Vital Signs   BP (!) 87/56 (!) 89/54 101/62   MAP (mmHg) --  --  76     Pt requesting pain medication at this time. BP as shown above. Sole MONTERO notified and stated ok to give dilaudid 1mg for BP greater then 90/50 with a MAP greater then 65. BP rechecked and within limits, dilaudid given. WIll continue to monitor and relay info to day nurse.

## 2017-04-14 LAB
ALBUMIN SERPL BCP-MCNC: 3.2 G/DL
ALP SERPL-CCNC: 63 U/L
ALT SERPL W/O P-5'-P-CCNC: 12 U/L
ANION GAP SERPL CALC-SCNC: 6 MMOL/L
AST SERPL-CCNC: 16 U/L
BASOPHILS # BLD AUTO: 0 K/UL
BASOPHILS NFR BLD: 0.4 %
BILIRUB SERPL-MCNC: 0.3 MG/DL
BUN SERPL-MCNC: 8 MG/DL
CALCIUM SERPL-MCNC: 8.7 MG/DL
CHLORIDE SERPL-SCNC: 110 MMOL/L
CO2 SERPL-SCNC: 24 MMOL/L
CREAT SERPL-MCNC: 1.8 MG/DL
DIFFERENTIAL METHOD: ABNORMAL
EOSINOPHIL # BLD AUTO: 0.8 K/UL
EOSINOPHIL NFR BLD: 12.6 %
ERYTHROCYTE [DISTWIDTH] IN BLOOD BY AUTOMATED COUNT: 13.6 %
EST. GFR  (AFRICAN AMERICAN): 41 ML/MIN/1.73 M^2
EST. GFR  (NON AFRICAN AMERICAN): 35 ML/MIN/1.73 M^2
GLUCOSE SERPL-MCNC: 83 MG/DL
HCT VFR BLD AUTO: 31.9 %
HGB BLD-MCNC: 10.3 G/DL
LYMPHOCYTES # BLD AUTO: 1.4 K/UL
LYMPHOCYTES NFR BLD: 22.7 %
MCH RBC QN AUTO: 31.2 PG
MCHC RBC AUTO-ENTMCNC: 32.2 %
MCV RBC AUTO: 97 FL
MONOCYTES # BLD AUTO: 0.7 K/UL
MONOCYTES NFR BLD: 11 %
NEUTROPHILS # BLD AUTO: 3.3 K/UL
NEUTROPHILS NFR BLD: 53.3 %
PLATELET # BLD AUTO: 183 K/UL
PLATELET BLD QL SMEAR: ABNORMAL
PMV BLD AUTO: 9.2 FL
POTASSIUM SERPL-SCNC: 4.3 MMOL/L
PROT SERPL-MCNC: 6.6 G/DL
RBC # BLD AUTO: 3.29 M/UL
SODIUM SERPL-SCNC: 140 MMOL/L
WBC # BLD AUTO: 6.2 K/UL

## 2017-04-14 PROCEDURE — 12000002 HC ACUTE/MED SURGE SEMI-PRIVATE ROOM

## 2017-04-14 PROCEDURE — 25000003 PHARM REV CODE 250: Performed by: INTERNAL MEDICINE

## 2017-04-14 PROCEDURE — 80053 COMPREHEN METABOLIC PANEL: CPT

## 2017-04-14 PROCEDURE — 85025 COMPLETE CBC W/AUTO DIFF WBC: CPT

## 2017-04-14 PROCEDURE — 25000003 PHARM REV CODE 250: Performed by: NURSE PRACTITIONER

## 2017-04-14 PROCEDURE — 63600175 PHARM REV CODE 636 W HCPCS: Performed by: INTERNAL MEDICINE

## 2017-04-14 PROCEDURE — 63600175 PHARM REV CODE 636 W HCPCS: Performed by: NURSE PRACTITIONER

## 2017-04-14 PROCEDURE — 25000003 PHARM REV CODE 250: Performed by: FAMILY MEDICINE

## 2017-04-14 PROCEDURE — 99232 SBSQ HOSP IP/OBS MODERATE 35: CPT | Mod: ,,, | Performed by: INTERNAL MEDICINE

## 2017-04-14 PROCEDURE — 36415 COLL VENOUS BLD VENIPUNCTURE: CPT

## 2017-04-14 PROCEDURE — 63600175 PHARM REV CODE 636 W HCPCS: Performed by: EMERGENCY MEDICINE

## 2017-04-14 RX ORDER — HYDROMORPHONE HYDROCHLORIDE 2 MG/ML
1 INJECTION, SOLUTION INTRAMUSCULAR; INTRAVENOUS; SUBCUTANEOUS EVERY 6 HOURS PRN
Status: DISCONTINUED | OUTPATIENT
Start: 2017-04-14 | End: 2017-04-15

## 2017-04-14 RX ORDER — HYDROCODONE BITARTRATE AND ACETAMINOPHEN 10; 325 MG/1; MG/1
1 TABLET ORAL EVERY 4 HOURS PRN
Status: DISCONTINUED | OUTPATIENT
Start: 2017-04-14 | End: 2017-04-15

## 2017-04-14 RX ADMIN — HYDROMORPHONE HYDROCHLORIDE 1 MG: 2 INJECTION INTRAMUSCULAR; INTRAVENOUS; SUBCUTANEOUS at 11:04

## 2017-04-14 RX ADMIN — FERROUS SULFATE TAB EC 325 MG (65 MG FE EQUIVALENT) 325 MG: 325 (65 FE) TABLET DELAYED RESPONSE at 05:04

## 2017-04-14 RX ADMIN — CEFTRIAXONE 1 G: 1 INJECTION, SOLUTION INTRAVENOUS at 11:04

## 2017-04-14 RX ADMIN — PROMETHAZINE HYDROCHLORIDE 25 MG: 25 INJECTION, SOLUTION INTRAMUSCULAR; INTRAVENOUS at 02:04

## 2017-04-14 RX ADMIN — HYDROMORPHONE HYDROCHLORIDE 1 MG: 2 INJECTION INTRAMUSCULAR; INTRAVENOUS; SUBCUTANEOUS at 01:04

## 2017-04-14 RX ADMIN — PROMETHAZINE HYDROCHLORIDE 25 MG: 25 INJECTION, SOLUTION INTRAMUSCULAR; INTRAVENOUS at 01:04

## 2017-04-14 RX ADMIN — FERROUS SULFATE TAB EC 325 MG (65 MG FE EQUIVALENT) 325 MG: 325 (65 FE) TABLET DELAYED RESPONSE at 07:04

## 2017-04-14 RX ADMIN — ENOXAPARIN SODIUM 40 MG: 100 INJECTION SUBCUTANEOUS at 10:04

## 2017-04-14 RX ADMIN — HYDROMORPHONE HYDROCHLORIDE 1 MG: 2 INJECTION INTRAMUSCULAR; INTRAVENOUS; SUBCUTANEOUS at 04:04

## 2017-04-14 RX ADMIN — ONDANSETRON 4 MG: 2 INJECTION INTRAMUSCULAR; INTRAVENOUS at 10:04

## 2017-04-14 RX ADMIN — FLUCONAZOLE 100 MG: 100 TABLET ORAL at 10:04

## 2017-04-14 RX ADMIN — HYDROMORPHONE HYDROCHLORIDE 1 MG: 2 INJECTION INTRAMUSCULAR; INTRAVENOUS; SUBCUTANEOUS at 07:04

## 2017-04-14 RX ADMIN — HYDROCODONE BITARTRATE AND ACETAMINOPHEN 1 TABLET: 10; 325 TABLET ORAL at 02:04

## 2017-04-14 RX ADMIN — HYDROMORPHONE HYDROCHLORIDE 1 MG: 2 INJECTION INTRAMUSCULAR; INTRAVENOUS; SUBCUTANEOUS at 05:04

## 2017-04-14 RX ADMIN — FERROUS SULFATE TAB EC 325 MG (65 MG FE EQUIVALENT) 325 MG: 325 (65 FE) TABLET DELAYED RESPONSE at 02:04

## 2017-04-14 RX ADMIN — PANTOPRAZOLE SODIUM 40 MG: 40 TABLET, DELAYED RELEASE ORAL at 10:04

## 2017-04-14 RX ADMIN — HYDROMORPHONE HYDROCHLORIDE 1 MG: 2 INJECTION INTRAMUSCULAR; INTRAVENOUS; SUBCUTANEOUS at 10:04

## 2017-04-14 RX ADMIN — ZOLPIDEM TARTRATE 5 MG: 5 TABLET, FILM COATED ORAL at 10:04

## 2017-04-14 RX ADMIN — PROMETHAZINE HYDROCHLORIDE 25 MG: 25 INJECTION, SOLUTION INTRAMUSCULAR; INTRAVENOUS at 07:04

## 2017-04-14 RX ADMIN — HYDROCODONE BITARTRATE AND ACETAMINOPHEN 1 TABLET: 10; 325 TABLET ORAL at 08:04

## 2017-04-14 RX ADMIN — POTASSIUM CHLORIDE 40 MEQ: 1500 TABLET, EXTENDED RELEASE ORAL at 10:04

## 2017-04-14 NOTE — PLAN OF CARE
Problem: Patient Care Overview  Goal: Plan of Care Review  Outcome: Ongoing (interventions implemented as appropriate)  Pt remains free from fall or injury. Pain controlled with PO and IV meds. Phenergan given for nausea. Afebrile and VS stable. Tolerating regular diet. Call light in reach. Will continue to monitor.

## 2017-04-14 NOTE — PLAN OF CARE
Problem: Patient Care Overview  Goal: Plan of Care Review  Outcome: Ongoing (interventions implemented as appropriate)  Pt VSS and afebrile, free of falls, trauma, injury, and skin break downs, pain and nausea controlled with medication, ambulates to RR, positions self in bed. Pt in low locked bed, call light in reach, safety precautions maintained, on RA, regular diet.

## 2017-04-14 NOTE — PROGRESS NOTES
Progress Note  Hospital Medicine  Patient Name:Cesilia Dozier  MRN:  [id number]  Patient Class: IP- Inpatient  Admit Date: 4/7/2017  Length of Stay: 6 days  Expected Discharge Date:   Attending Physician: Cathy Nicholson MD  Primary Care Provider:  Harsha Sheppard MD    SUBJECTIVE:     Principal Problem: Pyelonephritis  Initial history of present illness: Ms. Dozier is a 36yo AAF with a PMH cervical CA, Hydronephrosis, and recurrent pyelonephritis. She is currently in remission for Cervical CA and was last discharged from the hospital 3/22/17 after being treated for pyelonephritis. She presents to the hospital today with c/o left flank pain that started yesterday. Its sharp and originated in the LLQ with radiation to the left flank. It feels the same as when she has a infection or occluded stent. Associated symptoms are N/V. Denies any fever, muscle aches, dysuria or hematuria.     PMH/PSH/SH/FH/Meds: reviewed.    Symptoms/Review of Systems:  Reporting continued pan but is sitting on side of bed talking on the phone in no distress. No significant improvement noted. No shortness of breath, cough, chest pain or headache, fever.  Diet:  Adequate intake.    Activity level: Normal.    Pain:  As above      OBJECTIVE:   Vital Signs (Most Recent):      Temp: 98.6 °F (37 °C) (04/14/17 0700)  Pulse: 95 (04/14/17 0700)  Resp: 18 (04/14/17 0700)  BP: (!) 92/54 (04/14/17 0700)  SpO2: 100 % (04/14/17 0700)       Vital Signs Range (Last 24H):  Temp:  [98.3 °F (36.8 °C)-98.9 °F (37.2 °C)]   Pulse:  []   Resp:  [16-18]   BP: ()/(52-69)   SpO2:  [97 %-100 %]     Weight: 79.4 kg (175 lb)  Body mass index is 28.25 kg/(m^2).    Intake/Output Summary (Last 24 hours) at 04/14/17 0928  Last data filed at 04/14/17 0400   Gross per 24 hour   Intake           2552.5 ml   Output                0 ml   Net           2552.5 ml     Physical Examination:  General appearance: well developed, appears stated age  Head: normocephalic,  atraumatic  Eyes:  conjunctivae/corneas clear. PERRL.  Nose: Nares normal. Septum midline.  Throat: lips, mucosa, and tongue normal; teeth and gums normal, no throat erythema Neck: supple, symmetrical, trachea midline, no JVD and thyroid not enlarged, symmetric, no tenderness/mass/nodules  Lungs:  clear to auscultation bilaterally and normal respiratory effort  Chest wall: no tenderness  Heart: regular rate and rhythm, S1, S2 normal, no murmur, click, rub or gallop  Abdomen: soft, non-tender non-distented; bowel sounds normal; no masses,  no organomegaly  Extremities: no cyanosis or edema, or clubbing  Pulses: 2+ and symmetric  Skin: Skin color, texture, turgor normal. No rashes or lesions.  Lymph nodes: Cervical, supraclavicular, and axillary nodes normal.  Neurologic: Normal strength and tone. No focal numbness or weakness. CNII-XII intact.     CBC:    Recent Labs  Lab 04/12/17  0120 04/13/17  0544 04/14/17  0543   WBC 6.80 4.00 6.20   RBC 3.26* 2.77* 3.29*   HGB 10.6* 8.8* 10.3*   HCT 31.1* 26.5* 31.9*    131* 183   MCV 95 96 97   MCH 32.4* 32.0* 31.2*   MCHC 34.0 33.4 32.2   BMP    Recent Labs  Lab 04/12/17  0120 04/13/17  0544 04/14/17  0543   * 83 83    141 140   K 4.3 4.0 4.3    113* 110   CO2 20* 23 24   BUN 8 10 8   CREATININE 1.7* 1.7* 1.8*   CALCIUM 8.8 8.2* 8.7      Diagnostic Results:  KUB: Normal Bilateral ureteral stents in place otherwise negative abdomen.    Renal US: Bilateral ureteral stents are noted.  Mild hydronephrosis is seen on the left.  There is increased echogenicity of the renal parenchyma bilaterally suggesting intrinsic renal disease  Microbiology Results (last 7 days)     Procedure Component Value Units Date/Time    Blood culture [[id number]] Collected:  04/12/17 0120    Order Status:  Completed Specimen:  Blood from Peripheral, Left  Hand Updated:  04/13/17 1212     Blood Culture, Routine No Growth to date     Blood Culture, Routine No Growth to date     Narrative:       Take 2 sets.  Take both aerobic and anaerobic bottles.    Urine culture [[id number]] Collected:  04/11/17 2051    Order Status:  Completed Specimen:  Urine from Urine, Catheterized Updated:  04/13/17 1129     Urine Culture, Routine --     CANDIDA ORTHOPSILOSIS  > 100,000 cfu/ml  Treatment of asymptomatic candiduria is not recommended (except for   specific populations). Candida isolated in the urine typically   represents colonization. If an indwelling urinary catheter is present  it should be removed or replaced.      Urine culture [[id number]] Collected:  04/07/17 2352    Order Status:  Completed Specimen:  Urine from Urine, Clean Catch Updated:  04/09/17 1353     Urine Culture, Routine Multiple organisms isolated. None in predominance.  Repeat if     Urine Culture, Routine clinically necessary.         Assessment/Plan:   * Pyelonephritis   UTI due to yeast  IV Rocephin  Culture urine  IV narcotic pain management  IV Antiemetics  Follow urology recommendations.    Flank pain - unexplained  Reduce iV narcotics. Start PO lortab every 6 hours.  Discussed with patient, she would benefit with pain management specialist upon DC.      CKD (chronic kidney disease) stage 4, GFR 15-29 ml/min  Stable.  Renal dose medications  IVF hydration to maintain perfusion  Monitor labs      Hypokalemia - corrected  Monitor labs      Iron deficiency anemia  Stable. Continue chronic Fe supplementation.    Discussed with patient regarding need to reduce IV pain medication and the need for her to see a pain management specialist upon discharge. She voices understanding.    VTE Risk Mitigation         Ordered     enoxaparin injection 40 mg  Every 24 hours (non-standard times)     Route:  Subcutaneous        04/10/17 0952     Low Risk of VTE  Once      04/08/17 0209        Cathy Nicholson MD  Department of Hospital Medicine   [facility]

## 2017-04-15 PROBLEM — E87.6 HYPOKALEMIA: Status: RESOLVED | Noted: 2017-04-08 | Resolved: 2017-04-15

## 2017-04-15 LAB
ALBUMIN SERPL BCP-MCNC: 2.6 G/DL
ALP SERPL-CCNC: 50 U/L
ALT SERPL W/O P-5'-P-CCNC: 7 U/L
ANION GAP SERPL CALC-SCNC: 7 MMOL/L
AST SERPL-CCNC: 12 U/L
BASOPHILS # BLD AUTO: 0 K/UL
BASOPHILS NFR BLD: 0.4 %
BILIRUB SERPL-MCNC: 0.3 MG/DL
BUN SERPL-MCNC: 9 MG/DL
CALCIUM SERPL-MCNC: 8.3 MG/DL
CHLORIDE SERPL-SCNC: 111 MMOL/L
CO2 SERPL-SCNC: 22 MMOL/L
CREAT SERPL-MCNC: 1.7 MG/DL
DIFFERENTIAL METHOD: ABNORMAL
EOSINOPHIL # BLD AUTO: 0.7 K/UL
EOSINOPHIL NFR BLD: 17.2 %
ERYTHROCYTE [DISTWIDTH] IN BLOOD BY AUTOMATED COUNT: 13.3 %
EST. GFR  (AFRICAN AMERICAN): 43 ML/MIN/1.73 M^2
EST. GFR  (NON AFRICAN AMERICAN): 38 ML/MIN/1.73 M^2
GLUCOSE SERPL-MCNC: 82 MG/DL
HCT VFR BLD AUTO: 24.9 %
HGB BLD-MCNC: 8.4 G/DL
LYMPHOCYTES # BLD AUTO: 0.9 K/UL
LYMPHOCYTES NFR BLD: 22.8 %
MCH RBC QN AUTO: 32.6 PG
MCHC RBC AUTO-ENTMCNC: 33.9 %
MCV RBC AUTO: 96 FL
MONOCYTES # BLD AUTO: 0.4 K/UL
MONOCYTES NFR BLD: 10.9 %
NEUTROPHILS # BLD AUTO: 2 K/UL
NEUTROPHILS NFR BLD: 48.7 %
NRBC BLD-RTO: 0 /100 WBC
PLATELET # BLD AUTO: 140 K/UL
PLATELET BLD QL SMEAR: ABNORMAL
PMV BLD AUTO: 9.1 FL
POTASSIUM SERPL-SCNC: 4.7 MMOL/L
PROT SERPL-MCNC: 5.3 G/DL
RBC # BLD AUTO: 2.59 M/UL
SODIUM SERPL-SCNC: 140 MMOL/L
WBC # BLD AUTO: 4.1 K/UL

## 2017-04-15 PROCEDURE — 80053 COMPREHEN METABOLIC PANEL: CPT

## 2017-04-15 PROCEDURE — 25000003 PHARM REV CODE 250: Performed by: HOSPITALIST

## 2017-04-15 PROCEDURE — 63600175 PHARM REV CODE 636 W HCPCS: Performed by: INTERNAL MEDICINE

## 2017-04-15 PROCEDURE — 25000003 PHARM REV CODE 250: Performed by: FAMILY MEDICINE

## 2017-04-15 PROCEDURE — 25000003 PHARM REV CODE 250: Performed by: INTERNAL MEDICINE

## 2017-04-15 PROCEDURE — 85025 COMPLETE CBC W/AUTO DIFF WBC: CPT

## 2017-04-15 PROCEDURE — 12000002 HC ACUTE/MED SURGE SEMI-PRIVATE ROOM

## 2017-04-15 PROCEDURE — 36415 COLL VENOUS BLD VENIPUNCTURE: CPT

## 2017-04-15 PROCEDURE — 63600175 PHARM REV CODE 636 W HCPCS: Performed by: HOSPITALIST

## 2017-04-15 PROCEDURE — 25000003 PHARM REV CODE 250: Performed by: NURSE PRACTITIONER

## 2017-04-15 RX ORDER — FLUCONAZOLE 100 MG/1
200 TABLET ORAL DAILY
Status: DISCONTINUED | OUTPATIENT
Start: 2017-04-16 | End: 2017-04-17 | Stop reason: HOSPADM

## 2017-04-15 RX ORDER — HYDROMORPHONE HYDROCHLORIDE 2 MG/ML
1 INJECTION, SOLUTION INTRAMUSCULAR; INTRAVENOUS; SUBCUTANEOUS EVERY 6 HOURS PRN
Status: DISCONTINUED | OUTPATIENT
Start: 2017-04-15 | End: 2017-04-16

## 2017-04-15 RX ORDER — KETOROLAC TROMETHAMINE 30 MG/ML
15 INJECTION, SOLUTION INTRAMUSCULAR; INTRAVENOUS EVERY 6 HOURS PRN
Status: DISCONTINUED | OUTPATIENT
Start: 2017-04-15 | End: 2017-04-17 | Stop reason: HOSPADM

## 2017-04-15 RX ORDER — OXYCODONE AND ACETAMINOPHEN 10; 325 MG/1; MG/1
1 TABLET ORAL 4 TIMES DAILY
Status: DISCONTINUED | OUTPATIENT
Start: 2017-04-15 | End: 2017-04-16

## 2017-04-15 RX ADMIN — ZOLPIDEM TARTRATE 5 MG: 5 TABLET, FILM COATED ORAL at 09:04

## 2017-04-15 RX ADMIN — HYDROMORPHONE HYDROCHLORIDE 1 MG: 2 INJECTION INTRAMUSCULAR; INTRAVENOUS; SUBCUTANEOUS at 02:04

## 2017-04-15 RX ADMIN — HYDROCODONE BITARTRATE AND ACETAMINOPHEN 1 TABLET: 10; 325 TABLET ORAL at 12:04

## 2017-04-15 RX ADMIN — HYDROMORPHONE HYDROCHLORIDE 1 MG: 2 INJECTION INTRAMUSCULAR; INTRAVENOUS; SUBCUTANEOUS at 05:04

## 2017-04-15 RX ADMIN — HYDROCODONE BITARTRATE AND ACETAMINOPHEN 1 TABLET: 10; 325 TABLET ORAL at 04:04

## 2017-04-15 RX ADMIN — FLUCONAZOLE 100 MG: 100 TABLET ORAL at 08:04

## 2017-04-15 RX ADMIN — FERROUS SULFATE TAB EC 325 MG (65 MG FE EQUIVALENT) 325 MG: 325 (65 FE) TABLET DELAYED RESPONSE at 11:04

## 2017-04-15 RX ADMIN — OXYCODONE HYDROCHLORIDE AND ACETAMINOPHEN 1 TABLET: 10; 325 TABLET ORAL at 05:04

## 2017-04-15 RX ADMIN — SODIUM CHLORIDE: 0.9 INJECTION, SOLUTION INTRAVENOUS at 05:04

## 2017-04-15 RX ADMIN — KETOROLAC TROMETHAMINE 15 MG: 30 INJECTION, SOLUTION INTRAMUSCULAR at 07:04

## 2017-04-15 RX ADMIN — FERROUS SULFATE TAB EC 325 MG (65 MG FE EQUIVALENT) 325 MG: 325 (65 FE) TABLET DELAYED RESPONSE at 05:04

## 2017-04-15 RX ADMIN — FERROUS SULFATE TAB EC 325 MG (65 MG FE EQUIVALENT) 325 MG: 325 (65 FE) TABLET DELAYED RESPONSE at 08:04

## 2017-04-15 RX ADMIN — HYDROMORPHONE HYDROCHLORIDE 1 MG: 2 INJECTION INTRAMUSCULAR; INTRAVENOUS; SUBCUTANEOUS at 09:04

## 2017-04-15 RX ADMIN — HYDROCODONE BITARTRATE AND ACETAMINOPHEN 1 TABLET: 10; 325 TABLET ORAL at 08:04

## 2017-04-15 RX ADMIN — POTASSIUM CHLORIDE 40 MEQ: 1500 TABLET, EXTENDED RELEASE ORAL at 08:04

## 2017-04-15 RX ADMIN — ENOXAPARIN SODIUM 40 MG: 100 INJECTION SUBCUTANEOUS at 11:04

## 2017-04-15 RX ADMIN — PANTOPRAZOLE SODIUM 40 MG: 40 TABLET, DELAYED RELEASE ORAL at 08:04

## 2017-04-15 NOTE — PLAN OF CARE
Problem: Patient Care Overview  Goal: Plan of Care Review  Outcome: Ongoing (interventions implemented as appropriate)  POC reviewed with pt, pt verbalized understanding. Safety maintained throughout shift, bed locked and in lowest position, call light in reach, Side rails up X 2. Non skid sock on when OOB. Pt remained free of fall/trauma. Pt ambulating around room without difficulty. Pt self reports of pain treated with PO and IV pain medication, pt requesting additional pain medications; this request was addressed by  on pt rounding. Pt tolerating meals wells, no reports of nausea and vomiting. IVF infused until loss of IV access. IV access obtained this afternoon.  Pt having dependent edema in afternoon, Dr. Ivan gave orders to hold fluids at this time.  UTI being treated with fluconazole dosing increased to 200mg daily.  Will continue to monitor.

## 2017-04-15 NOTE — PLAN OF CARE
Problem: Patient Care Overview  Goal: Plan of Care Review  Outcome: Ongoing (interventions implemented as appropriate)  Plan of care discussed with pt. Pt ambulating independently in room, fall precautions in place.  PRN meds given for pain.  Pt remains free and clear of falls or injuries this shift. Discussed meds and care. Pt has no questions at this time. Will continue to monitor.

## 2017-04-16 LAB
ALBUMIN SERPL BCP-MCNC: 2.6 G/DL
ALP SERPL-CCNC: 50 U/L
ALT SERPL W/O P-5'-P-CCNC: 6 U/L
ANION GAP SERPL CALC-SCNC: 4 MMOL/L
AST SERPL-CCNC: 13 U/L
BASOPHILS # BLD AUTO: 0 K/UL
BASOPHILS NFR BLD: 0.5 %
BILIRUB SERPL-MCNC: 0.3 MG/DL
BUN SERPL-MCNC: 9 MG/DL
CALCIUM SERPL-MCNC: 8.6 MG/DL
CHLORIDE SERPL-SCNC: 113 MMOL/L
CO2 SERPL-SCNC: 25 MMOL/L
CREAT SERPL-MCNC: 1.9 MG/DL
CRP SERPL-MCNC: 11.1 MG/L
DIFFERENTIAL METHOD: ABNORMAL
EOSINOPHIL # BLD AUTO: 0.6 K/UL
EOSINOPHIL NFR BLD: 15.5 %
ERYTHROCYTE [DISTWIDTH] IN BLOOD BY AUTOMATED COUNT: 13.6 %
EST. GFR  (AFRICAN AMERICAN): 38 ML/MIN/1.73 M^2
EST. GFR  (NON AFRICAN AMERICAN): 33 ML/MIN/1.73 M^2
GLUCOSE SERPL-MCNC: 84 MG/DL
HCT VFR BLD AUTO: 27.1 %
HGB BLD-MCNC: 8.7 G/DL
LYMPHOCYTES # BLD AUTO: 0.8 K/UL
LYMPHOCYTES NFR BLD: 20.3 %
MCH RBC QN AUTO: 31 PG
MCHC RBC AUTO-ENTMCNC: 32.2 %
MCV RBC AUTO: 96 FL
MONOCYTES # BLD AUTO: 0.4 K/UL
MONOCYTES NFR BLD: 9.3 %
NEUTROPHILS # BLD AUTO: 2.2 K/UL
NEUTROPHILS NFR BLD: 54.4 %
PLATELET # BLD AUTO: 176 K/UL
PMV BLD AUTO: 9.5 FL
POTASSIUM SERPL-SCNC: 5.4 MMOL/L
PROT SERPL-MCNC: 5.2 G/DL
RBC # BLD AUTO: 2.81 M/UL
SODIUM SERPL-SCNC: 142 MMOL/L
WBC # BLD AUTO: 4 K/UL

## 2017-04-16 PROCEDURE — 25000003 PHARM REV CODE 250: Performed by: NURSE PRACTITIONER

## 2017-04-16 PROCEDURE — 12000002 HC ACUTE/MED SURGE SEMI-PRIVATE ROOM

## 2017-04-16 PROCEDURE — 63600175 PHARM REV CODE 636 W HCPCS: Performed by: NURSE PRACTITIONER

## 2017-04-16 PROCEDURE — 63600175 PHARM REV CODE 636 W HCPCS: Performed by: INTERNAL MEDICINE

## 2017-04-16 PROCEDURE — 86140 C-REACTIVE PROTEIN: CPT

## 2017-04-16 PROCEDURE — 25000003 PHARM REV CODE 250: Performed by: HOSPITALIST

## 2017-04-16 PROCEDURE — 63600175 PHARM REV CODE 636 W HCPCS: Performed by: EMERGENCY MEDICINE

## 2017-04-16 PROCEDURE — 36415 COLL VENOUS BLD VENIPUNCTURE: CPT

## 2017-04-16 PROCEDURE — 63600175 PHARM REV CODE 636 W HCPCS: Performed by: HOSPITALIST

## 2017-04-16 PROCEDURE — 85025 COMPLETE CBC W/AUTO DIFF WBC: CPT

## 2017-04-16 PROCEDURE — 80053 COMPREHEN METABOLIC PANEL: CPT

## 2017-04-16 PROCEDURE — 25000003 PHARM REV CODE 250: Performed by: INTERNAL MEDICINE

## 2017-04-16 RX ORDER — OXYCODONE HCL 20 MG/1
20 TABLET, FILM COATED, EXTENDED RELEASE ORAL EVERY 12 HOURS
Status: DISCONTINUED | OUTPATIENT
Start: 2017-04-16 | End: 2017-04-17 | Stop reason: HOSPADM

## 2017-04-16 RX ORDER — NALOXONE HCL 0.4 MG/ML
0.4 VIAL (ML) INJECTION
Status: DISCONTINUED | OUTPATIENT
Start: 2017-04-16 | End: 2017-04-17 | Stop reason: HOSPADM

## 2017-04-16 RX ORDER — OXYCODONE AND ACETAMINOPHEN 10; 325 MG/1; MG/1
1 TABLET ORAL EVERY 6 HOURS PRN
Status: DISCONTINUED | OUTPATIENT
Start: 2017-04-16 | End: 2017-04-17 | Stop reason: HOSPADM

## 2017-04-16 RX ORDER — HYDROMORPHONE HYDROCHLORIDE 2 MG/ML
0.5 INJECTION, SOLUTION INTRAMUSCULAR; INTRAVENOUS; SUBCUTANEOUS EVERY 6 HOURS PRN
Status: DISCONTINUED | OUTPATIENT
Start: 2017-04-16 | End: 2017-04-17 | Stop reason: HOSPADM

## 2017-04-16 RX ORDER — AMOXICILLIN 250 MG
1 CAPSULE ORAL 2 TIMES DAILY
Status: DISCONTINUED | OUTPATIENT
Start: 2017-04-16 | End: 2017-04-17 | Stop reason: HOSPADM

## 2017-04-16 RX ADMIN — KETOROLAC TROMETHAMINE 15 MG: 30 INJECTION, SOLUTION INTRAMUSCULAR at 02:04

## 2017-04-16 RX ADMIN — FERROUS SULFATE TAB EC 325 MG (65 MG FE EQUIVALENT) 325 MG: 325 (65 FE) TABLET DELAYED RESPONSE at 08:04

## 2017-04-16 RX ADMIN — CEFTRIAXONE 1 G: 1 INJECTION, SOLUTION INTRAVENOUS at 12:04

## 2017-04-16 RX ADMIN — STANDARDIZED SENNA CONCENTRATE AND DOCUSATE SODIUM 1 TABLET: 8.6; 5 TABLET, FILM COATED ORAL at 11:04

## 2017-04-16 RX ADMIN — OXYCODONE HYDROCHLORIDE 20 MG: 20 TABLET, FILM COATED, EXTENDED RELEASE ORAL at 11:04

## 2017-04-16 RX ADMIN — KETOROLAC TROMETHAMINE 15 MG: 30 INJECTION, SOLUTION INTRAMUSCULAR at 08:04

## 2017-04-16 RX ADMIN — OXYCODONE HYDROCHLORIDE 20 MG: 20 TABLET, FILM COATED, EXTENDED RELEASE ORAL at 08:04

## 2017-04-16 RX ADMIN — OXYCODONE HYDROCHLORIDE AND ACETAMINOPHEN 1 TABLET: 10; 325 TABLET ORAL at 12:04

## 2017-04-16 RX ADMIN — PANTOPRAZOLE SODIUM 40 MG: 40 TABLET, DELAYED RELEASE ORAL at 08:04

## 2017-04-16 RX ADMIN — OXYCODONE HYDROCHLORIDE AND ACETAMINOPHEN 1 TABLET: 10; 325 TABLET ORAL at 05:04

## 2017-04-16 RX ADMIN — FLUCONAZOLE 200 MG: 100 TABLET ORAL at 08:04

## 2017-04-16 RX ADMIN — ONDANSETRON 4 MG: 2 INJECTION INTRAMUSCULAR; INTRAVENOUS at 11:04

## 2017-04-16 RX ADMIN — PROMETHAZINE HYDROCHLORIDE 25 MG: 25 INJECTION, SOLUTION INTRAMUSCULAR; INTRAVENOUS at 01:04

## 2017-04-16 RX ADMIN — ZOLPIDEM TARTRATE 5 MG: 5 TABLET, FILM COATED ORAL at 11:04

## 2017-04-16 RX ADMIN — CEFTRIAXONE 1 G: 1 INJECTION, SOLUTION INTRAVENOUS at 11:04

## 2017-04-16 RX ADMIN — HYDROMORPHONE HYDROCHLORIDE 0.5 MG: 2 INJECTION INTRAMUSCULAR; INTRAVENOUS; SUBCUTANEOUS at 04:04

## 2017-04-16 RX ADMIN — STANDARDIZED SENNA CONCENTRATE AND DOCUSATE SODIUM 1 TABLET: 8.6; 5 TABLET, FILM COATED ORAL at 08:04

## 2017-04-16 RX ADMIN — KETOROLAC TROMETHAMINE 15 MG: 30 INJECTION, SOLUTION INTRAMUSCULAR at 11:04

## 2017-04-16 RX ADMIN — ENOXAPARIN SODIUM 40 MG: 100 INJECTION SUBCUTANEOUS at 11:04

## 2017-04-16 RX ADMIN — FERROUS SULFATE TAB EC 325 MG (65 MG FE EQUIVALENT) 325 MG: 325 (65 FE) TABLET DELAYED RESPONSE at 05:04

## 2017-04-16 RX ADMIN — FERROUS SULFATE TAB EC 325 MG (65 MG FE EQUIVALENT) 325 MG: 325 (65 FE) TABLET DELAYED RESPONSE at 11:04

## 2017-04-16 NOTE — PROVIDER TRANSFER
Pt reporting nausea X 4 this shift.  Pt asked to allow nurse to visualize emesis before disposal but pt has ignored that request and continues to flush emesis. IV Zofran and Phenergan given to treat nausea. Will continue to monitor.     Pt reports oral pain medication is not adequate, after changes made by  this AM, requesting additional IV pain medication. Educated pt on allowing medication adequate time to achieve desired pain relief.  informed of pt's request.

## 2017-04-16 NOTE — PLAN OF CARE
Problem: Patient Care Overview  Goal: Plan of Care Review  POC reviewed with pt, pt verbalized understanding. Safety maintained throughout shift, bed locked and in lowest position, call light in reach, Side rails up X 2. Non skid sock on when OOB. Pt remained free of fall/trauma. Pt ambulating around room without difficulty. Pt self reports of pain treated with PO and IV pain medication. pt reports that the regimen of pain medication received last night was inadequate, addressed by  on pt rounding. Pt tolerating meals wells. Pt reports nausea and vomiting this afternoon. Zofran and Phenergan given to treat nausea. VSS, afebrile throughout shift. Will continue to monitor.

## 2017-04-16 NOTE — PROGRESS NOTES
"Progress Note  Hospital Medicine    Admit Date: 4/7/2017    SUBJECTIVE:     Follow-up For:  Pyelonephritis      Interval history (See H&P for complete P,F,SHx) : Patient still remains in severe pain associated with stent and urinary tract. UA came back with candida. Lost IV access.    Review of Systems:   Pain scale: 5/10  Gen- Weakness/ Fatigue  - Flank pain        OBJECTIVE:     Vital Signs Range (Last 24H):  Temp:  [97.5 °F (36.4 °C)-98.4 °F (36.9 °C)]   Pulse:  [92-96]   Resp:  [16-18]   BP: (104-132)/(60-73)   SpO2:  [100 %]     I & O (Last 24H):    Intake/Output Summary (Last 24 hours) at 04/15/17 2383  Last data filed at 04/15/17 1751   Gross per 24 hour   Intake              960 ml   Output                0 ml   Net              960 ml       Estimated body mass index is 28.25 kg/(m^2) as calculated from the following:    Height as of this encounter: 5' 6" (1.676 m).    Weight as of this encounter: 79.4 kg (175 lb).    Physical Exam:  General- Patient alert and oriented x3 in NAD  HEENT- PERRLA, EOMI, OP clear, MMM  Neck- No JVD, Lymphadenopathy, Thyromegaly  CV- Regular rate and rhythm, No Murmur/minerva/rubs  Resp- Lungs CTA Bilaterally, No increased WOB  GI- Non tender/non-distended, BS normoactive x4 quads, no HSM  Extrem- No cyanosis, clubbing, edema. Pulses 2+ and symmetric    Laboratory/Diagnostic Data:  Reviewed and noted in plan where applicable- Please see chart for full lab data.    Medications:  Medication list was reviewed and changes noted under Assessment/Plan.    ASSESSMENT/PLAN:     Active Problems:    Active Hospital Problems    Diagnosis  POA    *Pyelonephritis [N12]-  Continue IV ABX/ anfi-fungals. Patient afebrile. Likely able to go home but pain an issue. Unclear cause.  Yes    CKD (chronic kidney disease) stage 4, GFR 15-29 ml/min [N18.4]- Creatine stable for now. Monitor UOP and serial BMP and adjust therapy as needed. Renally dose meds.  Yes    Iron deficiency anemia [D50.9]- "   Current CBC reviewed-   Lab Results   Component Value Date    WBC 4.10 04/15/2017    HGB 8.4 (L) 04/15/2017    HCT 24.9 (L) 04/15/2017    MCV 96 04/15/2017     (L) 04/15/2017     Monitor serial CBC and transfuse if patient becomes hemodynamically unstable, symptomatic or H/H drops below 7/21.   Yes    Ureteral stricture s/p bilateral ureteral stents secondary to XRT for cervical cancer [N13.5]-  Severe. Kidney function stable, but ureteral pain seems localized to L side with unclear cause. Urology consulted but signed off. Will re-address on Monday. POtential stent exchange d/t symptoms.  Yes    Cervical cancer [C53.9]-  Chronic, defer treatment to outpatient oncolog/urology.  Yes      Resolved Hospital Problems    Diagnosis Date Resolved POA    Hypokalemia [E87.6] 04/15/2017 Yes         Disposition- Home    VTE Risk Mitigation         Ordered     enoxaparin injection 40 mg  Every 24 hours (non-standard times)     Route:  Subcutaneous        04/10/17 0952     Low Risk of VTE  Once      04/08/17 0209

## 2017-04-16 NOTE — PROGRESS NOTES
Dr. Ivan contacted regarding pt's request for additional nausea and pain medication, no new orders given at this time.

## 2017-04-16 NOTE — PLAN OF CARE
Problem: Patient Care Overview  Goal: Plan of Care Review  Outcome: Ongoing (interventions implemented as appropriate)  Patient with chronic complaint of pain, relates oxycodone nor ketorolac works.  Patient reassured, calming techniques used.

## 2017-04-17 VITALS
BODY MASS INDEX: 28.12 KG/M2 | DIASTOLIC BLOOD PRESSURE: 72 MMHG | HEIGHT: 66 IN | RESPIRATION RATE: 148 BRPM | TEMPERATURE: 99 F | HEART RATE: 95 BPM | WEIGHT: 175 LBS | OXYGEN SATURATION: 99 % | SYSTOLIC BLOOD PRESSURE: 110 MMHG

## 2017-04-17 LAB
ALBUMIN SERPL BCP-MCNC: 3.1 G/DL
ALP SERPL-CCNC: 61 U/L
ALT SERPL W/O P-5'-P-CCNC: 8 U/L
ANION GAP SERPL CALC-SCNC: 9 MMOL/L
AST SERPL-CCNC: 15 U/L
BACTERIA BLD CULT: NORMAL
BASOPHILS # BLD AUTO: 0.1 K/UL
BASOPHILS NFR BLD: 1 %
BILIRUB SERPL-MCNC: 0.4 MG/DL
BUN SERPL-MCNC: 11 MG/DL
CALCIUM SERPL-MCNC: 9 MG/DL
CHLORIDE SERPL-SCNC: 110 MMOL/L
CO2 SERPL-SCNC: 21 MMOL/L
CREAT SERPL-MCNC: 2.3 MG/DL
CRP SERPL-MCNC: 14.8 MG/L
DIFFERENTIAL METHOD: ABNORMAL
EOSINOPHIL # BLD AUTO: 0.7 K/UL
EOSINOPHIL NFR BLD: 13.8 %
ERYTHROCYTE [DISTWIDTH] IN BLOOD BY AUTOMATED COUNT: 13.6 %
EST. GFR  (AFRICAN AMERICAN): 30 ML/MIN/1.73 M^2
EST. GFR  (NON AFRICAN AMERICAN): 26 ML/MIN/1.73 M^2
GLUCOSE SERPL-MCNC: 91 MG/DL
HCT VFR BLD AUTO: 30.5 %
HGB BLD-MCNC: 10 G/DL
LYMPHOCYTES # BLD AUTO: 0.9 K/UL
LYMPHOCYTES NFR BLD: 16 %
MCH RBC QN AUTO: 32.2 PG
MCHC RBC AUTO-ENTMCNC: 32.9 %
MCV RBC AUTO: 98 FL
MONOCYTES # BLD AUTO: 0.3 K/UL
MONOCYTES NFR BLD: 5.4 %
NEUTROPHILS # BLD AUTO: 3.4 K/UL
NEUTROPHILS NFR BLD: 63.8 %
PLATELET # BLD AUTO: 196 K/UL
PMV BLD AUTO: 9.3 FL
POTASSIUM SERPL-SCNC: 4.7 MMOL/L
PROT SERPL-MCNC: 6.5 G/DL
RBC # BLD AUTO: 3.12 M/UL
SODIUM SERPL-SCNC: 140 MMOL/L
WBC # BLD AUTO: 5.4 K/UL

## 2017-04-17 PROCEDURE — 63600175 PHARM REV CODE 636 W HCPCS: Performed by: HOSPITALIST

## 2017-04-17 PROCEDURE — 80053 COMPREHEN METABOLIC PANEL: CPT

## 2017-04-17 PROCEDURE — 86140 C-REACTIVE PROTEIN: CPT

## 2017-04-17 PROCEDURE — 85025 COMPLETE CBC W/AUTO DIFF WBC: CPT

## 2017-04-17 PROCEDURE — 25000003 PHARM REV CODE 250: Performed by: HOSPITALIST

## 2017-04-17 PROCEDURE — 63600175 PHARM REV CODE 636 W HCPCS: Performed by: INTERNAL MEDICINE

## 2017-04-17 PROCEDURE — 25000003 PHARM REV CODE 250: Performed by: INTERNAL MEDICINE

## 2017-04-17 PROCEDURE — 25000003 PHARM REV CODE 250: Performed by: NURSE PRACTITIONER

## 2017-04-17 PROCEDURE — 99239 HOSP IP/OBS DSCHRG MGMT >30: CPT | Mod: ,,, | Performed by: INTERNAL MEDICINE

## 2017-04-17 PROCEDURE — 63600175 PHARM REV CODE 636 W HCPCS: Performed by: EMERGENCY MEDICINE

## 2017-04-17 PROCEDURE — 36415 COLL VENOUS BLD VENIPUNCTURE: CPT

## 2017-04-17 RX ORDER — LEVOFLOXACIN 250 MG/1
250 TABLET ORAL DAILY
Qty: 10 TABLET | Refills: 0 | Status: SHIPPED | OUTPATIENT
Start: 2017-04-17 | End: 2017-04-27

## 2017-04-17 RX ORDER — FLUCONAZOLE 100 MG/1
100 TABLET ORAL DAILY
Qty: 10 TABLET | Refills: 0 | Status: SHIPPED | OUTPATIENT
Start: 2017-04-17 | End: 2017-05-01

## 2017-04-17 RX ORDER — OXYCODONE AND ACETAMINOPHEN 10; 325 MG/1; MG/1
1 TABLET ORAL EVERY 6 HOURS PRN
Qty: 20 TABLET | Refills: 0 | Status: SHIPPED | OUTPATIENT
Start: 2017-04-17 | End: 2017-05-01 | Stop reason: ALTCHOICE

## 2017-04-17 RX ORDER — CYCLOBENZAPRINE HCL 10 MG
10 TABLET ORAL 2 TIMES DAILY PRN
Qty: 14 TABLET | Refills: 0 | Status: SHIPPED | OUTPATIENT
Start: 2017-04-17 | End: 2017-04-27

## 2017-04-17 RX ADMIN — OXYCODONE HYDROCHLORIDE 20 MG: 20 TABLET, FILM COATED, EXTENDED RELEASE ORAL at 10:04

## 2017-04-17 RX ADMIN — PANTOPRAZOLE SODIUM 40 MG: 40 TABLET, DELAYED RELEASE ORAL at 08:04

## 2017-04-17 RX ADMIN — KETOROLAC TROMETHAMINE 15 MG: 30 INJECTION, SOLUTION INTRAMUSCULAR at 05:04

## 2017-04-17 RX ADMIN — OXYCODONE HYDROCHLORIDE AND ACETAMINOPHEN 1 TABLET: 10; 325 TABLET ORAL at 01:04

## 2017-04-17 RX ADMIN — FLUCONAZOLE 200 MG: 100 TABLET ORAL at 08:04

## 2017-04-17 RX ADMIN — FERROUS SULFATE TAB EC 325 MG (65 MG FE EQUIVALENT) 325 MG: 325 (65 FE) TABLET DELAYED RESPONSE at 08:04

## 2017-04-17 RX ADMIN — ONDANSETRON 4 MG: 2 INJECTION INTRAMUSCULAR; INTRAVENOUS at 11:04

## 2017-04-17 RX ADMIN — ENOXAPARIN SODIUM 40 MG: 100 INJECTION SUBCUTANEOUS at 10:04

## 2017-04-17 RX ADMIN — STANDARDIZED SENNA CONCENTRATE AND DOCUSATE SODIUM 1 TABLET: 8.6; 5 TABLET, FILM COATED ORAL at 08:04

## 2017-04-17 RX ADMIN — KETOROLAC TROMETHAMINE 15 MG: 30 INJECTION, SOLUTION INTRAMUSCULAR at 11:04

## 2017-04-17 RX ADMIN — HYDROMORPHONE HYDROCHLORIDE 0.5 MG: 2 INJECTION INTRAMUSCULAR; INTRAVENOUS; SUBCUTANEOUS at 08:04

## 2017-04-17 NOTE — PROGRESS NOTES
"Progress Note  Hospital Medicine    Admit Date: 4/7/2017    SUBJECTIVE:     Follow-up For:  Pyelonephritis      Interval history (See H&P for complete P,F,SHx) : Patient still remains in severe pain associated with stent and urinary tract. UA came back with candida. Patient still asking specifically for IV narcotics and claiming nausea and vomiting with no emesis noted by nursing staff. Drug seeking behavior and bargaining noted. Long discussion regarding pain meds.    Review of Systems:   Pain scale: 5/10  Gen- Weakness/ Fatigue  - Flank pain        OBJECTIVE:     Vital Signs Range (Last 24H):  Temp:  [98.1 °F (36.7 °C)-98.8 °F (37.1 °C)]   Pulse:  []   Resp:  [18-20]   BP: ()/(58-79)   SpO2:  [100 %]     I & O (Last 24H):    Intake/Output Summary (Last 24 hours) at 04/16/17 2204  Last data filed at 04/16/17 1700   Gross per 24 hour   Intake             1430 ml   Output                0 ml   Net             1430 ml       Estimated body mass index is 28.25 kg/(m^2) as calculated from the following:    Height as of this encounter: 5' 6" (1.676 m).    Weight as of this encounter: 79.4 kg (175 lb).    Physical Exam:  General- Patient alert and oriented x3 in NAD  HEENT- PERRLA, EOMI, OP clear, MMM  Neck- No JVD, Lymphadenopathy, Thyromegaly  CV- Regular rate and rhythm, No Murmur/minerva/rubs  Resp- Lungs CTA Bilaterally, No increased WOB  GI- Non tender/non-distended, BS normoactive x4 quads, no HSM  Extrem- No cyanosis, clubbing, edema. Pulses 2+ and symmetric    Laboratory/Diagnostic Data:  Reviewed and noted in plan where applicable- Please see chart for full lab data.    Medications:  Medication list was reviewed and changes noted under Assessment/Plan.    ASSESSMENT/PLAN:     Active Problems:    Active Hospital Problems    Diagnosis  POA    *Pyelonephritis [N12]-  Continue IV ABX/ anfi-fungals. Patient afebrile. Likely able to go home but pain an issue. Unclear cause.  Yes    Opiate dependence-  " Seems to have pain and symptoms out of proportion with organic disease and exhibiting behaviors indicative of narcotic dependence. Added oxycodone ER and decrease dilaudid. Start PRN naloxone and educate patient on dangers of narcotics.      CKD (chronic kidney disease) stage 4, GFR 15-29 ml/min [N18.4]- Creatine stable for now. Monitor UOP and serial BMP and adjust therapy as needed. Renally dose meds.  Yes    Iron deficiency anemia [D50.9]-   Current CBC reviewed-   Lab Results   Component Value Date    WBC 4.00 04/16/2017    HGB 8.7 (L) 04/16/2017    HCT 27.1 (L) 04/16/2017    MCV 96 04/16/2017     04/16/2017     Monitor serial CBC and transfuse if patient becomes hemodynamically unstable, symptomatic or H/H drops below 7/21.   Yes    Ureteral stricture s/p bilateral ureteral stents secondary to XRT for cervical cancer [N13.5]-  Severe. Kidney function stable, but ureteral pain seems localized to L side with unclear cause. Urology consulted but signed off. Will re-address on Monday. Potential stent exchange d/t symptoms.  Yes    Cervical cancer [C53.9]-  Chronic, defer treatment to outpatient oncolog/urology.  Yes      Resolved Hospital Problems    Diagnosis Date Resolved POA    Hypokalemia [E87.6] 04/15/2017 Yes         Disposition- Home    VTE Risk Mitigation         Ordered     enoxaparin injection 40 mg  Every 24 hours (non-standard times)     Route:  Subcutaneous        04/10/17 0952     Low Risk of VTE  Once      04/08/17 020

## 2017-04-17 NOTE — PLAN OF CARE
"Problem: Patient Care Overview  Goal: Plan of Care Review  Outcome: Ongoing (interventions implemented as appropriate)  Pt vital signs stable at this time. Lt hand IV infusing antibiotics as ordered without any difficulty. Pt c/o abdominal pain and  requests pain meds and states "whatever i can have that is due" and pt is asking "what can i get next" when giving pain meds at that time. Visualized pt dozing off while eating and while pouring herself a soda over ice and pt was only given Toradol so far this shift.  Pt has been eating and drinking this shift without any nausea or vomiting. Call light within pt reach, pt instructed to call staff for any needed assistance, Q2hr frequent checks performed, safety maintained.Pt denies needs/concerns at this time, will continue to monitor.       "

## 2017-04-17 NOTE — PLAN OF CARE
Spoke with the pt along with Dr Nicholson regarding her discharge for today; the pt is sitting in the bedside chair eating a Snickers. She is requesting Flexerile along with po pain pills for discharge. She states when she goes to the ER they only give her 10-14 pills.   Dr Nicholson wants the pt to see a pain management doctor and to see Dr Bocanegra, her urologist in a week.   I scheduled the pt an appt with Dr Bocanegra on [place], 4/24/17 and put it on her AVS.   The only pain management clinic that accepts the pt's insurance is [facility] ([phone]); I left them a voice mail with the referral information and asked them to call the pt with an appt, this will also be documented on the pt's AVS.....GLADYS Edouard CM

## 2017-05-01 ENCOUNTER — OFFICE VISIT (OUTPATIENT)
Dept: UROLOGY | Facility: CLINIC | Age: 37
End: 2017-05-01
Attending: UROLOGY
Payer: MEDICARE

## 2017-05-01 VITALS
BODY MASS INDEX: 24.59 KG/M2 | WEIGHT: 153 LBS | DIASTOLIC BLOOD PRESSURE: 72 MMHG | HEIGHT: 66 IN | HEART RATE: 115 BPM | SYSTOLIC BLOOD PRESSURE: 108 MMHG

## 2017-05-01 DIAGNOSIS — N13.30 BILATERAL HYDRONEPHROSIS: Primary | ICD-10-CM

## 2017-05-01 LAB
BILIRUB SERPL-MCNC: ABNORMAL MG/DL
BLOOD URINE, POC: 250
COLOR, POC UA: YELLOW
GLUCOSE UR QL STRIP: ABNORMAL
KETONES UR QL STRIP: ABNORMAL
LEUKOCYTE ESTERASE URINE, POC: ABNORMAL
NITRITE, POC UA: ABNORMAL
PH, POC UA: 7
PROTEIN, POC: 30
SPECIFIC GRAVITY, POC UA: 1
UROBILINOGEN, POC UA: ABNORMAL

## 2017-05-01 PROCEDURE — 99213 OFFICE O/P EST LOW 20 MIN: CPT | Mod: PBBFAC | Performed by: UROLOGY

## 2017-05-01 PROCEDURE — 99214 OFFICE O/P EST MOD 30 MIN: CPT | Mod: S$PBB,,, | Performed by: UROLOGY

## 2017-05-01 PROCEDURE — 81002 URINALYSIS NONAUTO W/O SCOPE: CPT | Mod: PBBFAC | Performed by: UROLOGY

## 2017-05-01 PROCEDURE — 99999 PR PBB SHADOW E&M-EST. PATIENT-LVL III: CPT | Mod: PBBFAC,,, | Performed by: UROLOGY

## 2017-05-01 PROCEDURE — 87086 URINE CULTURE/COLONY COUNT: CPT

## 2017-05-01 RX ORDER — CIPROFLOXACIN 2 MG/ML
400 INJECTION, SOLUTION INTRAVENOUS
Status: CANCELLED | OUTPATIENT
Start: 2017-05-01

## 2017-05-01 NOTE — PROGRESS NOTES
"Subjective:      Cesilia Dozier is a 37 y.o. female who returns today regarding her ureteral stents.    She has h/o cervical cancer, treated elsewhere, w/ subsequent bilateral hydro treated in the past w/ PCNs and most recently w/ stents. Stents last changed in early March. Etiology and location of obstruction is unclear.    She continues to have pain and has been admitted twice for this in past 2 months. She states pain is in left upper back, worse early AM, and relieved w/ flexeril. She has decreased bladder pain and bladder pressure since last stent exchange. She remains on detrol.    She has appointments upcoming w/ GYN and oncology.    The following portions of the patient's history were reviewed and updated as appropriate: allergies, current medications, past family history, past medical history, past social history, past surgical history and problem list.    Review of Systems  A comprehensive multipoint review of systems was negative except as otherwise stated in the HPI.     Objective:   Vitals:   /72 (BP Location: Left arm, Patient Position: Sitting, BP Method: Automatic)  Pulse (!) 115  Ht 5' 6" (1.676 m)  Wt 69.4 kg (153 lb)  BMI 24.69 kg/m2    Physical Exam   General: alert and oriented, no acute distress  Respiratory: Symmetric expansion, non-labored breathing  Cardiovascular: no peripheral edema  Abdomen: soft, non distended  Skin: normal coloration and turgor, no rashes, no suspicious skin lesions noted  Neuro: no gross deficits  Psych: normal judgment and insight, normal mood/affect and non-anxious    Lab Review   Urinalysis demonstrates positive for red blood cells, WBCs, nitrites  Lab Results   Component Value Date    WBC 5.40 04/17/2017    HGB 10.0 (L) 04/17/2017    HCT 30.5 (L) 04/17/2017    MCV 98 04/17/2017     04/17/2017     Lab Results   Component Value Date    CREATININE 2.3 (H) 04/17/2017    BUN 11 04/17/2017       Imaging        Assessment and Plan:   1. Bilateral " hydronephrosis  - Reinforced need for stents for now to preserve renal function  - Long term, would likely benefit from reimplant, though she will need further workup prior to proceeding.  - Will schedule stent exchange for June 7     2. Stent pain/Bladder spasms  - Continue detrol  - Back pain today sounds MSK (early am, relieved w/ flexeril) - encouraged to discuss w/ PCP    3. Cervical carcinoma  - Will need to be cleared of need for additional treatment before we can proceed w/ possible reconstructive surgery - discussed this again today

## 2017-05-04 ENCOUNTER — OFFICE VISIT (OUTPATIENT)
Dept: OBSTETRICS AND GYNECOLOGY | Facility: CLINIC | Age: 37
End: 2017-05-04
Payer: MEDICARE

## 2017-05-04 VITALS
WEIGHT: 151.69 LBS | DIASTOLIC BLOOD PRESSURE: 90 MMHG | SYSTOLIC BLOOD PRESSURE: 117 MMHG | BODY MASS INDEX: 24.38 KG/M2 | HEART RATE: 100 BPM | HEIGHT: 66 IN

## 2017-05-04 DIAGNOSIS — R39.9 UTI SYMPTOMS: ICD-10-CM

## 2017-05-04 DIAGNOSIS — R10.2 PELVIC PAIN IN FEMALE: ICD-10-CM

## 2017-05-04 DIAGNOSIS — Z01.419 ENCOUNTER FOR WELL WOMAN EXAM: Primary | ICD-10-CM

## 2017-05-04 LAB
BACTERIA UR CULT: NORMAL
BILIRUB SERPL-MCNC: NEGATIVE MG/DL
BLOOD URINE, POC: 250
COLOR, POC UA: YELLOW
GLUCOSE UR QL STRIP: NORMAL
KETONES UR QL STRIP: NEGATIVE
LEUKOCYTE ESTERASE URINE, POC: ABNORMAL
NITRITE, POC UA: NEGATIVE
PH, POC UA: 7
PROTEIN, POC: 100
SPECIFIC GRAVITY, POC UA: 1
UROBILINOGEN, POC UA: NORMAL

## 2017-05-04 PROCEDURE — 88141 CYTOPATH C/V INTERPRET: CPT | Mod: ,,, | Performed by: PATHOLOGY

## 2017-05-04 PROCEDURE — 88175 CYTOPATH C/V AUTO FLUID REDO: CPT | Performed by: PATHOLOGY

## 2017-05-04 PROCEDURE — 99385 PREV VISIT NEW AGE 18-39: CPT | Mod: S$PBB,,, | Performed by: NURSE PRACTITIONER

## 2017-05-04 PROCEDURE — 99999 PR PBB SHADOW E&M-EST. PATIENT-LVL III: CPT | Mod: PBBFAC,,, | Performed by: NURSE PRACTITIONER

## 2017-05-04 PROCEDURE — 81002 URINALYSIS NONAUTO W/O SCOPE: CPT | Mod: PBBFAC,PO | Performed by: NURSE PRACTITIONER

## 2017-05-04 PROCEDURE — 99213 OFFICE O/P EST LOW 20 MIN: CPT | Mod: PBBFAC,PO | Performed by: NURSE PRACTITIONER

## 2017-05-04 PROCEDURE — 87624 HPV HI-RISK TYP POOLED RSLT: CPT

## 2017-05-04 NOTE — PROGRESS NOTES
Chief Complaint   Patient presents with    Well Woman     History of Present Illness: Cesilia Dozier is a 37 y.o. female that presents today 5/4/2017 for well gyn visit.  Pt here for well woman exam. She states that she hasn't had an exam in 2 years. She reports that she had a hysterectomy in 2015 for cervical cancer with one fallopian tube removal; ovaries were left intact. She reports that she was just in the hospital for pyelonephritis and gets frequent UTIs. She was seen by her urologist after being discharged. She had stents in her ureters and gets them changed every 3 months. She has been having some pelvic pain and vaginal pain for the past 1-2weeks, but she does report that when it gets around the time for her stents to be changed she starts to get these symptoms along with back pain. Pt denies any other complaints or concerns at this time.         Past Medical History:   Diagnosis Date    Anemia     Cervical cancer     cervical    Encounter for blood transfusion     Hydronephrosis     PONV (postoperative nausea and vomiting)     Pyelonephritis        Past Surgical History:   Procedure Laterality Date    COLONOSCOPY N/A 9/22/2016    Procedure: COLONOSCOPY;  Surgeon: Joe Moe MD;  Location: [facility];  Service: Endoscopy;  Laterality: N/A;    HYSTERECTOMY      Partial    TUBAL LIGATION      URETERAL STENT PLACEMENT  07/2016       Family History   Problem Relation Age of Onset    No Known Problems Mother     Drug abuse Father     No Known Problems Sister     Asthma Brother        Social History     Social History    Marital status:      Spouse name: N/A    Number of children: N/A    Years of education: N/A     Social History Main Topics    Smoking status: Never Smoker    Smokeless tobacco: Never Used    Alcohol use No    Drug use: No    Sexual activity: Yes     Partners: Male     Birth control/ protection: See Surgical Hx     Other Topics Concern    None     Social  "History Narrative       OB History    Para Term  AB SAB TAB Ectopic Multiple Living   5 0 0 0 0 0 0 0 0 5      # Outcome Date GA Lbr Solomon/2nd Weight Sex Delivery Anes PTL Lv   5       Vag-Spont   Y   4       Vag-Spont   Y   3       Vag-Spont   Y   2       Vag-Spont   Y   1       Vag-Spont   Y          Current Outpatient Prescriptions   Medication Sig Dispense Refill    tolterodine (DETROL LA) 4 MG 24 hr capsule Take 1 capsule (4 mg total) by mouth once daily. (Patient taking differently: Take 4 mg by mouth every evening. ) 30 capsule 11    ferrous sulfate 325 (65 FE) MG EC tablet Take 325 mg by mouth 3 (three) times daily with meals.       No current facility-administered medications for this visit.        Review of patient's allergies indicates:   Allergen Reactions    Pcn [penicillins] Anaphylaxis         Review of Symptoms:  GENERAL: Denies weight gain or weight loss. Feeling well overall.   SKIN: Denies rash or lesions.   ABDOMEN: No abdominal pain, constipation, diarrhea, nausea, vomiting or rectal bleeding.   URINARY: Reports frequency and back pain - was just hospitalized for pyelonephrititis.    BP (!) 117/90  Pulse 100  Ht 5' 6" (1.676 m)  Wt 68.8 kg (151 lb 10.8 oz)  LMP  (LMP Unknown)  BMI 24.48 kg/m2    Physical Exam:  APPEARANCE: Well nourished, well developed, in no acute distress.  SKIN: Normal skin turgor, no lesions.  RESPIRATORY: Normal respiratory effort with no retractions or use of accessory muscles  ABDOMEN: Soft. No tenderness or masses. No hepatosplenomegaly. No hernias.  BREASTS: Symmetrical, no skin changes or visible lesions. No palpable masses, nipple discharge or adenopathy bilaterally.  PELVIC: Normal external female genitalia without lesions. Normal hair distribution. Adequate perineal body, normal urethral meatus. Urethra with no masses.  Bladder nontender. Vagina moist and well rugated without lesions or discharge. No " significant cystocele or rectocele.  Adnexa without masses; + bilateral tenderness. Urethra and bladder normal. PAP of vagina done.       ASSESSMENT/PLAN:  Encounter for well woman exam  -     Liquid-based pap smear, screening    UTI symptoms  -     POCT URINE DIPSTICK WITHOUT MICROSCOPE    Pelvic pain in female  -     US Pelvis Limited Non OB; Future; Expected date: 5/4/17      POCT urine dipstick results messaged to Dr. Sheppard and [facility] to follow-up with pt.     Patient was counseled today on Pap guidelines, recommendation for yearly exams, mammograms starting annually at age 40, Colonoscopy after the age of 50, Dexa Bone Scan and calcium and vitamin D supplementation in menopause and to see her PCP for other health maintenance.       Follow-up:  Will follow-up once results are received  RTC if symptoms worsen or do not improve or as needed  RTC in 1 year for annual exam.

## 2017-05-10 ENCOUNTER — HOSPITAL ENCOUNTER (OUTPATIENT)
Dept: RADIOLOGY | Facility: HOSPITAL | Age: 37
Discharge: HOME | End: 2017-05-10
Attending: NURSE PRACTITIONER
Payer: MEDICARE

## 2017-05-10 DIAGNOSIS — R10.2 PELVIC PAIN IN FEMALE: ICD-10-CM

## 2017-05-10 PROCEDURE — 76856 US EXAM PELVIC COMPLETE: CPT | Mod: TC

## 2017-05-10 PROCEDURE — 76830 TRANSVAGINAL US NON-OB: CPT | Mod: 26,,, | Performed by: RADIOLOGY

## 2017-05-10 PROCEDURE — 76856 US EXAM PELVIC COMPLETE: CPT | Mod: 26,,, | Performed by: RADIOLOGY

## 2017-05-11 ENCOUNTER — TELEPHONE (OUTPATIENT)
Dept: OBSTETRICS AND GYNECOLOGY | Facility: CLINIC | Age: 37
End: 2017-05-11

## 2017-05-11 NOTE — TELEPHONE ENCOUNTER
Please call and inform pt that her pelvic ultrasound showed no abnormalities to be the cause of her pelvic pain.     I will keep in touch once her pap smear results come back.     Thanks!

## 2017-05-11 NOTE — TELEPHONE ENCOUNTER
Patient informed of results and recommendations as noted by BAUTISTA Shields NP. Patient verbalized understanding.

## 2017-05-12 ENCOUNTER — TELEPHONE (OUTPATIENT)
Dept: OBSTETRICS AND GYNECOLOGY | Facility: CLINIC | Age: 37
End: 2017-05-12

## 2017-05-12 LAB
HPV16 DNA SPEC QL NAA+PROBE: NEGATIVE
HPV16+18+H RISK 12 DNA CVX-IMP: NEGATIVE
HPV18 DNA SPEC QL NAA+PROBE: NEGATIVE

## 2017-05-12 NOTE — TELEPHONE ENCOUNTER
Please call pt and inform her that her pap smear came back slightly abnormal and was graded as ASC-US (atypical squamous cells of undetermined significance). When this is the grade of the pap, the lab automatically runs a screening for HPV. Her HPV results were negative. According to her age and these results it is recommended that she have a repeat pap smear in 1 year or have repeat pap with the HPV screening in 3 years.     Let me know if she has any other questions.     Thanks!

## 2017-05-14 ENCOUNTER — HOSPITAL ENCOUNTER (INPATIENT)
Facility: HOSPITAL | Age: 37
LOS: 3 days | Discharge: HOME | DRG: 690 | End: 2017-05-17
Attending: EMERGENCY MEDICINE | Admitting: INTERNAL MEDICINE
Payer: MEDICARE

## 2017-05-14 DIAGNOSIS — N12 PYELONEPHRITIS: Primary | ICD-10-CM

## 2017-05-14 LAB
ALBUMIN SERPL BCP-MCNC: 3.9 G/DL
ALP SERPL-CCNC: 67 U/L
ALT SERPL W/O P-5'-P-CCNC: 9 U/L
ANION GAP SERPL CALC-SCNC: 11 MMOL/L
AST SERPL-CCNC: 14 U/L
BACTERIA #/AREA URNS HPF: ABNORMAL /HPF
BASOPHILS # BLD AUTO: 0.1 K/UL
BASOPHILS NFR BLD: 1.5 %
BILIRUB SERPL-MCNC: 0.6 MG/DL
BILIRUB UR QL STRIP: NEGATIVE
BUN SERPL-MCNC: 15 MG/DL
CALCIUM SERPL-MCNC: 9.8 MG/DL
CHLORIDE SERPL-SCNC: 105 MMOL/L
CLARITY UR: ABNORMAL
CO2 SERPL-SCNC: 25 MMOL/L
COLOR UR: ABNORMAL
CREAT SERPL-MCNC: 2 MG/DL
DIFFERENTIAL METHOD: ABNORMAL
EOSINOPHIL # BLD AUTO: 0.8 K/UL
EOSINOPHIL NFR BLD: 10.5 %
ERYTHROCYTE [DISTWIDTH] IN BLOOD BY AUTOMATED COUNT: 13.6 %
EST. GFR  (AFRICAN AMERICAN): 36 ML/MIN/1.73 M^2
EST. GFR  (NON AFRICAN AMERICAN): 31 ML/MIN/1.73 M^2
GLUCOSE SERPL-MCNC: 85 MG/DL
GLUCOSE UR QL STRIP: NEGATIVE
HCT VFR BLD AUTO: 34.5 %
HGB BLD-MCNC: 11.5 G/DL
HGB UR QL STRIP: ABNORMAL
HYALINE CASTS #/AREA URNS LPF: 0 /LPF
KETONES UR QL STRIP: NEGATIVE
LEUKOCYTE ESTERASE UR QL STRIP: ABNORMAL
LYMPHOCYTES # BLD AUTO: 1.3 K/UL
LYMPHOCYTES NFR BLD: 18.2 %
MCH RBC QN AUTO: 32.7 PG
MCHC RBC AUTO-ENTMCNC: 33.4 %
MCV RBC AUTO: 98 FL
MICROSCOPIC COMMENT: ABNORMAL
MONOCYTES # BLD AUTO: 0.5 K/UL
MONOCYTES NFR BLD: 6.4 %
NEUTROPHILS # BLD AUTO: 4.6 K/UL
NEUTROPHILS NFR BLD: 63.4 %
NITRITE UR QL STRIP: NEGATIVE
PH UR STRIP: 7 [PH] (ref 5–8)
PLATELET # BLD AUTO: 237 K/UL
PMV BLD AUTO: 8.7 FL
POTASSIUM SERPL-SCNC: 4.2 MMOL/L
PROT SERPL-MCNC: 7.4 G/DL
PROT UR QL STRIP: ABNORMAL
RBC # BLD AUTO: 3.53 M/UL
RBC #/AREA URNS HPF: >100 /HPF (ref 0–4)
SODIUM SERPL-SCNC: 141 MMOL/L
SP GR UR STRIP: 1.01 (ref 1–1.03)
URN SPEC COLLECT METH UR: ABNORMAL
UROBILINOGEN UR STRIP-ACNC: NEGATIVE EU/DL
WBC # BLD AUTO: 7.2 K/UL
WBC #/AREA URNS HPF: >100 /HPF (ref 0–5)

## 2017-05-14 PROCEDURE — 80053 COMPREHEN METABOLIC PANEL: CPT

## 2017-05-14 PROCEDURE — 63600175 PHARM REV CODE 636 W HCPCS: Performed by: EMERGENCY MEDICINE

## 2017-05-14 PROCEDURE — 87086 URINE CULTURE/COLONY COUNT: CPT

## 2017-05-14 PROCEDURE — 96375 TX/PRO/DX INJ NEW DRUG ADDON: CPT

## 2017-05-14 PROCEDURE — 99285 EMERGENCY DEPT VISIT HI MDM: CPT | Mod: 25

## 2017-05-14 PROCEDURE — 81000 URINALYSIS NONAUTO W/SCOPE: CPT

## 2017-05-14 PROCEDURE — 25000003 PHARM REV CODE 250: Performed by: EMERGENCY MEDICINE

## 2017-05-14 PROCEDURE — 36415 COLL VENOUS BLD VENIPUNCTURE: CPT

## 2017-05-14 PROCEDURE — 12000002 HC ACUTE/MED SURGE SEMI-PRIVATE ROOM

## 2017-05-14 PROCEDURE — 85025 COMPLETE CBC W/AUTO DIFF WBC: CPT

## 2017-05-14 PROCEDURE — 96365 THER/PROPH/DIAG IV INF INIT: CPT

## 2017-05-14 RX ORDER — HYDROMORPHONE HYDROCHLORIDE 1 MG/ML
1 INJECTION, SOLUTION INTRAMUSCULAR; INTRAVENOUS; SUBCUTANEOUS
Status: COMPLETED | OUTPATIENT
Start: 2017-05-14 | End: 2017-05-14

## 2017-05-14 RX ADMIN — HYDROMORPHONE HYDROCHLORIDE 1 MG: 1 INJECTION, SOLUTION INTRAMUSCULAR; INTRAVENOUS; SUBCUTANEOUS at 10:05

## 2017-05-14 RX ADMIN — SODIUM CHLORIDE 1000 ML: 0.9 INJECTION, SOLUTION INTRAVENOUS at 11:05

## 2017-05-14 RX ADMIN — CEFTRIAXONE 1 G: 1 INJECTION, SOLUTION INTRAVENOUS at 11:05

## 2017-05-15 PROBLEM — R10.2 PELVIC PAIN SYNDROME: Status: ACTIVE | Noted: 2017-05-15

## 2017-05-15 PROBLEM — G89.4 CHRONIC PAIN SYNDROME: Status: ACTIVE | Noted: 2017-05-15

## 2017-05-15 LAB
ANION GAP SERPL CALC-SCNC: 7 MMOL/L
BASOPHILS # BLD AUTO: 0 K/UL
BASOPHILS NFR BLD: 0.5 %
BUN SERPL-MCNC: 14 MG/DL
CALCIUM SERPL-MCNC: 8.9 MG/DL
CHLORIDE SERPL-SCNC: 107 MMOL/L
CO2 SERPL-SCNC: 25 MMOL/L
CREAT SERPL-MCNC: 1.8 MG/DL
DIFFERENTIAL METHOD: ABNORMAL
EOSINOPHIL # BLD AUTO: 0.7 K/UL
EOSINOPHIL NFR BLD: 13 %
ERYTHROCYTE [DISTWIDTH] IN BLOOD BY AUTOMATED COUNT: 13.3 %
EST. GFR  (AFRICAN AMERICAN): 41 ML/MIN/1.73 M^2
EST. GFR  (NON AFRICAN AMERICAN): 35 ML/MIN/1.73 M^2
GLUCOSE SERPL-MCNC: 74 MG/DL
HCT VFR BLD AUTO: 31.3 %
HGB BLD-MCNC: 10.6 G/DL
LYMPHOCYTES # BLD AUTO: 1.1 K/UL
LYMPHOCYTES NFR BLD: 20.9 %
MCH RBC QN AUTO: 32.9 PG
MCHC RBC AUTO-ENTMCNC: 33.7 %
MCV RBC AUTO: 98 FL
MONOCYTES # BLD AUTO: 0.4 K/UL
MONOCYTES NFR BLD: 6.5 %
NEUTROPHILS # BLD AUTO: 3.2 K/UL
NEUTROPHILS NFR BLD: 59.1 %
PLATELET # BLD AUTO: 199 K/UL
PMV BLD AUTO: 8.8 FL
POTASSIUM SERPL-SCNC: 3.7 MMOL/L
RBC # BLD AUTO: 3.2 M/UL
SODIUM SERPL-SCNC: 139 MMOL/L
WBC # BLD AUTO: 5.4 K/UL

## 2017-05-15 PROCEDURE — 25000003 PHARM REV CODE 250: Performed by: NURSE PRACTITIONER

## 2017-05-15 PROCEDURE — 63600175 PHARM REV CODE 636 W HCPCS: Performed by: NURSE PRACTITIONER

## 2017-05-15 PROCEDURE — 12000002 HC ACUTE/MED SURGE SEMI-PRIVATE ROOM

## 2017-05-15 PROCEDURE — 36415 COLL VENOUS BLD VENIPUNCTURE: CPT

## 2017-05-15 PROCEDURE — 63600175 PHARM REV CODE 636 W HCPCS: Performed by: INTERNAL MEDICINE

## 2017-05-15 PROCEDURE — 80048 BASIC METABOLIC PNL TOTAL CA: CPT

## 2017-05-15 PROCEDURE — 99222 1ST HOSP IP/OBS MODERATE 55: CPT | Mod: ,,, | Performed by: INTERNAL MEDICINE

## 2017-05-15 PROCEDURE — 85025 COMPLETE CBC W/AUTO DIFF WBC: CPT

## 2017-05-15 RX ORDER — METOCLOPRAMIDE HYDROCHLORIDE 5 MG/ML
5 INJECTION INTRAMUSCULAR; INTRAVENOUS EVERY 6 HOURS PRN
Status: DISCONTINUED | OUTPATIENT
Start: 2017-05-15 | End: 2017-05-17 | Stop reason: HOSPADM

## 2017-05-15 RX ORDER — ZOLPIDEM TARTRATE 5 MG/1
5 TABLET ORAL NIGHTLY PRN
Status: DISCONTINUED | OUTPATIENT
Start: 2017-05-15 | End: 2017-05-17 | Stop reason: HOSPADM

## 2017-05-15 RX ORDER — ENOXAPARIN SODIUM 100 MG/ML
30 INJECTION SUBCUTANEOUS EVERY 24 HOURS
Status: DISCONTINUED | OUTPATIENT
Start: 2017-05-15 | End: 2017-05-15

## 2017-05-15 RX ORDER — ONDANSETRON 2 MG/ML
4 INJECTION INTRAMUSCULAR; INTRAVENOUS EVERY 4 HOURS PRN
Status: DISCONTINUED | OUTPATIENT
Start: 2017-05-15 | End: 2017-05-15

## 2017-05-15 RX ORDER — PROMETHAZINE HYDROCHLORIDE 25 MG/1
25 SUPPOSITORY RECTAL EVERY 6 HOURS PRN
Status: DISCONTINUED | OUTPATIENT
Start: 2017-05-15 | End: 2017-05-17 | Stop reason: HOSPADM

## 2017-05-15 RX ORDER — ENOXAPARIN SODIUM 100 MG/ML
40 INJECTION SUBCUTANEOUS EVERY 24 HOURS
Status: DISCONTINUED | OUTPATIENT
Start: 2017-05-15 | End: 2017-05-17 | Stop reason: HOSPADM

## 2017-05-15 RX ORDER — SODIUM CHLORIDE 9 MG/ML
INJECTION, SOLUTION INTRAVENOUS CONTINUOUS
Status: DISCONTINUED | OUTPATIENT
Start: 2017-05-15 | End: 2017-05-17 | Stop reason: HOSPADM

## 2017-05-15 RX ORDER — HYDROMORPHONE HYDROCHLORIDE 2 MG/ML
1 INJECTION, SOLUTION INTRAMUSCULAR; INTRAVENOUS; SUBCUTANEOUS EVERY 6 HOURS PRN
Status: DISCONTINUED | OUTPATIENT
Start: 2017-05-15 | End: 2017-05-15

## 2017-05-15 RX ORDER — HYDROMORPHONE HYDROCHLORIDE 2 MG/ML
1 INJECTION, SOLUTION INTRAMUSCULAR; INTRAVENOUS; SUBCUTANEOUS EVERY 4 HOURS PRN
Status: DISCONTINUED | OUTPATIENT
Start: 2017-05-15 | End: 2017-05-17 | Stop reason: HOSPADM

## 2017-05-15 RX ADMIN — METOCLOPRAMIDE 5 MG: 5 INJECTION, SOLUTION INTRAMUSCULAR; INTRAVENOUS at 09:05

## 2017-05-15 RX ADMIN — CEFTRIAXONE 1 G: 1 INJECTION, SOLUTION INTRAVENOUS at 03:05

## 2017-05-15 RX ADMIN — ZOLPIDEM TARTRATE 5 MG: 5 TABLET, FILM COATED ORAL at 10:05

## 2017-05-15 RX ADMIN — HYDROMORPHONE HYDROCHLORIDE 1 MG: 2 INJECTION INTRAMUSCULAR; INTRAVENOUS; SUBCUTANEOUS at 05:05

## 2017-05-15 RX ADMIN — HYDROMORPHONE HYDROCHLORIDE 1 MG: 2 INJECTION INTRAMUSCULAR; INTRAVENOUS; SUBCUTANEOUS at 10:05

## 2017-05-15 RX ADMIN — ENOXAPARIN SODIUM 40 MG: 100 INJECTION SUBCUTANEOUS at 06:05

## 2017-05-15 RX ADMIN — SODIUM CHLORIDE: 0.9 INJECTION, SOLUTION INTRAVENOUS at 12:05

## 2017-05-15 RX ADMIN — HYDROMORPHONE HYDROCHLORIDE 1 MG: 2 INJECTION INTRAMUSCULAR; INTRAVENOUS; SUBCUTANEOUS at 01:05

## 2017-05-15 RX ADMIN — HYDROMORPHONE HYDROCHLORIDE 1 MG: 2 INJECTION INTRAMUSCULAR; INTRAVENOUS; SUBCUTANEOUS at 06:05

## 2017-05-15 RX ADMIN — PROMETHAZINE HYDROCHLORIDE 25 MG: 25 SUPPOSITORY RECTAL at 10:05

## 2017-05-15 RX ADMIN — HYDROMORPHONE HYDROCHLORIDE 1 MG: 2 INJECTION INTRAMUSCULAR; INTRAVENOUS; SUBCUTANEOUS at 09:05

## 2017-05-15 NOTE — ED PROVIDER NOTES
"Encounter Date: 5/14/2017    SCRIBE #1 NOTE: I, Manisha Jeff , am scribing for, and in the presence of,  Dr. Jain . I have scribed the entire note.       History     Chief Complaint   Patient presents with    Pelvic Pain     this morning     Vomiting    Vaginal Bleeding     Admits to bright red blood when wiping and hx of ALISON     Review of patient's allergies indicates:   Allergen Reactions    Pcn [penicillins] Anaphylaxis     HPI Comments:     05/14/2017  9:56 PM     Chief Complaint: Pelvic pain       The patient is a 37 y.o. female with a PMHx of cervical CA, hydronephrosis, and anemia who is presenting with the acute exacerbation of chronic pelvic pain that began this morning. The pt notes that the pain is constant, severe, and present diffusely across the suprapubic and LLQ. No exacerbating or mitigating factors. The pt additionally endorses nausea, L CVA tenderness, x 1 episode of emesis, and blood on tissue when wiping after urination.  The pt was admitted x 1 month ago for tx of pyelonephritis, and notes that she gets "frequent UTIs". Pertinent past surgical hx includes hysterectomy, ureteral stent placement, and tubal ligation.              The history is provided by the patient and medical records.     Past Medical History:   Diagnosis Date    Anemia     Cervical cancer     cervical    Encounter for blood transfusion     Hydronephrosis     PONV (postoperative nausea and vomiting)     Pyelonephritis      Past Surgical History:   Procedure Laterality Date    COLONOSCOPY N/A 9/22/2016    Procedure: COLONOSCOPY;  Surgeon: Joe Moe MD;  Location: [facility];  Service: Endoscopy;  Laterality: N/A;    HYSTERECTOMY      Partial    TUBAL LIGATION      URETERAL STENT PLACEMENT  07/2016     Family History   Problem Relation Age of Onset    No Known Problems Mother     Drug abuse Father     No Known Problems Sister     Asthma Brother      Social History   Substance Use Topics    Smoking " status: Never Smoker    Smokeless tobacco: Never Used    Alcohol use No     Review of Systems   Constitutional: Positive for appetite change. Negative for chills.   Gastrointestinal: Positive for abdominal pain, nausea and vomiting.   Genitourinary: Positive for flank pain.        Pelvic pain    All other systems reviewed and are negative.      Physical Exam   Initial Vitals   BP Pulse Resp Temp SpO2   05/14/17 1949 05/14/17 1949 05/14/17 1949 05/14/17 1949 05/14/17 1949   126/90 123 16 98.6 °F (37 °C) 100 %     Physical Exam    Nursing note and vitals reviewed.  Constitutional: She appears well-developed and well-nourished. She is not diaphoretic. No distress.   HENT:   Head: Normocephalic and atraumatic.   Eyes: EOM are normal.   Neck: Normal range of motion. Neck supple.   Cardiovascular: Normal rate and regular rhythm. Exam reveals no gallop and no friction rub.    No murmur heard.  Pulmonary/Chest: No respiratory distress.   Abdominal: There is tenderness.   Suprapubic and LLQ abdominal tenderness. No rebound or guarding.    Musculoskeletal: Normal range of motion.   Neurological: She is alert and oriented to person, place, and time.   Skin: Skin is warm and dry.   Psychiatric: She has a normal mood and affect. Her behavior is normal. Judgment and thought content normal.         ED Course   Procedures  Labs Reviewed   CBC W/ AUTO DIFFERENTIAL - Abnormal; Notable for the following:        Result Value    RBC 3.53 (*)     Hemoglobin 11.5 (*)     Hematocrit 34.5 (*)     MCH 32.7 (*)     MPV 8.7 (*)     Eos # 0.8 (*)     Eosinophil% 10.5 (*)     All other components within normal limits   COMPREHENSIVE METABOLIC PANEL - Abnormal; Notable for the following:     Creatinine 2.0 (*)     ALT 9 (*)     eGFR if  36 (*)     eGFR if non  31 (*)     All other components within normal limits   URINALYSIS             Medical Decision Making:   History:   Old Medical Records: I decided to  obtain old medical records.  Old Records Summarized: records from clinic visits.       <> Summary of Records: Urology and OB/GYN notes reviewed  Clinical Tests:   Lab Tests: Ordered and Reviewed  Radiological Study: Ordered and Reviewed            Scribe Attestation:   Scribe #1: I performed the above scribed service and the documentation accurately describes the services I performed. I attest to the accuracy of the note.    Attending Attestation:           Physician Attestation for Scribe:  Physician Attestation Statement for Scribe #1: I, Dr. Jain , reviewed documentation, as scribed by Manisha Jeff  in my presence, and it is both accurate and complete.                 ED Course     Clinical Impression:   There were no encounter diagnoses.      37-year-old female with cervical cancer and hydronephrosis with ureteral stents presents to the emergency room with left flank and left lower quadrant pain consistent with prior episodes of pyelonephritis.  Triage note states possible vaginal bleeding with the patient states this was blood on the toilet paper after she urinated and not vaginal bleeding.  Patient just had a pelvic exam and I see no reason to repeat this as she has had a hysterectomy.  I don't see any reason to do another CT of the abdomen and pelvis as she has had many and they all persistently show only hydronephrosis.  Urinalysis consistent with pyelonephritis.  Patient will be given IV antibiotics and admitted to hospital medicine.  Marnie Fortune of [facility] medicine consulted for admission.  No sign of sepsis in the ER.     Zechariah Jain MD  05/14/17 [id number]

## 2017-05-15 NOTE — CONSULTS
DATE OF CONSULTATION:  05/15/2017    ATTENDING PHYSICIAN:  Cathy Nicholson M.D.    CONSULTANT:  Dina Greco M.D.    CHIEF COMPLAINT:  Left flank pain, which is the reason for admission.    HISTORY OF PRESENT ILLNESS:  This 37-year-old female was admitted with a several   day history of worsening left flank pain radiating to the left lower abdomen.    She was hospitalized with similar complaints on 04/07/2017.  The patient's   history is significant in that she was diagnosed with cervical carcinoma, which   resulted in bilateral ureteral obstruction due to extrinsic decompression, which   she has required ureteral stent placements, which are changed every 2 to 3   months.  For the cervical carcinoma, she did undergo hysterectomy in 2014 and   subsequently underwent external beam radiation therapy and chemotherapy in 2015.    She was living in [place] for the hysterectomy and she was living in [place], [place] for the chemotherapy and radiation.  Since she has moved to this   area, she only saw a gynecologist for the first time recently in Dr. Neville's   office and she is yet to see an oncologist, and she states she has an   appointment [facility].  She also sees   Dr. Bocanegra, urologist at [facility] in [place], who has been   exchanging her stents, most recently in March of 2017 and is schedule to change   that again next month in June 2017.  She has had hospitalizations in the past   for episodes of pyelonephritis, for which she has required antibiotic   treatments.  She overall states she is feeling somewhat better on today's visit.    MEDICAL HISTORY:  Essentially unchanged and clearly documented in the records.    PHYSICAL EXAMINATION:  Abdomen is soft, but there is fairly significant   tenderness in the left flank and left lower quadrant, although feeling somewhat   better on today's visit.    LABORATORY WORK:  Reveals urine cultures still pending.  BUN  of 14, creatinine   1.8.  Electrolytes within satisfactory range.  WBC 5.4, hemoglobin 10.6,   hematocrit 31.3, platelets 199.  Urinalysis does reveal 3+ blood, 3+ leukocytes   with over 100 rbc's and over 100 wbc's, and many bacteria.    FINAL IMPRESSION:  Left flank pain, left lower quadrant pain, possible   pyelonephritis, possible obstruction of the left ureteral stent, for which she   needs to be evaluated; evidence of urinary tract infection; history of cervical   carcinoma with bilateral ureteral obstruction, for which she has required   ureteral stents, which are replaced on a regular basis that has been managed   with Dr. Bocanegra at [facility].    RECOMMENDATIONS:  We will obtain a CT scan without contrast to evaluate the   status of the kidneys and the stents to be sure they are not obstructed and we   may not need to change it sooner than scheduled.  Continue with antibiotic   coverage while awaiting the final urine culture.  Will, otherwise, follow with   you and make further recommendations as deemed necessary.    Thank you for this consult.      TERESA  dd: 05/15/2017 13:43:29 (CDT)  td: 05/15/2017 14:39:13 (CDT)  Doc ID   #[id number]  Job ID #[id number]    CC:

## 2017-05-15 NOTE — SUBJECTIVE & OBJECTIVE
Past Medical History:   Diagnosis Date    Anemia     Cervical cancer     cervical    Encounter for blood transfusion     Hydronephrosis     PONV (postoperative nausea and vomiting)     Pyelonephritis        Past Surgical History:   Procedure Laterality Date    COLONOSCOPY N/A 9/22/2016    Procedure: COLONOSCOPY;  Surgeon: Joe Moe MD;  Location: [facility];  Service: Endoscopy;  Laterality: N/A;    HYSTERECTOMY      Partial    TUBAL LIGATION      URETERAL STENT PLACEMENT  07/2016       Review of patient's allergies indicates:   Allergen Reactions    Pcn [penicillins] Anaphylaxis       No current facility-administered medications on file prior to encounter.      Current Outpatient Prescriptions on File Prior to Encounter   Medication Sig    tolterodine (DETROL LA) 4 MG 24 hr capsule Take 1 capsule (4 mg total) by mouth once daily. (Patient taking differently: Take 4 mg by mouth every evening. )     Family History     Problem Relation (Age of Onset)    Asthma Brother    Drug abuse Father    No Known Problems Mother, Sister        Social History Main Topics    Smoking status: Never Smoker    Smokeless tobacco: Never Used    Alcohol use No    Drug use: No    Sexual activity: Yes     Partners: Male     Birth control/ protection: See Surgical Hx     Review of Systems   Constitutional: Positive for appetite change. Negative for chills and fever.   HENT: Negative for congestion and postnasal drip.    Eyes: Negative for visual disturbance.   Respiratory: Negative for shortness of breath.    Cardiovascular: Negative for chest pain and leg swelling.   Gastrointestinal: Positive for abdominal pain, nausea and vomiting. Negative for blood in stool, constipation and diarrhea.   Genitourinary: Positive for flank pain and pelvic pain. Negative for dysuria.   Musculoskeletal: Negative for arthralgias.   Neurological: Negative for dizziness, seizures, syncope and headaches.   Hematological: Does not  bruise/bleed easily.   Psychiatric/Behavioral: Negative for confusion and hallucinations.     Objective:     Vital Signs (Most Recent):  Temp: 98.7 °F (37.1 °C) (05/14/17 2340)  Pulse: 98 (05/14/17 2340)  Resp: 16 (05/14/17 2340)  BP: 115/79 (05/14/17 2340)  SpO2: 98 % (05/14/17 2340) Vital Signs (24h Range):  Temp:  [98.6 °F (37 °C)-98.7 °F (37.1 °C)] 98.7 °F (37.1 °C)  Pulse:  [] 98  Resp:  [16] 16  SpO2:  [98 %-100 %] 98 %  BP: (115-126)/(79-90) 115/79     Weight: 74.8 kg (165 lb)  Body mass index is 26.63 kg/(m^2).    Physical Exam   Constitutional: She is oriented to person, place, and time. No distress.   HENT:   Head: Normocephalic.   Eyes: Pupils are equal, round, and reactive to light.   Neck: Normal range of motion. Neck supple. No JVD present. No tracheal deviation present.   Cardiovascular: Normal rate, regular rhythm, normal heart sounds and intact distal pulses.    No murmur heard.  Pulmonary/Chest: Effort normal and breath sounds normal. No respiratory distress.   Abdominal: Soft. Bowel sounds are normal. She exhibits no distension. There is tenderness in the suprapubic area and left lower quadrant. There is no guarding.   Musculoskeletal: Normal range of motion. She exhibits no edema.   Neurological: She is alert and oriented to person, place, and time.   Skin: Skin is warm, dry and intact.   Psychiatric: She has a normal mood and affect. Her behavior is normal. Judgment and thought content normal.        Significant Labs:   CBC:   Recent Labs  Lab 05/14/17 2230   WBC 7.20   HGB 11.5*   HCT 34.5*        CMP:   Recent Labs  Lab 05/14/17 2230      K 4.2      CO2 25   GLU 85   BUN 15   CREATININE 2.0*   CALCIUM 9.8   PROT 7.4   ALBUMIN 3.9   BILITOT 0.6   ALKPHOS 67   AST 14   ALT 9*   ANIONGAP 11   EGFRNONAA 31*     Urine Studies:   Recent Labs  Lab 05/14/17  2256   COLORU Brown*   APPEARANCEUA Cloudy*   PHUR 7.0   SPECGRAV 1.015   PROTEINUA 2+*   GLUCUA Negative   KETONESU  Negative   BILIRUBINUA Negative   OCCULTUA 3+*   NITRITE Negative   UROBILINOGEN Negative   LEUKOCYTESUR 3+*   RBCUA >100*   WBCUA >100*   BACTERIA Many*   HYALINECASTS 0       Significant Imaging: None

## 2017-05-15 NOTE — ASSESSMENT & PLAN NOTE
Likely due to ureteral stents  Last CT scan Abd negative for abnormalities or any cause of pelvic pain  Pelvic ultrasound negative for anything abnormal or that would cause pelvic pain  Patient has had a CT Abd/pelvis monthly  for past 3 months.  No imaging done at this time due to last CT and Pelvic ultarsound are negative  Pain management while in the hospital

## 2017-05-15 NOTE — PROGRESS NOTES
The enoxaparin dose has been adjusted for Cesilia Dozier [id number] according to the pharmacy practice protocol.      Based on the patient's Estimated Creatinine Clearance: 43.8 mL/min (based on Cr of 1.8)., Body mass index is 26.63 kg/(m^2)., and weight= 74.8 kg (165 lb), the enoxaparin dose has been adjusted 40 mg every 24 hours for CrCl >30.    Thank you,  Howie Farley, Pharmacist

## 2017-05-15 NOTE — ASSESSMENT & PLAN NOTE
IV rocephin  Culture urine  Consult Dr. granados--ureteral stents  Defer any further imaging to urology

## 2017-05-15 NOTE — ED NOTES
Patient identifiers for Cesilia Dozier checked and correct.  LOC:  Patient is awake, alert, and aware of environment with an appropriate affect. Patient is oriented x 3 and speaking appropriately.  APPEARANCE:  Patient resting comfortably and in no acute distress. Patient is clean and well groomed, patient's clothing is properly fastened.  SKIN:  The skin is warm and dry. Patient has normal skin turgor and moist mucus membranes. Skin is intact; no bruising or breakdown noted.  MUSCULOSKELETAL:  Patient is moving all extremities well, no obvious deformities noted. Pulses intact. Pt c/o pain to L flank area, radiates to mayra groin.   RESPIRATORY:  Airway is open and patent. Respirations are spontaneous and non-labored with normal effort and rate.  CARDIAC:  Patient has a normal rate and rhythm. No peripheral edema noted. Capillary refill < 3 seconds.  ABDOMEN:  No distention noted.  Soft and non-tender upon palpation.  NEUROLOGICAL:  PERRL. Facial expression is symmetrical. Hand grasps are equal bilaterally. Normal sensation in all extremities when touched with finger.  Allergies reported:   Review of patient's allergies indicates:   Allergen Reactions    Pcn [penicillins] Anaphylaxis     OTHER NOTES: Pt presents to ER with c/o L flank pain that radiates to mayra groin. Pt also said reports bleeding when wiping after using the bathroom. Pt reports she does not have a uterus so this is very unusual for her. Pt in bed lowest position, side rails up x2, locked, call light within reach. Will continue to monitor.

## 2017-05-15 NOTE — H&P
[facility] Hospital Medicine  History & Physical    Patient Name: Cesilia Dozier  MRN: [id number]  Admission Date: 5/14/2017  Attending Physician: Cathy Nicholson MD   Primary Care Provider: Harsha Sheppard MD         Patient information was obtained from patient and ER records.     Subjective:     Principal Problem:UTI (urinary tract infection)    Chief Complaint:   Chief Complaint   Patient presents with    Pelvic Pain     this morning     Vomiting    Vaginal Bleeding     Admits to bright red blood when wiping and hx of ALISON        HPI: Ms. Dozier is a 38yo AAF with a PMH cervical CA, Hydronephrosis, Ureteral strictures with stents, and recurrent pyelonephritis. She is currently in remission for Cervical CA and was last discharged from the hospital 4/17/17 after being treated for pyelonephritis and UTI (+) Candida Orthopsilosis. She presents to the hospital today with c/o acute suprapubic pain. She has chronic pelvic pain, but it became severe this morning. It radiates across to LLQ and to left flank. Associated symptoms include nausea, vomited x1, and hematuria. She seen her urologist, Dr. Fournier, on 5/7/17 with the plan to have a stent exchange on 6/7/17. She also seen gynecology on 5/4/17, and had a pelvic ultrasound on which was negative for any masses. Her UA in the ED today is (+) UTI. She reports that her pain is improved since being in the hospital.        Past Medical History:   Diagnosis Date    Anemia     Cervical cancer     cervical    Encounter for blood transfusion     Hydronephrosis     PONV (postoperative nausea and vomiting)     Pyelonephritis        Past Surgical History:   Procedure Laterality Date    COLONOSCOPY N/A 9/22/2016    Procedure: COLONOSCOPY;  Surgeon: Joe Moe MD;  Location: [facility];  Service: Endoscopy;  Laterality: N/A;    HYSTERECTOMY      Partial    TUBAL LIGATION      URETERAL STENT PLACEMENT  07/2016       Review of patient's allergies  indicates:   Allergen Reactions    Pcn [penicillins] Anaphylaxis       No current facility-administered medications on file prior to encounter.      Current Outpatient Prescriptions on File Prior to Encounter   Medication Sig    tolterodine (DETROL LA) 4 MG 24 hr capsule Take 1 capsule (4 mg total) by mouth once daily. (Patient taking differently: Take 4 mg by mouth every evening. )     Family History     Problem Relation (Age of Onset)    Asthma Brother    Drug abuse Father    No Known Problems Mother, Sister        Social History Main Topics    Smoking status: Never Smoker    Smokeless tobacco: Never Used    Alcohol use No    Drug use: No    Sexual activity: Yes     Partners: Male     Birth control/ protection: See Surgical Hx     Review of Systems   Constitutional: Positive for appetite change. Negative for chills and fever.   HENT: Negative for congestion and postnasal drip.    Eyes: Negative for visual disturbance.   Respiratory: Negative for shortness of breath.    Cardiovascular: Negative for chest pain and leg swelling.   Gastrointestinal: Positive for abdominal pain, nausea and vomiting. Negative for blood in stool, constipation and diarrhea.   Genitourinary: Positive for flank pain and pelvic pain. Negative for dysuria.   Musculoskeletal: Negative for arthralgias.   Neurological: Negative for dizziness, seizures, syncope and headaches.   Hematological: Does not bruise/bleed easily.   Psychiatric/Behavioral: Negative for confusion and hallucinations.     Objective:     Vital Signs (Most Recent):  Temp: 98.7 °F (37.1 °C) (05/14/17 2340)  Pulse: 98 (05/14/17 2340)  Resp: 16 (05/14/17 2340)  BP: 115/79 (05/14/17 2340)  SpO2: 98 % (05/14/17 2340) Vital Signs (24h Range):  Temp:  [98.6 °F (37 °C)-98.7 °F (37.1 °C)] 98.7 °F (37.1 °C)  Pulse:  [] 98  Resp:  [16] 16  SpO2:  [98 %-100 %] 98 %  BP: (115-126)/(79-90) 115/79     Weight: 74.8 kg (165 lb)  Body mass index is 26.63 kg/(m^2).    Physical Exam    Constitutional: She is oriented to person, place, and time. No distress.   HENT:   Head: Normocephalic.   Eyes: Pupils are equal, round, and reactive to light.   Neck: Normal range of motion. Neck supple. No JVD present. No tracheal deviation present.   Cardiovascular: Normal rate, regular rhythm, normal heart sounds and intact distal pulses.    No murmur heard.  Pulmonary/Chest: Effort normal and breath sounds normal. No respiratory distress.   Abdominal: Soft. Bowel sounds are normal. She exhibits no distension. There is tenderness in the suprapubic area and left lower quadrant. There is no guarding.   Musculoskeletal: Normal range of motion. She exhibits no edema.   Neurological: She is alert and oriented to person, place, and time.   Skin: Skin is warm, dry and intact.   Psychiatric: She has a normal mood and affect. Her behavior is normal. Judgment and thought content normal.        Significant Labs:   CBC:   Recent Labs  Lab 05/14/17  2230   WBC 7.20   HGB 11.5*   HCT 34.5*        CMP:   Recent Labs  Lab 05/14/17  2230      K 4.2      CO2 25   GLU 85   BUN 15   CREATININE 2.0*   CALCIUM 9.8   PROT 7.4   ALBUMIN 3.9   BILITOT 0.6   ALKPHOS 67   AST 14   ALT 9*   ANIONGAP 11   EGFRNONAA 31*     Urine Studies:   Recent Labs  Lab 05/14/17  2256   COLORU Brown*   APPEARANCEUA Cloudy*   PHUR 7.0   SPECGRAV 1.015   PROTEINUA 2+*   GLUCUA Negative   KETONESU Negative   BILIRUBINUA Negative   OCCULTUA 3+*   NITRITE Negative   UROBILINOGEN Negative   LEUKOCYTESUR 3+*   RBCUA >100*   WBCUA >100*   BACTERIA Many*   HYALINECASTS 0       Significant Imaging: None    Assessment/Plan:     * UTI (urinary tract infection)  IV rocephin  Culture urine  Consult Dr. granados--ureteral stents  Defer any further imaging to urology        CKD (chronic kidney disease) stage 4, GFR 15-29 ml/min  Stable.  Renal dose medications  IVF hydration to maintain perfusion  Monitor labs      Pelvic pain syndrome  Likely due to  ureteral stents  Last CT scan Abd negative for abnormalities or any cause of pelvic pain  Pelvic ultrasound negative for anything abnormal or that would cause pelvic pain  Patient has had a CT Abd/pelvis monthly  for past 3 months.  No imaging done at this time due to last CT and Pelvic ultarsound are negative  Pain management while in the hospital        VTE Risk Mitigation         Ordered     enoxaparin injection 30 mg  Daily     Route:  Subcutaneous        05/15/17 0029     High Risk of VTE  Once      05/15/17 0029        Marnie Fortune NP  Department of Hospital Medicine   [facility]

## 2017-05-15 NOTE — PLAN OF CARE
Problem: Patient Care Overview  Goal: Plan of Care Review  Resting quietly during two hour rounds.  Pain controlled with PRN pain medication. No falls or trauma this shift.Pt. Verbalizes understanding of their plan of care.

## 2017-05-15 NOTE — PLAN OF CARE
Problem: Patient Care Overview  Goal: Plan of Care Review  Outcome: Ongoing (interventions implemented as appropriate)  A&Ox3. Continent of b&b, ambulates to bathroom. No BM noted during shift. Cont on IV fluids. Pt c/o left flank and left pelvic pain, adm prn IV meds, medication effective. Urology consult,  seen pt today. CT done. Remains free from falls. Bed in low position, call light within reach. Verbalized understanding of plan of care.

## 2017-05-15 NOTE — PLAN OF CARE
The pt lives at home with her spouse and two teenage sons. She denies HH and has no DME. She reports independence at home. She uses [facility] [address] . She is a readmit from 4/7/17. She has no questions or concerns and I wrote my name and number on the pts white board. Ondian Haas, [facility]     05/15/17 1241   Readmission Questionnaire   At the time of your discharge, did someone talk to you about what your health problems were? Yes   At the time of discharge, did someone talk to you about what to watch out for regarding worsening of your health problem? Yes   At the time of discharge, did someone talk to you about what to do if you experienced worsening of your health problem? Yes   At the time of discharge, did someone talk to you about which medication to take when you left the hospital and which ones to stop taking? Yes   At the time of discharge, did someone talk to you about when and where to follow up with a doctor after you left the hospital? Yes   How often do you need to have someone help you when you read instructions, pamphlets, or other written material from your doctor or pharmacy? Never   Do you have problems taking your medications as prescribed? No  ([facility] [address] )   Do you have problems obtaining/receiving your medications? No   Does the patient have transportation to healthcare appointments? Yes   Lives With spouse   Living Arrangements house   Does the patient have family/friends to help with healtcare needs after discharge? yes   Who are your caregiver(s) and their phone number(s)? Spouse- Darlin [phone]   Does your caregiver provide all the help you need? Yes   Are you currently feeling confused? No   Are you currently having problems thinking? No   Are you currently having memory problems? No   Have you felt down, depressed, or hopeless? 0   Have you felt little interest or pleasure in doing things? 0   In the last 7 days, my sleep quality was: fair

## 2017-05-16 LAB
ANION GAP SERPL CALC-SCNC: 8 MMOL/L
BACTERIA UR CULT: NORMAL
BASOPHILS # BLD AUTO: 0 K/UL
BASOPHILS NFR BLD: 0.4 %
BUN SERPL-MCNC: 13 MG/DL
CALCIUM SERPL-MCNC: 9.1 MG/DL
CHLORIDE SERPL-SCNC: 104 MMOL/L
CO2 SERPL-SCNC: 27 MMOL/L
CREAT SERPL-MCNC: 1.7 MG/DL
DIFFERENTIAL METHOD: ABNORMAL
EOSINOPHIL # BLD AUTO: 0.8 K/UL
EOSINOPHIL NFR BLD: 15.2 %
ERYTHROCYTE [DISTWIDTH] IN BLOOD BY AUTOMATED COUNT: 13.5 %
EST. GFR  (AFRICAN AMERICAN): 43 ML/MIN/1.73 M^2
EST. GFR  (NON AFRICAN AMERICAN): 38 ML/MIN/1.73 M^2
GLUCOSE SERPL-MCNC: 70 MG/DL
HCT VFR BLD AUTO: 30.1 %
HGB BLD-MCNC: 10.2 G/DL
LYMPHOCYTES # BLD AUTO: 1.1 K/UL
LYMPHOCYTES NFR BLD: 19.5 %
MCH RBC QN AUTO: 32.9 PG
MCHC RBC AUTO-ENTMCNC: 33.9 %
MCV RBC AUTO: 97 FL
MONOCYTES # BLD AUTO: 0.6 K/UL
MONOCYTES NFR BLD: 10.3 %
NEUTROPHILS # BLD AUTO: 3 K/UL
NEUTROPHILS NFR BLD: 54.6 %
PLATELET # BLD AUTO: 184 K/UL
PMV BLD AUTO: 9 FL
POTASSIUM SERPL-SCNC: 4.1 MMOL/L
RBC # BLD AUTO: 3.1 M/UL
SODIUM SERPL-SCNC: 139 MMOL/L
WBC # BLD AUTO: 5.4 K/UL

## 2017-05-16 PROCEDURE — 85025 COMPLETE CBC W/AUTO DIFF WBC: CPT

## 2017-05-16 PROCEDURE — 99232 SBSQ HOSP IP/OBS MODERATE 35: CPT | Mod: ,,, | Performed by: INTERNAL MEDICINE

## 2017-05-16 PROCEDURE — 80048 BASIC METABOLIC PNL TOTAL CA: CPT

## 2017-05-16 PROCEDURE — 63600175 PHARM REV CODE 636 W HCPCS: Performed by: NURSE PRACTITIONER

## 2017-05-16 PROCEDURE — 25000003 PHARM REV CODE 250: Performed by: NURSE PRACTITIONER

## 2017-05-16 PROCEDURE — 12000002 HC ACUTE/MED SURGE SEMI-PRIVATE ROOM

## 2017-05-16 PROCEDURE — 63600175 PHARM REV CODE 636 W HCPCS: Performed by: INTERNAL MEDICINE

## 2017-05-16 PROCEDURE — 36415 COLL VENOUS BLD VENIPUNCTURE: CPT

## 2017-05-16 RX ADMIN — ENOXAPARIN SODIUM 40 MG: 100 INJECTION SUBCUTANEOUS at 04:05

## 2017-05-16 RX ADMIN — PROMETHAZINE HYDROCHLORIDE 25 MG: 25 SUPPOSITORY RECTAL at 07:05

## 2017-05-16 RX ADMIN — SODIUM CHLORIDE: 0.9 INJECTION, SOLUTION INTRAVENOUS at 04:05

## 2017-05-16 RX ADMIN — HYDROMORPHONE HYDROCHLORIDE 1 MG: 2 INJECTION INTRAMUSCULAR; INTRAVENOUS; SUBCUTANEOUS at 04:05

## 2017-05-16 RX ADMIN — HYDROMORPHONE HYDROCHLORIDE 1 MG: 2 INJECTION INTRAMUSCULAR; INTRAVENOUS; SUBCUTANEOUS at 07:05

## 2017-05-16 RX ADMIN — CEFTRIAXONE 1 G: 1 INJECTION, SOLUTION INTRAVENOUS at 05:05

## 2017-05-16 RX ADMIN — ZOLPIDEM TARTRATE 5 MG: 5 TABLET, FILM COATED ORAL at 08:05

## 2017-05-16 RX ADMIN — HYDROMORPHONE HYDROCHLORIDE 1 MG: 2 INJECTION INTRAMUSCULAR; INTRAVENOUS; SUBCUTANEOUS at 02:05

## 2017-05-16 RX ADMIN — HYDROMORPHONE HYDROCHLORIDE 1 MG: 2 INJECTION INTRAMUSCULAR; INTRAVENOUS; SUBCUTANEOUS at 08:05

## 2017-05-16 RX ADMIN — HYDROMORPHONE HYDROCHLORIDE 1 MG: 2 INJECTION INTRAMUSCULAR; INTRAVENOUS; SUBCUTANEOUS at 11:05

## 2017-05-16 RX ADMIN — PROMETHAZINE HYDROCHLORIDE 25 MG: 25 SUPPOSITORY RECTAL at 04:05

## 2017-05-16 NOTE — PROGRESS NOTES
Progress Note  Hospital Medicine  Patient Name:Cesilia Dozier  MRN:  [id number]  Patient Class: IP- Inpatient  Admit Date: 5/14/2017  Length of Stay: 2 days  Expected Discharge Date:   Attending Physician: Cathy Nicholson MD  Primary Care Provider:  Harsha Sheppard MD    SUBJECTIVE:     Principal Problem: UTI (urinary tract infection)  Initial history of present illness: Ms. Dozier is a 36yo AAF with a PMH cervical CA, Hydronephrosis, Ureteral strictures with stents, and recurrent pyelonephritis. She is currently in remission for Cervical CA and was last discharged from the hospital 4/17/17 after being treated for pyelonephritis and UTI (+) Candida Orthopsilosis. She presents to the hospital today with c/o acute suprapubic pain. She has chronic pelvic pain, but it became severe this morning. It radiates across to LLQ and to left flank. Associated symptoms include nausea, vomited x1, and hematuria. She seen her urologist, Dr. Fournier, on 5/7/17 with the plan to have a stent exchange on 6/7/17. She also seen gynecology on 5/4/17, and had a pelvic ultrasound on which was negative for any masses. Her UA in the ED today is (+) UTI. She reports that her pain is improved since being in the hospital.    PMH/PSH/SH/FH/Meds: reviewed.    Symptoms/Review of Systems:  Still with left flank pain. No shortness of breath, cough, chest pain or headache, fever or abdominal pain.     Diet:  Adequate intake.    Activity level: Normal.    Pain:  Chronic pain    OBJECTIVE:   Vital Signs (Most Recent):      Temp: 98.7 °F (37.1 °C) (05/16/17 0800)  Pulse: 100 (05/16/17 0800)  Resp: 18 (05/16/17 0800)  BP: 113/72 (05/16/17 0800)  SpO2: 100 % (05/16/17 0800)       Vital Signs Range (Last 24H):  Temp:  [98 °F (36.7 °C)-99 °F (37.2 °C)]   Pulse:  []   Resp:  [18]   BP: (101-114)/(63-72)   SpO2:  [99 %-100 %]     Weight: 74.8 kg (165 lb)  Body mass index is 26.63 kg/(m^2).    Intake/Output Summary (Last 24 hours) at 05/16/17 1158  Last data  filed at 05/16/17 0400   Gross per 24 hour   Intake             3270 ml   Output                0 ml   Net             3270 ml     Physical Examination:  Constitutional: She is oriented to person, place, and time. No distress.   HENT:   Head: Normocephalic.   Eyes: Pupils are equal, round, and reactive to light.   Neck: Normal range of motion. Neck supple. No JVD present. No tracheal deviation present.   Cardiovascular: Normal rate, regular rhythm, normal heart sounds and intact distal pulses.   No murmur heard.  Pulmonary/Chest: Effort normal and breath sounds normal. No respiratory distress.   Abdominal: Soft. Bowel sounds are normal. She exhibits no distension. There is tenderness in the suprapubic area and left lower quadrant. There is no guarding.   Musculoskeletal: Normal range of motion. She exhibits no edema.   Neurological: She is alert and oriented to person, place, and time.   Skin: Skin is warm, dry and intact.   Psychiatric: She has a normal mood and affect. Her behavior is normal. Judgment and thought content normal.     CBC:    Recent Labs  Lab 05/14/17  2230 05/15/17  0509 05/16/17  0629   WBC 7.20 5.40 5.40   RBC 3.53* 3.20* 3.10*   HGB 11.5* 10.6* 10.2*   HCT 34.5* 31.3* 30.1*    199 184   MCV 98 98 97   MCH 32.7* 32.9* 32.9*   MCHC 33.4 33.7 33.9   BMP    Recent Labs  Lab 05/14/17  2230 05/15/17  0509 05/16/17  0629   GLU 85 74 70    139 139   K 4.2 3.7 4.1    107 104   CO2 25 25 27   BUN 15 14 13   CREATININE 2.0* 1.8* 1.7*   CALCIUM 9.8 8.9 9.1      Diagnostic Results:  Microbiology Results (last 7 days)     Procedure Component Value Units Date/Time    Urine culture [[id number]] Collected:  05/14/17 2322    Order Status:  Sent Specimen:  Urine from Urine, Clean Catch Updated:  05/15/17 1140         CT abdomen:  Bilateral moderate hydronephrosis with bilateral ureteral stents in place.  Prior hysterectomy.  Cholelithiasis.  Assessment/Plan:      * UTI (urinary tract  infection)  Continue IV rocephin  Follow culture urine  Follow urology recommendations. Case discussed with him.  Defer any further imaging to urology     CKD (chronic kidney disease) stage 4, GFR 15-29 ml/min  Stable.  Renal dose medications  IVF hydration to maintain perfusion  Monitor labs     Pelvic pain syndrome  CT abdomen results reviewed.  Pain management while in the hospital  VTE Risk Mitigation         Ordered     enoxaparin injection 40 mg  Daily     Route:  Subcutaneous        05/15/17 1016     High Risk of VTE  Once      05/15/17 0029        Cathy Nicholson MD  Department of Hospital Medicine   [facility]

## 2017-05-16 NOTE — PROGRESS NOTES
Afebrile VS stable  Continues to complain of left-sided pain  CT scan reveals stents to be in good position with moderate hydronephrosis that has been long-term chronic finding.  Rec: Continue with management of suspected pyelonephritis           Patient was instructed to keep her follow-up appointment with Dr. Bocanegra who is planning to subsequently replace the stents.

## 2017-05-16 NOTE — PLAN OF CARE
Problem: Patient Care Overview  Goal: Plan of Care Review  Outcome: Ongoing (interventions implemented as appropriate)  Pt pain is controlled with dilaudid IV, VSS and noted, voiding without difficulty, absent from injury, IV fluids infusing, IV abx scheduled nad noted will cont to monitor

## 2017-05-17 VITALS
OXYGEN SATURATION: 100 % | RESPIRATION RATE: 18 BRPM | HEART RATE: 122 BPM | WEIGHT: 165 LBS | DIASTOLIC BLOOD PRESSURE: 69 MMHG | BODY MASS INDEX: 26.52 KG/M2 | HEIGHT: 66 IN | SYSTOLIC BLOOD PRESSURE: 104 MMHG | TEMPERATURE: 99 F

## 2017-05-17 LAB
ANION GAP SERPL CALC-SCNC: 9 MMOL/L
BASOPHILS # BLD AUTO: 0 K/UL
BASOPHILS NFR BLD: 0.5 %
BUN SERPL-MCNC: 11 MG/DL
CALCIUM SERPL-MCNC: 9.2 MG/DL
CHLORIDE SERPL-SCNC: 106 MMOL/L
CO2 SERPL-SCNC: 26 MMOL/L
CREAT SERPL-MCNC: 1.7 MG/DL
DIFFERENTIAL METHOD: ABNORMAL
EOSINOPHIL # BLD AUTO: 0.8 K/UL
EOSINOPHIL NFR BLD: 17.7 %
ERYTHROCYTE [DISTWIDTH] IN BLOOD BY AUTOMATED COUNT: 13.8 %
EST. GFR  (AFRICAN AMERICAN): 43 ML/MIN/1.73 M^2
EST. GFR  (NON AFRICAN AMERICAN): 38 ML/MIN/1.73 M^2
GLUCOSE SERPL-MCNC: 73 MG/DL
HCT VFR BLD AUTO: 30.1 %
HGB BLD-MCNC: 10.2 G/DL
LYMPHOCYTES # BLD AUTO: 0.9 K/UL
LYMPHOCYTES NFR BLD: 20.4 %
MCH RBC QN AUTO: 33 PG
MCHC RBC AUTO-ENTMCNC: 34 %
MCV RBC AUTO: 97 FL
MONOCYTES # BLD AUTO: 0.4 K/UL
MONOCYTES NFR BLD: 9.3 %
NEUTROPHILS # BLD AUTO: 2.3 K/UL
NEUTROPHILS NFR BLD: 52.1 %
PLATELET # BLD AUTO: 181 K/UL
PMV BLD AUTO: 9.1 FL
POTASSIUM SERPL-SCNC: 3.8 MMOL/L
RBC # BLD AUTO: 3.1 M/UL
SODIUM SERPL-SCNC: 141 MMOL/L
WBC # BLD AUTO: 4.5 K/UL

## 2017-05-17 PROCEDURE — 63600175 PHARM REV CODE 636 W HCPCS: Performed by: NURSE PRACTITIONER

## 2017-05-17 PROCEDURE — 80048 BASIC METABOLIC PNL TOTAL CA: CPT

## 2017-05-17 PROCEDURE — 36415 COLL VENOUS BLD VENIPUNCTURE: CPT

## 2017-05-17 PROCEDURE — 85025 COMPLETE CBC W/AUTO DIFF WBC: CPT

## 2017-05-17 PROCEDURE — 99239 HOSP IP/OBS DSCHRG MGMT >30: CPT | Mod: ,,, | Performed by: INTERNAL MEDICINE

## 2017-05-17 PROCEDURE — 25000003 PHARM REV CODE 250: Performed by: NURSE PRACTITIONER

## 2017-05-17 RX ORDER — HYDROCODONE BITARTRATE AND ACETAMINOPHEN 10; 325 MG/1; MG/1
1 TABLET ORAL EVERY 6 HOURS PRN
Qty: 20 TABLET | Refills: 0 | Status: SHIPPED | OUTPATIENT
Start: 2017-05-17 | End: 2017-06-08

## 2017-05-17 RX ORDER — CIPROFLOXACIN 250 MG/1
250 TABLET, FILM COATED ORAL 2 TIMES DAILY
Qty: 14 TABLET | Refills: 0 | Status: SHIPPED | OUTPATIENT
Start: 2017-05-17 | End: 2017-05-22

## 2017-05-17 RX ADMIN — HYDROMORPHONE HYDROCHLORIDE 1 MG: 2 INJECTION INTRAMUSCULAR; INTRAVENOUS; SUBCUTANEOUS at 12:05

## 2017-05-17 RX ADMIN — PROMETHAZINE HYDROCHLORIDE 25 MG: 25 SUPPOSITORY RECTAL at 12:05

## 2017-05-17 RX ADMIN — HYDROMORPHONE HYDROCHLORIDE 2 MG: 2 INJECTION INTRAMUSCULAR; INTRAVENOUS; SUBCUTANEOUS at 09:05

## 2017-05-17 RX ADMIN — HYDROMORPHONE HYDROCHLORIDE 1 MG: 2 INJECTION INTRAMUSCULAR; INTRAVENOUS; SUBCUTANEOUS at 01:05

## 2017-05-17 RX ADMIN — PROMETHAZINE HYDROCHLORIDE 25 MG: 25 SUPPOSITORY RECTAL at 11:05

## 2017-05-17 RX ADMIN — HYDROMORPHONE HYDROCHLORIDE 1 MG: 2 INJECTION INTRAMUSCULAR; INTRAVENOUS; SUBCUTANEOUS at 05:05

## 2017-05-17 NOTE — DISCHARGE SUMMARY
Discharge Summary  Hospital Medicine    Admit Date: 5/14/2017    Date and Time: 5/17/201712:48 PM    Discharge Attending Physician: Cathy Nicholson MD    Primary Care Physician: Harsha Sheppard MD    Diagnoses:  Active Hospital Problems    Diagnosis  POA    *UTI (urinary tract infection) [N39.0]  Yes    Pelvic pain syndrome [N94.89]  Yes    CKD (chronic kidney disease) stage 4, GFR 15-29 ml/min [N18.4]  Yes      Resolved Hospital Problems    Diagnosis Date Resolved POA   No resolved problems to display.     Discharged Condition: Good    Hospital Course:   Ms. Dozier is a 36yo AAF with a PMH cervical CA, Hydronephrosis, Ureteral strictures with stents, and recurrent pyelonephritis. She is currently in remission for Cervical CA and was last discharged from the hospital 4/17/17 after being treated for pyelonephritis and UTI (+) Candida Orthopsilosis. She presents to the hospital today with c/o acute suprapubic pain. She has chronic pelvic pain, but it became severe this morning. It radiated across to LLQ and to left flank. Associated symptoms include nausea, vomited x1, and hematuria. She seen her urologist, Dr. Fournier, on 5/7/17 with the plan to have a stent exchange on 6/7/17. She also seen gynecology on 5/4/17, and had a pelvic ultrasound on which was negative for any masses. She reported that her pain was improved since being in the hospital. Patient was admitted to Hospitalist medicine service. Patient was evaluated by Dr. Greco. Patient treated with IV antibiotics for UTI. Cultures remained negative. Patient required PO and IV narcotics for pain control. Patient cleared for discharge by urology team. Patient was discharged home in stable condition with following discharge plan of care. Total time with the patient was 30 minutes and greater than 50% was spent in counseling and coordination of care. The assessment and plan have been discussed at length. Physicians' notes reviewed. Labs and procedure reviewed.      Consults: Dr. Greco    Significant Diagnostic Studies:   CT abdomen:  Bilateral moderate hydronephrosis with bilateral ureteral stents in place.  Prior hysterectomy.  Cholelithiasis.    Microbiology Results (last 7 days)     Procedure Component Value Units Date/Time    Urine culture [[id number]] Collected:  05/14/17 2320    Order Status:  Completed Specimen:  Urine from Urine, Clean Catch Updated:  05/16/17 1416     Urine Culture, Routine No significant growth        Special Treatments/Procedures: None  Disposition: Home or Self Care    Medications:  Reconciled Home Medications: Current Discharge Medication List      START taking these medications    Details   ciprofloxacin HCl (CIPRO) 250 MG tablet Take 1 tablet (250 mg total) by mouth 2 (two) times daily.  Qty: 14 tablet, Refills: 0      hydrocodone-acetaminophen 10-325mg (NORCO)  mg Tab Take 1 tablet by mouth every 6 (six) hours as needed for Pain.  Qty: 20 tablet, Refills: 0         CONTINUE these medications which have NOT CHANGED    Details   tolterodine (DETROL LA) 4 MG 24 hr capsule Take 1 capsule (4 mg total) by mouth once daily.  Qty: 30 capsule, Refills: 11    Associated Diagnoses: Bilateral hydronephrosis         STOP taking these medications       ferrous sulfate 325 (65 FE) MG EC tablet Comments:   Reason for Stopping:               Discharge Procedure Orders  Diet general   Order Comments: Cardiac/ 2 gram sodium low cholesterol diet     Other restrictions (specify):   Order Comments: Fall precautions     Call MD for:   Order Comments: For worsening symptoms, chest pain, shortness of breath, increased abdominal pain, high grade fever, stroke or stroke like symptoms, immediately go to the nearest Emergency Room or call 911 as soon as possible.       Follow-up Information     Follow up with Harsha Sheppard MD.    Specialty:  Internal Medicine    Contact information:    [address] [place] [address] Dr Toya [place] [address] [phone]           Follow up with Turner Bocanegra MD.    Specialty:  Urology    Why:  as per routine    Contact information:    [address] [address]  [facility] [address] [phone]

## 2017-05-18 NOTE — PLAN OF CARE
05/18/17 1452   Final Note   Assessment Type Final Discharge Note   Discharge Disposition Home   Discharge planning education complete? Yes

## 2017-05-29 ENCOUNTER — TELEPHONE (OUTPATIENT)
Dept: UROLOGY | Facility: CLINIC | Age: 37
End: 2017-05-29

## 2017-05-29 NOTE — TELEPHONE ENCOUNTER
----- Message from Nargis Maya sent at 5/29/2017  1:22 PM CDT -----  x_  1st Request  _  2nd Request  _  3rd Request        Who: ALVIN DUBOSE [[id number]]    Why: Pt returned a call from the office. Please call her back     What Number to Call Back: [phone]    When to Expect a call back: (Before the end of the day)   -- if the call is after 12:00, the call back will be tomorrow.

## 2017-06-05 ENCOUNTER — TELEPHONE (OUTPATIENT)
Dept: UROLOGY | Facility: CLINIC | Age: 37
End: 2017-06-05

## 2017-06-05 NOTE — TELEPHONE ENCOUNTER
Spoke to patient.  She has been admitted to [facility].  She is scheduled for Cysto, stent exchange with Dr nieto on 6/7.  She states that her stents were changed at [facility] on 6/2.  Patient notified per Dr Nieto  Will cancel case on 6/7 and she is to follow up with him in 6 weeks.

## 2017-06-08 ENCOUNTER — HOSPITAL ENCOUNTER (INPATIENT)
Facility: HOSPITAL | Age: 37
LOS: 1 days | Discharge: HOME | DRG: 690 | End: 2017-06-10
Attending: EMERGENCY MEDICINE | Admitting: INTERNAL MEDICINE
Payer: MEDICARE

## 2017-06-08 DIAGNOSIS — N18.4 CKD (CHRONIC KIDNEY DISEASE) STAGE 4, GFR 15-29 ML/MIN: ICD-10-CM

## 2017-06-08 DIAGNOSIS — N13.5 URETERAL STRICTURE: ICD-10-CM

## 2017-06-08 DIAGNOSIS — N39.0 URINARY TRACT INFECTION WITHOUT HEMATURIA, SITE UNSPECIFIED: ICD-10-CM

## 2017-06-08 DIAGNOSIS — R10.2 PELVIC PAIN SYNDROME: ICD-10-CM

## 2017-06-08 DIAGNOSIS — R10.2 PELVIC PAIN IN FEMALE: ICD-10-CM

## 2017-06-08 DIAGNOSIS — N12 PYELONEPHRITIS: Primary | ICD-10-CM

## 2017-06-08 DIAGNOSIS — R10.2 PELVIC AND PERINEAL PAIN: ICD-10-CM

## 2017-06-08 LAB
ALBUMIN SERPL BCP-MCNC: 3.1 G/DL
ALP SERPL-CCNC: 73 U/L
ALT SERPL W/O P-5'-P-CCNC: 9 U/L
ANION GAP SERPL CALC-SCNC: 13 MMOL/L
AST SERPL-CCNC: 14 U/L
BACTERIA #/AREA URNS HPF: ABNORMAL /HPF
BASOPHILS # BLD AUTO: 0 K/UL
BASOPHILS NFR BLD: 0.1 %
BILIRUB SERPL-MCNC: 0.3 MG/DL
BILIRUB UR QL STRIP: NEGATIVE
BUN SERPL-MCNC: 17 MG/DL
CALCIUM SERPL-MCNC: 9.7 MG/DL
CHLORIDE SERPL-SCNC: 106 MMOL/L
CLARITY UR: ABNORMAL
CO2 SERPL-SCNC: 23 MMOL/L
COLOR UR: YELLOW
CREAT SERPL-MCNC: 2.1 MG/DL
DIFFERENTIAL METHOD: ABNORMAL
EOSINOPHIL # BLD AUTO: 0.3 K/UL
EOSINOPHIL NFR BLD: 3.5 %
ERYTHROCYTE [DISTWIDTH] IN BLOOD BY AUTOMATED COUNT: 12.8 %
EST. GFR  (AFRICAN AMERICAN): 34 ML/MIN/1.73 M^2
EST. GFR  (NON AFRICAN AMERICAN): 29 ML/MIN/1.73 M^2
GLUCOSE SERPL-MCNC: 85 MG/DL
GLUCOSE UR QL STRIP: NEGATIVE
HCT VFR BLD AUTO: 29.5 %
HGB BLD-MCNC: 10.1 G/DL
HGB UR QL STRIP: ABNORMAL
HYALINE CASTS #/AREA URNS LPF: 0 /LPF
KETONES UR QL STRIP: NEGATIVE
LEUKOCYTE ESTERASE UR QL STRIP: ABNORMAL
LYMPHOCYTES # BLD AUTO: 1 K/UL
LYMPHOCYTES NFR BLD: 10.7 %
MCH RBC QN AUTO: 33 PG
MCHC RBC AUTO-ENTMCNC: 34.3 %
MCV RBC AUTO: 96 FL
MICROSCOPIC COMMENT: ABNORMAL
MONOCYTES # BLD AUTO: 0 K/UL
MONOCYTES NFR BLD: 0.4 %
NEUTROPHILS # BLD AUTO: 7.6 K/UL
NEUTROPHILS NFR BLD: 85.3 %
NITRITE UR QL STRIP: NEGATIVE
PH UR STRIP: 8 [PH] (ref 5–8)
PLATELET # BLD AUTO: 404 K/UL
PMV BLD AUTO: 8.1 FL
POTASSIUM SERPL-SCNC: 4.4 MMOL/L
PROT SERPL-MCNC: 7.6 G/DL
PROT UR QL STRIP: ABNORMAL
RBC # BLD AUTO: 3.06 M/UL
RBC #/AREA URNS HPF: 25 /HPF (ref 0–4)
SODIUM SERPL-SCNC: 142 MMOL/L
SP GR UR STRIP: 1.01 (ref 1–1.03)
SQUAMOUS #/AREA URNS HPF: 3 /HPF
URN SPEC COLLECT METH UR: ABNORMAL
UROBILINOGEN UR STRIP-ACNC: NEGATIVE EU/DL
WBC # BLD AUTO: 8.9 K/UL
WBC #/AREA URNS HPF: 20 /HPF (ref 0–5)
YEAST URNS QL MICRO: ABNORMAL

## 2017-06-08 PROCEDURE — 63600175 PHARM REV CODE 636 W HCPCS: Performed by: EMERGENCY MEDICINE

## 2017-06-08 PROCEDURE — 96361 HYDRATE IV INFUSION ADD-ON: CPT

## 2017-06-08 PROCEDURE — 25000003 PHARM REV CODE 250: Performed by: EMERGENCY MEDICINE

## 2017-06-08 PROCEDURE — 36415 COLL VENOUS BLD VENIPUNCTURE: CPT

## 2017-06-08 PROCEDURE — 96366 THER/PROPH/DIAG IV INF ADDON: CPT

## 2017-06-08 PROCEDURE — 96374 THER/PROPH/DIAG INJ IV PUSH: CPT

## 2017-06-08 PROCEDURE — 63600175 PHARM REV CODE 636 W HCPCS: Performed by: INTERNAL MEDICINE

## 2017-06-08 PROCEDURE — 25000003 PHARM REV CODE 250: Performed by: INTERNAL MEDICINE

## 2017-06-08 PROCEDURE — 87086 URINE CULTURE/COLONY COUNT: CPT

## 2017-06-08 PROCEDURE — 96375 TX/PRO/DX INJ NEW DRUG ADDON: CPT

## 2017-06-08 PROCEDURE — G0378 HOSPITAL OBSERVATION PER HR: HCPCS

## 2017-06-08 PROCEDURE — 81000 URINALYSIS NONAUTO W/SCOPE: CPT

## 2017-06-08 PROCEDURE — 96376 TX/PRO/DX INJ SAME DRUG ADON: CPT

## 2017-06-08 PROCEDURE — 99285 EMERGENCY DEPT VISIT HI MDM: CPT | Mod: 25

## 2017-06-08 PROCEDURE — 96365 THER/PROPH/DIAG IV INF INIT: CPT

## 2017-06-08 PROCEDURE — 96372 THER/PROPH/DIAG INJ SC/IM: CPT

## 2017-06-08 PROCEDURE — 85025 COMPLETE CBC W/AUTO DIFF WBC: CPT

## 2017-06-08 PROCEDURE — 87086 URINE CULTURE/COLONY COUNT: CPT | Mod: 59

## 2017-06-08 PROCEDURE — 99219 PR INITIAL OBSERVATION CARE,LEVL II: CPT | Mod: ,,, | Performed by: INTERNAL MEDICINE

## 2017-06-08 PROCEDURE — 80053 COMPREHEN METABOLIC PANEL: CPT

## 2017-06-08 RX ORDER — KETOROLAC TROMETHAMINE 30 MG/ML
10 INJECTION, SOLUTION INTRAMUSCULAR; INTRAVENOUS
Status: COMPLETED | OUTPATIENT
Start: 2017-06-08 | End: 2017-06-08

## 2017-06-08 RX ORDER — PANTOPRAZOLE SODIUM 40 MG/1
40 TABLET, DELAYED RELEASE ORAL DAILY
Status: DISCONTINUED | OUTPATIENT
Start: 2017-06-08 | End: 2017-06-10 | Stop reason: HOSPADM

## 2017-06-08 RX ORDER — ONDANSETRON 2 MG/ML
4 INJECTION INTRAMUSCULAR; INTRAVENOUS
Status: COMPLETED | OUTPATIENT
Start: 2017-06-08 | End: 2017-06-08

## 2017-06-08 RX ORDER — MORPHINE SULFATE 4 MG/ML
4 INJECTION, SOLUTION INTRAMUSCULAR; INTRAVENOUS EVERY 4 HOURS PRN
Status: DISCONTINUED | OUTPATIENT
Start: 2017-06-08 | End: 2017-06-08

## 2017-06-08 RX ORDER — CIPROFLOXACIN 2 MG/ML
400 INJECTION, SOLUTION INTRAVENOUS
Status: DISCONTINUED | OUTPATIENT
Start: 2017-06-08 | End: 2017-06-10 | Stop reason: HOSPADM

## 2017-06-08 RX ORDER — HYDROCODONE BITARTRATE AND ACETAMINOPHEN 10; 325 MG/1; MG/1
1 TABLET ORAL EVERY 6 HOURS PRN
Status: DISCONTINUED | OUTPATIENT
Start: 2017-06-08 | End: 2017-06-10 | Stop reason: HOSPADM

## 2017-06-08 RX ORDER — SODIUM CHLORIDE 9 MG/ML
INJECTION, SOLUTION INTRAVENOUS CONTINUOUS
Status: DISCONTINUED | OUTPATIENT
Start: 2017-06-08 | End: 2017-06-10 | Stop reason: HOSPADM

## 2017-06-08 RX ORDER — MORPHINE SULFATE 2 MG/ML
2 INJECTION, SOLUTION INTRAMUSCULAR; INTRAVENOUS EVERY 4 HOURS PRN
Status: DISCONTINUED | OUTPATIENT
Start: 2017-06-08 | End: 2017-06-08

## 2017-06-08 RX ORDER — HYDROMORPHONE HYDROCHLORIDE 1 MG/ML
1 INJECTION, SOLUTION INTRAMUSCULAR; INTRAVENOUS; SUBCUTANEOUS EVERY 4 HOURS PRN
Status: DISCONTINUED | OUTPATIENT
Start: 2017-06-08 | End: 2017-06-10 | Stop reason: HOSPADM

## 2017-06-08 RX ORDER — OXYBUTYNIN CHLORIDE 5 MG/1
10 TABLET, EXTENDED RELEASE ORAL DAILY
Status: DISCONTINUED | OUTPATIENT
Start: 2017-06-08 | End: 2017-06-10 | Stop reason: HOSPADM

## 2017-06-08 RX ORDER — CIPROFLOXACIN 2 MG/ML
400 INJECTION, SOLUTION INTRAVENOUS
Status: COMPLETED | OUTPATIENT
Start: 2017-06-08 | End: 2017-06-08

## 2017-06-08 RX ORDER — ENOXAPARIN SODIUM 100 MG/ML
40 INJECTION SUBCUTANEOUS
Status: DISCONTINUED | OUTPATIENT
Start: 2017-06-08 | End: 2017-06-10 | Stop reason: HOSPADM

## 2017-06-08 RX ADMIN — CIPROFLOXACIN 400 MG: 2 INJECTION, SOLUTION INTRAVENOUS at 07:06

## 2017-06-08 RX ADMIN — SODIUM CHLORIDE: 0.9 INJECTION, SOLUTION INTRAVENOUS at 09:06

## 2017-06-08 RX ADMIN — CIPROFLOXACIN 400 MG: 2 INJECTION, SOLUTION INTRAVENOUS at 09:06

## 2017-06-08 RX ADMIN — HYDROCODONE BITARTRATE AND ACETAMINOPHEN 1 TABLET: 10; 325 TABLET ORAL at 09:06

## 2017-06-08 RX ADMIN — OXYBUTYNIN CHLORIDE 10 MG: 5 TABLET, EXTENDED RELEASE ORAL at 09:06

## 2017-06-08 RX ADMIN — ONDANSETRON 4 MG: 2 INJECTION INTRAMUSCULAR; INTRAVENOUS at 06:06

## 2017-06-08 RX ADMIN — SODIUM CHLORIDE: 0.9 INJECTION, SOLUTION INTRAVENOUS at 04:06

## 2017-06-08 RX ADMIN — PANTOPRAZOLE SODIUM 40 MG: 40 TABLET, DELAYED RELEASE ORAL at 09:06

## 2017-06-08 RX ADMIN — HYDROMORPHONE HYDROCHLORIDE 1 MG: 1 INJECTION, SOLUTION INTRAMUSCULAR; INTRAVENOUS; SUBCUTANEOUS at 10:06

## 2017-06-08 RX ADMIN — MORPHINE SULFATE 2 MG: 2 INJECTION, SOLUTION INTRAMUSCULAR; INTRAVENOUS at 08:06

## 2017-06-08 RX ADMIN — MORPHINE SULFATE 4 MG: 4 INJECTION INTRAVENOUS at 12:06

## 2017-06-08 RX ADMIN — HYDROMORPHONE HYDROCHLORIDE 1 MG: 1 INJECTION, SOLUTION INTRAMUSCULAR; INTRAVENOUS; SUBCUTANEOUS at 04:06

## 2017-06-08 RX ADMIN — ENOXAPARIN SODIUM 40 MG: 100 INJECTION SUBCUTANEOUS at 09:06

## 2017-06-08 RX ADMIN — KETOROLAC TROMETHAMINE 10 MG: 30 INJECTION, SOLUTION INTRAMUSCULAR at 06:06

## 2017-06-08 RX ADMIN — SODIUM CHLORIDE 1000 ML: 0.9 INJECTION, SOLUTION INTRAVENOUS at 06:06

## 2017-06-08 NOTE — H&P
PCP: Harsha Sheppard MD    History & Physical    Chief Complaint: Pelvic pain with associated nausea and vomiting    History of Present Illness:  Patient is a 37 y.o. female admitted to Hospitalist Service from [facility] Room with complaint of pelvic pain and associated nausea and vomiting. PMH cervical CA, Hydronephrosis, Ureteral strictures with stents, and recurrent pyelonephritis. She is currently in remission for Cervical CA.   The patient reports that she woke up with severe pelvic pain around 2 AM this morning.  She reported that it is associated with multiple episodes of nausea and vomiting.  She denied any associated fever, dysuria, or hematuria.  The patient has not taken anything for pain relief.  The patient had a recent ureteral stent change at [place] on 06/02/17.  There are no alleviating factors. Patient denied chest pain, shortness of breath, headache, vision changes, focal neuro-deficits, cough or fever.    Past Medical History:   Diagnosis Date    Anemia     Cervical cancer     cervical    Encounter for blood transfusion     Hydronephrosis     PONV (postoperative nausea and vomiting)     Pyelonephritis      Past Surgical History:   Procedure Laterality Date    COLONOSCOPY N/A 9/22/2016    Procedure: COLONOSCOPY;  Surgeon: Joe Moe MD;  Location: [facility];  Service: Endoscopy;  Laterality: N/A;    HYSTERECTOMY      Partial    TUBAL LIGATION      URETERAL STENT PLACEMENT  07/2016     Family History   Problem Relation Age of Onset    No Known Problems Mother     Drug abuse Father     No Known Problems Sister     Asthma Brother      Social History   Substance Use Topics    Smoking status: Never Smoker    Smokeless tobacco: Never Used    Alcohol use No      Review of patient's allergies indicates:   Allergen Reactions    Pcn [penicillins] Anaphylaxis       (Not in a hospital admission)  Review of Systems:  Constitutional: no fever or chills  Eyes: no  visual changes  Ears, nose, mouth, throat, and face: no nasal congestion or sore throat  Respiratory: no cough or shorness of breath  Cardiovascular: no chest pain or palpitations  Gastrointestinal: no nausea or vomiting, no abdominal pain or change in bowel habits  Genitourinary: no hematuria or dysuria. + Pelvic pain  Integument/breast: no rash or pruritis  Hematologic/lymphatic: no easy bruising or lymphadenopathy  Musculoskeletal: no arthralgias or myalgias  Neurological: no seizures or tremors.  Behavioral/Psych: no auditory or visual hallucinations  Endocrine: no heat or cold intolerance     OBJECTIVE:     Vital Signs (Most Recent)  Temp: 98 °F (36.7 °C) (06/08/17 0600)  Pulse: 110 (06/08/17 0600)  Resp: 16 (06/08/17 0600)  BP: 107/67 (06/08/17 0600)  SpO2: 100 % (06/08/17 0600)    Physical Exam:  General appearance: well developed, appears stated age  Head: normocephalic, atraumatic  Eyes:  conjunctivae/corneas clear. PERRL.  Nose: Nares normal. Septum midline.  Throat: lips, mucosa, and tongue normal; teeth and gums normal, no throat erythema.  Neck: supple, symmetrical, trachea midline, no JVD and thyroid not enlarged, symmetric, no tenderness/mass/nodules  Lungs:  clear to auscultation bilaterally and normal respiratory effort  Chest wall: no tenderness  Heart: regular rate and rhythm, S1, S2 normal, no murmur, click, rub or gallop  Abdomen: soft, diffuse abdominal tenderness, non-distented; bowel sounds normal; no masses,  no organomegaly  Extremities: no cyanosis, clubbing or edema.   Pulses: 2+ and symmetric  Skin: Skin color, texture, turgor normal. No rashes or lesions.  Lymph nodes: Cervical, supraclavicular, and axillary nodes normal.  Neurologic: Normal strength and tone. No focal numbness or weakness. CNII-XII intact.      Laboratory:   CBC:   Recent Labs  Lab 06/08/17  0737   WBC 8.90   RBC 3.06*   HGB 10.1*   HCT 29.5*   *   MCV 96   MCH 33.0*   MCHC 34.3     CMP:   Recent Labs  Lab  06/08/17  0737   GLU 85   CALCIUM 9.7   ALBUMIN 3.1*   PROT 7.6      K 4.4   CO2 23      BUN 17   CREATININE 2.1*   ALKPHOS 73   ALT 9*   AST 14   BILITOT 0.3     Microbiology Results (last 7 days)     Procedure Component Value Units Date/Time    Urine culture **CANNOT BE ORDERED STAT** [[id number]] Collected:  06/08/17 0700    Order Status:  Sent Specimen:  Urine from Urine, Clean Catch Updated:  06/08/17 0753          Recent Labs  Lab 06/08/17  0612   COLORU Yellow   SPECGRAV 1.015   PHUR 8.0   PROTEINUA 2+*   BACTERIA Moderate*   NITRITE Negative   LEUKOCYTESUR 3+*   UROBILINOGEN Negative   HYALINECASTS 0       Hemoglobin A1C   Date Value Ref Range Status   12/12/2016 5.4 4.5 - 6.2 % Final     Comment:     According to ADA guidelines, hemoglobin A1C <7.0% represents  optimal control in non-pregnant diabetic patients.  Different  metrics may apply to specific populations.   Standards of Medical Care in Diabetes - 2016.  For the purpose of screening for the presence of diabetes:  <5.7%     Consistent with the absence of diabetes  5.7-6.4%  Consistent with increasing risk for diabetes   (prediabetes)  >or=6.5%  Consistent with diabetes  Currently no consensus exists for use of hemoglobin A1C  for diagnosis of diabetes for children.       Microbiology Results (last 7 days)     Procedure Component Value Units Date/Time    Urine culture **CANNOT BE ORDERED STAT** [[id number]] Collected:  06/08/17 0700    Order Status:  Sent Specimen:  Urine from Urine, Clean Catch Updated:  06/08/17 0753        Diagnostic Results:  CT abdomen:  Bilateral double pigtail stents are in position with mild residual hydronephrosis, an improvement from the prior study of 05/15/2017.  Air is noted in the bladder which may be secondary to recent catheterization or gas-forming organism.  Cholelithiasis.  Prior hysterectomy.  Assessment/Plan:     * UTI (urinary tract infection)  IV Cipro  Culture urine      CKD (chronic kidney disease)  stage 4, GFR 15-29 ml/min  Stable.  Renal dose medications  IVF hydration to maintain perfusion  Monitor labs     Pelvic pain syndrome  Continue pain management, preferably with oral narcotics and use IV narcotics for breakthrough pain.    History of Cervical Cancer  In remission per patient.     VTE Risk Mitigation         Ordered     enoxaparin injection 40 mg  Every 24 hours (non-standard times)     Route:  Subcutaneous        06/08/17 0846     Medium Risk of VTE  Once      06/08/17 0843     Place ANA hose  Until discontinued      06/08/17 0843     Place sequential compression device  Until discontinued      06/08/17 0843        Cathy Nicholson MD  Department of Hospital Medicine   [facility]

## 2017-06-08 NOTE — ED PROVIDER NOTES
Encounter Date: 6/8/2017       History     Chief Complaint   Patient presents with    Pelvic Pain     began this am with N/V     Review of patient's allergies indicates:   Allergen Reactions    Pcn [penicillins] Anaphylaxis     37-year-old female with a past medical history of cervical cancer, pyelonephritis, and hydronephrosis presents with chief complaint of pelvic pain.  The patient reports that she woke up with severe pelvic pain around 2 AM this morning.  She reports that it is associated with multiple episodes of nausea and vomiting.  She denies any associated fever, dysuria, or hematuria.  The patient has not taken anything for pain relief.  The patient had a recent ureteral stent change at [place] on the second of this month.  There are no alleviating factors.      The history is provided by the patient.     Past Medical History:   Diagnosis Date    Anemia     Cervical cancer     cervical    Encounter for blood transfusion     Hydronephrosis     PONV (postoperative nausea and vomiting)     Pyelonephritis      Past Surgical History:   Procedure Laterality Date    COLONOSCOPY N/A 9/22/2016    Procedure: COLONOSCOPY;  Surgeon: Joe Moe MD;  Location: [facility];  Service: Endoscopy;  Laterality: N/A;    HYSTERECTOMY      Partial    TUBAL LIGATION      URETERAL STENT PLACEMENT  07/2016     Family History   Problem Relation Age of Onset    No Known Problems Mother     Drug abuse Father     No Known Problems Sister     Asthma Brother      Social History   Substance Use Topics    Smoking status: Never Smoker    Smokeless tobacco: Never Used    Alcohol use No     Review of Systems   Constitutional: Negative for chills and fever.   HENT: Negative for congestion.    Respiratory: Negative for cough and shortness of breath.    Cardiovascular: Negative for chest pain.   Gastrointestinal: Negative for abdominal pain, nausea and vomiting.   Genitourinary: Positive for pelvic pain. Negative for  dysuria.   Musculoskeletal: Negative for gait problem.   Skin: Negative for color change.   Neurological: Negative for dizziness and numbness.   Psychiatric/Behavioral: Negative for agitation.       Physical Exam     Initial Vitals [06/08/17 0600]   BP Pulse Resp Temp SpO2   107/67 110 16 98 °F (36.7 °C) 100 %     Physical Exam    Nursing note and vitals reviewed.  Constitutional: She appears well-developed and well-nourished.   HENT:   Head: Atraumatic.   Eyes: EOM are normal. Pupils are equal, round, and reactive to light.   Neck: Normal range of motion.   Cardiovascular: Normal rate and regular rhythm.   Pulmonary/Chest: Breath sounds normal.   Abdominal: Soft. Bowel sounds are normal. There is tenderness.   Diffuse abdominal tenderness noted.   Musculoskeletal: Normal range of motion.        Right shoulder: Normal.        Left shoulder: Normal.   Neurological: She is alert and oriented to person, place, and time.   Skin: Skin is warm and dry.   Psychiatric: She has a normal mood and affect.         ED Course   Procedures  Labs Reviewed   URINALYSIS - Abnormal; Notable for the following:        Result Value    Appearance, UA Hazy (*)     Protein, UA 2+ (*)     Occult Blood UA 2+ (*)     Leukocytes, UA 3+ (*)     All other components within normal limits   URINALYSIS MICROSCOPIC - Abnormal; Notable for the following:     RBC, UA 25 (*)     WBC, UA 20 (*)     Bacteria, UA Moderate (*)     Yeast, UA Occasional (*)     All other components within normal limits   CULTURE, URINE   COMPREHENSIVE METABOLIC PANEL   CBC W/ AUTO DIFFERENTIAL             Medical Decision Making:   Initial Assessment:   37-year-old female presents with a chief complaint of pelvic pain.  Differential Diagnosis:   Initial differential diagnosis included but not limited to pyelonephritis, malingering, stent malfunction, and dehydration.  Clinical Tests:   Lab Tests: Ordered  ED Management:  The patient was emergently evaluated in the ED, her  evaluation was significant for a young female with diffuse abdominal tenderness.  The patient was treated with IV fluids and IV Toradol.  Additionally I have ordered labs and a CT scan of her abdomen and pelvis.  The patient's urine shows evidence of infection, she likely has a recurrent pyelonephritis.  I started the patient on IV Cipro, and will admit her to the hospitalist service.  I've discussed the case with the hospitalist on-call, Dr. Nicholson.  He has accepted the patient for admission.   The patient's CT scan and blood work are still pending at the time of admission.                   ED Course     Clinical Impression:   The primary encounter diagnosis was Pyelonephritis. A diagnosis of Pelvic pain in female was also pertinent to this visit.          Humberto Dhaliwal MD  06/08/17 0644       Humberto Dhaliwal MD  06/08/17 0746       Humberto Dhaliwal MD  06/08/17 0747

## 2017-06-08 NOTE — NURSING
Pt admitted to unit from ER.  Initial assessment completed per flow sheet.  NS infusing at 125 ml.  Lovenox initiated for VTE. Medicated for pain with hydrocodone.  Side rails up times two, bed low and locked, call light within reach.

## 2017-06-08 NOTE — PLAN OF CARE
Problem: Patient Care Overview  Goal: Plan of Care Review  Pt lying in bed, respirations even and unlabored, no acute distress noted.  She had been medicated for pelvic and flank pain throughout shift.  NS infusing at 125 ml/hr to LFA.  Side rails up times two, bed low and locked, call light within reach.  Urology consult canceled.

## 2017-06-08 NOTE — UM SECONDARY REVIEW
Physician Advisor External    Level of Care Issue    Approved Inpatient  For admit 6/8/17 per dr jefferson at [facility]

## 2017-06-08 NOTE — PLAN OF CARE
The pt lives at home with her two sons and her spouse, Darlin [phone]. She denies HH or DME. Her PCP is Dr. Sheppard and she has Medicare and Medicaid insurance. No questions at this time. Ondina Haas LMSW     06/08/17 1146   Discharge Assessment   Assessment Type Discharge Planning Assessment   Confirmed/corrected address and phone number on facesheet? Yes   Assessment information obtained from? Patient;Medical Record   Communicated expected length of stay with patient/caregiver no   If Healthcare Directive is received, is it scanned into Epic? no (comment)   Prior to hospitilization cognitive status: Alert/Oriented   Prior to hospitalization functional status: Independent   Current cognitive status: Alert/Oriented   Lives With spouse;child(garo), dependent   Able to Return to Prior Arrangements yes   Is patient able to care for self after discharge? Yes   Readmission Within The Last 30 Days other (see comments)  (Recent admit from 5/15/17)   Patient currently being followed by outpatient case management? No   Patient currently receives home health services? No   Does the patient currently use HME? No   Patient currently receives private duty nursing? No   Patient currently receives any other outside agency services? No   Equipment Currently Used at Home none   Do you have any problems affording any of your prescribed medications? No  ([address] )   Is the patient taking medications as prescribed? yes   Do you have any financial concerns preventing you from receiving the healthcare you need? No  (Medicare and Medicaid )   Does the patient have transportation to healthcare appointments? Yes   Transportation Available car   On Dialysis? No   Does the patient receive services at the Coumadin Clinic? No   Are there any open cases? No   Discharge Plan A Home   Discharge Plan B Home with family   Patient/Family In Agreement With Plan yes

## 2017-06-08 NOTE — ED NOTES
Pt presents with complaints of pelvic pain in bladder area but denies any pain or burning with urination. Pt reports has been having normal bowel movements. No vaginal discharge. Pain level is 10/10. To bathroom to get urine specimen.

## 2017-06-09 LAB
ANION GAP SERPL CALC-SCNC: 7 MMOL/L
BACTERIA UR CULT: NORMAL
BASOPHILS # BLD AUTO: 0 K/UL
BASOPHILS NFR BLD: 0.5 %
BUN SERPL-MCNC: 21 MG/DL
CALCIUM SERPL-MCNC: 8.3 MG/DL
CHLORIDE SERPL-SCNC: 110 MMOL/L
CO2 SERPL-SCNC: 22 MMOL/L
CREAT SERPL-MCNC: 2.3 MG/DL
DIFFERENTIAL METHOD: ABNORMAL
EOSINOPHIL # BLD AUTO: 0.7 K/UL
EOSINOPHIL NFR BLD: 11.1 %
ERYTHROCYTE [DISTWIDTH] IN BLOOD BY AUTOMATED COUNT: 13.1 %
EST. GFR  (AFRICAN AMERICAN): 30 ML/MIN/1.73 M^2
EST. GFR  (NON AFRICAN AMERICAN): 26 ML/MIN/1.73 M^2
GLUCOSE SERPL-MCNC: 70 MG/DL
HCT VFR BLD AUTO: 24.3 %
HGB BLD-MCNC: 8.1 G/DL
LYMPHOCYTES # BLD AUTO: 1.5 K/UL
LYMPHOCYTES NFR BLD: 22.4 %
MCH RBC QN AUTO: 32.6 PG
MCHC RBC AUTO-ENTMCNC: 33.5 %
MCV RBC AUTO: 97 FL
MONOCYTES # BLD AUTO: 0.5 K/UL
MONOCYTES NFR BLD: 7.5 %
NEUTROPHILS # BLD AUTO: 3.8 K/UL
NEUTROPHILS NFR BLD: 58.5 %
OB PNL STL: NEGATIVE
PLATELET # BLD AUTO: 280 K/UL
PMV BLD AUTO: 8.4 FL
POTASSIUM SERPL-SCNC: 4.1 MMOL/L
RBC # BLD AUTO: 2.5 M/UL
SODIUM SERPL-SCNC: 139 MMOL/L
WBC # BLD AUTO: 6.6 K/UL

## 2017-06-09 PROCEDURE — 96360 HYDRATION IV INFUSION INIT: CPT

## 2017-06-09 PROCEDURE — 63600175 PHARM REV CODE 636 W HCPCS: Performed by: INTERNAL MEDICINE

## 2017-06-09 PROCEDURE — 99232 SBSQ HOSP IP/OBS MODERATE 35: CPT | Mod: ,,, | Performed by: INTERNAL MEDICINE

## 2017-06-09 PROCEDURE — 80048 BASIC METABOLIC PNL TOTAL CA: CPT

## 2017-06-09 PROCEDURE — 96366 THER/PROPH/DIAG IV INF ADDON: CPT

## 2017-06-09 PROCEDURE — 96361 HYDRATE IV INFUSION ADD-ON: CPT

## 2017-06-09 PROCEDURE — 25000003 PHARM REV CODE 250: Performed by: EMERGENCY MEDICINE

## 2017-06-09 PROCEDURE — 82272 OCCULT BLD FECES 1-3 TESTS: CPT

## 2017-06-09 PROCEDURE — 25000003 PHARM REV CODE 250: Performed by: INTERNAL MEDICINE

## 2017-06-09 PROCEDURE — 63600175 PHARM REV CODE 636 W HCPCS: Performed by: EMERGENCY MEDICINE

## 2017-06-09 PROCEDURE — 25000003 PHARM REV CODE 250: Performed by: NURSE PRACTITIONER

## 2017-06-09 PROCEDURE — 85025 COMPLETE CBC W/AUTO DIFF WBC: CPT

## 2017-06-09 PROCEDURE — 12000002 HC ACUTE/MED SURGE SEMI-PRIVATE ROOM

## 2017-06-09 PROCEDURE — 36415 COLL VENOUS BLD VENIPUNCTURE: CPT

## 2017-06-09 RX ORDER — ZOLPIDEM TARTRATE 5 MG/1
5 TABLET ORAL NIGHTLY PRN
Status: DISCONTINUED | OUTPATIENT
Start: 2017-06-09 | End: 2017-06-10 | Stop reason: HOSPADM

## 2017-06-09 RX ADMIN — HYDROMORPHONE HYDROCHLORIDE 1 MG: 1 INJECTION, SOLUTION INTRAMUSCULAR; INTRAVENOUS; SUBCUTANEOUS at 07:06

## 2017-06-09 RX ADMIN — HYDROMORPHONE HYDROCHLORIDE 1 MG: 1 INJECTION, SOLUTION INTRAMUSCULAR; INTRAVENOUS; SUBCUTANEOUS at 05:06

## 2017-06-09 RX ADMIN — ZOLPIDEM TARTRATE 5 MG: 5 TABLET, FILM COATED ORAL at 10:06

## 2017-06-09 RX ADMIN — HYDROCODONE BITARTRATE AND ACETAMINOPHEN 1 TABLET: 10; 325 TABLET ORAL at 11:06

## 2017-06-09 RX ADMIN — CIPROFLOXACIN 400 MG: 2 INJECTION, SOLUTION INTRAVENOUS at 08:06

## 2017-06-09 RX ADMIN — HYDROMORPHONE HYDROCHLORIDE 1 MG: 1 INJECTION, SOLUTION INTRAMUSCULAR; INTRAVENOUS; SUBCUTANEOUS at 01:06

## 2017-06-09 RX ADMIN — SODIUM CHLORIDE: 0.9 INJECTION, SOLUTION INTRAVENOUS at 01:06

## 2017-06-09 RX ADMIN — HYDROMORPHONE HYDROCHLORIDE 1 MG: 1 INJECTION, SOLUTION INTRAMUSCULAR; INTRAVENOUS; SUBCUTANEOUS at 03:06

## 2017-06-09 RX ADMIN — HYDROMORPHONE HYDROCHLORIDE 1 MG: 1 INJECTION, SOLUTION INTRAMUSCULAR; INTRAVENOUS; SUBCUTANEOUS at 09:06

## 2017-06-09 RX ADMIN — PANTOPRAZOLE SODIUM 40 MG: 40 TABLET, DELAYED RELEASE ORAL at 08:06

## 2017-06-09 RX ADMIN — HYDROCODONE BITARTRATE AND ACETAMINOPHEN 1 TABLET: 10; 325 TABLET ORAL at 05:06

## 2017-06-09 RX ADMIN — ENOXAPARIN SODIUM 40 MG: 100 INJECTION SUBCUTANEOUS at 09:06

## 2017-06-09 RX ADMIN — HYDROCODONE BITARTRATE AND ACETAMINOPHEN 1 TABLET: 10; 325 TABLET ORAL at 08:06

## 2017-06-09 RX ADMIN — OXYBUTYNIN CHLORIDE 10 MG: 5 TABLET, EXTENDED RELEASE ORAL at 08:06

## 2017-06-09 NOTE — PLAN OF CARE
Problem: Patient Care Overview  Goal: Plan of Care Review  Outcome: Ongoing (interventions implemented as appropriate)  Pt safe, Pt free from falls or injury, Pt VS stable, Pt complains of pain relieved by PRN meds. Pt denies N/V. Pt verbalized understanding plan of care. Call light in reach, bed in low position, wheels locked

## 2017-06-09 NOTE — PLAN OF CARE
Problem: Patient Care Overview  Goal: Plan of Care Review  Outcome: Ongoing (interventions implemented as appropriate)  Received report on pt approximately 1315 from nurseElda. Pt requires pain medication prn as ordered q 4 H. Will continue to monitor.

## 2017-06-09 NOTE — PROGRESS NOTES
Progress Note  Hospital Medicine  Patient Name:Cesilia Dozier  MRN:  [id number]  Patient Class: OP- Observation  Admit Date: 6/8/2017  Length of Stay: 0 days  Expected Discharge Date:   Attending Physician: Cathy Nicholson MD  Primary Care Provider:  Harsha Sheppard MD    SUBJECTIVE:     Principal Problem: Pyelonephritis  Initial history of present illness: Patient is a 37 y.o. female admitted to Hospitalist Service from [facility] Room with complaint of pelvic pain and associated nausea and vomiting. PMH cervical CA, Hydronephrosis, Ureteral strictures with stents, and recurrent pyelonephritis. She is currently in remission for Cervical CA.   The patient reports that she woke up with severe pelvic pain around 2 AM this morning.  She reported that it is associated with multiple episodes of nausea and vomiting.  She denied any associated fever, dysuria, or hematuria.  The patient has not taken anything for pain relief.  The patient had a recent ureteral stent change at [place] on 06/02/17.  There are no alleviating factors. Patient denied chest pain, shortness of breath, headache, vision changes, focal neuro-deficits, cough or fever.    PMH/PSH/SH/FH/Meds: reviewed.    Symptoms/Review of Systems: Renal function worsening. H/H trending down. Continued pain. No shortness of breath, cough, chest pain or headache, fever.     Diet:  Adequate intake.    Activity level: Normal.    Pain:  Continued pain.       OBJECTIVE:   Vital Signs (Most Recent):      Temp: 97.9 °F (36.6 °C) (06/09/17 0720)  Pulse: 86 (06/09/17 0720)  Resp: 18 (06/09/17 0720)  BP: 90/61 (06/09/17 0720)  SpO2: 100 % (06/09/17 0720)       Vital Signs Range (Last 24H):  Temp:  [97.9 °F (36.6 °C)-98.6 °F (37 °C)]   Pulse:  []   Resp:  [16-18]   BP: ()/(58-70)   SpO2:  [94 %-100 %]     Weight: 74.8 kg (165 lb)  Body mass index is 26.63 kg/m².    Intake/Output Summary (Last 24 hours) at 06/09/17 0803  Last data filed at 06/08/17  1628   Gross per 24 hour   Intake              240 ml   Output              350 ml   Net             -110 ml     Physical Examination:  General appearance: well developed, appears stated age  Head: normocephalic, atraumatic  Eyes:  conjunctivae/corneas clear. PERRL.  Nose: Nares normal. Septum midline.  Throat: lips, mucosa, and tongue normal; teeth and gums normal, no throat erythema Neck: supple, symmetrical, trachea midline, no JVD and thyroid not enlarged, symmetric, no tenderness/mass/nodules  Lungs:  clear to auscultation bilaterally and normal respiratory effort  Chest wall: no tenderness  Heart: regular rate and rhythm, S1, S2 normal, no murmur, click, rub or gallop  Abdomen: soft, non-tender non-distented; bowel sounds normal; no masses,  no organomegaly  Extremities: no cyanosis or edema, or clubbing  Pulses: 2+ and symmetric  Skin: Skin color, texture, turgor normal. No rashes or lesions.  Lymph nodes: Cervical, supraclavicular, and axillary nodes normal.  Neurologic: Normal strength and tone. No focal numbness or weakness. CNII-XII intact.     CBC:    Recent Labs  Lab 06/08/17  0737 06/09/17  0426   WBC 8.90 6.60   RBC 3.06* 2.50*   HGB 10.1* 8.1*   HCT 29.5* 24.3*   * 280   MCV 96 97   MCH 33.0* 32.6*   MCHC 34.3 33.5   BMP    Recent Labs  Lab 06/08/17  0737 06/09/17  0426   GLU 85 70    139   K 4.4 4.1    110   CO2 23 22*   BUN 17 21*   CREATININE 2.1* 2.3*   CALCIUM 9.7 8.3*      Diagnostic Results:  Microbiology Results (last 7 days)     Procedure Component Value Units Date/Time    Urine culture **CANNOT BE ORDERED STAT** [[id number]] Collected:  06/08/17 0700    Order Status:  Sent Specimen:  Urine from Urine, Clean Catch Updated:  06/08/17 1832    Urine culture [[id number]] Collected:  06/08/17 1241    Order Status:  Sent Specimen:  Urine from Urine, Clean Catch Updated:  06/08/17 1832         CT abdomen: Bilateral double pigtail stents are in position with mild residual  hydronephrosis, an improvement from the prior study of 05/15/2017.  Air is noted in the bladder which may be secondary to recent catheterization or gas-forming organism.  Cholelithiasis.  Prior hysterectomy.    Assessment/Plan:     UTI (urinary tract infection)  IV Cipro  Culture urine    Acute Kidney Injury  Continue IVF. Follow BMP.     CKD (chronic kidney disease) stage 4, GFR 15-29 ml/min  Renal dose medications  IVF hydration to maintain perfusion. Worsening renal function.  Monitor labs     Pelvic pain syndrome  Continue pain management, preferably with oral narcotics and use IV narcotics for breakthrough pain.     History of Cervical Cancer  In remission per patient.     VTE Risk Mitigation         Ordered     enoxaparin injection 40 mg  Every 24 hours (non-standard times)     Route:  Subcutaneous        06/08/17 0846     Medium Risk of VTE  Once      06/08/17 0843     Place ANA hose  Until discontinued      06/08/17 0843     Place sequential compression device  Until discontinued      06/08/17 0843        Cathy Nicholson MD  Department of Hospital Medicine   [facility]

## 2017-06-10 VITALS
RESPIRATION RATE: 18 BRPM | SYSTOLIC BLOOD PRESSURE: 83 MMHG | BODY MASS INDEX: 26.52 KG/M2 | HEIGHT: 66 IN | OXYGEN SATURATION: 98 % | TEMPERATURE: 98 F | HEART RATE: 88 BPM | WEIGHT: 165 LBS | DIASTOLIC BLOOD PRESSURE: 50 MMHG

## 2017-06-10 LAB
ANION GAP SERPL CALC-SCNC: 8 MMOL/L
BASOPHILS # BLD AUTO: ABNORMAL K/UL
BASOPHILS NFR BLD: 0 %
BUN SERPL-MCNC: 17 MG/DL
CALCIUM SERPL-MCNC: 8.8 MG/DL
CHLORIDE SERPL-SCNC: 109 MMOL/L
CO2 SERPL-SCNC: 20 MMOL/L
CREAT SERPL-MCNC: 2.1 MG/DL
DIFFERENTIAL METHOD: ABNORMAL
EOSINOPHIL # BLD AUTO: ABNORMAL K/UL
EOSINOPHIL NFR BLD: 22 %
ERYTHROCYTE [DISTWIDTH] IN BLOOD BY AUTOMATED COUNT: 13.1 %
EST. GFR  (AFRICAN AMERICAN): 34 ML/MIN/1.73 M^2
EST. GFR  (NON AFRICAN AMERICAN): 29 ML/MIN/1.73 M^2
GLUCOSE SERPL-MCNC: 74 MG/DL
HCT VFR BLD AUTO: 24.3 %
HGB BLD-MCNC: 8.4 G/DL
LYMPHOCYTES # BLD AUTO: ABNORMAL K/UL
LYMPHOCYTES NFR BLD: 17 %
MCH RBC QN AUTO: 33.5 PG
MCHC RBC AUTO-ENTMCNC: 34.6 %
MCV RBC AUTO: 97 FL
MONOCYTES # BLD AUTO: ABNORMAL K/UL
MONOCYTES NFR BLD: 7 %
NEUTROPHILS NFR BLD: 53 %
NEUTS BAND NFR BLD MANUAL: 1 %
PLATELET # BLD AUTO: 295 K/UL
PLATELET BLD QL SMEAR: ABNORMAL
PMV BLD AUTO: 8.7 FL
POLYCHROMASIA BLD QL SMEAR: ABNORMAL
POTASSIUM SERPL-SCNC: 4.2 MMOL/L
RBC # BLD AUTO: 2.51 M/UL
SODIUM SERPL-SCNC: 137 MMOL/L
WBC # BLD AUTO: 5.6 K/UL

## 2017-06-10 PROCEDURE — 85007 BL SMEAR W/DIFF WBC COUNT: CPT

## 2017-06-10 PROCEDURE — 85027 COMPLETE CBC AUTOMATED: CPT

## 2017-06-10 PROCEDURE — 63600175 PHARM REV CODE 636 W HCPCS: Performed by: INTERNAL MEDICINE

## 2017-06-10 PROCEDURE — 80048 BASIC METABOLIC PNL TOTAL CA: CPT

## 2017-06-10 PROCEDURE — 63600175 PHARM REV CODE 636 W HCPCS: Performed by: EMERGENCY MEDICINE

## 2017-06-10 PROCEDURE — 25000003 PHARM REV CODE 250: Performed by: INTERNAL MEDICINE

## 2017-06-10 PROCEDURE — 36415 COLL VENOUS BLD VENIPUNCTURE: CPT

## 2017-06-10 RX ORDER — OXYCODONE AND ACETAMINOPHEN 10; 325 MG/1; MG/1
1 TABLET ORAL EVERY 4 HOURS PRN
Qty: 15 TABLET | Refills: 0 | Status: SHIPPED | OUTPATIENT
Start: 2017-06-10 | End: 2017-06-27

## 2017-06-10 RX ORDER — CIPROFLOXACIN 500 MG/1
500 TABLET ORAL EVERY 12 HOURS
Qty: 20 TABLET | Refills: 0 | Status: SHIPPED | OUTPATIENT
Start: 2017-06-10 | End: 2017-06-27 | Stop reason: ALTCHOICE

## 2017-06-10 RX ADMIN — HYDROMORPHONE HYDROCHLORIDE 1 MG: 1 INJECTION, SOLUTION INTRAMUSCULAR; INTRAVENOUS; SUBCUTANEOUS at 08:06

## 2017-06-10 RX ADMIN — PANTOPRAZOLE SODIUM 40 MG: 40 TABLET, DELAYED RELEASE ORAL at 08:06

## 2017-06-10 RX ADMIN — ENOXAPARIN SODIUM 40 MG: 100 INJECTION SUBCUTANEOUS at 10:06

## 2017-06-10 RX ADMIN — HYDROCODONE BITARTRATE AND ACETAMINOPHEN 1 TABLET: 10; 325 TABLET ORAL at 05:06

## 2017-06-10 RX ADMIN — OXYBUTYNIN CHLORIDE 10 MG: 5 TABLET, EXTENDED RELEASE ORAL at 08:06

## 2017-06-10 RX ADMIN — HYDROMORPHONE HYDROCHLORIDE 1 MG: 1 INJECTION, SOLUTION INTRAMUSCULAR; INTRAVENOUS; SUBCUTANEOUS at 02:06

## 2017-06-10 RX ADMIN — CIPROFLOXACIN 400 MG: 2 INJECTION, SOLUTION INTRAVENOUS at 08:06

## 2017-06-10 RX ADMIN — HYDROMORPHONE HYDROCHLORIDE 1 MG: 1 INJECTION, SOLUTION INTRAMUSCULAR; INTRAVENOUS; SUBCUTANEOUS at 12:06

## 2017-06-10 RX ADMIN — HYDROCODONE BITARTRATE AND ACETAMINOPHEN 1 TABLET: 10; 325 TABLET ORAL at 11:06

## 2017-06-10 NOTE — PLAN OF CARE
Discharge paper reviewed, signed and copy given to patient, home medicine sent to pharmacy. Verbalized understanding  IV access removed no bleeding  noted

## 2017-06-10 NOTE — PROGRESS NOTES
Zolpidem therapy for Cesilia Dozier [id number] has been evaluated according to the pharmacy practice protocol.      Due to patient being female, zolpidem dose has been reduced to 5 mg nightly prn insomnia.      Thank you,  Krystle Vieira

## 2017-06-10 NOTE — PLAN OF CARE
Problem: Patient Care Overview  Goal: Plan of Care Review  Outcome: Ongoing (interventions implemented as appropriate)  Persistent pain to right flank area and radiating to right abdominal area  Prn pain meds given; pt states po pain meds do not help  afebrile

## 2017-06-10 NOTE — PLAN OF CARE
06/10/17 1402   Final Note   Assessment Type Final Discharge Note   Discharge Disposition Home   Discharge planning education complete? Yes

## 2017-06-10 NOTE — DISCHARGE INSTRUCTIONS
Thank you for choosing [facility] for your medical care. The primary doctor who is taking care of you at the time of your discharge is Cathy Nicholson MD.     You were admitted to the hospital with Pyelonephritis.     Please note your discharge instructions, including diet/activity restrictions, follow-up appointments, and medication changes.  If you have any questions about your medical issues, prescriptions, or any other questions, please feel free to contact the [facility] Medicine Dept at [phone] and we will help.    If you are previously with Home health, outpatient PT/OT or under a therapy program, you are cleared to return to those programs.    Please direct all long term medication refills and follow up to your primary care provider, Harsha Sheppard MD. Thank you again for letting us take care of your health care needs.

## 2017-06-11 NOTE — HOSPITAL COURSE
Patient was admitted with UTI and indwelling bilateral ureteral stents and hydronephrosis. She has underlying cervical cancer with opiate dependence. She was started on IV ABX and did well. Urine culture was non-revealing and she was transitioned to PO pain meds and ABX at discharge and will F/U with her urologist next week post-discharge.    Patient and/or family was seen on day of discharge by myself and was examined. They were stable for discharge. Discharge plan, follow up instructions, and contacts in case of further needs were discussed in detail.

## 2017-06-11 NOTE — DISCHARGE SUMMARY
[facility] Hospital Medicine  Discharge Summary      Patient Name: Cesilia Dozier  MRN: [id number]  Admission Date: 6/8/2017  Hospital Length of Stay: 1 days  Discharge Date and Time: 6/10/2017  7:25 PM  Attending Physician: No att. providers found   Discharging Provider: Librado Ivan MD  Primary Care Provider: Harsha Sheppard MD    * No surgery found *      Indwelling Lines/Drains at time of discharge:   Lines/Drains/Airways     Drain                 Ureteral Drain/Stent 11/07/16 1835 Left ureter 6 Fr. 215 days         Ureteral Drain/Stent 11/15/16 0816 Left ureter 6 Fr. 207 days              Hospital Course:   Patient was admitted with UTI and indwelling bilateral ureteral stents and hydronephrosis. She has underlying cervical cancer with opiate dependence. She was started on IV ABX and did well. Urine culture was non-revealing and she was transitioned to PO pain meds and ABX at discharge and will F/U with her urologist next week post-discharge.    Patient and/or family was seen on day of discharge by myself and was examined. They were stable for discharge. Discharge plan, follow up instructions, and contacts in case of further needs were discussed in detail.       Consults:     Significant Diagnostic Studies: Labs:   BMP:   Recent Labs  Lab 06/09/17  0426 06/10/17  0506   GLU 70 74    137   K 4.1 4.2    109   CO2 22* 20*   BUN 21* 17   CREATININE 2.3* 2.1*   CALCIUM 8.3* 8.8    and CBC   Recent Labs  Lab 06/09/17  0426 06/10/17  0506   WBC 6.60 5.60   HGB 8.1* 8.4*   HCT 24.3* 24.3*    295       Pending Diagnostic Studies:     None        Final Active Diagnoses:    Diagnosis Date Noted POA    PRINCIPAL PROBLEM:  Pyelonephritis [N12] 04/08/2017 Yes    Pelvic pain in female [R10.2] 06/08/2017 Yes    Pelvic pain syndrome [N94.89] 05/15/2017 Yes    CKD (chronic kidney disease) stage 4, GFR 15-29 ml/min [N18.4] 12/13/2016 Yes    Ureteral stricture s/p bilateral ureteral  stents secondary to XRT for cervical cancer [N13.5] 09/16/2016 Yes      Problems Resolved During this Admission:    Diagnosis Date Noted Date Resolved POA      No new Assessment & Plan notes have been filed under this hospital service since the last note was generated.  Service: Hospital Medicine      Discharged Condition: stable    Disposition: Home or Self Care    Follow Up:  Follow-up Information     Harsha Sheppard MD In 2 weeks.    Specialty:  Internal Medicine  Contact information:  [address] [place] [address] [address]  [facility] [id number]  [phone]                 Patient Instructions:     Diet general     Activity as tolerated     Call MD for:  temperature >100.4     Call MD for:  persistent nausea and vomiting or diarrhea     Call MD for:  severe uncontrolled pain       Medications:  Reconciled Home Medications:   Discharge Medication List as of 6/10/2017  1:39 PM      START taking these medications    Details   ciprofloxacin HCl (CIPRO) 500 MG tablet Take 1 tablet (500 mg total) by mouth every 12 (twelve) hours., Starting Sat 6/10/2017, Normal      oxycodone-acetaminophen (PERCOCET)  mg per tablet Take 1 tablet by mouth every 4 (four) hours as needed for Pain., Starting Sat 6/10/2017, Normal         CONTINUE these medications which have NOT CHANGED    Details   tolterodine (DETROL LA) 4 MG 24 hr capsule Take 1 capsule (4 mg total) by mouth once daily., Starting 1/24/2017, Until Wed 1/24/18, Normal         STOP taking these medications       hydrocodone-acetaminophen 10-325mg (NORCO)  mg Tab Comments:   Reason for Stopping:             Time spent on the discharge of patient: 35 minutes    Librado Ivan MD  Department of Hospital Medicine  [facility]

## 2017-06-13 ENCOUNTER — PATIENT OUTREACH (OUTPATIENT)
Dept: ADMINISTRATIVE | Facility: CLINIC | Age: 37
End: 2017-06-13
Payer: MEDICARE

## 2017-06-27 ENCOUNTER — ANESTHESIA EVENT (OUTPATIENT)
Dept: SURGERY | Facility: OTHER | Age: 37
End: 2017-06-27
Payer: MEDICARE

## 2017-06-27 ENCOUNTER — OFFICE VISIT (OUTPATIENT)
Dept: UROLOGY | Facility: CLINIC | Age: 37
End: 2017-06-27
Attending: UROLOGY
Payer: MEDICARE

## 2017-06-27 ENCOUNTER — HOSPITAL ENCOUNTER (OUTPATIENT)
Dept: PREADMISSION TESTING | Facility: OTHER | Age: 37
Discharge: HOME | End: 2017-06-27
Attending: UROLOGY
Payer: MEDICARE

## 2017-06-27 VITALS
HEIGHT: 66 IN | HEART RATE: 100 BPM | OXYGEN SATURATION: 99 % | SYSTOLIC BLOOD PRESSURE: 116 MMHG | BODY MASS INDEX: 23.46 KG/M2 | WEIGHT: 146 LBS | DIASTOLIC BLOOD PRESSURE: 80 MMHG | TEMPERATURE: 98 F

## 2017-06-27 VITALS
DIASTOLIC BLOOD PRESSURE: 69 MMHG | SYSTOLIC BLOOD PRESSURE: 110 MMHG | BODY MASS INDEX: 23.5 KG/M2 | WEIGHT: 146.25 LBS | HEIGHT: 66 IN | HEART RATE: 113 BPM

## 2017-06-27 DIAGNOSIS — N20.0 NEPHROLITHIASIS: ICD-10-CM

## 2017-06-27 DIAGNOSIS — N13.30 HYDRONEPHROSIS, BILATERAL: Primary | ICD-10-CM

## 2017-06-27 PROCEDURE — 99213 OFFICE O/P EST LOW 20 MIN: CPT | Mod: PBBFAC | Performed by: UROLOGY

## 2017-06-27 PROCEDURE — 99214 OFFICE O/P EST MOD 30 MIN: CPT | Mod: S$PBB,,, | Performed by: UROLOGY

## 2017-06-27 PROCEDURE — 99999 PR PBB SHADOW E&M-EST. PATIENT-LVL III: CPT | Mod: PBBFAC,,, | Performed by: UROLOGY

## 2017-06-27 RX ORDER — SODIUM CHLORIDE, SODIUM LACTATE, POTASSIUM CHLORIDE, CALCIUM CHLORIDE 600; 310; 30; 20 MG/100ML; MG/100ML; MG/100ML; MG/100ML
INJECTION, SOLUTION INTRAVENOUS CONTINUOUS
Status: CANCELLED | OUTPATIENT
Start: 2017-06-27

## 2017-06-27 RX ORDER — PREGABALIN 75 MG/1
150 CAPSULE ORAL
Status: DISCONTINUED | OUTPATIENT
Start: 2017-06-27 | End: 2017-06-28 | Stop reason: HOSPADM

## 2017-06-27 RX ORDER — TRAMADOL HYDROCHLORIDE 50 MG/1
TABLET ORAL
Status: ON HOLD | COMMUNITY
Start: 2017-06-15 | End: 2017-06-28

## 2017-06-27 RX ORDER — FAMOTIDINE 20 MG/1
20 TABLET, FILM COATED ORAL
Status: CANCELLED | OUTPATIENT
Start: 2017-06-27 | End: 2017-06-27

## 2017-06-27 RX ORDER — IBUPROFEN 800 MG/1
TABLET ORAL
COMMUNITY
Start: 2017-05-17 | End: 2017-07-03

## 2017-06-27 RX ORDER — CIPROFLOXACIN 2 MG/ML
400 INJECTION, SOLUTION INTRAVENOUS
Status: CANCELLED | OUTPATIENT
Start: 2017-06-27

## 2017-06-27 NOTE — ANESTHESIA PREPROCEDURE EVALUATION
06/27/2017  Cesilia Dozier is a 37 y.o., female.    Anesthesia Evaluation    I have reviewed the Patient Summary Reports.    I have reviewed the Nursing Notes.   I have reviewed the Medications.     Review of Systems  Anesthesia Hx:  No problems with previous Anesthesia Hx of Anesthetic complications  Denies Family Hx of Anesthesia complications.  Personal Hx of Anesthesia complications, Post-Operative Nausea/Vomiting, in the past, but not with recent anesthetics / prophylaxis   Social:  Non-Smoker    Hematology/Oncology:         -- Anemia (Fe def, has responded to iron supp): Hematology Comments: Hct 24  --  Cancer in past history:  Oncology Comments: Cervical CA dx'ed 2014  S/p hyst and ureteral stents for tumor invasion  S/p chemo and radiation     EENT/Dental:EENT/Dental Normal   Cardiovascular:  Cardiovascular Normal     Pulmonary:  Pulmonary Normal    Renal/:   Chronic Renal Disease (CKD III-IV) H/o hydronephrosis and acute renal failure in Nove and Dec 2016.  Never dialyzed   Hepatic/GI:  Hepatic/GI Normal    Musculoskeletal:  Musculoskeletal Normal    Neurological:  Neurology Normal    Endocrine:  Endocrine Normal    Dermatological:  Skin Normal    Psych:  Psychiatric Normal           Physical Exam  General:  Well nourished    Airway/Jaw/Neck:  Airway Findings: Mouth Opening: < 3 cm Tongue: Large  Mallampati: III  Improves to II with phonation.  TM Distance: Normal, at least 6 cm  Jaw/Neck Findings:  Neck ROM: Normal ROM      Dental:  Dental Findings: In tact              Anesthesia Plan  Type of Anesthesia, risks & benefits discussed:  Anesthesia Type:  general  Patient's Preference:   Intra-op Monitoring Plan: standard ASA monitors  Intra-op Monitoring Plan Comments:   Post Op Pain Control Plan:   Post Op Pain Control Plan Comments:   Induction:   IV  Beta Blocker:         Informed Consent:   Anesthesia consent signed with patient.  ASA Score: 2     Day of Surgery Review of History & Physical:    H&P update referred to the surgeon.     Anesthesia Plan Notes: PONV prophylaxis, did well with last anesthetic. Scop patch.        Ready For Surgery From Anesthesia Perspective.

## 2017-06-27 NOTE — PROGRESS NOTES
"Subjective:      Cesilia Dozier is a 37 y.o. female who returns today regarding her ureteral stents.    She has h/o cervical cancer, treated elsewhere w/ surgery, chemo, XRT, w/ subsequent bilateral hydro treated in the past w/ PCNs and most recently w/ stents. Stents last changed in June in [place]. Etiology and location of obstruction is unclear.    She continues to have pain and has had multiple recent admissions and ER visits. She states pain is in left upper back as in the past. She remains on detrol.    She saw a NP in GYN last month and had negative pap smear.    The following portions of the patient's history were reviewed and updated as appropriate: allergies, current medications, past family history, past medical history, past social history, past surgical history and problem list.    Review of Systems  A comprehensive multipoint review of systems was negative except as otherwise stated in the HPI.     Objective:   Vitals:   /69 (BP Location: Left arm, Patient Position: Sitting, BP Method: Automatic)   Pulse (!) 113   Ht 5' 6" (1.676 m)   Wt 66.3 kg (146 lb 4.4 oz)   LMP  (LMP Unknown)   BMI 23.61 kg/m²     Physical Exam   General: alert and oriented, no acute distress  Respiratory: Symmetric expansion, non-labored breathing  Cardiovascular: no peripheral edema  Abdomen: soft, non distended  Skin: normal coloration and turgor, no rashes, no suspicious skin lesions noted  Neuro: no gross deficits  Psych: normal judgment and insight, normal mood/affect and non-anxious    Lab Review     Lab Results   Component Value Date    WBC 5.60 06/10/2017    HGB 8.4 (L) 06/10/2017    HCT 24.3 (L) 06/10/2017    MCV 97 06/10/2017     06/10/2017     Lab Results   Component Value Date    CREATININE 2.1 (H) 06/10/2017    BUN 17 06/10/2017       Imaging (all images personally reviewed; agree with report below)  Results for orders placed during the hospital encounter of 06/08/17   CT Renal Stone Study ABD " Pelvis WO    Narrative Axial images are obtained from the dome of the diaphragm to the floor of the pelvis.  The patient did not receive oral or IV contrast.  Comparison is made with the prior CT of 05/15/2017.    The liver is of normal size and contour  without focal mass. The CT density is within normal limits. The gallbladder is of normal size but contains radiodensity in the fundus suggesting a very small gallstones or gallbladder sand.  The pancreas is of normal contour and CT density without mass, edema or pseudocyst.  The spleen is of normal size and CT density.    The adrenal glands are not enlarged.  The head patient has bilateral double pigtail ureteral stents in place extending from the kidneys through the bladder.  There is decreased hydronephrosis on the left and mild degenerative arthrosis remaining on the right unchanged.  A stone in the kidneys or in either ureter is not seen on this study.  The abdominal aorta and inferior vena cava are of normal caliber.  No josi-aortic or retroperitoneal adenopathy or masses are seen. The gastrointestinal organs appear within normal limits without dilation, air-fluid levels or masses seen.    Within the pelvis, the bladder is without mass or asymmetry.A small amount of air is seen in the bladder which may be due to to recent catheterization.  Infection with an airforming organism however is not ruled out.  The bladder wall is not significantly thickened.  A uterus is not seen.    Impression  Bilateral double pigtail stents are in position with mild residual hydronephrosis, an improvement from the prior study of 05/15/2017.  Air is noted in the bladder which may be secondary to recent catheterization or gas-forming organism.  Cholelithiasis.  Prior hysterectomy.      Electronically signed by: Jarod Tate MD  Date:     06/08/17  Time:    08:28            Assessment and Plan:   1. Bilateral hydronephrosis  - Again discussed that long term, would likely benefit  from reimplant, which she is anxious to proceed with.   - Will plan for stent exchange and bilateral RPG tomorrow in anticipation of above    2. Stent pain/Bladder spasms  - Continue detrol    3. Cervical carcinoma  - Had normal annual exam with GYN NP, but needs definitive clearance prior to reimplant. Will discuss further with patient.    I spent over 25 minutes with the patient. Over 50% of the visit was spent in counseling and coordination of care.

## 2017-06-27 NOTE — DISCHARGE INSTRUCTIONS
PRE-ADMIT TESTING -  [phone]    [address]  [facility]        OUTPATIENT SURGERY UNIT - [phone]    Your surgery has been scheduled at [facility]. We are pleased to have the opportunity to serve you. For Further Information please call [phone].    On the day of surgery please report to the Information Desk on the 1st floor.    · CONTACT YOUR PHYSICIAN'S OFFICE THE DAY PRIOR TO YOUR SURGERY TO OBTAIN YOUR ARRIVAL TIME.     · The evening before surgery do not eat anything after 9 p.m. ( this includes hard candy, chewing gum and mints).  You may only have GATORADE, POWERADE AND WATER  from 9 p.m. until you leave your home.   DO NOT DRINK ANY LIQUIDS ON THE WAY TO THE HOSPITAL.      SPECIAL MEDICATION INSTRUCTIONS: TAKE medications checked off by the Anesthesiologist on your Medication List.    Angiogram Patients: Take medications as instructed by your physician, including aspirin.     Surgery Patients:    If you take ASPIRIN - Your PHYSICIAN/SURGEON will need to inform you IF/OR when you need to stop taking aspirin prior to your surgery.     Do Not take any medications containing IBUPROFEN.  Do Not Wear any make-up or dark nail polish   (especially eye make-up) to surgery. If you come to surgery with makeup on you will be required to remove the makeup or nail polish.    Do not shave your surgical area at least 5 days prior to your surgery. The surgical prep will be performed at the hospital according to Infection Control regulations.    Leave all valuables at home.   Do Not wear any jewelry or watches, including any metal in body piercings.  Contact Lens must be removed before surgery. Either do not wear the contact lens or bring a case and solution for storage.  Please bring a container for eyeglasses or dentures as required.  Bring any paperwork your physician has provided, such as consent forms,  history and physicals, doctor's orders, etc.   Bring comfortable clothes  that are loose fitting to wear upon discharge. Take into consideration the type of surgery being performed.  Maintain your diet as advised per your physician the day prior to surgery.      Adequate rest the night before surgery is advised.   Park in the Parking lot behind the hospital or in the [facility] across the street from the parking lot. Parking is complimentary.  If you will be discharged the same day as your procedure, please arrange for a responsible adult to drive you home or to accompany you if traveling by taxi.   YOU WILL NOT BE PERMITTED TO DRIVE OR TO LEAVE THE HOSPITAL ALONE AFTER SURGERY.   It is strongly recommended that you arrange for someone to remain with you for the first 24 hrs following your surgery.       Thank you for your cooperation.  The Staff of [facility].        Bathing Instructions                                                                 Please shower the evening before and morning of your procedure with    ANTIBACTERIAL SOAP. ( DIAL, etc )  Concentrate on the surgical area   for at least 3 minutes and rinse completely. Dry off as usual.   Do not use any deodorant, powder, body lotions, perfume, after shave or    cologne.

## 2017-06-27 NOTE — PLAN OF CARE
06/27/17 1533   ED Admissions Case Approval   ED Admissions Case Approval (!) CM Approved  (OP )

## 2017-06-27 NOTE — H&P
"Subjective:      Cesilia Dozier is a 37 y.o. female who returns today regarding her ureteral stents.    She has h/o cervical cancer, treated elsewhere w/ surgery, chemo, XRT, w/ subsequent bilateral hydro treated in the past w/ PCNs and most recently w/ stents. Stents last changed in June in [place]. Etiology and location of obstruction is unclear.    She continues to have pain and has had multiple recent admissions and ER visits. She states pain is in left upper back as in the past. She remains on detrol.    She saw a NP in GYN last month and had negative pap smear.    The following portions of the patient's history were reviewed and updated as appropriate: allergies, current medications, past family history, past medical history, past social history, past surgical history and problem list.    Review of Systems  A comprehensive multipoint review of systems was negative except as otherwise stated in the HPI.     Objective:   Vitals:   /69 (BP Location: Left arm, Patient Position: Sitting, BP Method: Automatic)   Pulse (!) 113   Ht 5' 6" (1.676 m)   Wt 66.3 kg (146 lb 4.4 oz)   LMP  (LMP Unknown)   BMI 23.61 kg/m²     Physical Exam   General: alert and oriented, no acute distress  Respiratory: Symmetric expansion, non-labored breathing  Cardiovascular: no peripheral edema  Abdomen: soft, non distended  Skin: normal coloration and turgor, no rashes, no suspicious skin lesions noted  Neuro: no gross deficits  Psych: normal judgment and insight, normal mood/affect and non-anxious    Lab Review     Lab Results   Component Value Date    WBC 5.60 06/10/2017    HGB 8.4 (L) 06/10/2017    HCT 24.3 (L) 06/10/2017    MCV 97 06/10/2017     06/10/2017     Lab Results   Component Value Date    CREATININE 2.1 (H) 06/10/2017    BUN 17 06/10/2017       Imaging (all images personally reviewed; agree with report below)  Results for orders placed during the hospital encounter of 06/08/17   CT Renal Stone Study ABD " Pelvis WO    Narrative Axial images are obtained from the dome of the diaphragm to the floor of the pelvis.  The patient did not receive oral or IV contrast.  Comparison is made with the prior CT of 05/15/2017.    The liver is of normal size and contour  without focal mass. The CT density is within normal limits. The gallbladder is of normal size but contains radiodensity in the fundus suggesting a very small gallstones or gallbladder sand.  The pancreas is of normal contour and CT density without mass, edema or pseudocyst.  The spleen is of normal size and CT density.    The adrenal glands are not enlarged.  The head patient has bilateral double pigtail ureteral stents in place extending from the kidneys through the bladder.  There is decreased hydronephrosis on the left and mild degenerative arthrosis remaining on the right unchanged.  A stone in the kidneys or in either ureter is not seen on this study.  The abdominal aorta and inferior vena cava are of normal caliber.  No josi-aortic or retroperitoneal adenopathy or masses are seen. The gastrointestinal organs appear within normal limits without dilation, air-fluid levels or masses seen.    Within the pelvis, the bladder is without mass or asymmetry.A small amount of air is seen in the bladder which may be due to to recent catheterization.  Infection with an airforming organism however is not ruled out.  The bladder wall is not significantly thickened.  A uterus is not seen.    Impression  Bilateral double pigtail stents are in position with mild residual hydronephrosis, an improvement from the prior study of 05/15/2017.  Air is noted in the bladder which may be secondary to recent catheterization or gas-forming organism.  Cholelithiasis.  Prior hysterectomy.      Electronically signed by: Jarod Tate MD  Date:     06/08/17  Time:    08:28            Assessment and Plan:   1. Bilateral hydronephrosis  - Again discussed that long term, would likely benefit  from reimplant, which she is anxious to proceed with.   - Will plan for stent exchange and bilateral RPG tomorrow in anticipation of above    2. Stent pain/Bladder spasms  - Continue detrol    3. Cervical carcinoma  - Had normal annual exam with GYN NP, but needs definitive clearance prior to reimplant. Will discuss further with patient.

## 2017-06-28 ENCOUNTER — HOSPITAL ENCOUNTER (OUTPATIENT)
Facility: OTHER | Age: 37
Discharge: HOME | End: 2017-06-28
Attending: UROLOGY | Admitting: UROLOGY
Payer: MEDICARE

## 2017-06-28 ENCOUNTER — ANESTHESIA (OUTPATIENT)
Dept: SURGERY | Facility: OTHER | Age: 37
End: 2017-06-28
Payer: MEDICARE

## 2017-06-28 VITALS
HEART RATE: 74 BPM | WEIGHT: 146 LBS | DIASTOLIC BLOOD PRESSURE: 65 MMHG | BODY MASS INDEX: 23.46 KG/M2 | TEMPERATURE: 99 F | HEIGHT: 66 IN | SYSTOLIC BLOOD PRESSURE: 105 MMHG | RESPIRATION RATE: 16 BRPM | OXYGEN SATURATION: 100 %

## 2017-06-28 DIAGNOSIS — N13.5 URETERAL STRICTURE: Primary | ICD-10-CM

## 2017-06-28 DIAGNOSIS — N20.0 NEPHROLITHIASIS: ICD-10-CM

## 2017-06-28 DIAGNOSIS — N13.30 HYDRONEPHROSIS, BILATERAL: ICD-10-CM

## 2017-06-28 PROCEDURE — 37000009 HC ANESTHESIA EA ADD 15 MINS: Performed by: UROLOGY

## 2017-06-28 PROCEDURE — 71000016 HC POSTOP RECOV ADDL HR: Performed by: UROLOGY

## 2017-06-28 PROCEDURE — 63600175 PHARM REV CODE 636 W HCPCS: Performed by: NURSE ANESTHETIST, CERTIFIED REGISTERED

## 2017-06-28 PROCEDURE — 25000003 PHARM REV CODE 250: Performed by: ANESTHESIOLOGY

## 2017-06-28 PROCEDURE — 71000033 HC RECOVERY, INTIAL HOUR: Performed by: UROLOGY

## 2017-06-28 PROCEDURE — 71000015 HC POSTOP RECOV 1ST HR: Performed by: UROLOGY

## 2017-06-28 PROCEDURE — C1769 GUIDE WIRE: HCPCS | Performed by: UROLOGY

## 2017-06-28 PROCEDURE — 36000706: Performed by: UROLOGY

## 2017-06-28 PROCEDURE — 63600175 PHARM REV CODE 636 W HCPCS: Performed by: UROLOGY

## 2017-06-28 PROCEDURE — C2617 STENT, NON-COR, TEM W/O DEL: HCPCS | Performed by: UROLOGY

## 2017-06-28 PROCEDURE — C1758 CATHETER, URETERAL: HCPCS | Performed by: UROLOGY

## 2017-06-28 PROCEDURE — 74430 CONTRAST X-RAY BLADDER: CPT | Mod: 26,,, | Performed by: UROLOGY

## 2017-06-28 PROCEDURE — 25500020 PHARM REV CODE 255: Performed by: UROLOGY

## 2017-06-28 PROCEDURE — 51600 INJECTION FOR BLADDER X-RAY: CPT | Mod: 51,,, | Performed by: UROLOGY

## 2017-06-28 PROCEDURE — 71000039 HC RECOVERY, EACH ADD'L HOUR: Performed by: UROLOGY

## 2017-06-28 PROCEDURE — 36000707: Performed by: UROLOGY

## 2017-06-28 PROCEDURE — 25000003 PHARM REV CODE 250: Performed by: NURSE ANESTHETIST, CERTIFIED REGISTERED

## 2017-06-28 PROCEDURE — 37000008 HC ANESTHESIA 1ST 15 MINUTES: Performed by: UROLOGY

## 2017-06-28 PROCEDURE — 52332 CYSTOSCOPY AND TREATMENT: CPT | Mod: 50,,, | Performed by: UROLOGY

## 2017-06-28 PROCEDURE — 63600175 PHARM REV CODE 636 W HCPCS: Performed by: ANESTHESIOLOGY

## 2017-06-28 DEVICE — STENT URETERAL UNIV 8FR 22CM: Type: IMPLANTABLE DEVICE | Site: URETER | Status: FUNCTIONAL

## 2017-06-28 RX ORDER — MIDAZOLAM HYDROCHLORIDE 1 MG/ML
INJECTION INTRAMUSCULAR; INTRAVENOUS
Status: DISCONTINUED | OUTPATIENT
Start: 2017-06-28 | End: 2017-06-28

## 2017-06-28 RX ORDER — HYDROMORPHONE HYDROCHLORIDE 2 MG/ML
0.4 INJECTION, SOLUTION INTRAMUSCULAR; INTRAVENOUS; SUBCUTANEOUS EVERY 5 MIN PRN
Status: DISCONTINUED | OUTPATIENT
Start: 2017-06-28 | End: 2017-06-28 | Stop reason: HOSPADM

## 2017-06-28 RX ORDER — HYDROCODONE BITARTRATE AND ACETAMINOPHEN 5; 325 MG/1; MG/1
1 TABLET ORAL EVERY 4 HOURS PRN
Status: DISCONTINUED | OUTPATIENT
Start: 2017-06-28 | End: 2017-06-28 | Stop reason: HOSPADM

## 2017-06-28 RX ORDER — MEPERIDINE HYDROCHLORIDE 50 MG/ML
12.5 INJECTION INTRAMUSCULAR; INTRAVENOUS; SUBCUTANEOUS ONCE AS NEEDED
Status: DISCONTINUED | OUTPATIENT
Start: 2017-06-28 | End: 2017-06-28 | Stop reason: HOSPADM

## 2017-06-28 RX ORDER — SODIUM CHLORIDE 0.9 % (FLUSH) 0.9 %
3 SYRINGE (ML) INJECTION EVERY 8 HOURS
Status: DISCONTINUED | OUTPATIENT
Start: 2017-06-28 | End: 2017-06-28 | Stop reason: HOSPADM

## 2017-06-28 RX ORDER — HYDROMORPHONE HYDROCHLORIDE 2 MG/ML
1 INJECTION, SOLUTION INTRAMUSCULAR; INTRAVENOUS; SUBCUTANEOUS EVERY 4 HOURS PRN
Status: DISCONTINUED | OUTPATIENT
Start: 2017-06-28 | End: 2017-06-28 | Stop reason: HOSPADM

## 2017-06-28 RX ORDER — LIDOCAINE HCL/PF 100 MG/5ML
SYRINGE (ML) INTRAVENOUS
Status: DISCONTINUED | OUTPATIENT
Start: 2017-06-28 | End: 2017-06-28

## 2017-06-28 RX ORDER — SODIUM CHLORIDE, SODIUM LACTATE, POTASSIUM CHLORIDE, CALCIUM CHLORIDE 600; 310; 30; 20 MG/100ML; MG/100ML; MG/100ML; MG/100ML
INJECTION, SOLUTION INTRAVENOUS CONTINUOUS
Status: DISCONTINUED | OUTPATIENT
Start: 2017-06-28 | End: 2017-06-28 | Stop reason: HOSPADM

## 2017-06-28 RX ORDER — PROPOFOL 10 MG/ML
VIAL (ML) INTRAVENOUS
Status: DISCONTINUED | OUTPATIENT
Start: 2017-06-28 | End: 2017-06-28

## 2017-06-28 RX ORDER — PHENYLEPHRINE HYDROCHLORIDE 10 MG/ML
INJECTION INTRAVENOUS
Status: DISCONTINUED | OUTPATIENT
Start: 2017-06-28 | End: 2017-06-28

## 2017-06-28 RX ORDER — ONDANSETRON 2 MG/ML
4 INJECTION INTRAMUSCULAR; INTRAVENOUS ONCE AS NEEDED
Status: COMPLETED | OUTPATIENT
Start: 2017-06-28 | End: 2017-06-28

## 2017-06-28 RX ORDER — FENTANYL CITRATE 50 UG/ML
INJECTION, SOLUTION INTRAMUSCULAR; INTRAVENOUS
Status: DISCONTINUED | OUTPATIENT
Start: 2017-06-28 | End: 2017-06-28

## 2017-06-28 RX ORDER — OXYCODONE HYDROCHLORIDE 5 MG/1
5 TABLET ORAL
Status: DISCONTINUED | OUTPATIENT
Start: 2017-06-28 | End: 2017-06-28 | Stop reason: HOSPADM

## 2017-06-28 RX ORDER — CIPROFLOXACIN 2 MG/ML
400 INJECTION, SOLUTION INTRAVENOUS
Status: COMPLETED | OUTPATIENT
Start: 2017-06-28 | End: 2017-06-28

## 2017-06-28 RX ORDER — ONDANSETRON 2 MG/ML
4 INJECTION INTRAMUSCULAR; INTRAVENOUS EVERY 12 HOURS PRN
Status: DISCONTINUED | OUTPATIENT
Start: 2017-06-28 | End: 2017-06-28 | Stop reason: HOSPADM

## 2017-06-28 RX ORDER — ONDANSETRON 8 MG/1
8 TABLET, ORALLY DISINTEGRATING ORAL ONCE
Status: COMPLETED | OUTPATIENT
Start: 2017-06-28 | End: 2017-06-28

## 2017-06-28 RX ORDER — SODIUM CHLORIDE 0.9 % (FLUSH) 0.9 %
3 SYRINGE (ML) INJECTION
Status: DISCONTINUED | OUTPATIENT
Start: 2017-06-28 | End: 2017-06-28 | Stop reason: HOSPADM

## 2017-06-28 RX ORDER — FENTANYL CITRATE 50 UG/ML
25 INJECTION, SOLUTION INTRAMUSCULAR; INTRAVENOUS EVERY 5 MIN PRN
Status: DISCONTINUED | OUTPATIENT
Start: 2017-06-28 | End: 2017-06-28 | Stop reason: HOSPADM

## 2017-06-28 RX ORDER — FAMOTIDINE 20 MG/1
20 TABLET, FILM COATED ORAL
Status: COMPLETED | OUTPATIENT
Start: 2017-06-28 | End: 2017-06-28

## 2017-06-28 RX ORDER — ATROPA BELLADONNA AND OPIUM 16.2; 6 MG/1; MG/1
60 SUPPOSITORY RECTAL ONCE AS NEEDED
Status: DISCONTINUED | OUTPATIENT
Start: 2017-06-28 | End: 2017-06-28 | Stop reason: HOSPADM

## 2017-06-28 RX ORDER — TRAMADOL HYDROCHLORIDE 50 MG/1
50 TABLET ORAL EVERY 4 HOURS PRN
Qty: 30 TABLET | Refills: 0 | Status: SHIPPED | OUTPATIENT
Start: 2017-06-28 | End: 2017-07-15

## 2017-06-28 RX ADMIN — HYDROMORPHONE HYDROCHLORIDE 0.4 MG: 2 INJECTION INTRAMUSCULAR; INTRAVENOUS; SUBCUTANEOUS at 01:06

## 2017-06-28 RX ADMIN — PROPOFOL 200 MG: 10 INJECTION, EMULSION INTRAVENOUS at 12:06

## 2017-06-28 RX ADMIN — CIPROFLOXACIN 400 MG: 2 INJECTION, SOLUTION INTRAVENOUS at 12:06

## 2017-06-28 RX ADMIN — MIDAZOLAM HYDROCHLORIDE 2 MG: 1 INJECTION, SOLUTION INTRAMUSCULAR; INTRAVENOUS at 12:06

## 2017-06-28 RX ADMIN — SODIUM CHLORIDE, SODIUM LACTATE, POTASSIUM CHLORIDE, AND CALCIUM CHLORIDE: 600; 310; 30; 20 INJECTION, SOLUTION INTRAVENOUS at 12:06

## 2017-06-28 RX ADMIN — ONDANSETRON 8 MG: 8 TABLET, ORALLY DISINTEGRATING ORAL at 05:06

## 2017-06-28 RX ADMIN — FAMOTIDINE 20 MG: 20 TABLET, FILM COATED ORAL at 07:06

## 2017-06-28 RX ADMIN — SCOPOLAMINE HYDROBROMIDE 0.4 MG: 0.4 INJECTION, SOLUTION INTRAMUSCULAR; INTRAVENOUS; SUBCUTANEOUS at 12:06

## 2017-06-28 RX ADMIN — HYDROMORPHONE HYDROCHLORIDE 0.4 MG: 2 INJECTION INTRAMUSCULAR; INTRAVENOUS; SUBCUTANEOUS at 02:06

## 2017-06-28 RX ADMIN — LIDOCAINE HYDROCHLORIDE 75 MG: 20 INJECTION, SOLUTION INTRAVENOUS at 12:06

## 2017-06-28 RX ADMIN — FENTANYL CITRATE 100 MCG: 50 INJECTION, SOLUTION INTRAMUSCULAR; INTRAVENOUS at 12:06

## 2017-06-28 RX ADMIN — PHENYLEPHRINE HYDROCHLORIDE 100 MCG: 10 INJECTION INTRAVENOUS at 01:06

## 2017-06-28 RX ADMIN — ONDANSETRON 4 MG: 2 INJECTION INTRAMUSCULAR; INTRAVENOUS at 02:06

## 2017-06-28 NOTE — TRANSFER OF CARE
"Anesthesia Transfer of Care Note    Patient: Cesilia Dozier    Procedure(s) Performed: Procedure(s) (LRB):  CYSTOSCOPY WITH STENT PLACEMENT (Bilateral)  URETHROGRAM-RETROGRADE (Bilateral)    Patient location: PACU    Transport from OR: Transported from OR on 2-3 L/min O2 by NC with adequate spontaneous ventilation    Post pain: adequate analgesia    Post assessment: no apparent anesthetic complications    Post vital signs: stable    Level of consciousness: awake, alert and oriented    Nausea/Vomiting: no nausea/vomiting    Complications: none    Transfer of care protocol was followed      Last vitals:   Visit Vitals  /76 (BP Location: Left arm, Patient Position: Sitting, BP Method: Automatic)   Pulse 85   Temp 36.8 °C (98.2 °F) (Oral)   Resp 16   Ht 5' 6" (1.676 m)   Wt 66.2 kg (146 lb)   LMP  (LMP Unknown)   SpO2 100%   Breastfeeding? No   BMI 23.56 kg/m²     "

## 2017-06-28 NOTE — PLAN OF CARE
Patient prefers to have [employer] present for discharge teaching. Please contact them @[phone].

## 2017-06-28 NOTE — DISCHARGE INSTRUCTIONS
Cystoscopy    After the procedure  If you had a sedative, general anesthesia, or spinal anesthesia, you must have someone drive you home. Once youre home:    · Drink plenty of fluids.  · You may have burning or light bleeding when you urinate--this is normal.  · Medications may be prescribed to ease any discomfort or prevent infection. Take these as directed.  · Call your doctor if you have heavy bleeding or blood clots, burning that lasts more than a day, a fever over 100°F  (38° C), or trouble urinating.      Discharge Instructions: After Your Surgery  Youve just had surgery. During surgery you were given medicine called anesthesia to keep you relaxed and free of pain. After surgery you may have some pain or nausea. This is common. Here are some tips for feeling better and getting well after surgery.     Stay on schedule with your medication.     Going home  Your doctor or nurse will show you how to take care of yourself when you go home. He or she will also answer your questions. Have an adult family member or friend drive you home. For the first 24 hours after your surgery:    · Do not drive or use heavy equipment.  · Do not make important decisions or sign legal papers.  · Do not drink alcohol.  · Have someone stay with you, if needed. He or she can watch for problems and help keep you safe.    Be sure to go to all follow-up visits with your doctor. And rest after your surgery for as long as your doctor tells you to.    Coping with pain  If you have pain after surgery, pain medicine will help you feel better. Take it as told, before pain becomes severe. Also, ask your doctor or pharmacist about other ways to control pain. This might be with heat, ice, or relaxation. And follow any other instructions your surgeon or nurse gives you.    Tips for taking pain medicine  To get the best relief possible, remember these points:    · Pain medicines can upset your stomach. Taking them with a little food may  help.  · Most pain relievers taken by mouth need at least 20 to 30 minutes to start to work.  · Taking medicine on a schedule can help you remember to take it. Try to time your medicine so that you can take it before starting an activity. This might be before you get dressed, go for a walk, or sit down for dinner.  · Constipation is a common side effect of pain medicines. Call your doctor before taking any medicines such as laxatives or stool softeners to help ease constipation. Also ask if you should skip any foods. Drinking lots of fluids and eating foods such as fruits and vegetables that are high in fiber can also help. Remember, do not take laxatives unless your surgeon has prescribed them.  · Drinking alcohol and taking pain medicine can cause dizziness and slow your breathing. It can even be deadly. Do not drink alcohol while taking pain medicine.  · Pain medicine can make you react more slowly to things. Do not drive or run machinery while taking pain medicine.    Your health care provider may tell you to take acetaminophen to help ease your pain. Ask him or her how much you are supposed to take each day. Acetaminophen or other pain relievers may interact with your prescription medicines or other over-the-counter (OTC) drugs. Some prescription medicines have acetaminophen and other ingredients. Using both prescription and OTC acetaminophen for pain can cause you to overdose. Read the labels on your OTC medicines with care. This will help you to clearly know the list of ingredients, how much to take, and any warnings. It may also help you not take too much acetaminophen. If you have questions or do not understand the information, ask your pharmacist or health care provider to explain it to you before you take the OTC medicine.    Managing nausea  Some people have an upset stomach after surgery. This is often because of anesthesia, pain, or pain medicine, or the stress of surgery. These tips will help you  handle nausea and eat healthy foods as you get better. If you were on a special food plan before surgery, ask your doctor if you should follow it while you get better. These tips may help:    · Do not push yourself to eat. Your body will tell you when to eat and how much.  · Start off with clear liquids and soup. They are easier to digest.  · Next try semi-solid foods, such as mashed potatoes, applesauce, and gelatin, as you feel ready.  · Slowly move to solid foods. Dont eat fatty, rich, or spicy foods at first.  · Do not force yourself to have 3 large meals a day. Instead eat smaller amounts more often.  · Take pain medicines with a small amount of solid food, such as crackers or toast, to avoid nausea.     Call your surgeon if  · You still have pain an hour after taking medicine. The medicine may not be strong enough.  · You feel too sleepy, dizzy, or groggy. The medicine may be too strong.  · You have side effects like nausea, vomiting, or skin changes, such as rash, itching, or hives.       If you have obstructive sleep apnea  You were given anesthesia medicine during surgery to keep you comfortable and free of pain. After surgery, you may have more apnea spells because of this medicine and other medicines you were given. The spells may last longer than usual.   At home:    · Keep using the continuous positive airway pressure (CPAP) device when you sleep. Unless your health care provider tells you not to, use it when you sleep, day or night. CPAP is a common device used to treat obstructive sleep apnea.  · Talk with your provider before taking any pain medicine, muscle relaxants, or sedatives. Your provider will tell you about the possible dangers of taking these medicines.    © 5273-8065 [employer]. [address] [place] [address], [place] [address]. All rights reserved. This information is not intended as a substitute for professional medical care. Always follow your healthcare professional's  instructions.    PLEASE FOLLOW ANY OTHER INSTRUCTIONS PROVIDED TO YOU BY DR. ROMERO!

## 2017-06-28 NOTE — H&P (VIEW-ONLY)
"Subjective:      Cesilia Dozier is a 37 y.o. female who returns today regarding her ureteral stents.    She has h/o cervical cancer, treated elsewhere w/ surgery, chemo, XRT, w/ subsequent bilateral hydro treated in the past w/ PCNs and most recently w/ stents. Stents last changed in June in [place]. Etiology and location of obstruction is unclear.    She continues to have pain and has had multiple recent admissions and ER visits. She states pain is in left upper back as in the past. She remains on detrol.    She saw a NP in GYN last month and had negative pap smear.    The following portions of the patient's history were reviewed and updated as appropriate: allergies, current medications, past family history, past medical history, past social history, past surgical history and problem list.    Review of Systems  A comprehensive multipoint review of systems was negative except as otherwise stated in the HPI.     Objective:   Vitals:   /69 (BP Location: Left arm, Patient Position: Sitting, BP Method: Automatic)   Pulse (!) 113   Ht 5' 6" (1.676 m)   Wt 66.3 kg (146 lb 4.4 oz)   LMP  (LMP Unknown)   BMI 23.61 kg/m²     Physical Exam   General: alert and oriented, no acute distress  Respiratory: Symmetric expansion, non-labored breathing  Cardiovascular: no peripheral edema  Abdomen: soft, non distended  Skin: normal coloration and turgor, no rashes, no suspicious skin lesions noted  Neuro: no gross deficits  Psych: normal judgment and insight, normal mood/affect and non-anxious    Lab Review     Lab Results   Component Value Date    WBC 5.60 06/10/2017    HGB 8.4 (L) 06/10/2017    HCT 24.3 (L) 06/10/2017    MCV 97 06/10/2017     06/10/2017     Lab Results   Component Value Date    CREATININE 2.1 (H) 06/10/2017    BUN 17 06/10/2017       Imaging (all images personally reviewed; agree with report below)  Results for orders placed during the hospital encounter of 06/08/17   CT Renal Stone Study ABD " Pelvis WO    Narrative Axial images are obtained from the dome of the diaphragm to the floor of the pelvis.  The patient did not receive oral or IV contrast.  Comparison is made with the prior CT of 05/15/2017.    The liver is of normal size and contour  without focal mass. The CT density is within normal limits. The gallbladder is of normal size but contains radiodensity in the fundus suggesting a very small gallstones or gallbladder sand.  The pancreas is of normal contour and CT density without mass, edema or pseudocyst.  The spleen is of normal size and CT density.    The adrenal glands are not enlarged.  The head patient has bilateral double pigtail ureteral stents in place extending from the kidneys through the bladder.  There is decreased hydronephrosis on the left and mild degenerative arthrosis remaining on the right unchanged.  A stone in the kidneys or in either ureter is not seen on this study.  The abdominal aorta and inferior vena cava are of normal caliber.  No josi-aortic or retroperitoneal adenopathy or masses are seen. The gastrointestinal organs appear within normal limits without dilation, air-fluid levels or masses seen.    Within the pelvis, the bladder is without mass or asymmetry.A small amount of air is seen in the bladder which may be due to to recent catheterization.  Infection with an airforming organism however is not ruled out.  The bladder wall is not significantly thickened.  A uterus is not seen.    Impression  Bilateral double pigtail stents are in position with mild residual hydronephrosis, an improvement from the prior study of 05/15/2017.  Air is noted in the bladder which may be secondary to recent catheterization or gas-forming organism.  Cholelithiasis.  Prior hysterectomy.      Electronically signed by: Jarod Tate MD  Date:     06/08/17  Time:    08:28            Assessment and Plan:   1. Bilateral hydronephrosis  - Again discussed that long term, would likely benefit  from reimplant, which she is anxious to proceed with.   - Will plan for stent exchange and bilateral RPG tomorrow in anticipation of above    2. Stent pain/Bladder spasms  - Continue detrol    3. Cervical carcinoma  - Had normal annual exam with GYN NP, but needs definitive clearance prior to reimplant. Will discuss further with patient.

## 2017-06-28 NOTE — BRIEF OP NOTE
[facility]  Brief Operative Note     SUMMARY     Surgery Date: 6/28/2017     Surgeon(s) and Role:     * Turner Bocanegra MD - Primary    Assisting Surgeon: None    Pre-op Diagnosis:  Hydronephrosis, bilateral [N13.30]    Post-op Diagnosis:  Post-Op Diagnosis Codes:     * Hydronephrosis, bilateral [N13.30]    Procedure(s) (LRB):  CYSTOSCOPY WITH STENT PLACEMENT (Bilateral)  URETHROGRAM-RETROGRADE (Bilateral)    Anesthesia: General/MAC    Description of the findings of the procedure: Bilateral RPG - proximal right ureteral stricture; no clear stricture on left but did not drain on delayed images. Bilateral 8x22 stents placed. Cystogram demonstrated max capacity of 350cc.    Estimated Blood Loss: 0cc         Specimens:   Specimen (12h ago through future)    None          Discharge Note    SUMMARY     Admit Date: 6/28/2017    Discharge Date and Time: 6/28/2017    Hospital Course: Patient was admitted for elective outpatient procedure, which was uncomplicated and well tolerated.      Final Diagnosis: Post-Op Diagnosis Codes:     * Hydronephrosis, bilateral [N13.30]    Disposition: Home or Self Care    Follow Up/Patient Instructions:     Medications:  Reconciled Home Medications: Current Discharge Medication List      CONTINUE these medications which have CHANGED    Details   tramadol (ULTRAM) 50 mg tablet Take 1 tablet (50 mg total) by mouth every 4 (four) hours as needed for Pain.  Qty: 30 tablet, Refills: 0         CONTINUE these medications which have NOT CHANGED    Details   ibuprofen (ADVIL,MOTRIN) 800 MG tablet       tolterodine (DETROL LA) 4 MG 24 hr capsule Take 1 capsule (4 mg total) by mouth once daily.  Qty: 30 capsule, Refills: 11    Associated Diagnoses: Bilateral hydronephrosis             Discharge Procedure Orders  Diet general     Activity as tolerated       Follow-up Information     Turner Bocanegra MD. Schedule an appointment as soon as possible for a visit in 2 weeks.    Specialty:   Urology  Why:  For post-operative follow-up  Contact information:  [address] [id number] [phone]

## 2017-06-28 NOTE — ANESTHESIA POSTPROCEDURE EVALUATION
"Anesthesia Post Evaluation    Patient: Cesilia Dozier    Procedure(s) Performed: Procedure(s) (LRB):  CYSTOSCOPY WITH STENT PLACEMENT (Bilateral)  URETHROGRAM-RETROGRADE (Bilateral)    Final Anesthesia Type: general  Patient location during evaluation: PACU  Patient participation: Yes- Able to Participate  Level of consciousness: awake and alert  Post-procedure vital signs: reviewed and stable  Pain management: adequate  Airway patency: patent  PONV status at discharge: No PONV  Anesthetic complications: no      Cardiovascular status: blood pressure returned to baseline  Respiratory status: unassisted and spontaneous ventilation  Hydration status: euvolemic  Follow-up not needed.        Visit Vitals  /64 (BP Location: Right arm, Patient Position: Lying, BP Method: Automatic)   Pulse 89   Temp 36.4 °C (97.6 °F)   Resp 16   Ht 5' 6" (1.676 m)   Wt 66.2 kg (146 lb)   LMP  (LMP Unknown)   SpO2 100%   Breastfeeding? No   BMI 23.56 kg/m²       Pain/Robbie Score: Pain Assessment Performed: Yes (6/28/2017  7:35 AM)  Presence of Pain: complains of pain/discomfort (6/28/2017  2:13 PM)  Pain Rating Prior to Med Admin: 6 (6/28/2017  2:10 PM)  Robbie Score: 9 (6/28/2017  2:13 PM)      "

## 2017-06-29 ENCOUNTER — TELEPHONE (OUTPATIENT)
Dept: UROLOGY | Facility: CLINIC | Age: 37
End: 2017-06-29

## 2017-06-29 RX ORDER — ONDANSETRON 8 MG/1
8 TABLET, ORALLY DISINTEGRATING ORAL EVERY 6 HOURS PRN
Qty: 30 TABLET | Refills: 1 | Status: SHIPPED | OUTPATIENT
Start: 2017-06-29 | End: 2017-07-15

## 2017-06-29 NOTE — TELEPHONE ENCOUNTER
----- Message from Jenny Carlson MA sent at 6/29/2017 10:08 AM CDT -----  Contact: Self  Patient would like something else sent to pharmacy for pain.  She states tramadol is not working.  Also would like something for nausea  ----- Message -----  From: Neena Hickey  Sent: 6/29/2017   9:51 AM  To: [facility]jit Tompkins Staff    X 1st Request  _  2nd Request  _  3rd Request        Who: ALVIN DUBOSE [[id number]]    Why: Pt states she had a procedure yesterday and she is currently experiencing lower abdominal pain, nausea and vommitting. Pt would like to speak to the clinical team as soon as possible. Pt is asking if  she needs to go to the ED or come back to the office. Please call, thanks!    What Number to Call Back: [phone]    When to Expect a call back: (With in 24 hours)

## 2017-06-29 NOTE — TELEPHONE ENCOUNTER
----- Message from Neena Ruperto sent at 6/29/2017  9:51 AM CDT -----  Contact: Self  X 1st Request  _  2nd Request  _  3rd Request        Who: ALVIN DUBOSE [[id number]]    Why: Pt states she had a procedure yesterday and she is currently experiencing lower abdominal pain, nausea and vommitting. Pt would like to speak to the clinical team as soon as possible. Pt is asking if  she needs to go to the ED or come back to the office. Please call, thanks!    What Number to Call Back: [phone]    When to Expect a call back: (With in 24 hours)

## 2017-06-29 NOTE — TELEPHONE ENCOUNTER
Spoke with pt to inform her not to take the rx tramadol that often. Pt was advise to take tylenol if needed per . Pt stated that she will take the tylenol to help with pain.

## 2017-06-29 NOTE — TELEPHONE ENCOUNTER
Spoke with patient.  Per Dr neito patient advised to continue pain meds given post procedure and he can call in rx for nausea.  Patient states that she was given tramadol and it is not helping.  Will forward message to Dr Nieto to poss alternate pain med and something for nausea

## 2017-06-29 NOTE — TELEPHONE ENCOUNTER
Spoke with pt to inform her that a rx for Zofran was sent to her pharmacy. Pt wants to know since the rx Tramadol isn't helping can she take 1 every 2 hours or as needed for pain. Please advise.

## 2017-06-29 NOTE — TELEPHONE ENCOUNTER
----- Message from Wilfredo Lauren sent at 6/29/2017 12:35 PM CDT -----  Contact: self  2nd call :Pt stated that she is in severe pain on a scale of 1-10 she stated that it is a 10  [phone]

## 2017-07-03 ENCOUNTER — HOSPITAL ENCOUNTER (INPATIENT)
Facility: HOSPITAL | Age: 37
LOS: 5 days | Discharge: HOME | DRG: 698 | End: 2017-07-08
Attending: EMERGENCY MEDICINE | Admitting: HOSPITALIST
Payer: MEDICARE

## 2017-07-03 DIAGNOSIS — A41.9 SEPSIS, DUE TO UNSPECIFIED ORGANISM: Primary | ICD-10-CM

## 2017-07-03 LAB
ALBUMIN SERPL BCP-MCNC: 3.7 G/DL
ALP SERPL-CCNC: 86 U/L
ALT SERPL W/O P-5'-P-CCNC: 7 U/L
ANION GAP SERPL CALC-SCNC: 14 MMOL/L
APTT BLDCRRT: 29.1 SEC
AST SERPL-CCNC: 15 U/L
BACTERIA #/AREA URNS HPF: ABNORMAL /HPF
BASOPHILS # BLD AUTO: 0 K/UL
BASOPHILS NFR BLD: 0.2 %
BILIRUB SERPL-MCNC: 0.8 MG/DL
BILIRUB UR QL STRIP: NEGATIVE
BUN SERPL-MCNC: 18 MG/DL
CALCIUM SERPL-MCNC: 10.1 MG/DL
CHLORIDE SERPL-SCNC: 104 MMOL/L
CLARITY UR: ABNORMAL
CO2 SERPL-SCNC: 21 MMOL/L
COLOR UR: YELLOW
CREAT SERPL-MCNC: 1.9 MG/DL
DIFFERENTIAL METHOD: ABNORMAL
EOSINOPHIL # BLD AUTO: 0.6 K/UL
EOSINOPHIL NFR BLD: 6.3 %
ERYTHROCYTE [DISTWIDTH] IN BLOOD BY AUTOMATED COUNT: 13.4 %
EST. GFR  (AFRICAN AMERICAN): 38 ML/MIN/1.73 M^2
EST. GFR  (NON AFRICAN AMERICAN): 33 ML/MIN/1.73 M^2
GLUCOSE SERPL-MCNC: 94 MG/DL
GLUCOSE UR QL STRIP: NEGATIVE
HCT VFR BLD AUTO: 34.2 %
HGB BLD-MCNC: 11.5 G/DL
HGB UR QL STRIP: ABNORMAL
HYALINE CASTS #/AREA URNS LPF: 0 /LPF
INR PPP: 1
KETONES UR QL STRIP: NEGATIVE
LACTATE SERPL-SCNC: 1.2 MMOL/L
LACTATE SERPL-SCNC: 2.1 MMOL/L
LEUKOCYTE ESTERASE UR QL STRIP: ABNORMAL
LYMPHOCYTES # BLD AUTO: 1.5 K/UL
LYMPHOCYTES NFR BLD: 16.6 %
MAGNESIUM SERPL-MCNC: 1.5 MG/DL
MCH RBC QN AUTO: 32 PG
MCHC RBC AUTO-ENTMCNC: 33.6 %
MCV RBC AUTO: 95 FL
MICROSCOPIC COMMENT: ABNORMAL
MONOCYTES # BLD AUTO: 0.7 K/UL
MONOCYTES NFR BLD: 8.2 %
NEUTROPHILS # BLD AUTO: 6.1 K/UL
NEUTROPHILS NFR BLD: 68.7 %
NITRITE UR QL STRIP: NEGATIVE
PH UR STRIP: 7 [PH] (ref 5–8)
PHOSPHATE SERPL-MCNC: 1.8 MG/DL
PLATELET # BLD AUTO: 226 K/UL
PMV BLD AUTO: 9.2 FL
POTASSIUM SERPL-SCNC: 3.4 MMOL/L
PROT SERPL-MCNC: 8 G/DL
PROT UR QL STRIP: ABNORMAL
PROTHROMBIN TIME: 10.8 SEC
RBC # BLD AUTO: 3.59 M/UL
RBC #/AREA URNS HPF: >100 /HPF (ref 0–4)
SODIUM SERPL-SCNC: 139 MMOL/L
SP GR UR STRIP: 1.01 (ref 1–1.03)
URN SPEC COLLECT METH UR: ABNORMAL
UROBILINOGEN UR STRIP-ACNC: NEGATIVE EU/DL
WBC # BLD AUTO: 8.9 K/UL
WBC #/AREA URNS HPF: >100 /HPF (ref 0–5)

## 2017-07-03 PROCEDURE — 85730 THROMBOPLASTIN TIME PARTIAL: CPT

## 2017-07-03 PROCEDURE — 87186 SC STD MICRODIL/AGAR DIL: CPT

## 2017-07-03 PROCEDURE — 87088 URINE BACTERIA CULTURE: CPT

## 2017-07-03 PROCEDURE — 81000 URINALYSIS NONAUTO W/SCOPE: CPT

## 2017-07-03 PROCEDURE — 96375 TX/PRO/DX INJ NEW DRUG ADDON: CPT

## 2017-07-03 PROCEDURE — 85025 COMPLETE CBC W/AUTO DIFF WBC: CPT

## 2017-07-03 PROCEDURE — 87077 CULTURE AEROBIC IDENTIFY: CPT

## 2017-07-03 PROCEDURE — 36415 COLL VENOUS BLD VENIPUNCTURE: CPT

## 2017-07-03 PROCEDURE — 85610 PROTHROMBIN TIME: CPT

## 2017-07-03 PROCEDURE — 96365 THER/PROPH/DIAG IV INF INIT: CPT

## 2017-07-03 PROCEDURE — 84100 ASSAY OF PHOSPHORUS: CPT

## 2017-07-03 PROCEDURE — 63600175 PHARM REV CODE 636 W HCPCS: Performed by: EMERGENCY MEDICINE

## 2017-07-03 PROCEDURE — 12000002 HC ACUTE/MED SURGE SEMI-PRIVATE ROOM

## 2017-07-03 PROCEDURE — 96361 HYDRATE IV INFUSION ADD-ON: CPT

## 2017-07-03 PROCEDURE — 83735 ASSAY OF MAGNESIUM: CPT

## 2017-07-03 PROCEDURE — 80053 COMPREHEN METABOLIC PANEL: CPT

## 2017-07-03 PROCEDURE — 87086 URINE CULTURE/COLONY COUNT: CPT

## 2017-07-03 PROCEDURE — 99284 EMERGENCY DEPT VISIT MOD MDM: CPT

## 2017-07-03 PROCEDURE — 25000003 PHARM REV CODE 250: Performed by: EMERGENCY MEDICINE

## 2017-07-03 PROCEDURE — 83605 ASSAY OF LACTIC ACID: CPT

## 2017-07-03 PROCEDURE — 87040 BLOOD CULTURE FOR BACTERIA: CPT

## 2017-07-03 RX ORDER — DIPHENHYDRAMINE HYDROCHLORIDE 50 MG/ML
25 INJECTION INTRAMUSCULAR; INTRAVENOUS
Status: COMPLETED | OUTPATIENT
Start: 2017-07-03 | End: 2017-07-03

## 2017-07-03 RX ORDER — ONDANSETRON 2 MG/ML
8 INJECTION INTRAMUSCULAR; INTRAVENOUS
Status: COMPLETED | OUTPATIENT
Start: 2017-07-03 | End: 2017-07-03

## 2017-07-03 RX ORDER — METOCLOPRAMIDE HYDROCHLORIDE 5 MG/ML
10 INJECTION INTRAMUSCULAR; INTRAVENOUS
Status: COMPLETED | OUTPATIENT
Start: 2017-07-03 | End: 2017-07-03

## 2017-07-03 RX ORDER — ACETAMINOPHEN 500 MG
1000 TABLET ORAL
Status: COMPLETED | OUTPATIENT
Start: 2017-07-03 | End: 2017-07-03

## 2017-07-03 RX ORDER — HYDROMORPHONE HYDROCHLORIDE 1 MG/ML
1 INJECTION, SOLUTION INTRAMUSCULAR; INTRAVENOUS; SUBCUTANEOUS
Status: COMPLETED | OUTPATIENT
Start: 2017-07-03 | End: 2017-07-03

## 2017-07-03 RX ADMIN — ACETAMINOPHEN 1000 MG: 500 TABLET, FILM COATED ORAL at 10:07

## 2017-07-03 RX ADMIN — ONDANSETRON 8 MG: 2 INJECTION INTRAMUSCULAR; INTRAVENOUS at 09:07

## 2017-07-03 RX ADMIN — METOCLOPRAMIDE 10 MG: 5 INJECTION, SOLUTION INTRAMUSCULAR; INTRAVENOUS at 11:07

## 2017-07-03 RX ADMIN — CEFTRIAXONE 2 G: 2 INJECTION, SOLUTION INTRAVENOUS at 10:07

## 2017-07-03 RX ADMIN — HYDROMORPHONE HYDROCHLORIDE 1 MG: 1 INJECTION, SOLUTION INTRAMUSCULAR; INTRAVENOUS; SUBCUTANEOUS at 09:07

## 2017-07-03 RX ADMIN — DIPHENHYDRAMINE HYDROCHLORIDE 25 MG: 50 INJECTION, SOLUTION INTRAMUSCULAR; INTRAVENOUS at 11:07

## 2017-07-03 RX ADMIN — SODIUM CHLORIDE 2040 ML: 0.9 INJECTION, SOLUTION INTRAVENOUS at 09:07

## 2017-07-04 PROBLEM — E87.6 HYPOKALEMIA: Status: ACTIVE | Noted: 2017-07-04

## 2017-07-04 PROBLEM — E83.42 HYPOMAGNESEMIA: Status: ACTIVE | Noted: 2017-07-04

## 2017-07-04 PROBLEM — E83.39 HYPOPHOSPHATEMIA: Status: ACTIVE | Noted: 2017-07-04

## 2017-07-04 LAB
ANION GAP SERPL CALC-SCNC: 11 MMOL/L
BASOPHILS # BLD AUTO: 0.1 K/UL
BASOPHILS NFR BLD: 0.7 %
BUN SERPL-MCNC: 16 MG/DL
CALCIUM SERPL-MCNC: 8.4 MG/DL
CHLORIDE SERPL-SCNC: 109 MMOL/L
CO2 SERPL-SCNC: 21 MMOL/L
CREAT SERPL-MCNC: 1.7 MG/DL
DIFFERENTIAL METHOD: ABNORMAL
EOSINOPHIL # BLD AUTO: 0.5 K/UL
EOSINOPHIL NFR BLD: 6.8 %
ERYTHROCYTE [DISTWIDTH] IN BLOOD BY AUTOMATED COUNT: 12.8 %
EST. GFR  (AFRICAN AMERICAN): 43 ML/MIN/1.73 M^2
EST. GFR  (NON AFRICAN AMERICAN): 38 ML/MIN/1.73 M^2
GLUCOSE SERPL-MCNC: 100 MG/DL
HCT VFR BLD AUTO: 29.3 %
HGB BLD-MCNC: 9.9 G/DL
LACTATE SERPL-SCNC: 0.6 MMOL/L
LACTATE SERPL-SCNC: 0.7 MMOL/L
LYMPHOCYTES # BLD AUTO: 1.3 K/UL
LYMPHOCYTES NFR BLD: 16 %
MCH RBC QN AUTO: 32.1 PG
MCHC RBC AUTO-ENTMCNC: 33.8 %
MCV RBC AUTO: 95 FL
MONOCYTES # BLD AUTO: 0.8 K/UL
MONOCYTES NFR BLD: 10.4 %
NEUTROPHILS # BLD AUTO: 5.2 K/UL
NEUTROPHILS NFR BLD: 66.1 %
PLATELET # BLD AUTO: 208 K/UL
PMV BLD AUTO: 9.5 FL
POTASSIUM SERPL-SCNC: 3.4 MMOL/L
RBC # BLD AUTO: 3.09 M/UL
SODIUM SERPL-SCNC: 141 MMOL/L
WBC # BLD AUTO: 7.9 K/UL

## 2017-07-04 PROCEDURE — 63600175 PHARM REV CODE 636 W HCPCS: Performed by: NURSE PRACTITIONER

## 2017-07-04 PROCEDURE — 12000002 HC ACUTE/MED SURGE SEMI-PRIVATE ROOM

## 2017-07-04 PROCEDURE — 80048 BASIC METABOLIC PNL TOTAL CA: CPT

## 2017-07-04 PROCEDURE — 83605 ASSAY OF LACTIC ACID: CPT | Mod: 91

## 2017-07-04 PROCEDURE — 36415 COLL VENOUS BLD VENIPUNCTURE: CPT

## 2017-07-04 PROCEDURE — 83605 ASSAY OF LACTIC ACID: CPT

## 2017-07-04 PROCEDURE — 25000003 PHARM REV CODE 250: Performed by: EMERGENCY MEDICINE

## 2017-07-04 PROCEDURE — 85025 COMPLETE CBC W/AUTO DIFF WBC: CPT

## 2017-07-04 PROCEDURE — 25000003 PHARM REV CODE 250: Performed by: NURSE PRACTITIONER

## 2017-07-04 PROCEDURE — 63600175 PHARM REV CODE 636 W HCPCS: Performed by: HOSPITALIST

## 2017-07-04 PROCEDURE — 25000003 PHARM REV CODE 250: Performed by: HOSPITALIST

## 2017-07-04 PROCEDURE — 63600175 PHARM REV CODE 636 W HCPCS: Performed by: EMERGENCY MEDICINE

## 2017-07-04 RX ORDER — ONDANSETRON 2 MG/ML
4 INJECTION INTRAMUSCULAR; INTRAVENOUS EVERY 8 HOURS PRN
Status: DISCONTINUED | OUTPATIENT
Start: 2017-07-04 | End: 2017-07-04

## 2017-07-04 RX ORDER — HYDROCODONE BITARTRATE AND ACETAMINOPHEN 10; 325 MG/1; MG/1
1 TABLET ORAL EVERY 6 HOURS PRN
Status: DISCONTINUED | OUTPATIENT
Start: 2017-07-04 | End: 2017-07-08 | Stop reason: HOSPADM

## 2017-07-04 RX ORDER — POTASSIUM CHLORIDE 20 MEQ/15ML
40 SOLUTION ORAL
Status: DISCONTINUED | OUTPATIENT
Start: 2017-07-04 | End: 2017-07-08 | Stop reason: HOSPADM

## 2017-07-04 RX ORDER — SODIUM,POTASSIUM PHOSPHATES 280-250MG
1 POWDER IN PACKET (EA) ORAL
Status: DISCONTINUED | OUTPATIENT
Start: 2017-07-04 | End: 2017-07-04

## 2017-07-04 RX ORDER — LANOLIN ALCOHOL/MO/W.PET/CERES
800 CREAM (GRAM) TOPICAL
Status: DISCONTINUED | OUTPATIENT
Start: 2017-07-04 | End: 2017-07-08 | Stop reason: HOSPADM

## 2017-07-04 RX ORDER — ACETAMINOPHEN 325 MG/1
650 TABLET ORAL EVERY 6 HOURS PRN
Status: DISCONTINUED | OUTPATIENT
Start: 2017-07-04 | End: 2017-07-08 | Stop reason: HOSPADM

## 2017-07-04 RX ORDER — SODIUM CHLORIDE 9 MG/ML
INJECTION, SOLUTION INTRAVENOUS CONTINUOUS
Status: DISCONTINUED | OUTPATIENT
Start: 2017-07-04 | End: 2017-07-04

## 2017-07-04 RX ORDER — IBUPROFEN 200 MG
16 TABLET ORAL
Status: DISCONTINUED | OUTPATIENT
Start: 2017-07-04 | End: 2017-07-08 | Stop reason: HOSPADM

## 2017-07-04 RX ORDER — HYDROMORPHONE HYDROCHLORIDE 1 MG/ML
1 INJECTION, SOLUTION INTRAMUSCULAR; INTRAVENOUS; SUBCUTANEOUS EVERY 4 HOURS PRN
Status: DISCONTINUED | OUTPATIENT
Start: 2017-07-04 | End: 2017-07-08 | Stop reason: HOSPADM

## 2017-07-04 RX ORDER — ONDANSETRON 2 MG/ML
4 INJECTION INTRAMUSCULAR; INTRAVENOUS EVERY 8 HOURS PRN
Status: DISCONTINUED | OUTPATIENT
Start: 2017-07-04 | End: 2017-07-08 | Stop reason: HOSPADM

## 2017-07-04 RX ORDER — AMOXICILLIN 250 MG
1 CAPSULE ORAL DAILY PRN
Status: DISCONTINUED | OUTPATIENT
Start: 2017-07-04 | End: 2017-07-08 | Stop reason: HOSPADM

## 2017-07-04 RX ORDER — SODIUM CHLORIDE 9 MG/ML
INJECTION, SOLUTION INTRAVENOUS CONTINUOUS
Status: DISCONTINUED | OUTPATIENT
Start: 2017-07-04 | End: 2017-07-08 | Stop reason: HOSPADM

## 2017-07-04 RX ORDER — ZOLPIDEM TARTRATE 5 MG/1
5 TABLET ORAL NIGHTLY PRN
Status: DISCONTINUED | OUTPATIENT
Start: 2017-07-04 | End: 2017-07-08 | Stop reason: HOSPADM

## 2017-07-04 RX ORDER — METOCLOPRAMIDE HYDROCHLORIDE 5 MG/ML
10 INJECTION INTRAMUSCULAR; INTRAVENOUS EVERY 6 HOURS PRN
Status: DISCONTINUED | OUTPATIENT
Start: 2017-07-04 | End: 2017-07-08 | Stop reason: HOSPADM

## 2017-07-04 RX ORDER — AMOXICILLIN 250 MG
1 CAPSULE ORAL 2 TIMES DAILY
Status: DISCONTINUED | OUTPATIENT
Start: 2017-07-04 | End: 2017-07-08 | Stop reason: HOSPADM

## 2017-07-04 RX ORDER — SODIUM,POTASSIUM PHOSPHATES 280-250MG
2 POWDER IN PACKET (EA) ORAL
Status: DISCONTINUED | OUTPATIENT
Start: 2017-07-04 | End: 2017-07-08 | Stop reason: HOSPADM

## 2017-07-04 RX ORDER — GLUCAGON 1 MG
1 KIT INJECTION
Status: DISCONTINUED | OUTPATIENT
Start: 2017-07-04 | End: 2017-07-08 | Stop reason: HOSPADM

## 2017-07-04 RX ORDER — IBUPROFEN 200 MG
24 TABLET ORAL
Status: DISCONTINUED | OUTPATIENT
Start: 2017-07-04 | End: 2017-07-08 | Stop reason: HOSPADM

## 2017-07-04 RX ORDER — POTASSIUM CHLORIDE 20 MEQ/15ML
60 SOLUTION ORAL
Status: DISCONTINUED | OUTPATIENT
Start: 2017-07-04 | End: 2017-07-08 | Stop reason: HOSPADM

## 2017-07-04 RX ORDER — ONDANSETRON 2 MG/ML
4 INJECTION INTRAMUSCULAR; INTRAVENOUS EVERY 6 HOURS PRN
Status: DISCONTINUED | OUTPATIENT
Start: 2017-07-04 | End: 2017-07-08 | Stop reason: HOSPADM

## 2017-07-04 RX ADMIN — ZOLPIDEM TARTRATE 5 MG: 5 TABLET, FILM COATED ORAL at 09:07

## 2017-07-04 RX ADMIN — HYDROMORPHONE HYDROCHLORIDE 1 MG: 1 INJECTION, SOLUTION INTRAMUSCULAR; INTRAVENOUS; SUBCUTANEOUS at 05:07

## 2017-07-04 RX ADMIN — SODIUM CHLORIDE: 0.9 INJECTION, SOLUTION INTRAVENOUS at 01:07

## 2017-07-04 RX ADMIN — METOCLOPRAMIDE 10 MG: 5 INJECTION, SOLUTION INTRAMUSCULAR; INTRAVENOUS at 03:07

## 2017-07-04 RX ADMIN — POTASSIUM CHLORIDE 40 MEQ: 20 SOLUTION ORAL at 08:07

## 2017-07-04 RX ADMIN — HYDROMORPHONE HYDROCHLORIDE 1 MG: 1 INJECTION, SOLUTION INTRAMUSCULAR; INTRAVENOUS; SUBCUTANEOUS at 11:07

## 2017-07-04 RX ADMIN — HYDROMORPHONE HYDROCHLORIDE 1 MG: 1 INJECTION, SOLUTION INTRAMUSCULAR; INTRAVENOUS; SUBCUTANEOUS at 12:07

## 2017-07-04 RX ADMIN — STANDARDIZED SENNA CONCENTRATE AND DOCUSATE SODIUM 1 TABLET: 8.6; 5 TABLET, FILM COATED ORAL at 12:07

## 2017-07-04 RX ADMIN — SODIUM CHLORIDE: 0.9 INJECTION, SOLUTION INTRAVENOUS at 12:07

## 2017-07-04 RX ADMIN — ONDANSETRON 4 MG: 2 INJECTION INTRAMUSCULAR; INTRAVENOUS at 05:07

## 2017-07-04 RX ADMIN — HYDROMORPHONE HYDROCHLORIDE 1 MG: 1 INJECTION, SOLUTION INTRAMUSCULAR; INTRAVENOUS; SUBCUTANEOUS at 07:07

## 2017-07-04 RX ADMIN — HYDROMORPHONE HYDROCHLORIDE 1 MG: 1 INJECTION, SOLUTION INTRAMUSCULAR; INTRAVENOUS; SUBCUTANEOUS at 02:07

## 2017-07-04 RX ADMIN — HYDROMORPHONE HYDROCHLORIDE 1 MG: 1 INJECTION, SOLUTION INTRAMUSCULAR; INTRAVENOUS; SUBCUTANEOUS at 10:07

## 2017-07-04 NOTE — ED NOTES
Patient identifiers for Cesilia Dozier checked and correct.  LOC: Patient is awake, alert, and aware of environment with an appropriate affect. Patient is oriented x 3 and speaking appropriately.  APPEARANCE: Patient sick appearing. Patient is clean and well groomed, patient's clothing is properly fastened.  SKIN: The skin is warm and dry. Patient has normal skin turgor and moist mucus membrances. Skin is intact; no bruising or breakdown noted. Pt reports running fever at home.   MUSCULOSKELETAL: Patient is moving all extremities well, no obvious deformities noted. Pulses intact.   RESPIRATORY: Airway is open and patent. Respirations are spontaneous and non-labored with increased effort and rate. Pt placed on continuous pulse ox. Pt reports sob.   CARDIAC: Patient has a tachycardic rate and rhythm. No peripheral edema noted. Capillary refill < 3 seconds. Pt placed on continuous cardiac monitor.  ABDOMEN: No distention noted. Bowel sounds active in all 4 quadrants. Soft and non-tender upon palpation. Pt c/o right flank. Pt c/o N/V throughout the day. Pt reports recently had stent placed about a week ago.    NEUROLOGICAL: PERRL. Facial expression is symmetrical. Hand grasps are equal bilaterally. Normal sensation in all extremities when touched with finger.  Allergies reported:   Review of patient's allergies indicates:   Allergen Reactions    Pcn [penicillins] Anaphylaxis     Bed locked, lowest position. Call light within easy reach. Side rails up x2.

## 2017-07-04 NOTE — SUBJECTIVE & OBJECTIVE
Past Medical History:   Diagnosis Date    Anemia     Cervical cancer     cervical    Encounter for blood transfusion     Hydronephrosis     PONV (postoperative nausea and vomiting)     Pyelonephritis        Past Surgical History:   Procedure Laterality Date    COLONOSCOPY N/A 9/22/2016    Procedure: COLONOSCOPY;  Surgeon: Joe Moe MD;  Location: [facility];  Service: Endoscopy;  Laterality: N/A;    HYSTERECTOMY      Partial    TUBAL LIGATION      URETERAL STENT PLACEMENT  07/2016       Review of patient's allergies indicates:   Allergen Reactions    Pcn [penicillins] Anaphylaxis       No current facility-administered medications on file prior to encounter.      Current Outpatient Prescriptions on File Prior to Encounter   Medication Sig    ondansetron (ZOFRAN-ODT) 8 MG TbDL Take 1 tablet (8 mg total) by mouth every 6 (six) hours as needed.    tramadol (ULTRAM) 50 mg tablet Take 1 tablet (50 mg total) by mouth every 4 (four) hours as needed for Pain. (Patient taking differently: Take 50 mg by mouth every 8 (eight) hours as needed for Pain. )     Family History     Problem Relation (Age of Onset)    Asthma Brother    Drug abuse Father    No Known Problems Mother, Sister        Social History Main Topics    Smoking status: Never Smoker    Smokeless tobacco: Never Used    Alcohol use No    Drug use: No    Sexual activity: Yes     Partners: Male     Birth control/ protection: See Surgical Hx     Review of Systems   Constitutional: Positive for chills, fatigue and fever. Negative for activity change and appetite change.   HENT: Negative for congestion, postnasal drip, sore throat and trouble swallowing.    Eyes: Negative for photophobia and visual disturbance.   Respiratory: Negative for cough, shortness of breath and wheezing.    Cardiovascular: Negative for chest pain, palpitations and leg swelling.   Gastrointestinal: Positive for abdominal pain, nausea and vomiting. Negative for abdominal  distention, blood in stool, constipation and diarrhea.   Genitourinary: Positive for difficulty urinating and flank pain. Negative for decreased urine volume, dysuria, frequency and hematuria.   Musculoskeletal: Positive for back pain and myalgias. Negative for arthralgias.   Skin: Negative for color change.   Neurological: Positive for headaches. Negative for dizziness and light-headedness.   Psychiatric/Behavioral: Negative for confusion. The patient is not nervous/anxious.      Objective:     Vital Signs (Most Recent):  Temp: 97 °F (36.1 °C) (07/04/17 0416)  Pulse: 84 (07/04/17 0416)  Resp: 20 (07/04/17 0416)  BP: (!) 93/52 (07/04/17 0416)  SpO2: 100 % (07/04/17 0416) Vital Signs (24h Range):  Temp:  [97 °F (36.1 °C)-101.5 °F (38.6 °C)] 97 °F (36.1 °C)  Pulse:  [] 84  Resp:  [20-30] 20  SpO2:  [98 %-100 %] 100 %  BP: ()/(52-69) 93/52     Weight: 68 kg (150 lb)  Body mass index is 24.21 kg/m².    Physical Exam   Constitutional: She is oriented to person, place, and time. She appears well-developed and well-nourished. She appears distressed.   HENT:   Head: Normocephalic and atraumatic.   Eyes: Conjunctivae and EOM are normal. Pupils are equal, round, and reactive to light.   Neck: Normal range of motion. Neck supple. No thyromegaly present.   Cardiovascular: Normal rate, regular rhythm, normal heart sounds and intact distal pulses.    Pulmonary/Chest: Effort normal and breath sounds normal. No respiratory distress.   Abdominal: Soft. Bowel sounds are normal. She exhibits no distension and no mass. There is tenderness (suprapubic area and lower abdomen tender to palpation). There is no guarding.   Musculoskeletal: Normal range of motion. She exhibits tenderness (Left CVA tenderness). She exhibits no edema.   Neurological: She is alert and oriented to person, place, and time. No cranial nerve deficit.   Skin: Skin is warm and dry.   Psychiatric: She has a normal mood and affect. Her behavior is normal.  Judgment and thought content normal.        Significant Labs:   CBC:   Recent Labs  Lab 07/03/17 2110 07/04/17  0204   WBC 8.90 7.90   HGB 11.5* 9.9*   HCT 34.2* 29.3*    208     CMP:   Recent Labs  Lab 07/03/17 2110 07/04/17  0204    141   K 3.4* 3.4*    109   CO2 21* 21*   GLU 94 100   BUN 18 16   CREATININE 1.9* 1.7*   CALCIUM 10.1 8.4*   PROT 8.0  --    ALBUMIN 3.7  --    BILITOT 0.8  --    ALKPHOS 86  --    AST 15  --    ALT 7*  --    ANIONGAP 14 11   EGFRNONAA 33* 38*     Lactic Acid:   Recent Labs  Lab 07/03/17 2110 07/03/17 2216 07/04/17  0204   LACTATE 2.1 1.2 0.6     Urine Studies:   Recent Labs  Lab 07/03/17 2128   COLORU Yellow   APPEARANCEUA Cloudy*   PHUR 7.0   SPECGRAV 1.010   PROTEINUA 2+*   GLUCUA Negative   KETONESU Negative   BILIRUBINUA Negative   OCCULTUA 3+*   NITRITE Negative   UROBILINOGEN Negative   LEUKOCYTESUR 3+*   RBCUA >100*   WBCUA >100*   BACTERIA Many*   HYALINECASTS 0

## 2017-07-04 NOTE — HPI
Cesilia Dozier is a 37 y.o. Female with PMHx significant for cervical CA, Hydronephrosis, Ureteral strictures with stents, and recurrent pyelonephritis. She is currently in remission for Cervical CA- normal PAP .   She was admitted to the service of hospital medicine with severe sepsis due to pyelonephritis.  She reported to the ED with complaint of lower abdominal pain, as well as LEFT flank pain.  She reports the pain began yesterday, associated with nausea, vomiting and generalized myalgias. She states she called Dr. Bocanegra- her urologist- who suggested a visit to the ED but she attempted home treatment with tramadol and zofran, which helped her sleep but woke today with nausea, vomiting, and worsened pain.  She had her stents exchanged 6/28 with Dr. Fournier.  She reported minimal discomfort post-operatively and was feeling better for a few days prior to symptom onset.  She denies any dysuria or hematuria, but does reports difficulty completely emptying bladder describing a pressure sensation. She reports fever and chills.  Other pertinent medical history as below:

## 2017-07-04 NOTE — PLAN OF CARE
CM met with patient to perform discharge planning assessment, verified demographic and insurance information, patient lives with spouse- Darlin Dozier [phone], denies POA or living will, PCP is Dr. Sheppard, pharmacy is [facility]. Patient reports independence at home and plans to return home with no needs.      07/04/17 0905   Discharge Assessment   Assessment Type Discharge Planning Assessment   Confirmed/corrected address and phone number on facesheet? Yes   Assessment information obtained from? Patient   Prior to hospitilization cognitive status: Alert/Oriented   Prior to hospitalization functional status: Independent   Current cognitive status: Alert/Oriented   Current Functional Status: Independent   Lives With spouse;child(garo), dependent   Able to Return to Prior Arrangements yes   Is patient able to care for self after discharge? Yes   Does the patient have family/friends to help with healtcare needs after discharge? yes   How many people do you have in your home that can help with your care after discharge? 1   Who are your caregiver(s) and their phone number(s)? spouse- Darlin Dozier [phone]   Patient's perception of discharge disposition home or selfcare   Readmission Within The Last 30 Days current reason for admission unrelated to previous admission   Patient currently being followed by outpatient case management? No   Patient currently receives home health services? No   Does the patient currently use HME? No   Patient currently receives private duty nursing? No   Patient currently receives any other outside agency services? No   Equipment Currently Used at Home none   Do you have any problems affording any of your prescribed medications? No   Is the patient taking medications as prescribed? yes   Do you have any financial concerns preventing you from receiving the healthcare you need? No   Does the patient have transportation to healthcare appointments? Yes   Transportation Available car   On  Dialysis? No   Does the patient receive services at the [facility]? No   Are there any open cases? No   Discharge Plan A Home   Patient/Family In Agreement With Plan yes

## 2017-07-04 NOTE — ASSESSMENT & PLAN NOTE
Fever + tachycardia + lactic acid of 2.1 in ED with pyelonephritis. S/p 30 ml/kg bolus in ED with normalization of lactic acid since that time.  Continue IV rocephin daily.  Blood cultures drawn-- follow results.  Follow urine cultures.

## 2017-07-04 NOTE — PLAN OF CARE
07/04/17 1355   Readmission Questionnaire   At the time of your discharge, did someone talk to you about what your health problems were? Yes   At the time of discharge, did someone talk to you about what to watch out for regarding worsening of your health problem? Yes   At the time of discharge, did someone talk to you about what to do if you experienced worsening of your health problem? Yes   At the time of discharge, did someone talk to you about which medication to take when you left the hospital and which ones to stop taking? Yes   At the time of discharge, did someone talk to you about when and where to follow up with a doctor after you left the hospital? Yes   What do you believe caused you to be sick enough to be re-admitted? infection   How often do you need to have someone help you when you read instructions, pamphlets, or other written material from your doctor or pharmacy? Never   Do you have problems taking your medications as prescribed? No   Do you have any problems affording any of  your prescribed medications? No   Do you have problems obtaining/receiving your medications? No   Does the patient have transportation to healthcare appointments? Yes   Lives With spouse;child(garo), dependent   Living Arrangements house   Does the patient have family/friends to help with healtcare needs after discharge? yes   Who are your caregiver(s) and their phone number(s)? Darlin Green- spouse   Does your caregiver provide all the help you need? Yes   Are you currently feeling confused? No   Are you currently having problems thinking? No   Are you currently having memory problems? No   Have you felt down, depressed, or hopeless? 0   Have you felt little interest or pleasure in doing things? 0   In the last 7 days, my sleep quality was: fair

## 2017-07-04 NOTE — ED PROVIDER NOTES
Encounter Date: 7/3/2017    SCRIBE #1 NOTE: Dilip LOVE, am scribing for, and in the presence of, Dr. Jain.       History     Chief Complaint   Patient presents with    Abdominal Pain     nausea, vomiting onset yesterday. C/o right flank pain starting in the back and radiating around to the epigastric area    Shortness of Breath     onset today     07/03/2017  8:47 PM    Chief Complaint: Abdominal pain    The patient is a 37 y.o. female presents to the ED c/o lower abdominal pain that began yesterday. Pt reports nausea, vomiting and generalized myalgias. She states she called Dr. Bocanegra who suggested a visit to the ED but she attempted tx at home that helped her sleep but woke up with sx worsening this AM. Pt reports a stent replacement surgery that was done at [facility] a few days ago. Multiple prior ER visits for identical symptoms.      PMHx of anemia; cervical cancer; encounter for blood transfusion; hydronephrosis; PONV and Pyelonephritis and PSHx of hysterectomy; tubal ligation; ureteral stent placement and colonoscopy.      The history is provided by the patient and medical records.     Review of patient's allergies indicates:   Allergen Reactions    Pcn [penicillins] Anaphylaxis     Past Medical History:   Diagnosis Date    Anemia     Cervical cancer     cervical    Encounter for blood transfusion     Hydronephrosis     PONV (postoperative nausea and vomiting)     Pyelonephritis      Past Surgical History:   Procedure Laterality Date    COLONOSCOPY N/A 9/22/2016    Procedure: COLONOSCOPY;  Surgeon: Joe Moe MD;  Location: [facility];  Service: Endoscopy;  Laterality: N/A;    HYSTERECTOMY      Partial    TUBAL LIGATION      URETERAL STENT PLACEMENT  07/2016     Family History   Problem Relation Age of Onset    No Known Problems Mother     Drug abuse Father     No Known Problems Sister     Asthma Brother      Social History   Substance Use Topics    Smoking status: Never Smoker     Smokeless tobacco: Never Used    Alcohol use No     Review of Systems   Constitutional: Positive for fever.   Respiratory: Negative for shortness of breath.    Cardiovascular: Negative for chest pain.   Gastrointestinal: Positive for abdominal pain, nausea and vomiting.   Genitourinary: Positive for dysuria and flank pain.   Musculoskeletal: Positive for myalgias (generalized ). Negative for back pain.   Skin: Negative for rash.   Neurological: Negative for weakness.   Hematological: Does not bruise/bleed easily.   All other systems reviewed and are negative.      Physical Exam     Initial Vitals [07/03/17 2030]   BP Pulse Resp Temp SpO2   115/64 (!) 138 (!) 30 99.6 °F (37.6 °C) 100 %      MAP       81         Physical Exam    Nursing note and vitals reviewed.  Constitutional: She appears well-developed and well-nourished. She is not diaphoretic. No distress.   Pt is uncomfortable   HENT:   Head: Normocephalic and atraumatic.   Eyes: EOM are normal.   Neck: Normal range of motion. Neck supple.   Cardiovascular: Regular rhythm and normal heart sounds. Tachycardia present.    Pulmonary/Chest: Breath sounds normal. No respiratory distress.   Abdominal: Soft. There is tenderness (Mild, lower abdomen ).   Musculoskeletal: Normal range of motion.   Neurological: She is alert and oriented to person, place, and time.   Skin: Skin is warm and dry.   Psychiatric: She has a normal mood and affect. Her behavior is normal. Judgment and thought content normal.         ED Course   Procedures  Labs Reviewed - No data to display          Medical Decision Making:   History:   Old Medical Records: I decided to obtain old medical records.  Old Records Summarized: records from clinic visits and records from previous admission(s).  Clinical Tests:   Lab Tests: Reviewed and Ordered  Radiological Study: Reviewed and Ordered            Scribe Attestation:   Scribe #1: I performed the above scribed service and the documentation  accurately describes the services I performed. I attest to the accuracy of the note.    Attending Attestation:           Physician Attestation for Scribe:  Physician Attestation Statement for Scribe #1: I, Dr. Jain, reviewed documentation, as scribed by Dilip Grey  in my presence, and it is both accurate and complete.                 ED Course   Value Comment By Time    Patient does not meet sepsis criteria. Zechariah Jain MD 07/03 2144   Appearance, UA: (!) Cloudy (Reviewed) Zechariah Jain MD 07/03 2144   Leukocytes, UA: (!) 3+ (Reviewed) Zecahriah Jain MD 07/03 2144   RBC, UA: (!) >100 (Reviewed) Zechariah Jain MD 07/03 2144   Bacteria, UA: (!) Many (Reviewed) Zechariah Jain MD 07/03 2144    Case discussed with Dr. Greco of urology, okay to keep the patient here. Zechariah Jain MD 07/03 2159    Repeat vital signs temperature 101.5, heart rate 101, blood pressure 105/52 Zechariah Jain MD 07/03 2202    Patient now meets sepsis criteria with fever tachycardia and source.  Lactic acid has been drawn earlier.  Repeat has been ordered normal saline 30 cc per KG bolus and broad-spectrum antibiotics have been ordered.  Case discussed with Aleisha Padilla for admission. Zechariah Jain MD 07/03 2212     Clinical Impression:   The encounter diagnosis was Sepsis, due to unspecified organism.            37-year-old with a history of cervical cancer and chronic ureteral obstruction presents to the ER complaining of flank pain and abdominal pain fevers chills.  Numerous prior ER visits for the same symptoms.  Initially did not meet sepsis criteria but repeat vital signs demonstrated a fever now.  Patient remains well-appearing and in no distress.Blood cultures have been drawn, lactic acid has been repeated in the ER, IV antibiotics have been given.  Patient will be admitted to hospital medicine for further care.  Patient has had numerous CT scans within the last 9 months I see no reason to repeat  one at this time.  She has had a great deal of radiation within the last year.               Zechariah Jain MD  07/03/17 9297

## 2017-07-04 NOTE — ASSESSMENT & PLAN NOTE
Fever + tachycardia + lactic acid of 2.1 in ED with pyelonephritis. S/p 30 ml/kg bolus in ED with normalization of lactic acid since that time.  Continue IV rocephin daily.  Blood cultures drawn-- follow results.  Follow urine cultures.    Sepsis protocol initiated with ivf bolus, pressors as needed and lactate levels and broad spectrum  ivantibiotics  For likely source:  renal  Blood culture  Urine culture    Trend cbc, lactate and progress with serial levels.

## 2017-07-04 NOTE — ASSESSMENT & PLAN NOTE
Creatinine stable./improving     Follow BUN/sCr.  Avoid non-essential nephrotoxins.  Renal dose medications as appropriate.  Consider renal us for obstruction -

## 2017-07-04 NOTE — ASSESSMENT & PLAN NOTE
With hypomag and phos-due to decreased intake  Replace judiciously due to ckd4  and monitor on  telemetry

## 2017-07-04 NOTE — PROGRESS NOTES
[facility] Hospital Medicine  Progress Note    Patient Name: Cesilia Dozier  MRN: [id number]  Patient Class: IP- Inpatient   Admission Date: 7/3/2017  Length of Stay: 1 days  Attending Physician: Cathy Nicholson MD  Primary Care Provider: Harsha Sheppard MD        Subjective:     Principal Problem:Severe sepsis    HPI:  Cesilia Dozier is a 37 y.o. Female with PMHx significant for cervical CA, Hydronephrosis, Ureteral strictures with stents, and recurrent pyelonephritis. She is currently in remission for Cervical CA- normal PAP .   She was admitted to the service of hospital medicine with severe sepsis due to pyelonephritis.  She reported to the ED with complaint of lower abdominal pain, as well as LEFT flank pain.  She reports the pain began yesterday, associated with nausea, vomiting and generalized myalgias. She states she called Dr. Bocanegra- her urologist- who suggested a visit to the ED but she attempted home treatment with tramadol and zofran, which helped her sleep but woke today with nausea, vomiting, and worsened pain.  She had her stents exchanged 6/28 with Dr. Fournier.  She reported minimal discomfort post-operatively and was feeling better for a few days prior to symptom onset.  She denies any dysuria or hematuria, but does reports difficulty completely emptying bladder describing a pressure sensation. She reports fever and chills.  Other pertinent medical history as below:    Hospital Course:  No notes on file    Interval History: pain left flank and lower abd not controlled with medications but overall feeling better     Review of Systems   Respiratory: Negative.    Cardiovascular: Negative.    Gastrointestinal: Negative.    Genitourinary: Positive for difficulty urinating, dysuria and flank pain.     Objective:     Vital Signs (Most Recent):  Temp: 98 °F (36.7 °C) (07/04/17 1538)  Pulse: 85 (07/04/17 1538)  Resp: 18 (07/04/17 1538)  BP: 101/60 (07/04/17 1538)  SpO2: 100 % (07/04/17 1538)  Vital Signs (24h Range):  Temp:  [97 °F (36.1 °C)-101.5 °F (38.6 °C)] 98 °F (36.7 °C)  Pulse:  [] 85  Resp:  [16-30] 18  SpO2:  [98 %-100 %] 100 %  BP: ()/(52-69) 101/60     Weight: 68 kg (150 lb)  Body mass index is 24.21 kg/m².  No intake or output data in the 24 hours ending 07/04/17 1729   Physical Exam   Constitutional: She is oriented to person, place, and time. She appears well-developed and well-nourished. She appears distressed.   HENT:   Head: Normocephalic and atraumatic.   Eyes: Conjunctivae and EOM are normal. Pupils are equal, round, and reactive to light.   Neck: Normal range of motion. Neck supple. No thyromegaly present.   Cardiovascular: Normal rate, regular rhythm, normal heart sounds and intact distal pulses.    Pulmonary/Chest: Effort normal and breath sounds normal. No respiratory distress.   Abdominal: Soft. Bowel sounds are normal. She exhibits no distension and no mass. There is tenderness (suprapubic area and lower abdomen tender to palpation). There is no guarding.   Musculoskeletal: Normal range of motion. She exhibits tenderness (Left CVA tenderness). She exhibits no edema.   Neurological: She is alert and oriented to person, place, and time. No cranial nerve deficit.   Skin: Skin is warm and dry.   Psychiatric: She has a normal mood and affect. Her behavior is normal. Judgment and thought content normal.       Significant Labs: All pertinent labs within the past 24 hours have been reviewed.    Significant Imaging: I have reviewed and interpreted all pertinent imaging results/findings within the past 24 hours.    Assessment/Plan:      Hypokalemia    With hypomag and phos-due to decreased intake  Replace judiciously due to ckd4  and monitor on  telemetry           Pelvic pain syndrome    Chronic issue.  Currently requiring IV narcotics with pyelonephritis-- attempt to manage with oral medications with IV narcotics for breakthrough pain only.            Pyelonephritis     Recurrent issue.  IV rocephin, IV hydration.  Anti-emetics.  Requiring IV narcotics for pain control.  Attempt to manage with oral medications with IV medication utilized only for break-through pain.  Of note, despite multiple admissions with pyelonephritis in past, review of microbiology results indicates urine has not grown anything since February.  Pt has bilateral ureteral stents -due to hx xrt for cervical ca causing obstruction.-replaced 6/28-  If not improvement 24-48 hours consult urology to replace           CKD (chronic kidney disease) stage 4, GFR 15-29 ml/min    Creatinine stable./improving     Follow BUN/sCr.  Avoid non-essential nephrotoxins.  Renal dose medications as appropriate.  Consider renal us for obstruction -          Ureteral stricture s/p bilateral ureteral stents secondary to XRT for cervical cancer    Recent stent exchange 6/28 with Dr. Fournier.  Consider urology consultation as course is complicated by recurrent infection.          Intractable nausea and vomiting    Acute, due to pyelonephritis  IV hydration, PRN zofran-- continues with vomiting.  Will add PRN phenergan.  Clear liquids and advance as patient as able to tolerate.          * Severe sepsis    Fever + tachycardia + lactic acid of 2.1 in ED with pyelonephritis. S/p 30 ml/kg bolus in ED with normalization of lactic acid since that time.  Continue IV rocephin daily.  Blood cultures drawn-- follow results.  Follow urine cultures.    Sepsis protocol initiated with ivf bolus, pressors as needed and lactate levels and broad spectrum  ivantibiotics  For likely source:  renal  Blood culture  Urine culture    Trend cbc, lactate and progress with serial levels.               VTE Risk Mitigation         Ordered     Medium Risk of VTE  Once      07/04/17 0047     Place sequential compression device  Until discontinued      07/04/17 0047     Place ANA hose  Until discontinued      07/04/17 0047          Bhakti Julien MD  Department of  Hospital Medicine [facility]

## 2017-07-04 NOTE — H&P
[facility] Medicine  History & Physical    Patient Name: Cesilia Dozier  MRN: [id number]  Admission Date: 7/3/2017  Attending Physician: Bhakti Julien MD   Primary Care Provider: Harsha Sheppard MD         Patient information was obtained from patient and ER records.     Subjective:     Principal Problem:Severe sepsis    Chief Complaint:   Chief Complaint   Patient presents with    Abdominal Pain     nausea, vomiting onset yesterday. C/o right flank pain starting in the back and radiating around to the epigastric area    Shortness of Breath     onset today        HPI: Cesilia Dozier is a 37 y.o. Female with PMHx significant for cervical CA, Hydronephrosis, Ureteral strictures with stents, and recurrent pyelonephritis. She is currently in remission for Cervical CA- normal PAP .   She was admitted to the service of hospital medicine with severe sepsis due to pyelonephritis.  She reported to the ED with complaint of lower abdominal pain, as well as LEFT flank pain.  She reports the pain began yesterday, associated with nausea, vomiting and generalized myalgias. She states she called Dr. Bocanegra- her urologist- who suggested a visit to the ED but she attempted home treatment with tramadol and zofran, which helped her sleep but woke today with nausea, vomiting, and worsened pain.  She had her stents exchanged 6/28 with Dr. Fournier.  She reported minimal discomfort post-operatively and was feeling better for a few days prior to symptom onset.  She denies any dysuria or hematuria, but does reports difficulty completely emptying bladder describing a pressure sensation. She reports fever and chills.  Other pertinent medical history as below:    Past Medical History:   Diagnosis Date    Anemia     Cervical cancer     cervical    Encounter for blood transfusion     Hydronephrosis     PONV (postoperative nausea and vomiting)     Pyelonephritis        Past Surgical History:   Procedure  Laterality Date    COLONOSCOPY N/A 9/22/2016    Procedure: COLONOSCOPY;  Surgeon: Joe Moe MD;  Location: [facility];  Service: Endoscopy;  Laterality: N/A;    HYSTERECTOMY      Partial    TUBAL LIGATION      URETERAL STENT PLACEMENT  07/2016       Review of patient's allergies indicates:   Allergen Reactions    Pcn [penicillins] Anaphylaxis       No current facility-administered medications on file prior to encounter.      Current Outpatient Prescriptions on File Prior to Encounter   Medication Sig    ondansetron (ZOFRAN-ODT) 8 MG TbDL Take 1 tablet (8 mg total) by mouth every 6 (six) hours as needed.    tramadol (ULTRAM) 50 mg tablet Take 1 tablet (50 mg total) by mouth every 4 (four) hours as needed for Pain. (Patient taking differently: Take 50 mg by mouth every 8 (eight) hours as needed for Pain. )     Family History     Problem Relation (Age of Onset)    Asthma Brother    Drug abuse Father    No Known Problems Mother, Sister        Social History Main Topics    Smoking status: Never Smoker    Smokeless tobacco: Never Used    Alcohol use No    Drug use: No    Sexual activity: Yes     Partners: Male     Birth control/ protection: See Surgical Hx     Review of Systems   Constitutional: Positive for chills, fatigue and fever. Negative for activity change and appetite change.   HENT: Negative for congestion, postnasal drip, sore throat and trouble swallowing.    Eyes: Negative for photophobia and visual disturbance.   Respiratory: Negative for cough, shortness of breath and wheezing.    Cardiovascular: Negative for chest pain, palpitations and leg swelling.   Gastrointestinal: Positive for abdominal pain, nausea and vomiting. Negative for abdominal distention, blood in stool, constipation and diarrhea.   Genitourinary: Positive for difficulty urinating and flank pain. Negative for decreased urine volume, dysuria, frequency and hematuria.   Musculoskeletal: Positive for back pain and myalgias.  Negative for arthralgias.   Skin: Negative for color change.   Neurological: Positive for headaches. Negative for dizziness and light-headedness.   Psychiatric/Behavioral: Negative for confusion. The patient is not nervous/anxious.      Objective:     Vital Signs (Most Recent):  Temp: 97 °F (36.1 °C) (07/04/17 0416)  Pulse: 84 (07/04/17 0416)  Resp: 20 (07/04/17 0416)  BP: (!) 93/52 (07/04/17 0416)  SpO2: 100 % (07/04/17 0416) Vital Signs (24h Range):  Temp:  [97 °F (36.1 °C)-101.5 °F (38.6 °C)] 97 °F (36.1 °C)  Pulse:  [] 84  Resp:  [20-30] 20  SpO2:  [98 %-100 %] 100 %  BP: ()/(52-69) 93/52     Weight: 68 kg (150 lb)  Body mass index is 24.21 kg/m².    Physical Exam   Constitutional: She is oriented to person, place, and time. She appears well-developed and well-nourished. She appears distressed.   HENT:   Head: Normocephalic and atraumatic.   Eyes: Conjunctivae and EOM are normal. Pupils are equal, round, and reactive to light.   Neck: Normal range of motion. Neck supple. No thyromegaly present.   Cardiovascular: Normal rate, regular rhythm, normal heart sounds and intact distal pulses.    Pulmonary/Chest: Effort normal and breath sounds normal. No respiratory distress.   Abdominal: Soft. Bowel sounds are normal. She exhibits no distension and no mass. There is tenderness (suprapubic area and lower abdomen tender to palpation). There is no guarding.   Musculoskeletal: Normal range of motion. She exhibits tenderness (Left CVA tenderness). She exhibits no edema.   Neurological: She is alert and oriented to person, place, and time. No cranial nerve deficit.   Skin: Skin is warm and dry.   Psychiatric: She has a normal mood and affect. Her behavior is normal. Judgment and thought content normal.        Significant Labs:   CBC:   Recent Labs  Lab 07/03/17  2110 07/04/17  0204   WBC 8.90 7.90   HGB 11.5* 9.9*   HCT 34.2* 29.3*    208     CMP:   Recent Labs  Lab 07/03/17  2110 07/04/17  0204     141   K 3.4* 3.4*    109   CO2 21* 21*   GLU 94 100   BUN 18 16   CREATININE 1.9* 1.7*   CALCIUM 10.1 8.4*   PROT 8.0  --    ALBUMIN 3.7  --    BILITOT 0.8  --    ALKPHOS 86  --    AST 15  --    ALT 7*  --    ANIONGAP 14 11   EGFRNONAA 33* 38*     Lactic Acid:   Recent Labs  Lab 07/03/17  2110 07/03/17  2216 07/04/17  0204   LACTATE 2.1 1.2 0.6     Urine Studies:   Recent Labs  Lab 07/03/17 2128   COLORU Yellow   APPEARANCEUA Cloudy*   PHUR 7.0   SPECGRAV 1.010   PROTEINUA 2+*   GLUCUA Negative   KETONESU Negative   BILIRUBINUA Negative   OCCULTUA 3+*   NITRITE Negative   UROBILINOGEN Negative   LEUKOCYTESUR 3+*   RBCUA >100*   WBCUA >100*   BACTERIA Many*   HYALINECASTS 0           Assessment/Plan:     * Severe sepsis    Fever + tachycardia + lactic acid of 2.1 in ED with pyelonephritis. S/p 30 ml/kg bolus in ED with normalization of lactic acid since that time.  Continue IV rocephin daily.  Blood cultures drawn-- follow results.  Follow urine cultures.          Pyelonephritis    Recurrent issue.  IV rocephin, IV hydration.  Anti-emetics.  Requiring IV narcotics for pain control.  Attempt to manage with oral medications with IV medication utilized only for break-through pain.  Of note, despite multiple admissions with pyelonephritis in past, review of microbiology results indicates urine has not grown anything since February.          Ureteral stricture s/p bilateral ureteral stents secondary to XRT for cervical cancer    Recent stent exchange 6/28 with Dr. Fournier.  Consider urology consultation as course is complicated by recurrent infection.          Pelvic pain syndrome    Chronic issue.  Currently requiring IV narcotics with pyelonephritis-- attempt to manage with oral medications with IV narcotics for breakthrough pain only.            CKD (chronic kidney disease) stage 4, GFR 15-29 ml/min    Creatinine stable.  Follow BUN/sCr.  Avoid non-essential nephrotoxins.  Renal dose medications as  appropriate.          Intractable nausea and vomiting    IV hydration, PRN zofran-- continues with vomiting.  Will add PRN phenergan.  Clear liquids and advance as patient as able to tolerate.            VTE Risk Mitigation         Ordered     Medium Risk of VTE  Once      07/04/17 0047     Place sequential compression device  Until discontinued      07/04/17 0047     Place ANA hose  Until discontinued      07/04/17 0047        Sole Barlow NP  Department of Hospital Medicine   [facility]

## 2017-07-04 NOTE — ASSESSMENT & PLAN NOTE
Recurrent issue.  IV rocephin, IV hydration.  Anti-emetics.  Requiring IV narcotics for pain control.  Attempt to manage with oral medications with IV medication utilized only for break-through pain.  Of note, despite multiple admissions with pyelonephritis in past, review of microbiology results indicates urine has not grown anything since February.

## 2017-07-04 NOTE — NURSING
Alert and oriented. Medicated for c/o rate 8/10 pain to lower back and abdomen. Assessment complete. Instructed on plan of care. Verbalized understanding. Safety precautions discussed. Call light in reach.

## 2017-07-04 NOTE — CARE UPDATE
P.O. Intake from shift was 1980 cc, IV fluid intake was 1500 cc, urine output was 1100 cc. Performed bladder scan, showed 143 cc in bladder.

## 2017-07-04 NOTE — ASSESSMENT & PLAN NOTE
Recurrent issue.  IV rocephin, IV hydration.  Anti-emetics.  Requiring IV narcotics for pain control.  Attempt to manage with oral medications with IV medication utilized only for break-through pain.  Of note, despite multiple admissions with pyelonephritis in past, review of microbiology results indicates urine has not grown anything since February.  Pt has bilateral ureteral stents -due to hx xrt for cervical ca causing obstruction.-replaced 6/28-  If not improvement 24-48 hours consult urology to replace

## 2017-07-04 NOTE — ASSESSMENT & PLAN NOTE
IV hydration, PRN zofran-- continues with vomiting.  Will add PRN phenergan.  Clear liquids and advance as patient as able to tolerate.

## 2017-07-04 NOTE — ASSESSMENT & PLAN NOTE
Recent stent exchange 6/28 with Dr. Fournier.  Consider urology consultation as course is complicated by recurrent infection.

## 2017-07-04 NOTE — NURSING
Consult called to Dr. Salgado (urology).  States not on call today.  States can see patient tomorrow if need be but current treatment with abx's is appropriate.  Dr. Julien notified.  Will ask Dr. Salgado to see patient tomorrow.

## 2017-07-04 NOTE — ASSESSMENT & PLAN NOTE
Chronic issue.  Currently requiring IV narcotics with pyelonephritis-- attempt to manage with oral medications with IV narcotics for breakthrough pain only.

## 2017-07-04 NOTE — PLAN OF CARE
04/20/17 0952   Final Note   Assessment Type Final Discharge Note   Discharge Disposition Home   Discharge planning education complete? Yes      2/2 GI Bleed  - Will give calcium gluconate, insulin, dextrose, albuterol if not resolved in AM labs  - Will recheck BMP if administered 2/2 GI Bleed - resolved

## 2017-07-04 NOTE — ASSESSMENT & PLAN NOTE
Creatinine stable.  Follow BUN/sCr.  Avoid non-essential nephrotoxins.  Renal dose medications as appropriate.

## 2017-07-04 NOTE — SUBJECTIVE & OBJECTIVE
Interval History: pain left flank and lower abd not controlled with medications but overall feeling better     Review of Systems   Respiratory: Negative.    Cardiovascular: Negative.    Gastrointestinal: Negative.    Genitourinary: Positive for difficulty urinating, dysuria and flank pain.     Objective:     Vital Signs (Most Recent):  Temp: 98 °F (36.7 °C) (07/04/17 1538)  Pulse: 85 (07/04/17 1538)  Resp: 18 (07/04/17 1538)  BP: 101/60 (07/04/17 1538)  SpO2: 100 % (07/04/17 1538) Vital Signs (24h Range):  Temp:  [97 °F (36.1 °C)-101.5 °F (38.6 °C)] 98 °F (36.7 °C)  Pulse:  [] 85  Resp:  [16-30] 18  SpO2:  [98 %-100 %] 100 %  BP: ()/(52-69) 101/60     Weight: 68 kg (150 lb)  Body mass index is 24.21 kg/m².  No intake or output data in the 24 hours ending 07/04/17 1729   Physical Exam   Constitutional: She is oriented to person, place, and time. She appears well-developed and well-nourished. She appears distressed.   HENT:   Head: Normocephalic and atraumatic.   Eyes: Conjunctivae and EOM are normal. Pupils are equal, round, and reactive to light.   Neck: Normal range of motion. Neck supple. No thyromegaly present.   Cardiovascular: Normal rate, regular rhythm, normal heart sounds and intact distal pulses.    Pulmonary/Chest: Effort normal and breath sounds normal. No respiratory distress.   Abdominal: Soft. Bowel sounds are normal. She exhibits no distension and no mass. There is tenderness (suprapubic area and lower abdomen tender to palpation). There is no guarding.   Musculoskeletal: Normal range of motion. She exhibits tenderness (Left CVA tenderness). She exhibits no edema.   Neurological: She is alert and oriented to person, place, and time. No cranial nerve deficit.   Skin: Skin is warm and dry.   Psychiatric: She has a normal mood and affect. Her behavior is normal. Judgment and thought content normal.       Significant Labs: All pertinent labs within the past 24 hours have been  reviewed.    Significant Imaging: I have reviewed and interpreted all pertinent imaging results/findings within the past 24 hours.

## 2017-07-04 NOTE — ASSESSMENT & PLAN NOTE
Acute, due to pyelonephritis  IV hydration, PRN zofran-- continues with vomiting.  Will add PRN phenergan.  Clear liquids and advance as patient as able to tolerate.

## 2017-07-05 LAB
ANION GAP SERPL CALC-SCNC: 8 MMOL/L
BUN SERPL-MCNC: 12 MG/DL
CALCIUM SERPL-MCNC: 8.6 MG/DL
CHLORIDE SERPL-SCNC: 112 MMOL/L
CO2 SERPL-SCNC: 21 MMOL/L
CREAT SERPL-MCNC: 1.5 MG/DL
EST. GFR  (AFRICAN AMERICAN): 51 ML/MIN/1.73 M^2
EST. GFR  (NON AFRICAN AMERICAN): 44 ML/MIN/1.73 M^2
GLUCOSE SERPL-MCNC: 82 MG/DL
POTASSIUM SERPL-SCNC: 3.3 MMOL/L
SODIUM SERPL-SCNC: 141 MMOL/L

## 2017-07-05 PROCEDURE — 25000003 PHARM REV CODE 250: Performed by: HOSPITALIST

## 2017-07-05 PROCEDURE — 36415 COLL VENOUS BLD VENIPUNCTURE: CPT

## 2017-07-05 PROCEDURE — 25000003 PHARM REV CODE 250: Performed by: NURSE PRACTITIONER

## 2017-07-05 PROCEDURE — 63600175 PHARM REV CODE 636 W HCPCS: Performed by: HOSPITALIST

## 2017-07-05 PROCEDURE — 80048 BASIC METABOLIC PNL TOTAL CA: CPT

## 2017-07-05 PROCEDURE — 12000002 HC ACUTE/MED SURGE SEMI-PRIVATE ROOM

## 2017-07-05 PROCEDURE — 63600175 PHARM REV CODE 636 W HCPCS: Performed by: EMERGENCY MEDICINE

## 2017-07-05 RX ORDER — LINEZOLID 2 MG/ML
600 INJECTION, SOLUTION INTRAVENOUS
Status: DISCONTINUED | OUTPATIENT
Start: 2017-07-05 | End: 2017-07-08 | Stop reason: HOSPADM

## 2017-07-05 RX ADMIN — LINEZOLID 600 MG: 600 INJECTION, SOLUTION INTRAVENOUS at 11:07

## 2017-07-05 RX ADMIN — HYDROMORPHONE HYDROCHLORIDE 1 MG: 1 INJECTION, SOLUTION INTRAMUSCULAR; INTRAVENOUS; SUBCUTANEOUS at 05:07

## 2017-07-05 RX ADMIN — HYDROMORPHONE HYDROCHLORIDE 1 MG: 1 INJECTION, SOLUTION INTRAMUSCULAR; INTRAVENOUS; SUBCUTANEOUS at 01:07

## 2017-07-05 RX ADMIN — HYDROCODONE BITARTRATE AND ACETAMINOPHEN 1 TABLET: 10; 325 TABLET ORAL at 08:07

## 2017-07-05 RX ADMIN — HYDROCODONE BITARTRATE AND ACETAMINOPHEN 1 TABLET: 10; 325 TABLET ORAL at 04:07

## 2017-07-05 RX ADMIN — ZOLPIDEM TARTRATE 5 MG: 5 TABLET, FILM COATED ORAL at 10:07

## 2017-07-05 RX ADMIN — POTASSIUM CHLORIDE 40 MEQ: 20 SOLUTION ORAL at 07:07

## 2017-07-05 RX ADMIN — HYDROMORPHONE HYDROCHLORIDE 1 MG: 1 INJECTION, SOLUTION INTRAMUSCULAR; INTRAVENOUS; SUBCUTANEOUS at 09:07

## 2017-07-05 RX ADMIN — POTASSIUM CHLORIDE 40 MEQ: 20 SOLUTION ORAL at 09:07

## 2017-07-05 RX ADMIN — HYDROMORPHONE HYDROCHLORIDE 1 MG: 1 INJECTION, SOLUTION INTRAMUSCULAR; INTRAVENOUS; SUBCUTANEOUS at 10:07

## 2017-07-05 NOTE — PHYSICIAN QUERY
PT Name: Cesilia Dozier  MR #: [id number]    Physician Query Form - Cause and Effect Relationship Clarification      CDS/: Tatiana Case RN            Contact information:[phone]    This form is a permanent document in the medical record.     Query Date: July 5, 2017    By submitting this query, we are merely seeking further clarification of documentation. Please utilize your independent clinical judgment when addressing the question(s) below.    The Medical record contains the following:  Supporting Clinical Findings   Location in record   Pt has bilateral ureteral stents -due to hx xrt for cervical ca causing obstruction.-replaced 6/28                                                                                                  Ureteral stricture s/p bilateral ureteral stents secondary to XRT for cervical cancer      Recent stent exchange 6/28 with Dr. Fournier.  Consider urology consultation as course is complicated by recurrent infection.       She was admitted to the service of hospital medicine with severe sepsis due to pyelonephritis.                                                                          7/4 Hosp PN                     7/4 H&P   Ureteral strictures with stents, and recurrent pyelonephritis                                                                                                                                                    cefTRIAXone (ROCEPHIN) 2 gm  IVPB                     7/4 Hosp PN       7/3 MAR         Provider, please clarify if there is any correlation between _Ureteral stricture s/p bilateral ureteral stents__and ___Sepsis______.           Are the conditions:     [ x ] Due to or associated with each other     [  ] Unrelated to each other     [  ] Other (Please Specify): _________________________     [  ] Clinically Undetermined

## 2017-07-05 NOTE — PHYSICIAN QUERY
"PT Name: Cesilia Dozier  MR #: [id number]     Physician Query Form - Documentation Clarification      CDS/: Tatiana Case RN          Contact information:[phone]    This form is a permanent document in the medical record.     Query Date: July 5, 2017    By submitting this query, we are merely seeking further clarification of documentation. Please utilize your independent clinical judgment when addressing the question(s) below.    The Medical record reflects the following:    Supporting Clinical Findings Location in Medical Record   Pyelonephritis    c/o lower abdominal pain that began yesterday. Pt reports nausea, vomiting and generalized myalgias  ./o right flank pain starting in the back and radiating around to the epigastric area     Fever + tachycardia + lactic acid of 2.1 in ED      She was admitted  with severe sepsis due to pyelonephritis   7/4 H&P    7/3 ED MD note            7/4 H&P   cefTRIAXone (ROCEPHIN) 2 gm  IVPB        7/3 MAR                                                                            Doctor, Please specify diagnosis or diagnoses associated with above clinical findings.    Provider Use Only    [ X ] Acute Pyelonephritis    [  ] Chronic Pyelonephritis    [  ] Other Infections (Specify)_____________________                                                                                                             [  ] Clinically undetermined          7/5 Urologist PN at 1: 14 pm "Acute pyelonephritis appears to be the cause of her current condition."..iu  "

## 2017-07-05 NOTE — PLAN OF CARE
Problem: Patient Care Overview  Goal: Plan of Care Review  Outcome: Ongoing (interventions implemented as appropriate)  Alert and oriented. Complain of flank pain given dilaudid 1 mg IVP x 2 doses with some relief noted.  Afebrile. Complained of having diarrhea. Stool softener held tonight. Plan of care reviewed with patient.  Understanding voiced. Bed in low position and locked. Side rails up x 2 call bell in reach.  Telemetry

## 2017-07-05 NOTE — CONSULTS
This 37-year-old female is admitted with a 1 to 2 ay history of lower abdominal and left flank pain and fever and general malaise.  The patient has a history of metastatic cervical carcinoma which has resulted in extrinsic compression of both ureters with obstruction for which she has required ureteral stent placements. She most recently underwent cystoscopy and bilateral ureteral stent replacement on 6/28/2017by Dr. Bocanegra at [facility]. She was doing well until the development of the above-mentioned symptoms.      Past Medical History:   Diagnosis Date    Anemia      Cervical cancer       cervical    Encounter for blood transfusion      Hydronephrosis      PONV (postoperative nausea and vomiting)      Pyelonephritis                 Past Surgical History:   Procedure Laterality Date    COLONOSCOPY N/A 9/22/2016     Procedure: COLONOSCOPY;  Surgeon: Joe Moe MD;  Location: [facility];  Service: Endoscopy;  Laterality: N/A;    HYSTERECTOMY         Partial    TUBAL LIGATION        URETERAL STENT PLACEMENT   07/2016              Review of patient's allergies indicates:   Allergen Reactions    Pcn [penicillins] Anaphylaxis         No current facility-administered medications on file prior to encounter.            Current Outpatient Prescriptions on File Prior to Encounter   Medication Sig    ondansetron (ZOFRAN-ODT) 8 MG TbDL Take 1 tablet (8 mg total) by mouth every 6 (six) hours as needed.    tramadol (ULTRAM) 50 mg tablet Take 1 tablet (50 mg total) by mouth every 4 (four) hours as needed for Pain. (Patient taking differently: Take 50 mg by mouth every 8 (eight) hours as needed for Pain. )           Family History      Problem Relation (Age of Onset)     Asthma Brother     Drug abuse Father     No Known Problems Mother, Sister                Social History Main Topics    Smoking status: Never Smoker    Smokeless tobacco: Never Used    Alcohol use No    Drug use: No    Sexual activity: Yes        Partners: Male       Birth control/ protection: See Surgical Hx      Review of Systems   Constitutional: Positive for chills, fatigue and fever. Negative for activity change and appetite change.   HENT: Negative for congestion, postnasal drip, sore throat and trouble swallowing.    Eyes: Negative for photophobia and visual disturbance.   Respiratory: Negative for cough, shortness of breath and wheezing.    Cardiovascular: Negative for chest pain, palpitations and leg swelling.   Gastrointestinal: Positive for abdominal pain, nausea and vomiting. Negative for abdominal distention, blood in stool, constipation and diarrhea.   Genitourinary: Positive for difficulty urinating and flank pain. Negative for decreased urine volume, dysuria, frequency and hematuria.   Musculoskeletal: Positive for back pain and myalgias. Negative for arthralgias.   Skin: Negative for color change.   Neurological: Positive for headaches. Negative for dizziness and light-headedness.   Psychiatric/Behavioral: Negative for confusion. The patient is not nervous/anxious.       Objective:      Vital Signs (Most Recent):  Temp: 97 °F (36.1 °C) (07/04/17 0416)  Pulse: 84 (07/04/17 0416)  Resp: 20 (07/04/17 0416)  BP: (!) 93/52 (07/04/17 0416)  SpO2: 100 % (07/04/17 0416) Vital Signs (24h Range):  Temp:  [97 °F (36.1 °C)-101.5 °F (38.6 °C)] 97 °F (36.1 °C)  Pulse:  [] 84  Resp:  [20-30] 20  SpO2:  [98 %-100 %] 100 %  BP: ()/(52-69) 93/52      Weight: 68 kg (150 lb)  Body mass index is 24.21 kg/m².     Physical Exam   Constitutional: She is oriented to person, place, and time. She appears well-developed and well-nourished. She appears distressed.   HENT:   Head: Normocephalic and atraumatic.   Eyes: Conjunctivae and EOM are normal. Pupils are equal, round, and reactive to light.   Neck: Normal range of motion. Neck supple. No thyromegaly present.   Cardiovascular: Normal rate, regular rhythm, normal heart sounds and intact distal pulses.     Pulmonary/Chest: Effort normal and breath sounds normal. No respiratory distress.   Abdominal: Soft. Bowel sounds are normal. She exhibits no distension and no mass. There is tenderness (suprapubic area and lower abdomen tender to palpation). There is no guarding.   Musculoskeletal: Normal range of motion. She exhibits tenderness (Left CVA tenderness). She exhibits no edema.   Neurological: She is alert and oriented to person, place, and time. No cranial nerve deficit.   Skin: Skin is warm and dry.   Psychiatric: She has a normal mood and affect. Her behavior is normal. Judgment and thought content normal.         Significant Labs:   CBC:   Recent Labs  Lab 07/03/17 2110 07/04/17  0204   WBC 8.90 7.90   HGB 11.5* 9.9*   HCT 34.2* 29.3*    208      CMP:   Recent Labs  Lab 07/03/17 2110 07/04/17  0204    141   K 3.4* 3.4*    109   CO2 21* 21*   GLU 94 100   BUN 18 16   CREATININE 1.9* 1.7*   CALCIUM 10.1 8.4*   PROT 8.0  --    ALBUMIN 3.7  --    BILITOT 0.8  --    ALKPHOS 86  --    AST 15  --    ALT 7*  --    ANIONGAP 14 11   EGFRNONAA 33* 38*      Lactic Acid:   Recent Labs  Lab 07/03/17 2110 07/03/17 2216 07/04/17  0204   LACTATE 2.1 1.2 0.6      Urine Studies:   Recent Labs  Lab 07/03/17 2128   COLORU Yellow   APPEARANCEUA Cloudy*   PHUR 7.0   SPECGRAV 1.010   PROTEINUA 2+*   GLUCUA Negative   KETONESU Negative   BILIRUBINUA Negative   OCCULTUA 3+*   NITRITE Negative   UROBILINOGEN Negative   LEUKOCYTESUR 3+*   RBCUA >100*   WBCUA >100*   BACTERIA Many*   HYALINECASTS 0       KUB today reveals both stents to be in good  Position.    Final impression:  Acute pyelonephritis appears to be the cause of her current condition.                                Bilateral ureteral obstruction with indwelling ureteral stents that were recently changed.                                Cervical carcinoma with bilateral extrinsic ureteral compression.    Recommendation: Continue current management with IV  antibiotics as has been started pending the final culture reports.  No further additional recommendations at this time.  Patient will otherwise continue to follow-up with Dr. Bocanegra at [facility].

## 2017-07-06 PROBLEM — E87.6 HYPOKALEMIA: Status: RESOLVED | Noted: 2017-07-04 | Resolved: 2017-07-06

## 2017-07-06 PROBLEM — N10 ACUTE PYELONEPHRITIS: Status: RESOLVED | Noted: 2017-03-19 | Resolved: 2017-07-06

## 2017-07-06 PROBLEM — E83.42 HYPOMAGNESEMIA: Status: RESOLVED | Noted: 2017-07-04 | Resolved: 2017-07-06

## 2017-07-06 PROBLEM — E83.39 HYPOPHOSPHATEMIA: Status: RESOLVED | Noted: 2017-07-04 | Resolved: 2017-07-06

## 2017-07-06 LAB
ANION GAP SERPL CALC-SCNC: 7 MMOL/L
BUN SERPL-MCNC: 8 MG/DL
CALCIUM SERPL-MCNC: 8.9 MG/DL
CHLORIDE SERPL-SCNC: 109 MMOL/L
CO2 SERPL-SCNC: 23 MMOL/L
CREAT SERPL-MCNC: 1.4 MG/DL
EST. GFR  (AFRICAN AMERICAN): 55 ML/MIN/1.73 M^2
EST. GFR  (NON AFRICAN AMERICAN): 48 ML/MIN/1.73 M^2
GLUCOSE SERPL-MCNC: 85 MG/DL
POTASSIUM SERPL-SCNC: 3.6 MMOL/L
SODIUM SERPL-SCNC: 139 MMOL/L

## 2017-07-06 PROCEDURE — 12000002 HC ACUTE/MED SURGE SEMI-PRIVATE ROOM

## 2017-07-06 PROCEDURE — 80048 BASIC METABOLIC PNL TOTAL CA: CPT

## 2017-07-06 PROCEDURE — 25000003 PHARM REV CODE 250: Performed by: NURSE PRACTITIONER

## 2017-07-06 PROCEDURE — 63600175 PHARM REV CODE 636 W HCPCS: Performed by: EMERGENCY MEDICINE

## 2017-07-06 PROCEDURE — 36415 COLL VENOUS BLD VENIPUNCTURE: CPT

## 2017-07-06 PROCEDURE — 63600175 PHARM REV CODE 636 W HCPCS: Performed by: HOSPITALIST

## 2017-07-06 PROCEDURE — 25000003 PHARM REV CODE 250: Performed by: HOSPITALIST

## 2017-07-06 RX ADMIN — HYDROMORPHONE HYDROCHLORIDE 1 MG: 1 INJECTION, SOLUTION INTRAMUSCULAR; INTRAVENOUS; SUBCUTANEOUS at 06:07

## 2017-07-06 RX ADMIN — HYDROMORPHONE HYDROCHLORIDE 1 MG: 1 INJECTION, SOLUTION INTRAMUSCULAR; INTRAVENOUS; SUBCUTANEOUS at 07:07

## 2017-07-06 RX ADMIN — STANDARDIZED SENNA CONCENTRATE AND DOCUSATE SODIUM 1 TABLET: 8.6; 5 TABLET, FILM COATED ORAL at 08:07

## 2017-07-06 RX ADMIN — HYDROMORPHONE HYDROCHLORIDE 1 MG: 1 INJECTION, SOLUTION INTRAMUSCULAR; INTRAVENOUS; SUBCUTANEOUS at 10:07

## 2017-07-06 RX ADMIN — SODIUM CHLORIDE: 0.9 INJECTION, SOLUTION INTRAVENOUS at 09:07

## 2017-07-06 RX ADMIN — HYDROMORPHONE HYDROCHLORIDE 1 MG: 1 INJECTION, SOLUTION INTRAMUSCULAR; INTRAVENOUS; SUBCUTANEOUS at 11:07

## 2017-07-06 RX ADMIN — STANDARDIZED SENNA CONCENTRATE AND DOCUSATE SODIUM 1 TABLET: 8.6; 5 TABLET, FILM COATED ORAL at 09:07

## 2017-07-06 RX ADMIN — LINEZOLID 600 MG: 600 INJECTION, SOLUTION INTRAVENOUS at 11:07

## 2017-07-06 RX ADMIN — HYDROMORPHONE HYDROCHLORIDE 1 MG: 1 INJECTION, SOLUTION INTRAMUSCULAR; INTRAVENOUS; SUBCUTANEOUS at 03:07

## 2017-07-06 RX ADMIN — HYDROMORPHONE HYDROCHLORIDE 1 MG: 1 INJECTION, SOLUTION INTRAMUSCULAR; INTRAVENOUS; SUBCUTANEOUS at 02:07

## 2017-07-06 RX ADMIN — ZOLPIDEM TARTRATE 5 MG: 5 TABLET, FILM COATED ORAL at 09:07

## 2017-07-06 NOTE — PLAN OF CARE
Problem: Infection, Risk/Actual (Adult)  Goal: Infection Prevention/Resolution  Patient will demonstrate the desired outcomes by discharge/transition of care.   Outcome: Ongoing (interventions implemented as appropriate)   07/06/17 0336   Infection, Risk/Actual (Adult)   Infection Prevention/Resolution making progress toward outcome

## 2017-07-06 NOTE — ASSESSMENT & PLAN NOTE
Creatinine stable.  Follow BUN/Cr.  Avoid non-essential nephrotoxins.  Renal dose medications as appropriate.

## 2017-07-06 NOTE — PROGRESS NOTES
[facility] Medicine  Progress Note    Patient Name: Cesilia Dozier  MRN: [id number]  Patient Class: IP- Inpatient   Admission Date: 7/3/2017  Length of Stay: 3 days  Attending Physician: Cathy Nicholson MD  Primary Care Provider: Harsha Sheppard MD        Subjective:     Principal Problem:Pyelonephritis    HPI:  Cesilia Dozier is a 37 y.o. Female with PMHx significant for cervical CA, Hydronephrosis, Ureteral strictures with stents, and recurrent pyelonephritis. She is currently in remission for Cervical CA- normal PAP .   She was admitted to the service of hospital medicine with severe sepsis due to pyelonephritis.  She reported to the ED with complaint of lower abdominal pain, as well as LEFT flank pain.  She reports the pain began yesterday, associated with nausea, vomiting and generalized myalgias. She states she called Dr. Bocanegra- her urologist- who suggested a visit to the ED but she attempted home treatment with tramadol and zofran, which helped her sleep but woke today with nausea, vomiting, and worsened pain.  She had her stents exchanged 6/28 with Dr. Fournier.  She reported minimal discomfort post-operatively and was feeling better for a few days prior to symptom onset.  She denies any dysuria or hematuria, but does reports difficulty completely emptying bladder describing a pressure sensation. She reports fever and chills.  Other pertinent medical history as below:    Hospital Course:  No notes on file    Interval History:  Still with pain in groin and flank.  Afebrile.  No vomiting.  Enterococcus growing in urine.    Review of Systems   Respiratory: Negative.    Cardiovascular: Negative.    Gastrointestinal: Negative.    Genitourinary: Positive for flank pain.     Objective:     Vital Signs (Most Recent):  Temp: 98.2 °F (36.8 °C) (07/06/17 1100)  Pulse: 90 (07/06/17 1100)  Resp: 16 (07/06/17 1100)  BP: 102/61 (07/06/17 1100)  SpO2: 100 % (07/06/17 1100) Vital Signs (24h  Range):  Temp:  [98.2 °F (36.8 °C)-98.6 °F (37 °C)] 98.2 °F (36.8 °C)  Pulse:  [88-98] 90  Resp:  [16-20] 16  SpO2:  [100 %] 100 %  BP: ()/(51-62) 102/61     Weight: 68 kg (150 lb)  Body mass index is 24.21 kg/m².    Intake/Output Summary (Last 24 hours) at 07/06/17 1417  Last data filed at 07/06/17 0639   Gross per 24 hour   Intake              950 ml   Output             4000 ml   Net            -3050 ml      Physical Exam   Constitutional: She is oriented to person, place, and time. She appears distressed.   Neck: No JVD present.   Cardiovascular: Normal rate and regular rhythm.    Pulmonary/Chest: Effort normal and breath sounds normal. No respiratory distress.   Abdominal: Soft. Bowel sounds are normal. She exhibits no distension. There is tenderness (suprapubic area and lower abdomen tender to palpation). There is no guarding.   Musculoskeletal: She exhibits no edema.   Neurological: She is alert and oriented to person, place, and time. No cranial nerve deficit.   Skin: Skin is warm and dry.   Psychiatric: She has a normal mood and affect. Her behavior is normal.       Significant Labs: All pertinent labs within the past 24 hours have been reviewed.    Significant Imaging: none    Assessment/Plan:      Pelvic pain syndrome    Chronic issue.  Currently requiring IV narcotics with pyelonephritis-- attempt to manage with oral medications with IV narcotics for breakthrough pain only.            CKD (chronic kidney disease) stage 3, GFR 30-59 ml/min    Creatinine stable.  Follow BUN/Cr.  Avoid non-essential nephrotoxins.  Renal dose medications as appropriate.           Iron deficiency anemia    Monitor HGB.  Transfuse if 7 or less.          Ureteral stricture s/p bilateral ureteral stents secondary to XRT for cervical cancer    Recent stent exchange 6/28 with Dr. Fournier.   Consulted with urologist this admission -- recommended continuing IVAB and having pt follow up with her outpt urologist.  KUB shows  stents in proper position.          * Pyelonephritis    Cont linezolid.  Urine growing Enterococcus sp.  Waiting on susceptibility data.          VTE Risk Mitigation         Ordered     Medium Risk of VTE  Once      07/04/17 0047     Place sequential compression device  Until discontinued      07/04/17 0047     Place ANA hose  Until discontinued      07/04/17 0047          Jan Norris MD  Department of Hospital Medicine   [facility]

## 2017-07-06 NOTE — PLAN OF CARE
Problem: Patient Care Overview  Goal: Plan of Care Review  Outcome: Ongoing (interventions implemented as appropriate)  Patient remained safe and free from falls. Voiding clear yellow urine, independent with amb and assist to bathroom. Room air. NSR on monitor. Dilaudid 1 mg administered q4h for pain per patient request. Bed in lowest position, wheels locked, SR raised, call light in reach.

## 2017-07-06 NOTE — ASSESSMENT & PLAN NOTE
Fever + tachycardia + lactic acid of 2.1 in ED with pyelonephritis. S/p 30 ml/kg bolus in ED with normalization of lactic acid since that time.  Continue IV rocephin daily.  Blood cultures drawn-- follow results.  Follow urine cultures.    Sepsis protocol initiated with ivf bolus, pressors as needed and lactate levels and broad spectrum  ivantibiotics  For likely source:  renal  Blood culture--neg  Urine culture--enterococcus-antibiotics transitioned to vancomycin     Trend cbc, lactate and progress with serial levels.

## 2017-07-06 NOTE — PROGRESS NOTES
[facility] Medicine  Progress Note    Patient Name: Cesilia Dozier  MRN: [id number]  Patient Class: IP- Inpatient   Admission Date: 7/3/2017  Length of Stay: 2 days  Attending Physician: Cathy Nicholson MD  Primary Care Provider: Harsha Sheppard MD        Subjective:     Principal Problem:Severe sepsis    HPI:  Cesilia Dozier is a 37 y.o. Female with PMHx significant for cervical CA, Hydronephrosis, Ureteral strictures with stents, and recurrent pyelonephritis. She is currently in remission for Cervical CA- normal PAP .   She was admitted to the service of hospital medicine with severe sepsis due to pyelonephritis.  She reported to the ED with complaint of lower abdominal pain, as well as LEFT flank pain.  She reports the pain began yesterday, associated with nausea, vomiting and generalized myalgias. She states she called Dr. Bocanegra- her urologist- who suggested a visit to the ED but she attempted home treatment with tramadol and zofran, which helped her sleep but woke today with nausea, vomiting, and worsened pain.  She had her stents exchanged 6/28 with Dr. Fournier.  She reported minimal discomfort post-operatively and was feeling better for a few days prior to symptom onset.  She denies any dysuria or hematuria, but does reports difficulty completely emptying bladder describing a pressure sensation. She reports fever and chills.  Other pertinent medical history as below:    Hospital Course:  No notes on file    Interval History: pain feeling some better     Review of Systems   Respiratory: Negative.    Cardiovascular: Negative.    Genitourinary: Positive for flank pain.     Objective:     Vital Signs (Most Recent):  Temp: 98.2 °F (36.8 °C) (07/05/17 1500)  Pulse: 94 (07/05/17 1500)  Resp: 20 (07/05/17 1500)  BP: (!) 97/51 (07/05/17 1500)  SpO2: 100 % (07/05/17 1500) Vital Signs (24h Range):  Temp:  [96.3 °F (35.7 °C)-98.2 °F (36.8 °C)] 98.2 °F (36.8 °C)  Pulse:  [82-94] 94  Resp:  [18-20]  20  SpO2:  [99 %-100 %] 100 %  BP: ()/(51-61) 97/51     Weight: 68 kg (150 lb)  Body mass index is 24.21 kg/m².    Intake/Output Summary (Last 24 hours) at 07/05/17 1930  Last data filed at 07/05/17 0600   Gross per 24 hour   Intake             1500 ml   Output                0 ml   Net             1500 ml      Physical Exam   Constitutional: She is oriented to person, place, and time. She appears well-developed and well-nourished. She appears distressed.   HENT:   Head: Normocephalic and atraumatic.   Eyes: Conjunctivae and EOM are normal. Pupils are equal, round, and reactive to light.   Neck: Normal range of motion. Neck supple. No thyromegaly present.   Cardiovascular: Normal rate, regular rhythm, normal heart sounds and intact distal pulses.    Pulmonary/Chest: Effort normal and breath sounds normal. No respiratory distress.   Abdominal: Soft. Bowel sounds are normal. She exhibits no distension and no mass. There is tenderness (suprapubic area and lower abdomen tender to palpation). There is no guarding.   Musculoskeletal: Normal range of motion. She exhibits tenderness (Left CVA tenderness). She exhibits no edema.   Neurological: She is alert and oriented to person, place, and time. No cranial nerve deficit.   Skin: Skin is warm and dry.   Psychiatric: She has a normal mood and affect. Her behavior is normal. Judgment and thought content normal.       Significant Labs: All pertinent labs within the past 24 hours have been reviewed.    Significant Imaging: I have reviewed and interpreted all pertinent imaging results/findings within the past 24 hours.    Assessment/Plan:      Hypokalemia    With hypomag and phos-due to decreased intake  Replace judiciously due to ckd4  and monitor on  telemetry           Hypomagnesemia              Pelvic pain syndrome    Chronic issue.  Currently requiring IV narcotics with pyelonephritis-- attempt to manage with oral medications with IV narcotics for breakthrough pain  only.            Pyelonephritis    Recurrent issue.  IV rocephin, IV hydration.  Anti-emetics.  Requiring IV narcotics for pain control.  Attempt to manage with oral medications with IV medication utilized only for break-through pain.  Of note, despite multiple admissions with pyelonephritis in past, review of microbiology results indicates urine has not grown anything since February.  Pt has bilateral ureteral stents -due to hx xrt for cervical ca causing obstruction.-replaced 6/28-  If not improvement 24-48 hours consult urology to replace           CKD (chronic kidney disease) stage 4, GFR 15-29 ml/min    Creatinine stable./improving     Follow BUN/sCr.  Avoid non-essential nephrotoxins.  Renal dose medications as appropriate.  Consider renal us for obstruction --urology consulted for stent evaluation   kub done--stents in place           Ureteral stricture s/p bilateral ureteral stents secondary to XRT for cervical cancer    Recent stent exchange 6/28 with Dr. Fournier.   urology consultation as course is complicated by recurrent infection.          Intractable nausea and vomiting    Acute, due to pyelonephritis  IV hydration, PRN zofran-- continues with vomiting.  Will add PRN phenergan.  Clear liquids and advance as patient as able to tolerate.          * Severe sepsis    Fever + tachycardia + lactic acid of 2.1 in ED with pyelonephritis. S/p 30 ml/kg bolus in ED with normalization of lactic acid since that time.  Continue IV rocephin daily.  Blood cultures drawn-- follow results.  Follow urine cultures.    Sepsis protocol initiated with ivf bolus, pressors as needed and lactate levels and broad spectrum  ivantibiotics  For likely source:  renal  Blood culture--neg  Urine culture--enterococcus-antibiotics transitioned to vancomycin     Trend cbc, lactate and progress with serial levels.               VTE Risk Mitigation         Ordered     Medium Risk of VTE  Once      07/04/17 0047     Place sequential  compression device  Until discontinued      07/04/17 0047     Place ANA hose  Until discontinued      07/04/17 0047          Bhakti Julien MD  Department of Hospital Medicine   [facility]

## 2017-07-06 NOTE — ASSESSMENT & PLAN NOTE
Recent stent exchange 6/28 with Dr. Fournier.   Consulted with urologist this admission -- recommended continuing IVAB and having pt follow up with her outpt urologist.  KUB shows stents in proper position.

## 2017-07-06 NOTE — PLAN OF CARE
Problem: Patient Care Overview  Goal: Plan of Care Review  Outcome: Ongoing (interventions implemented as appropriate)  Patient remained safe and free from falls. Voiding without difficulty. 0.9% Nacl infusing at 125 mL/hour. Dilaudid 1 mg administered for pain per order. NSR on monitor. Bed in lowest position, wheels locked, SR raised, call light in reach.

## 2017-07-06 NOTE — SUBJECTIVE & OBJECTIVE
Interval History:  Still with pain in groin and flank.  Afebrile.  No vomiting.  Enterococcus growing in urine.    Review of Systems   Respiratory: Negative.    Cardiovascular: Negative.    Gastrointestinal: Negative.    Genitourinary: Positive for flank pain.     Objective:     Vital Signs (Most Recent):  Temp: 98.2 °F (36.8 °C) (07/06/17 1100)  Pulse: 90 (07/06/17 1100)  Resp: 16 (07/06/17 1100)  BP: 102/61 (07/06/17 1100)  SpO2: 100 % (07/06/17 1100) Vital Signs (24h Range):  Temp:  [98.2 °F (36.8 °C)-98.6 °F (37 °C)] 98.2 °F (36.8 °C)  Pulse:  [88-98] 90  Resp:  [16-20] 16  SpO2:  [100 %] 100 %  BP: ()/(51-62) 102/61     Weight: 68 kg (150 lb)  Body mass index is 24.21 kg/m².    Intake/Output Summary (Last 24 hours) at 07/06/17 1417  Last data filed at 07/06/17 0639   Gross per 24 hour   Intake              950 ml   Output             4000 ml   Net            -3050 ml      Physical Exam   Constitutional: She is oriented to person, place, and time. She appears distressed.   Neck: No JVD present.   Cardiovascular: Normal rate and regular rhythm.    Pulmonary/Chest: Effort normal and breath sounds normal. No respiratory distress.   Abdominal: Soft. Bowel sounds are normal. She exhibits no distension. There is tenderness (suprapubic area and lower abdomen tender to palpation). There is no guarding.   Musculoskeletal: She exhibits no edema.   Neurological: She is alert and oriented to person, place, and time. No cranial nerve deficit.   Skin: Skin is warm and dry.   Psychiatric: She has a normal mood and affect. Her behavior is normal.       Significant Labs: All pertinent labs within the past 24 hours have been reviewed.    Significant Imaging: none

## 2017-07-06 NOTE — ASSESSMENT & PLAN NOTE
Recent stent exchange 6/28 with Dr. Fournier.   urology consultation as course is complicated by recurrent infection.

## 2017-07-06 NOTE — SUBJECTIVE & OBJECTIVE
Interval History: pain feeling some better     Review of Systems   Respiratory: Negative.    Cardiovascular: Negative.    Genitourinary: Positive for flank pain.     Objective:     Vital Signs (Most Recent):  Temp: 98.2 °F (36.8 °C) (07/05/17 1500)  Pulse: 94 (07/05/17 1500)  Resp: 20 (07/05/17 1500)  BP: (!) 97/51 (07/05/17 1500)  SpO2: 100 % (07/05/17 1500) Vital Signs (24h Range):  Temp:  [96.3 °F (35.7 °C)-98.2 °F (36.8 °C)] 98.2 °F (36.8 °C)  Pulse:  [82-94] 94  Resp:  [18-20] 20  SpO2:  [99 %-100 %] 100 %  BP: ()/(51-61) 97/51     Weight: 68 kg (150 lb)  Body mass index is 24.21 kg/m².    Intake/Output Summary (Last 24 hours) at 07/05/17 1930  Last data filed at 07/05/17 0600   Gross per 24 hour   Intake             1500 ml   Output                0 ml   Net             1500 ml      Physical Exam   Constitutional: She is oriented to person, place, and time. She appears well-developed and well-nourished. She appears distressed.   HENT:   Head: Normocephalic and atraumatic.   Eyes: Conjunctivae and EOM are normal. Pupils are equal, round, and reactive to light.   Neck: Normal range of motion. Neck supple. No thyromegaly present.   Cardiovascular: Normal rate, regular rhythm, normal heart sounds and intact distal pulses.    Pulmonary/Chest: Effort normal and breath sounds normal. No respiratory distress.   Abdominal: Soft. Bowel sounds are normal. She exhibits no distension and no mass. There is tenderness (suprapubic area and lower abdomen tender to palpation). There is no guarding.   Musculoskeletal: Normal range of motion. She exhibits tenderness (Left CVA tenderness). She exhibits no edema.   Neurological: She is alert and oriented to person, place, and time. No cranial nerve deficit.   Skin: Skin is warm and dry.   Psychiatric: She has a normal mood and affect. Her behavior is normal. Judgment and thought content normal.       Significant Labs: All pertinent labs within the past 24 hours have been  reviewed.    Significant Imaging: I have reviewed and interpreted all pertinent imaging results/findings within the past 24 hours.

## 2017-07-07 LAB — BACTERIA UR CULT: NORMAL

## 2017-07-07 PROCEDURE — 63600175 PHARM REV CODE 636 W HCPCS: Performed by: EMERGENCY MEDICINE

## 2017-07-07 PROCEDURE — 12000002 HC ACUTE/MED SURGE SEMI-PRIVATE ROOM

## 2017-07-07 PROCEDURE — 25000003 PHARM REV CODE 250: Performed by: NURSE PRACTITIONER

## 2017-07-07 PROCEDURE — 63600175 PHARM REV CODE 636 W HCPCS: Performed by: HOSPITALIST

## 2017-07-07 PROCEDURE — 25000003 PHARM REV CODE 250: Performed by: HOSPITALIST

## 2017-07-07 RX ADMIN — HYDROMORPHONE HYDROCHLORIDE 1 MG: 1 INJECTION, SOLUTION INTRAMUSCULAR; INTRAVENOUS; SUBCUTANEOUS at 08:07

## 2017-07-07 RX ADMIN — STANDARDIZED SENNA CONCENTRATE AND DOCUSATE SODIUM 1 TABLET: 8.6; 5 TABLET, FILM COATED ORAL at 08:07

## 2017-07-07 RX ADMIN — LINEZOLID 600 MG: 600 INJECTION, SOLUTION INTRAVENOUS at 12:07

## 2017-07-07 RX ADMIN — ZOLPIDEM TARTRATE 5 MG: 5 TABLET, FILM COATED ORAL at 08:07

## 2017-07-07 RX ADMIN — HYDROMORPHONE HYDROCHLORIDE 1 MG: 1 INJECTION, SOLUTION INTRAMUSCULAR; INTRAVENOUS; SUBCUTANEOUS at 04:07

## 2017-07-07 RX ADMIN — HYDROMORPHONE HYDROCHLORIDE 1 MG: 1 INJECTION, SOLUTION INTRAMUSCULAR; INTRAVENOUS; SUBCUTANEOUS at 03:07

## 2017-07-07 RX ADMIN — HYDROMORPHONE HYDROCHLORIDE 1 MG: 1 INJECTION, SOLUTION INTRAMUSCULAR; INTRAVENOUS; SUBCUTANEOUS at 12:07

## 2017-07-07 NOTE — PROGRESS NOTES
[facility] Medicine  Progress Note    Patient Name: Cesilia Dozier  MRN: [id number]  Patient Class: IP- Inpatient   Admission Date: 7/3/2017  Length of Stay: 4 days  Attending Physician: Cathy Nicholson MD  Primary Care Provider: Harsha Sheppard MD        Subjective:     Principal Problem:Pyelonephritis    HPI:  Cesilia Dozier is a 37 y.o. Female with PMHx significant for cervical CA, Hydronephrosis, Ureteral strictures with stents, and recurrent pyelonephritis. She is currently in remission for Cervical CA- normal PAP .   She was admitted to the service of hospital medicine with severe sepsis due to pyelonephritis.  She reported to the ED with complaint of lower abdominal pain, as well as LEFT flank pain.  She reports the pain began yesterday, associated with nausea, vomiting and generalized myalgias. She states she called Dr. Bocanegra- her urologist- who suggested a visit to the ED but she attempted home treatment with tramadol and zofran, which helped her sleep but woke today with nausea, vomiting, and worsened pain.  She had her stents exchanged 6/28 with Dr. Fournier.  She reported minimal discomfort post-operatively and was feeling better for a few days prior to symptom onset.  She denies any dysuria or hematuria, but does reports difficulty completely emptying bladder describing a pressure sensation. She reports fever and chills.  Other pertinent medical history as below:    Hospital Course:  No notes on file    Interval History:  Pain is improving.  Enterococcus susceptibilities are resulted.    Review of Systems   Respiratory: Negative.    Cardiovascular: Negative.    Gastrointestinal: Negative.    Genitourinary: Positive for flank pain (less).     Objective:     Vital Signs (Most Recent):  Temp: 98.7 °F (37.1 °C) (07/07/17 1500)  Pulse: 81 (07/07/17 1500)  Resp: 18 (07/07/17 1500)  BP: 100/69 (07/07/17 1500)  SpO2: 100 % (07/07/17 1500) Vital Signs (24h Range):  Temp:  [97.6 °F (36.4  °C)-98.7 °F (37.1 °C)] 98.7 °F (37.1 °C)  Pulse:  [68-86] 81  Resp:  [18-20] 18  SpO2:  [98 %-100 %] 100 %  BP: ()/(55-70) 100/69     Weight: 68 kg (150 lb)  Body mass index is 24.21 kg/m².    Intake/Output Summary (Last 24 hours) at 07/07/17 1833  Last data filed at 07/07/17 1700   Gross per 24 hour   Intake             7740 ml   Output             3000 ml   Net             4740 ml      Physical Exam   Constitutional: She is oriented to person, place, and time. She appears distressed.   Neck: No JVD present.   Cardiovascular: Normal rate and regular rhythm.    Pulmonary/Chest: Effort normal and breath sounds normal. No respiratory distress.   Abdominal: Soft. Bowel sounds are normal. She exhibits no distension. There is tenderness (suprapubic area and lower abdomen tender to palpation -- much less today). There is no guarding.   Musculoskeletal: She exhibits no edema.   Neurological: She is alert and oriented to person, place, and time. No cranial nerve deficit.   Skin: Skin is warm and dry.   Psychiatric: She has a normal mood and affect. Her behavior is normal.       Significant Labs: Urine Culture: Enterococcus faecalis    Significant Imaging: none    Assessment/Plan:      Pelvic pain syndrome    Chronic issue.  Currently requiring IV narcotics with pyelonephritis-- attempt to manage with oral medications with IV narcotics for breakthrough pain only.            CKD (chronic kidney disease) stage 3, GFR 30-59 ml/min    Creatinine stable.  Follow BUN/Cr.  Avoid non-essential nephrotoxins.  Renal dose medications as appropriate.           Iron deficiency anemia    Monitor HGB.  Transfuse if 7 or less.          Ureteral stricture s/p bilateral ureteral stents secondary to XRT for cervical cancer    Recent stent exchange 6/28 with Dr. Fournier.   Consulted with urologist this admission -- recommended continuing IVAB and having pt follow up with her outpt urologist.  KUB shows stents in proper position.          *  Pyelonephritis    Cont linezolid.  Urine growing Enterococcus sp.  Susceptibility data reviewed -- S to PCN but pt has allergy -- will use linezolid PO as outpatient          VTE Risk Mitigation         Ordered     Medium Risk of VTE  Once      07/04/17 0047     Place sequential compression device  Until discontinued      07/04/17 0047     Place ANA hose  Until discontinued      07/04/17 0047          Jan Norris MD  Department of Hospital Medicine   [facility]

## 2017-07-07 NOTE — SUBJECTIVE & OBJECTIVE
Interval History:  Pain is improving.  Enterococcus susceptibilities are resulted.    Review of Systems   Respiratory: Negative.    Cardiovascular: Negative.    Gastrointestinal: Negative.    Genitourinary: Positive for flank pain (less).     Objective:     Vital Signs (Most Recent):  Temp: 98.7 °F (37.1 °C) (07/07/17 1500)  Pulse: 81 (07/07/17 1500)  Resp: 18 (07/07/17 1500)  BP: 100/69 (07/07/17 1500)  SpO2: 100 % (07/07/17 1500) Vital Signs (24h Range):  Temp:  [97.6 °F (36.4 °C)-98.7 °F (37.1 °C)] 98.7 °F (37.1 °C)  Pulse:  [68-86] 81  Resp:  [18-20] 18  SpO2:  [98 %-100 %] 100 %  BP: ()/(55-70) 100/69     Weight: 68 kg (150 lb)  Body mass index is 24.21 kg/m².    Intake/Output Summary (Last 24 hours) at 07/07/17 1833  Last data filed at 07/07/17 1700   Gross per 24 hour   Intake             7740 ml   Output             3000 ml   Net             4740 ml      Physical Exam   Constitutional: She is oriented to person, place, and time. She appears distressed.   Neck: No JVD present.   Cardiovascular: Normal rate and regular rhythm.    Pulmonary/Chest: Effort normal and breath sounds normal. No respiratory distress.   Abdominal: Soft. Bowel sounds are normal. She exhibits no distension. There is tenderness (suprapubic area and lower abdomen tender to palpation -- much less today). There is no guarding.   Musculoskeletal: She exhibits no edema.   Neurological: She is alert and oriented to person, place, and time. No cranial nerve deficit.   Skin: Skin is warm and dry.   Psychiatric: She has a normal mood and affect. Her behavior is normal.       Significant Labs: Urine Culture: Enterococcus faecalis    Significant Imaging: none

## 2017-07-07 NOTE — PLAN OF CARE
"Per Dr Norris the pt will need Zyvox 600mg po BID x10 days at discharge.    I called the Zyvox prescription into her pharmacy, [facility] ([phone]); I was told that the pt's [employer] coverage had  on 17. I was able to have our admit department confirm the cancellation.   I spoke with the pt who states she was unaware that her [employer] was no longer active. She states her "new" Medicaid also started this month. I informed her that when her Medicaid QMB went into effect her [employer] most likely terminated. She does not have Medicare part D benefits to cover her prescriptions. She is going to call Medicaid QMB to find out her options.  I asked her permission to call the [employer] Assistance Program to see if she qualifies, she did give me her permission.   I spoke with Jarod at [employer] [facility] ([phone]); he took the pt's information and the pt was approved for 30 days free of charge.   ID#[id number], group#[id number], BIN#[id number].   [employer] will fax over a letter of approval that the pt will need to bring with her to the pharmacy when she gets the prescription filled.   I will inform the pt of the above instructions and provide her a copy of the letter....GLADYS Edouard CM   "

## 2017-07-07 NOTE — ASSESSMENT & PLAN NOTE
Cont linezolid.  Urine growing Enterococcus sp.  Susceptibility data reviewed -- S to PCN but pt has allergy -- will use linezolid PO as outpatient

## 2017-07-07 NOTE — PLAN OF CARE
Problem: Patient Care Overview  Goal: Plan of Care Review  Outcome: Ongoing (interventions implemented as appropriate)  Alert and oriented. Complain of pain to flank area. Pain meds given with some relief noted.  Afebrile. Continued on IV Abx. Fall and injury free this shift. Plan of care reviewed with patient.  Understanding voiced. Bed in low position and locked. Side rails up x 2. Call bell in reach.  Telemetry

## 2017-07-08 VITALS
RESPIRATION RATE: 16 BRPM | HEART RATE: 82 BPM | HEIGHT: 66 IN | WEIGHT: 150 LBS | TEMPERATURE: 98 F | OXYGEN SATURATION: 100 % | BODY MASS INDEX: 24.11 KG/M2 | SYSTOLIC BLOOD PRESSURE: 104 MMHG | DIASTOLIC BLOOD PRESSURE: 62 MMHG

## 2017-07-08 PROCEDURE — 63600175 PHARM REV CODE 636 W HCPCS: Performed by: HOSPITALIST

## 2017-07-08 PROCEDURE — 25000003 PHARM REV CODE 250: Performed by: NURSE PRACTITIONER

## 2017-07-08 PROCEDURE — 63600175 PHARM REV CODE 636 W HCPCS: Performed by: EMERGENCY MEDICINE

## 2017-07-08 RX ORDER — OXYCODONE AND ACETAMINOPHEN 5; 325 MG/1; MG/1
1 TABLET ORAL EVERY 6 HOURS PRN
Qty: 28 TABLET | Refills: 0 | Status: SHIPPED | OUTPATIENT
Start: 2017-07-08 | End: 2017-07-15

## 2017-07-08 RX ORDER — LINEZOLID 600 MG/1
600 TABLET, FILM COATED ORAL EVERY 12 HOURS
Qty: 20 TABLET | Refills: 0 | Status: SHIPPED | OUTPATIENT
Start: 2017-07-08 | End: 2017-07-15

## 2017-07-08 RX ADMIN — HYDROMORPHONE HYDROCHLORIDE 1 MG: 1 INJECTION, SOLUTION INTRAMUSCULAR; INTRAVENOUS; SUBCUTANEOUS at 12:07

## 2017-07-08 RX ADMIN — HYDROMORPHONE HYDROCHLORIDE 1 MG: 1 INJECTION, SOLUTION INTRAMUSCULAR; INTRAVENOUS; SUBCUTANEOUS at 08:07

## 2017-07-08 RX ADMIN — HYDROMORPHONE HYDROCHLORIDE 1 MG: 1 INJECTION, SOLUTION INTRAMUSCULAR; INTRAVENOUS; SUBCUTANEOUS at 04:07

## 2017-07-08 RX ADMIN — LINEZOLID 600 MG: 600 INJECTION, SOLUTION INTRAVENOUS at 12:07

## 2017-07-08 RX ADMIN — STANDARDIZED SENNA CONCENTRATE AND DOCUSATE SODIUM 1 TABLET: 8.6; 5 TABLET, FILM COATED ORAL at 08:07

## 2017-07-08 RX ADMIN — HYDROMORPHONE HYDROCHLORIDE 1 MG: 1 INJECTION, SOLUTION INTRAMUSCULAR; INTRAVENOUS; SUBCUTANEOUS at 11:07

## 2017-07-08 RX ADMIN — LINEZOLID 600 MG: 600 INJECTION, SOLUTION INTRAVENOUS at 11:07

## 2017-07-08 NOTE — PLAN OF CARE
Problem: Patient Care Overview  Goal: Plan of Care Review  Outcome: Ongoing (interventions implemented as appropriate)  POC reviewed with patient.  AAO, SAENZ, follows commands, clear speech.  On tele, NSR, HR 80s-90s, BP stable.  On RA, no complaints of shortness of breath, does not appear to be in distress or discomfort.  Pt c/o lower abdominal pain, pain controlled with hydromorphone IV, pt refuses po pain medicine.  UO adequate, clear yellow.  Pt gets in/out of bed and ambulates to bathroom independently.  Pt remains free of falls and injuries.  Will continue to monitor.

## 2017-07-08 NOTE — DISCHARGE SUMMARY
[facility] Medicine  Discharge Summary      Patient Name: Cesilia Dozier  MRN: [id number]  Admission Date: 7/3/2017  Hospital Length of Stay: 5 days  Discharge Date and Time:  07/08/2017 5:05 PM  Attending Physician: Cathy Nicholson MD   Discharging Provider: Jan Norris MD  Primary Care Provider: Harsha Sheppard MD        HPI: Cesilia Dozier is a 37 y.o. Female with PMHx significant for cervical CA, Hydronephrosis, Ureteral strictures with stents, and recurrent pyelonephritis. She is currently in remission for Cervical CA- normal PAP .   She was admitted to the service of hospital medicine with severe sepsis due to pyelonephritis.  She reported to the ED with complaint of lower abdominal pain, as well as LEFT flank pain.  She reports the pain began yesterday, associated with nausea, vomiting and generalized myalgias. She states she called Dr. Bocanegra- her urologist- who suggested a visit to the ED but she attempted home treatment with tramadol and zofran, which helped her sleep but woke today with nausea, vomiting, and worsened pain.  She had her stents exchanged 6/28 with Dr. Fournier.  She reported minimal discomfort post-operatively and was feeling better for a few days prior to symptom onset.  She denies any dysuria or hematuria, but does reports difficulty completely emptying bladder describing a pressure sensation. She reports fever and chills.     * No surgery found *      Indwelling Lines/Drains at time of discharge:   Lines/Drains/Airways     Drain                 Ureteral Drain/Stent 11/07/16 1835 Left ureter 6 Fr. 242 days         Ureteral Drain/Stent 11/15/16 0816 Left ureter 6 Fr. 235 days              Hospital Course:   Pt was admitted with pyelonephritis and severe sepsis.  The severe sepsis subsided with IVF and antibiotics.  She was given anti-emetics.  We consulted with urology b/c of her history of indwelling ureteral stents that were recently changed.  Recommendation  was to continue antibiotics and follow-up with Dr. Bocanegra.  Her condition slowly improved.  The nausea and pain became better controlled.  Urine culture grew E.faecalis, R to doxy and FQ.  In light of her PCN allergy, I chose linezolid to treat this infection.  Eventually, she was well enough for discharge home.      Consults:   Consults         Status Ordering Provider     Inpatient consult to Urology  Once     Provider:  MD Jonathan Farias AQIB          Significant Diagnostic Studies:   BMP  Lab Results   Component Value Date     07/06/2017    K 3.6 07/06/2017     07/06/2017    CO2 23 07/06/2017    BUN 8 07/06/2017    CREATININE 1.4 07/06/2017    CALCIUM 8.9 07/06/2017    ANIONGAP 7 (L) 07/06/2017    ESTGFRAFRICA 55 (A) 07/06/2017    EGFRNONAA 48 (A) 07/06/2017     Lab Results   Component Value Date    WBC 7.90 07/04/2017    HGB 9.9 (L) 07/04/2017    HCT 29.3 (L) 07/04/2017    MCV 95 07/04/2017     07/04/2017         Pending Diagnostic Studies:     None        Final Active Diagnoses:    Diagnosis Date Noted POA    PRINCIPAL PROBLEM:  Pyelonephritis [N12] 04/08/2017 Yes    Pelvic pain syndrome [N94.89] 05/15/2017 Yes    CKD (chronic kidney disease) stage 3, GFR 30-59 ml/min [N18.3] 12/13/2016 Yes    Iron deficiency anemia [D50.9] 11/17/2016 Yes    Ureteral stricture s/p bilateral ureteral stents secondary to XRT for cervical cancer [N13.5] 09/16/2016 Yes      Problems Resolved During this Admission:    Diagnosis Date Noted Date Resolved POA    Hypophosphatemia [E83.39] 07/04/2017 07/06/2017 Yes    Hypomagnesemia [E83.42] 07/04/2017 07/06/2017 Yes    Hypokalemia [E87.6] 07/04/2017 07/06/2017 Yes    Severe sepsis [A41.9, R65.20] 09/19/2016 07/06/2017 Yes    Intractable nausea and vomiting [R11.2] 09/12/2016 07/06/2017 Yes      Discharged Condition: stable    Disposition: Home or Self Care    Follow Up:  Follow-up Information     Harsha Sheppard MD In 2 weeks.     Specialty:  Internal Medicine  Contact information:  [address] [place] [address] [address] [place] [id number]  [phone]             Give Dr. Bocanegra's office a call on Monday to let him know you were just discharged from here with kidney infection..               Patient Instructions:     Diet general     Activity as tolerated       Medications:  Reconciled Home Medications:   Current Discharge Medication List      START taking these medications    Details   linezolid (ZYVOX) 600 mg Tab Take 1 tablet (600 mg total) by mouth every 12 (twelve) hours.  Qty: 20 tablet, Refills: 0      oxycodone-acetaminophen (PERCOCET) 5-325 mg per tablet Take 1 tablet by mouth every 6 (six) hours as needed for Pain.  Qty: 28 tablet, Refills: 0         CONTINUE these medications which have NOT CHANGED    Details   ondansetron (ZOFRAN-ODT) 8 MG TbDL Take 1 tablet (8 mg total) by mouth every 6 (six) hours as needed.  Qty: 30 tablet, Refills: 1      tramadol (ULTRAM) 50 mg tablet Take 1 tablet (50 mg total) by mouth every 4 (four) hours as needed for Pain.  Qty: 30 tablet, Refills: 0           Time spent on the discharge of patient: 23 minutes    Jan Norris MD  Department of Hospital Medicine  [facility]

## 2017-07-09 LAB
BACTERIA BLD CULT: NORMAL
BACTERIA BLD CULT: NORMAL

## 2017-07-09 NOTE — PLAN OF CARE
07/09/17 0943   Final Note   Assessment Type Final Discharge Note   Discharge Disposition Home   Discharge planning education complete? Yes

## 2017-07-13 NOTE — PHYSICIAN QUERY
PT Name: Cesilia Dozier  MR #: [id number]    Physician Query Form - Cause and Effect Relationship Clarification      CDS/: Juan Martinez               Contact information: [email or web address]    This form is a permanent document in the medical record.     Query Date: July 13, 2017    By submitting this query, we are merely seeking further clarification of documentation. Please utilize your independent clinical judgment when addressing the question(s) below.    The Medical record contains the following:  Supporting Clinical Findings   Location in record   She was admitted to the service of hospital medicine with severe sepsis due to pyelonephritis.    Urine culture: ENTEROCOCCUS FAECALIS  >100,000 cfu/ml    Urine culture--enterococcus-antibiotics transitioned to vancomycin    Pyelonephritis, Cont linezolid...Urine growing Enterococcus sp.                                                                                                                                                                               H & P        7/3 Lab      7/5 PN      7/6 PN                                                                                                                                                                                                       Provider, please clarify if there is any correlation between Enterococcus and Sepsis.           Are the conditions:     [  x] Due to or associated with each other     [  ] Unrelated to each other     [  ] Other (Please Specify): _________________________     [  ] Clinically Undetermined

## 2017-07-14 ENCOUNTER — TELEPHONE (OUTPATIENT)
Dept: UROLOGY | Facility: CLINIC | Age: 37
End: 2017-07-14

## 2017-07-14 NOTE — TELEPHONE ENCOUNTER
----- Message from Trevor Everett sent at 7/14/2017  2:54 PM CDT -----  Contact: Cesilia Dozier  _X  1st Request  _  2nd Request  _  3rd Request        Who: Cesilia Dozier    Why: Patient called to follow up if Dr. Bocanegra got her lab results from [facility]. Please call back to follow up.    What Number to Call Back: [phone]    When to Expect a call back: (With in 24 hours)

## 2017-07-14 NOTE — OP NOTE
DATE OF PROCEDURE:  06/28/2017    PREOPERATIVE DIAGNOSIS:  Bilateral ureteral stricture secondary to radiation.    POSTOPERATIVE DIAGNOSIS:  Bilateral ureteral stricture secondary to radiation.    PROCEDURES PERFORMED:  1.  Bilateral retrograde pyelogram.  2.  Bilateral exchange of ureteral stents.  3.  Cystogram.    SURGEON:  Turner Bocanegra M.D.    ANESTHESIA:  General.    COMPLICATIONS:  None.    ESTIMATED BLOOD LOSS:  Zero.    SPECIMEN:  None.    INDICATIONS FOR PROCEDURE:  Ms. Dozier is a 37-year-old female with a history of   cervical cancer treated initially with hysterectomy and then later with   chemoradiation.  She subsequently developed bilateral ureteral strictures, which   have been managed for the last couple of years with bilateral ureteral stents.    She has frequent episodes of severe stent pain with recurrent episodes to   Emergency Room and hospital admissions.  Her stents were changed on a regular   basis.  Most recently, they were changed during admission in [place], [place]   earlier this month.  I saw her in the office yesterday and she continues to   report significant implant pain, especially in her left flank.  She stated she   had been seen in an outside ER a couple of days prior and was told that she   still had hydro on her left side as well as elevation in her creatinine.  During   the visit yesterday, we discussed proceeding with stent exchange now due to   these findings.  She has also recently seen a gynecologic nurse practitioner who   by report has indicated that she has no recurrence of her cervical cancer.  We   therefore planned to proceed today with imaging in anticipation of upcoming   ureteral reimplant or other repair.    PROCEDURE DESCRIPTION:  The patient was brought to the OR and placed under   general anesthesia.  She was placed in dorsal lithotomy position.  She was   prepped and draped in sterile fashion.  Timeout was performed.    Rigid cystoscopy was performed.   Using the rigid stent grasper, the right   ureteral stent was extracted to the meatus.  It was cannulated with a guidewire,   which advanced into the renal pelvis under fluoroscopic guidance.  The stent   was then removed.    The stent was exchanged for a 5-Turkish open-ended ureteral catheter.  Retrograde   pyelogram was then performed.  This was done using real time fluoroscopic   imaging, which were interpreted at that time.  Her right kidney demonstrated   some moderate hydronephrosis.  There was dilation of the proximal third of the   ureter with a tapering at the transition between the proximal and middle-third   of her ureter.  This was above the pelvic brim.  Distal to this location, there   were intermittent areas of narrowing consistent with complex strictures.  The   wire was then replaced through the open-ended catheter and the catheter removed.    Under fluoroscopic guidance, the wire was then exchanged for 8 x 22 double-J   ureteral stent.    A similar procedure was then performed on the left side.  The stent was then   exchanged for a wire, which was exchanged for the open-ended catheter.    Retrograde pyelogram was performed using real time fluoroscopic images, which I   interpreted.  The etiology of the obstruction in the left side was less clear.    There was less hydronephrosis noted.  There were no distinct strictures   identified.  The proximal third of the ureter appeared to have normal caliber as   did the middle portion almost of the images.  I opted to remove the open-ended   catheter and monitor the kidney with followup fluoroscopic imaging to see if it   drained.  After several minutes for the contrast within the collecting system   persisted demonstrated that there was some level of the obstruction within the   ureter, again the precise etiology is unclear.  The wire was then replaced   cystoscopically into the renal pelvis.  This was exchanged for an 8 x 22   double-J ureteral stent.   Both strings were removed from both stents.    I then elected to perform a cystogram as well to plan for hopeful upcoming   reconstruction.  A 14-Romansh red rubber catheter was placed into the bladder and   the bladder was distended with dilute Omnipaque.  This was done under gravity   drainage into the bladder and reached capacity.  Of note, the fluoroscopic   images did demonstrate a portion of this contrast did flow retrograde into the   kidneys.  The bladder demonstrated normal contour, but diminished size on the   cystogram.  The bladder was drained, demonstrating a total bladder capacity of   350 mL.  The catheter was then removed.  The patient was awoken and transferred   to PACU in stable condition.      BRAYDON/  dd: 06/28/2017 14:26:42 (CDT)  td: 07/14/2017 01:44:34 (CDT)  Doc ID   #[id number]  Job ID #[id number]    CC:

## 2017-07-14 NOTE — TELEPHONE ENCOUNTER
I called pt and informed that we have NOT rec'd [facility].  She will check back with them next week.

## 2017-07-15 ENCOUNTER — PATIENT MESSAGE (OUTPATIENT)
Dept: UROLOGY | Facility: CLINIC | Age: 37
End: 2017-07-15

## 2017-07-15 ENCOUNTER — HOSPITAL ENCOUNTER (INPATIENT)
Facility: HOSPITAL | Age: 37
LOS: 3 days | Discharge: HOME | DRG: 690 | End: 2017-07-18
Attending: EMERGENCY MEDICINE | Admitting: INTERNAL MEDICINE
Payer: MEDICARE

## 2017-07-15 DIAGNOSIS — R10.9 FLANK PAIN: ICD-10-CM

## 2017-07-15 DIAGNOSIS — N12 PYELONEPHRITIS: Primary | ICD-10-CM

## 2017-07-15 DIAGNOSIS — C53.9 MALIGNANT NEOPLASM OF CERVIX, UNSPECIFIED SITE: ICD-10-CM

## 2017-07-15 DIAGNOSIS — N39.0 URINARY TRACT INFECTION WITHOUT HEMATURIA, SITE UNSPECIFIED: ICD-10-CM

## 2017-07-15 LAB
ALBUMIN SERPL BCP-MCNC: 4.1 G/DL
ALP SERPL-CCNC: 92 U/L
ALT SERPL W/O P-5'-P-CCNC: 8 U/L
ANION GAP SERPL CALC-SCNC: 17 MMOL/L
AST SERPL-CCNC: 16 U/L
BACTERIA #/AREA URNS HPF: ABNORMAL /HPF
BASOPHILS # BLD AUTO: 0 K/UL
BASOPHILS NFR BLD: 0.4 %
BILIRUB SERPL-MCNC: 0.5 MG/DL
BILIRUB UR QL STRIP: NEGATIVE
BUN SERPL-MCNC: 13 MG/DL
CALCIUM SERPL-MCNC: 10.3 MG/DL
CHLORIDE SERPL-SCNC: 102 MMOL/L
CLARITY UR: ABNORMAL
CO2 SERPL-SCNC: 23 MMOL/L
COLOR UR: YELLOW
CREAT SERPL-MCNC: 1.8 MG/DL
DIFFERENTIAL METHOD: ABNORMAL
EOSINOPHIL # BLD AUTO: 0.2 K/UL
EOSINOPHIL NFR BLD: 2.6 %
ERYTHROCYTE [DISTWIDTH] IN BLOOD BY AUTOMATED COUNT: 13.5 %
EST. GFR  (AFRICAN AMERICAN): 41 ML/MIN/1.73 M^2
EST. GFR  (NON AFRICAN AMERICAN): 35 ML/MIN/1.73 M^2
GLUCOSE SERPL-MCNC: 96 MG/DL
GLUCOSE UR QL STRIP: NEGATIVE
HCT VFR BLD AUTO: 36.1 %
HGB BLD-MCNC: 12 G/DL
HGB UR QL STRIP: ABNORMAL
HYALINE CASTS #/AREA URNS LPF: 0 /LPF
INR PPP: 1.1
KETONES UR QL STRIP: NEGATIVE
LACTATE SERPL-SCNC: 0.9 MMOL/L
LACTATE SERPL-SCNC: 1 MMOL/L
LEUKOCYTE ESTERASE UR QL STRIP: ABNORMAL
LIPASE SERPL-CCNC: 15 U/L
LYMPHOCYTES # BLD AUTO: 1.6 K/UL
LYMPHOCYTES NFR BLD: 17.6 %
MCH RBC QN AUTO: 32.1 PG
MCHC RBC AUTO-ENTMCNC: 33.3 %
MCV RBC AUTO: 97 FL
MICROSCOPIC COMMENT: ABNORMAL
MONOCYTES # BLD AUTO: 0.4 K/UL
MONOCYTES NFR BLD: 4 %
NEUTROPHILS # BLD AUTO: 6.9 K/UL
NEUTROPHILS NFR BLD: 75.4 %
NITRITE UR QL STRIP: NEGATIVE
PH UR STRIP: 7 [PH] (ref 5–8)
PLATELET # BLD AUTO: 405 K/UL
PMV BLD AUTO: 8.9 FL
POTASSIUM SERPL-SCNC: 3.9 MMOL/L
PROT SERPL-MCNC: 8.3 G/DL
PROT UR QL STRIP: ABNORMAL
PROTHROMBIN TIME: 11.4 SEC
RBC # BLD AUTO: 3.73 M/UL
RBC #/AREA URNS HPF: >100 /HPF (ref 0–4)
SODIUM SERPL-SCNC: 142 MMOL/L
SP GR UR STRIP: 1.02 (ref 1–1.03)
SQUAMOUS #/AREA URNS HPF: 2 /HPF
TROPONIN I SERPL DL<=0.01 NG/ML-MCNC: <0.006 NG/ML
URN SPEC COLLECT METH UR: ABNORMAL
UROBILINOGEN UR STRIP-ACNC: NEGATIVE EU/DL
WBC # BLD AUTO: 9.2 K/UL
WBC #/AREA URNS HPF: >100 /HPF (ref 0–5)

## 2017-07-15 PROCEDURE — 36415 COLL VENOUS BLD VENIPUNCTURE: CPT

## 2017-07-15 PROCEDURE — 63600175 PHARM REV CODE 636 W HCPCS: Performed by: INTERNAL MEDICINE

## 2017-07-15 PROCEDURE — 12000002 HC ACUTE/MED SURGE SEMI-PRIVATE ROOM

## 2017-07-15 PROCEDURE — 25000003 PHARM REV CODE 250: Performed by: INTERNAL MEDICINE

## 2017-07-15 PROCEDURE — 83690 ASSAY OF LIPASE: CPT

## 2017-07-15 PROCEDURE — 96375 TX/PRO/DX INJ NEW DRUG ADDON: CPT

## 2017-07-15 PROCEDURE — 83605 ASSAY OF LACTIC ACID: CPT

## 2017-07-15 PROCEDURE — 84484 ASSAY OF TROPONIN QUANT: CPT

## 2017-07-15 PROCEDURE — 85025 COMPLETE CBC W/AUTO DIFF WBC: CPT

## 2017-07-15 PROCEDURE — 80053 COMPREHEN METABOLIC PANEL: CPT

## 2017-07-15 PROCEDURE — 85610 PROTHROMBIN TIME: CPT

## 2017-07-15 PROCEDURE — 93005 ELECTROCARDIOGRAM TRACING: CPT

## 2017-07-15 PROCEDURE — 99285 EMERGENCY DEPT VISIT HI MDM: CPT | Mod: 25

## 2017-07-15 PROCEDURE — 25000003 PHARM REV CODE 250: Performed by: HOSPITALIST

## 2017-07-15 PROCEDURE — 63600175 PHARM REV CODE 636 W HCPCS: Performed by: HOSPITALIST

## 2017-07-15 PROCEDURE — 87040 BLOOD CULTURE FOR BACTERIA: CPT | Mod: 59

## 2017-07-15 PROCEDURE — 87086 URINE CULTURE/COLONY COUNT: CPT

## 2017-07-15 PROCEDURE — 81000 URINALYSIS NONAUTO W/SCOPE: CPT

## 2017-07-15 PROCEDURE — 96372 THER/PROPH/DIAG INJ SC/IM: CPT

## 2017-07-15 PROCEDURE — 96365 THER/PROPH/DIAG IV INF INIT: CPT

## 2017-07-15 PROCEDURE — 63600175 PHARM REV CODE 636 W HCPCS: Performed by: EMERGENCY MEDICINE

## 2017-07-15 RX ORDER — FAMOTIDINE 20 MG/1
20 TABLET, FILM COATED ORAL 2 TIMES DAILY
Status: DISCONTINUED | OUTPATIENT
Start: 2017-07-15 | End: 2017-07-16

## 2017-07-15 RX ORDER — IRON 18 MG
TABLET ORAL
COMMUNITY
End: 2017-10-20

## 2017-07-15 RX ORDER — ONDANSETRON 2 MG/ML
8 INJECTION INTRAMUSCULAR; INTRAVENOUS EVERY 8 HOURS PRN
Status: DISCONTINUED | OUTPATIENT
Start: 2017-07-15 | End: 2017-07-18 | Stop reason: HOSPADM

## 2017-07-15 RX ORDER — KETOROLAC TROMETHAMINE 30 MG/ML
30 INJECTION, SOLUTION INTRAMUSCULAR; INTRAVENOUS
Status: COMPLETED | OUTPATIENT
Start: 2017-07-15 | End: 2017-07-15

## 2017-07-15 RX ORDER — LANOLIN ALCOHOL/MO/W.PET/CERES
800 CREAM (GRAM) TOPICAL
Status: DISCONTINUED | OUTPATIENT
Start: 2017-07-15 | End: 2017-07-18 | Stop reason: HOSPADM

## 2017-07-15 RX ORDER — ACETAMINOPHEN 500 MG
1000 TABLET ORAL EVERY 6 HOURS PRN
Status: DISCONTINUED | OUTPATIENT
Start: 2017-07-15 | End: 2017-07-18 | Stop reason: HOSPADM

## 2017-07-15 RX ORDER — ACETAMINOPHEN 325 MG/1
650 TABLET ORAL EVERY 6 HOURS PRN
Status: DISCONTINUED | OUTPATIENT
Start: 2017-07-15 | End: 2017-07-18 | Stop reason: HOSPADM

## 2017-07-15 RX ORDER — TOLTERODINE 4 MG/1
4 CAPSULE, EXTENDED RELEASE ORAL DAILY
COMMUNITY

## 2017-07-15 RX ORDER — HALOPERIDOL 5 MG/ML
5 INJECTION INTRAMUSCULAR
Status: COMPLETED | OUTPATIENT
Start: 2017-07-15 | End: 2017-07-15

## 2017-07-15 RX ORDER — METOCLOPRAMIDE HYDROCHLORIDE 5 MG/ML
10 INJECTION INTRAMUSCULAR; INTRAVENOUS
Status: COMPLETED | OUTPATIENT
Start: 2017-07-15 | End: 2017-07-15

## 2017-07-15 RX ORDER — POTASSIUM CHLORIDE 20 MEQ/15ML
40 SOLUTION ORAL
Status: DISCONTINUED | OUTPATIENT
Start: 2017-07-15 | End: 2017-07-18 | Stop reason: HOSPADM

## 2017-07-15 RX ORDER — LINEZOLID 2 MG/ML
INJECTION, SOLUTION INTRAVENOUS
Status: DISCONTINUED
Start: 2017-07-15 | End: 2017-07-15 | Stop reason: SDUPTHER

## 2017-07-15 RX ORDER — MORPHINE SULFATE 2 MG/ML
7 INJECTION, SOLUTION INTRAMUSCULAR; INTRAVENOUS
Status: COMPLETED | OUTPATIENT
Start: 2017-07-15 | End: 2017-07-15

## 2017-07-15 RX ORDER — LINEZOLID 2 MG/ML
600 INJECTION, SOLUTION INTRAVENOUS
Status: DISCONTINUED | OUTPATIENT
Start: 2017-07-15 | End: 2017-07-15

## 2017-07-15 RX ORDER — SODIUM CHLORIDE 9 MG/ML
INJECTION, SOLUTION INTRAVENOUS CONTINUOUS
Status: DISCONTINUED | OUTPATIENT
Start: 2017-07-15 | End: 2017-07-18 | Stop reason: HOSPADM

## 2017-07-15 RX ORDER — METOCLOPRAMIDE 10 MG/1
10 TABLET ORAL EVERY 6 HOURS PRN
Status: DISCONTINUED | OUTPATIENT
Start: 2017-07-15 | End: 2017-07-18 | Stop reason: HOSPADM

## 2017-07-15 RX ORDER — AMOXICILLIN 250 MG
1 CAPSULE ORAL 2 TIMES DAILY
Status: DISCONTINUED | OUTPATIENT
Start: 2017-07-15 | End: 2017-07-18 | Stop reason: HOSPADM

## 2017-07-15 RX ORDER — KETOROLAC TROMETHAMINE 30 MG/ML
15 INJECTION, SOLUTION INTRAMUSCULAR; INTRAVENOUS EVERY 6 HOURS PRN
Status: DISCONTINUED | OUTPATIENT
Start: 2017-07-15 | End: 2017-07-16

## 2017-07-15 RX ORDER — OXYBUTYNIN CHLORIDE 5 MG/1
10 TABLET, EXTENDED RELEASE ORAL DAILY
Status: DISCONTINUED | OUTPATIENT
Start: 2017-07-16 | End: 2017-07-18 | Stop reason: HOSPADM

## 2017-07-15 RX ORDER — ZOLPIDEM TARTRATE 5 MG/1
5 TABLET ORAL NIGHTLY PRN
Status: DISCONTINUED | OUTPATIENT
Start: 2017-07-15 | End: 2017-07-18 | Stop reason: HOSPADM

## 2017-07-15 RX ORDER — ENOXAPARIN SODIUM 100 MG/ML
40 INJECTION SUBCUTANEOUS EVERY 24 HOURS
Status: DISCONTINUED | OUTPATIENT
Start: 2017-07-15 | End: 2017-07-18 | Stop reason: HOSPADM

## 2017-07-15 RX ORDER — POTASSIUM CHLORIDE 20 MEQ/15ML
60 SOLUTION ORAL
Status: DISCONTINUED | OUTPATIENT
Start: 2017-07-15 | End: 2017-07-18 | Stop reason: HOSPADM

## 2017-07-15 RX ORDER — ONDANSETRON 2 MG/ML
4 INJECTION INTRAMUSCULAR; INTRAVENOUS EVERY 8 HOURS PRN
Status: DISCONTINUED | OUTPATIENT
Start: 2017-07-15 | End: 2017-07-18 | Stop reason: HOSPADM

## 2017-07-15 RX ADMIN — LINEZOLID 600 MG: 600 INJECTION, SOLUTION INTRAVENOUS at 09:07

## 2017-07-15 RX ADMIN — CEFTRIAXONE 2 G: 2 INJECTION, SOLUTION INTRAVENOUS at 08:07

## 2017-07-15 RX ADMIN — FAMOTIDINE 20 MG: 20 TABLET, FILM COATED ORAL at 10:07

## 2017-07-15 RX ADMIN — KETOROLAC TROMETHAMINE 30 MG: 30 INJECTION, SOLUTION INTRAMUSCULAR at 07:07

## 2017-07-15 RX ADMIN — SODIUM CHLORIDE: 0.9 INJECTION, SOLUTION INTRAVENOUS at 10:07

## 2017-07-15 RX ADMIN — MORPHINE SULFATE 7 MG: 2 INJECTION, SOLUTION INTRAMUSCULAR; INTRAVENOUS at 08:07

## 2017-07-15 RX ADMIN — STANDARDIZED SENNA CONCENTRATE AND DOCUSATE SODIUM 1 TABLET: 8.6; 5 TABLET, FILM COATED ORAL at 10:07

## 2017-07-15 RX ADMIN — METOCLOPRAMIDE 10 MG: 5 INJECTION, SOLUTION INTRAMUSCULAR; INTRAVENOUS at 07:07

## 2017-07-15 RX ADMIN — ENOXAPARIN SODIUM 40 MG: 100 INJECTION SUBCUTANEOUS at 10:07

## 2017-07-15 RX ADMIN — VANCOMYCIN HYDROCHLORIDE 1000 MG: 1 INJECTION, POWDER, LYOPHILIZED, FOR SOLUTION INTRAVENOUS at 10:07

## 2017-07-15 RX ADMIN — HALOPERIDOL LACTATE 5 MG: 5 INJECTION, SOLUTION INTRAMUSCULAR at 08:07

## 2017-07-15 RX ADMIN — METHYLNALTREXONE BROMIDE 12 MG: 12 INJECTION, SOLUTION SUBCUTANEOUS at 09:07

## 2017-07-16 LAB
ALBUMIN SERPL BCP-MCNC: 3.4 G/DL
ALP SERPL-CCNC: 73 U/L
ALT SERPL W/O P-5'-P-CCNC: 6 U/L
ANION GAP SERPL CALC-SCNC: 12 MMOL/L
AST SERPL-CCNC: 13 U/L
BASOPHILS # BLD AUTO: 0.1 K/UL
BASOPHILS NFR BLD: 1.7 %
BILIRUB SERPL-MCNC: 0.4 MG/DL
BUN SERPL-MCNC: 17 MG/DL
CALCIUM SERPL-MCNC: 9.5 MG/DL
CHLORIDE SERPL-SCNC: 102 MMOL/L
CO2 SERPL-SCNC: 24 MMOL/L
CREAT SERPL-MCNC: 2.2 MG/DL
DIFFERENTIAL METHOD: ABNORMAL
EOSINOPHIL # BLD AUTO: 0.2 K/UL
EOSINOPHIL NFR BLD: 2.4 %
ERYTHROCYTE [DISTWIDTH] IN BLOOD BY AUTOMATED COUNT: 13.6 %
EST. GFR  (AFRICAN AMERICAN): 32 ML/MIN/1.73 M^2
EST. GFR  (NON AFRICAN AMERICAN): 28 ML/MIN/1.73 M^2
GLUCOSE SERPL-MCNC: 99 MG/DL
HCT VFR BLD AUTO: 29.5 %
HGB BLD-MCNC: 10.2 G/DL
LACTATE SERPL-SCNC: 0.9 MMOL/L
LYMPHOCYTES # BLD AUTO: 1.2 K/UL
LYMPHOCYTES NFR BLD: 17.1 %
MAGNESIUM SERPL-MCNC: 1.7 MG/DL
MCH RBC QN AUTO: 33.4 PG
MCHC RBC AUTO-ENTMCNC: 34.5 %
MCV RBC AUTO: 97 FL
MONOCYTES # BLD AUTO: 0.5 K/UL
MONOCYTES NFR BLD: 6.9 %
NEUTROPHILS # BLD AUTO: 4.9 K/UL
NEUTROPHILS NFR BLD: 71.9 %
PHOSPHATE SERPL-MCNC: 4.3 MG/DL
PLATELET # BLD AUTO: 313 K/UL
PMV BLD AUTO: 7.8 FL
POTASSIUM SERPL-SCNC: 3.9 MMOL/L
PROT SERPL-MCNC: 6.9 G/DL
RBC # BLD AUTO: 3.04 M/UL
SODIUM SERPL-SCNC: 138 MMOL/L
VANCOMYCIN TROUGH SERPL-MCNC: 11.7 UG/ML
WBC # BLD AUTO: 6.8 K/UL

## 2017-07-16 PROCEDURE — 83605 ASSAY OF LACTIC ACID: CPT

## 2017-07-16 PROCEDURE — 80053 COMPREHEN METABOLIC PANEL: CPT

## 2017-07-16 PROCEDURE — 12000002 HC ACUTE/MED SURGE SEMI-PRIVATE ROOM

## 2017-07-16 PROCEDURE — 83735 ASSAY OF MAGNESIUM: CPT

## 2017-07-16 PROCEDURE — 84100 ASSAY OF PHOSPHORUS: CPT

## 2017-07-16 PROCEDURE — 85025 COMPLETE CBC W/AUTO DIFF WBC: CPT

## 2017-07-16 PROCEDURE — 25000003 PHARM REV CODE 250: Performed by: HOSPITALIST

## 2017-07-16 PROCEDURE — 63600175 PHARM REV CODE 636 W HCPCS: Performed by: INTERNAL MEDICINE

## 2017-07-16 PROCEDURE — 25000003 PHARM REV CODE 250: Performed by: INTERNAL MEDICINE

## 2017-07-16 PROCEDURE — 80202 ASSAY OF VANCOMYCIN: CPT

## 2017-07-16 PROCEDURE — 36415 COLL VENOUS BLD VENIPUNCTURE: CPT

## 2017-07-16 PROCEDURE — 63600175 PHARM REV CODE 636 W HCPCS: Performed by: HOSPITALIST

## 2017-07-16 PROCEDURE — 99222 1ST HOSP IP/OBS MODERATE 55: CPT | Mod: ,,, | Performed by: INTERNAL MEDICINE

## 2017-07-16 PROCEDURE — 63600175 PHARM REV CODE 636 W HCPCS: Performed by: NURSE PRACTITIONER

## 2017-07-16 PROCEDURE — 63600175 PHARM REV CODE 636 W HCPCS: Performed by: EMERGENCY MEDICINE

## 2017-07-16 RX ORDER — FAMOTIDINE 20 MG/1
20 TABLET, FILM COATED ORAL DAILY
Status: DISCONTINUED | OUTPATIENT
Start: 2017-07-16 | End: 2017-07-18 | Stop reason: HOSPADM

## 2017-07-16 RX ORDER — KETOROLAC TROMETHAMINE 30 MG/ML
15 INJECTION, SOLUTION INTRAMUSCULAR; INTRAVENOUS EVERY 6 HOURS PRN
Status: DISCONTINUED | OUTPATIENT
Start: 2017-07-16 | End: 2017-07-16

## 2017-07-16 RX ORDER — HYDROMORPHONE HYDROCHLORIDE 2 MG/ML
0.5 INJECTION, SOLUTION INTRAMUSCULAR; INTRAVENOUS; SUBCUTANEOUS EVERY 6 HOURS PRN
Status: DISCONTINUED | OUTPATIENT
Start: 2017-07-16 | End: 2017-07-17

## 2017-07-16 RX ADMIN — HYDROMORPHONE HYDROCHLORIDE 0.5 MG: 2 INJECTION, SOLUTION INTRAMUSCULAR; INTRAVENOUS; SUBCUTANEOUS at 11:07

## 2017-07-16 RX ADMIN — STANDARDIZED SENNA CONCENTRATE AND DOCUSATE SODIUM 1 TABLET: 8.6; 5 TABLET, FILM COATED ORAL at 08:07

## 2017-07-16 RX ADMIN — CEFTRIAXONE 1 G: 1 INJECTION, SOLUTION INTRAVENOUS at 08:07

## 2017-07-16 RX ADMIN — FAMOTIDINE 20 MG: 20 TABLET, FILM COATED ORAL at 08:07

## 2017-07-16 RX ADMIN — ENOXAPARIN SODIUM 40 MG: 100 INJECTION SUBCUTANEOUS at 04:07

## 2017-07-16 RX ADMIN — KETOROLAC TROMETHAMINE 15 MG: 30 INJECTION, SOLUTION INTRAMUSCULAR at 12:07

## 2017-07-16 RX ADMIN — KETOROLAC TROMETHAMINE 15 MG: 30 INJECTION, SOLUTION INTRAMUSCULAR at 06:07

## 2017-07-16 RX ADMIN — HYDROMORPHONE HYDROCHLORIDE 0.5 MG: 2 INJECTION, SOLUTION INTRAMUSCULAR; INTRAVENOUS; SUBCUTANEOUS at 05:07

## 2017-07-16 RX ADMIN — VANCOMYCIN HYDROCHLORIDE 1000 MG: 1 INJECTION, POWDER, LYOPHILIZED, FOR SOLUTION INTRAVENOUS at 11:07

## 2017-07-16 RX ADMIN — OXYBUTYNIN CHLORIDE 10 MG: 5 TABLET, EXTENDED RELEASE ORAL at 08:07

## 2017-07-16 NOTE — PROGRESS NOTES
Famotidine therapy for Cesilia Dozier [id number] has been evaluated according to the pharmacy practice protocol.      Based on the patient's Estimated Creatinine Clearance: 39.7 mL/min (based on Cr of 1.8)., famotidine therapy has been adjusted to 20 mg once daily.    Thank you,  Patrick Ro

## 2017-07-16 NOTE — PLAN OF CARE
Problem: Patient Care Overview  Goal: Plan of Care Review  Outcome: Ongoing (interventions implemented as appropriate)  Pt remains free from injury or falls. Vital signs stable throughout night on room air. Positions self independently. Pain managed with IV  Medications, no complaints of nausea. Telemetry maintained.  Bed in low locked position and call light within reach.  Will continue to monitor.

## 2017-07-16 NOTE — CONSULTS
Cesilia Dozier [id number] is a 37 y.o. female who has been consulted for vancomycin dosing.    The patient has the following labs:     Date Creatinine (mg/dl)    BUN WBC Count   7/15/2017 Estimated Creatinine Clearance: 39.7 mL/min (based on Cr of 1.8). Lab Results   Component Value Date    BUN 13 07/15/2017     Lab Results   Component Value Date    WBC 9.20 07/15/2017        Current weight is 70.8 kg (156 lb)      UTI    The patient will be started on vancomycin at a dose of 1000 mg every 24 hours (15mg/kg/dose).  The vancomycin trough has been ordered for 7/16 at 2200.      Patient will be followed by pharmacy for changes in renal function, toxicity, and efficacy.   Thank you for allowing us to participate in this patient's care.     Nasreen Garcia

## 2017-07-16 NOTE — ED PROVIDER NOTES
Encounter Date: 7/15/2017    SCRIBE #1 NOTE: Azalea LOVE, erum scribing for, and in the presence of, Dr. Carey.       History     Chief Complaint   Patient presents with    Abdominal Pain     Davon lower ABD pain with N/V since yesterday        07/15/2017 7:08 PM     Chief Complaint: Abdominal pain      The patient is a 37 y.o. female with a history of Hydronephrosis, PONV, and Pyelonephritis who presents to the ED with complaints of abdominal pain associated with nausea, vomiting, and back pain that began yesterday. The patient states she was discharged on May 28th after having a cytoscopy with insertion of stents and was given percocet for the pain.The patients endorses having nausea, vomiting, and back pain since yesterday and worsened this morning. She also endorses having a minor fever of 99.7 yesterday morning that has since been resolved. She states that her stool is dark, not black, and while she doesn't have difficulty urinating, she has to force urination. She denies experiencing diarrhea, fever, cough, and SOB. Known drug allergies: Penicillin         The history is provided by the patient.     Review of patient's allergies indicates:   Allergen Reactions    Pcn [penicillins] Anaphylaxis     Past Medical History:   Diagnosis Date    Anemia     Cervical cancer     cervical    Encounter for blood transfusion     Hydronephrosis     PONV (postoperative nausea and vomiting)     Pyelonephritis      Past Surgical History:   Procedure Laterality Date    COLONOSCOPY N/A 9/22/2016    Procedure: COLONOSCOPY;  Surgeon: Joe Moe MD;  Location: [facility];  Service: Endoscopy;  Laterality: N/A;    HYSTERECTOMY      Partial    TUBAL LIGATION      URETERAL STENT PLACEMENT  07/2016     Family History   Problem Relation Age of Onset    No Known Problems Mother     Drug abuse Father     No Known Problems Sister     Asthma Brother      Social History   Substance Use Topics    Smoking status: Never  Smoker    Smokeless tobacco: Never Used    Alcohol use No     Review of Systems   Constitutional: Positive for appetite change (decreased) and fever (resolved). Negative for activity change, chills and fatigue.   Eyes: Negative for visual disturbance.   Respiratory: Negative for apnea and shortness of breath.    Cardiovascular: Negative for chest pain and palpitations.   Gastrointestinal: Positive for nausea and vomiting. Negative for abdominal distention, abdominal pain, blood in stool and diarrhea.   Genitourinary: Negative for decreased urine volume and dysuria.   Musculoskeletal: Positive for back pain and myalgias. Negative for neck pain.   Skin: Negative for pallor and rash.   Neurological: Negative for headaches.   Hematological: Does not bruise/bleed easily.   Psychiatric/Behavioral: Negative for agitation.       Physical Exam     Initial Vitals [07/15/17 1839]   BP Pulse Resp Temp SpO2   126/75 (!) 118 18 98.6 °F (37 °C) 99 %      MAP       92         Physical Exam    Nursing note and vitals reviewed.  Constitutional: She appears well-developed and well-nourished.   HENT:   Head: Normocephalic and atraumatic.   Eyes: Conjunctivae are normal.   Neck: Normal range of motion. Neck supple.   Cardiovascular: Regular rhythm. Tachycardia present.  Exam reveals no friction rub.    No murmur heard.  Pulmonary/Chest: Effort normal and breath sounds normal. No respiratory distress. She has no wheezes. She has no rhonchi. She has no rales.   Abdominal: Soft. She exhibits no distension and no mass. There is no rebound and no guarding.   Musculoskeletal: Normal range of motion. She exhibits tenderness (CVA tenderness.).   Neurological: She is alert and oriented to person, place, and time.   Skin: Skin is warm and dry. No erythema.   Psychiatric: She has a normal mood and affect.         ED Course   Procedures  Labs Reviewed - No data to display          Medical Decision Making:   History:   Old Records Summarized:  records from previous admission(s).  Clinical Tests:   Lab Tests: Ordered and Reviewed  The following lab test(s) were unremarkable: CBC, CMP and Urinalysis  Radiological Study: Ordered and Reviewed  ED Management:  Cesilia Dozier is a 37 y.o. female who presents with  bilateral lower abdominal pain with associated tenderness.  CT of the abdomen and pelvis is obtained with no evidence of hydronephrosis, appendicitis, diverticulitis or bowel obstruction.  She was admitted recently with pyelonephritis with associated sepsis and has symptoms concerning for same.  Urine culture and 6-28 grew enterococcus susceptible to vancomycin.  She will be admitted for IV vancomycin and ceftriaxone.  Other:   I have discussed this case with another health care provider.       <> Summary of the Discussion: Discussed with Dr. Ivan who will admit on behalf of Dr. Nicholson.            Scribe Attestation:   Scribe #1: I performed the above scribed service and the documentation accurately describes the services I performed. I attest to the accuracy of the note.    Attending Attestation:           Physician Attestation for Scribe:  Physician Attestation Statement for Scribe #1: I, Dr. Carey, reviewed documentation, as scribed by Azalea Sheriff in my presence, and it is both accurate and complete.                 ED Course     Clinical Impression:   The encounter diagnosis was Flank pain.                           Baljit Carey III, MD  07/16/17 2384

## 2017-07-16 NOTE — PROGRESS NOTES
Patient urine output from [id number] was 150 ml. Bladder scanned revealed less than 90 ml. Will continue to monitor.

## 2017-07-16 NOTE — H&P
[facility] Hospital Medicine  History & Physical    Patient Name: Cesilia Dozier  MRN: [id number]  Admission Date: 7/15/2017  Attending Physician: Cathy Nicholson MD   Primary Care Provider: Harsha Sheppard MD         Patient information was obtained from patient and ER records.     Subjective:     Principal Problem:<principal problem not specified>    Chief Complaint:   Chief Complaint   Patient presents with    Abdominal Pain     Davon lower ABD pain with N/V since yesterday         HPI: This is a 37 year old female with history of cervical cancer, ureteral obstruction with bilateral stents, and recurrent UTI's (enterococcus).  She presented yesterday with two days of nausea, vomiting, back pain, and subjective fevers.  On presentation she was tachycardic although otherwise HD stable and started on Vanc/Rocephin.  Since admit, she has remained stable but continues to complain of severe back pain.  She was recently admitted with similar complaints and her stents most recently exchanged on 6/28 by Dr. Bocanegra.  With regard to her cervical cancer, she is currently in remission and doing quite well.     Otherwise, no other complaints at this time.    Past Medical History:   Diagnosis Date    Anemia     Cervical cancer     cervical    Encounter for blood transfusion     Hydronephrosis     PONV (postoperative nausea and vomiting)     Pyelonephritis        Past Surgical History:   Procedure Laterality Date    COLONOSCOPY N/A 9/22/2016    Procedure: COLONOSCOPY;  Surgeon: Joe Moe MD;  Location: [facility];  Service: Endoscopy;  Laterality: N/A;    HYSTERECTOMY      Partial    TUBAL LIGATION      URETERAL STENT PLACEMENT  07/2016       Review of patient's allergies indicates:   Allergen Reactions    Pcn [penicillins] Anaphylaxis       No current facility-administered medications on file prior to encounter.      No current outpatient prescriptions on file prior to encounter.     Family  History     Problem Relation (Age of Onset)    Asthma Brother    Drug abuse Father    No Known Problems Mother, Sister        Social History Main Topics    Smoking status: Never Smoker    Smokeless tobacco: Never Used    Alcohol use No    Drug use: No    Sexual activity: Yes     Partners: Male     Birth control/ protection: See Surgical Hx     Review of Systems   Constitutional: Positive for fatigue and fever. Negative for activity change and appetite change.   HENT: Negative.    Eyes: Negative.    Respiratory: Negative for chest tightness, shortness of breath and wheezing.    Cardiovascular: Negative for chest pain, palpitations and leg swelling.   Gastrointestinal: Positive for nausea and vomiting. Negative for abdominal distention, abdominal pain, blood in stool and diarrhea.   Genitourinary: Negative for dysuria and hematuria.   Musculoskeletal: Positive for back pain.   Neurological: Negative for headaches.   Hematological: Negative for adenopathy.   Psychiatric/Behavioral: Negative for confusion.     Objective:     Vital Signs (Most Recent):  Temp: 97.9 °F (36.6 °C) (07/16/17 0405)  Pulse: 92 (07/16/17 0405)  Resp: 18 (07/16/17 0405)  BP: (!) 102/59 (07/16/17 0405)  SpO2: 100 % (07/16/17 0405) Vital Signs (24h Range):  Temp:  [97.4 °F (36.3 °C)-98.6 °F (37 °C)] 97.9 °F (36.6 °C)  Pulse:  [] 92  Resp:  [18-20] 18  SpO2:  [97 %-100 %] 100 %  BP: ()/(59-75) 102/59     Weight: 70.8 kg (156 lb)  Body mass index is 25.18 kg/m².    Physical Exam   Constitutional: She is oriented to person, place, and time. She appears well-developed and well-nourished. No distress.   HENT:   Head: Normocephalic and atraumatic.   Eyes: Pupils are equal, round, and reactive to light.   Neck: Neck supple. No thyromegaly present.   Cardiovascular: Normal rate and regular rhythm.  Exam reveals no gallop and no friction rub.    No murmur heard.  Pulmonary/Chest: Effort normal and breath sounds normal. No respiratory  distress. She has no wheezes.   Abdominal: Soft. Bowel sounds are normal. She exhibits no distension. There is no tenderness. There is no guarding.   Musculoskeletal: Normal range of motion. She exhibits no edema.   Neurological: She is alert and oriented to person, place, and time.   Skin: Skin is warm and dry. No erythema.   Psychiatric: She has a normal mood and affect.        Significant Labs:   CBC:   Recent Labs  Lab 07/15/17  1935 07/16/17  0644   WBC 9.20 6.80   HGB 12.0 10.2*   HCT 36.1* 29.5*   * 313       Significant Imaging: I have reviewed all pertinent imaging results/findings within the past 24 hours.    Assessment/Plan:     Pyelonephritis    - stable   - previously cultured enterococcus - likely colonized stents  - continue Vanc/Rocephin  - urine and blood cultures pending  - has urology follow up this week            VTE Risk Mitigation         Ordered     enoxaparin injection 40 mg  Daily     Route:  Subcutaneous        07/15/17 2153     Medium Risk of VTE  Once      07/15/17 2153        Moises Mortensen MD  Department of Hospital Medicine   [facility]

## 2017-07-16 NOTE — PLAN OF CARE
SW met w/ pt for assessment.  Pt was d/c 4 days ago.  Pt denies having HH, DME, states lives w/  and 2 kids.  She can return to prior living arrangements.  Pt is independent w/ ADL.  Pt uses [facility] ([address]     07/16/17 2597   Discharge Assessment   Assessment Type Discharge Planning Assessment   Confirmed/corrected address and phone number on facesheet? Yes   Assessment information obtained from? Patient   Type of Healthcare Directive Received (None)   Prior to hospitilization cognitive status: Alert/Oriented   Prior to hospitalization functional status: Independent   Current cognitive status: Alert/Oriented   Current Functional Status: Independent   Arrived From home or self-care   Lives With spouse;child(garo), dependent   Able to Return to Prior Arrangements yes   Is patient able to care for self after discharge? Yes   Does the patient have family/friends to help with healtcare needs after discharge? yes   How many people do you have in your home that can help with your care after discharge? 1   Who are your caregiver(s) and their phone number(s)? spouse:  Darlin Dozier [phone]   sister:  Lilia Lee [phone]   Patient's perception of discharge disposition home or selfcare   Readmission Within The Last 30 Days unable to assess   Patient currently being followed by outpatient case management? No   Patient currently receives home health services? No   Does the patient currently use HME? No   Patient currently receives private duty nursing? No   Patient currently receives any other outside agency services? No   Equipment Currently Used at Home none   Do you have any problems affording any of your prescribed medications? No   Is the patient taking medications as prescribed? yes   Do you have any financial concerns preventing you from receiving the healthcare you need? No   Does the patient have transportation to healthcare appointments? Yes   Transportation Available car;family or  friend will provide   On Dialysis? No   Does the patient receive services at the [facility]madin Clinic? No   Are there any open cases? No   Discharge Plan A Home with family   Discharge Plan B Home with family   Patient/Family In Agreement With Plan yes

## 2017-07-16 NOTE — PLAN OF CARE
Patient discussed with Dr. Carey from ED. Known cervical cancer, chronic UTI and obstructing uropathy s/p stent placement recently discharged. Patient will be admitted under sepsis protocol, with Pyelonephritis, with IV vanc for enterococcus.

## 2017-07-16 NOTE — ASSESSMENT & PLAN NOTE
- stable   - previously cultured enterococcus - likely colonized stents  - continue Vanc/Rocephin  - urine and blood cultures pending  - has urology follow up this week

## 2017-07-16 NOTE — PLAN OF CARE
07/16/17 1716   Readmission Questionnaire   At the time of your discharge, did someone talk to you about what your health problems were? Yes   At the time of discharge, did someone talk to you about what to watch out for regarding worsening of your health problem? Yes   At the time of discharge, did someone talk to you about what to do if you experienced worsening of your health problem? Yes   At the time of discharge, did someone talk to you about which medication to take when you left the hospital and which ones to stop taking? Yes   At the time of discharge, did someone talk to you about when and where to follow up with a doctor after you left the hospital? Yes   How often do you need to have someone help you when you read instructions, pamphlets, or other written material from your doctor or pharmacy? Never   Do you have problems taking your medications as prescribed? No   Do you have any problems affording any of  your prescribed medications? No   Do you have problems obtaining/receiving your medications? No   Does the patient have transportation to healthcare appointments? Yes   Lives With child(garo), dependent;spouse   Living Arrangements house   Does the patient have family/friends to help with healtcare needs after discharge? yes   Who are your caregiver(s) and their phone number(s)? spouse:  Darlin Dozier  [phone]   Does your caregiver provide all the help you need? Yes   Are you currently feeling confused? No   Are you currently having problems thinking? No   Are you currently having memory problems? No   Have you felt down, depressed, or hopeless? 0   Have you felt little interest or pleasure in doing things? 1   In the last 7 days, my sleep quality was: very poor

## 2017-07-16 NOTE — CONSULTS
Cesilia Dozier [id number] is a 37 y.o. female who has been consulted for vancomycin dosing.    Vancomycin trough has been changed to 7/16/17 at 2200.       Patient will be followed by pharmacy for changes in renal function, toxicity, and efficacy.    Thank you for allowing us to participate in this patient's care.     Patrick Ro, PharmD

## 2017-07-16 NOTE — SUBJECTIVE & OBJECTIVE
Past Medical History:   Diagnosis Date    Anemia     Cervical cancer     cervical    Encounter for blood transfusion     Hydronephrosis     PONV (postoperative nausea and vomiting)     Pyelonephritis        Past Surgical History:   Procedure Laterality Date    COLONOSCOPY N/A 9/22/2016    Procedure: COLONOSCOPY;  Surgeon: Joe Moe MD;  Location: [facility];  Service: Endoscopy;  Laterality: N/A;    HYSTERECTOMY      Partial    TUBAL LIGATION      URETERAL STENT PLACEMENT  07/2016       Review of patient's allergies indicates:   Allergen Reactions    Pcn [penicillins] Anaphylaxis       No current facility-administered medications on file prior to encounter.      No current outpatient prescriptions on file prior to encounter.     Family History     Problem Relation (Age of Onset)    Asthma Brother    Drug abuse Father    No Known Problems Mother, Sister        Social History Main Topics    Smoking status: Never Smoker    Smokeless tobacco: Never Used    Alcohol use No    Drug use: No    Sexual activity: Yes     Partners: Male     Birth control/ protection: See Surgical Hx     Review of Systems   Constitutional: Positive for fatigue and fever. Negative for activity change and appetite change.   HENT: Negative.    Eyes: Negative.    Respiratory: Negative for chest tightness, shortness of breath and wheezing.    Cardiovascular: Negative for chest pain, palpitations and leg swelling.   Gastrointestinal: Positive for nausea and vomiting. Negative for abdominal distention, abdominal pain, blood in stool and diarrhea.   Genitourinary: Negative for dysuria and hematuria.   Musculoskeletal: Positive for back pain.   Neurological: Negative for headaches.   Hematological: Negative for adenopathy.   Psychiatric/Behavioral: Negative for confusion.     Objective:     Vital Signs (Most Recent):  Temp: 97.9 °F (36.6 °C) (07/16/17 0405)  Pulse: 92 (07/16/17 0405)  Resp: 18 (07/16/17 0405)  BP: (!) 102/59  (07/16/17 0405)  SpO2: 100 % (07/16/17 0405) Vital Signs (24h Range):  Temp:  [97.4 °F (36.3 °C)-98.6 °F (37 °C)] 97.9 °F (36.6 °C)  Pulse:  [] 92  Resp:  [18-20] 18  SpO2:  [97 %-100 %] 100 %  BP: ()/(59-75) 102/59     Weight: 70.8 kg (156 lb)  Body mass index is 25.18 kg/m².    Physical Exam   Constitutional: She is oriented to person, place, and time. She appears well-developed and well-nourished. No distress.   HENT:   Head: Normocephalic and atraumatic.   Eyes: Pupils are equal, round, and reactive to light.   Neck: Neck supple. No thyromegaly present.   Cardiovascular: Normal rate and regular rhythm.  Exam reveals no gallop and no friction rub.    No murmur heard.  Pulmonary/Chest: Effort normal and breath sounds normal. No respiratory distress. She has no wheezes.   Abdominal: Soft. Bowel sounds are normal. She exhibits no distension. There is no tenderness. There is no guarding.   Musculoskeletal: Normal range of motion. She exhibits no edema.   Neurological: She is alert and oriented to person, place, and time.   Skin: Skin is warm and dry. No erythema.   Psychiatric: She has a normal mood and affect.        Significant Labs:   CBC:   Recent Labs  Lab 07/15/17  1935 07/16/17  0644   WBC 9.20 6.80   HGB 12.0 10.2*   HCT 36.1* 29.5*   * 313       Significant Imaging: I have reviewed all pertinent imaging results/findings within the past 24 hours.

## 2017-07-16 NOTE — HPI
This is a 37 year old female with history of cervical cancer, ureteral obstruction with bilateral stents, and recurrent UTI's (enterococcus).  She presented yesterday with two days of nausea, vomiting, back pain, and subjective fevers.  On presentation she was tachycardic although otherwise HD stable and started on Vanc/Rocephin.  Since admit, she has remained stable but continues to complain of severe back pain.  She was recently admitted with similar complaints and her stents most recently exchanged on 6/28 by Dr. Bocanegra.  With regard to her cervical cancer, she is currently in remission and doing quite well.     Otherwise, no other complaints at this time.

## 2017-07-17 LAB
ALBUMIN SERPL BCP-MCNC: 3.3 G/DL
ALP SERPL-CCNC: 70 U/L
ALT SERPL W/O P-5'-P-CCNC: 6 U/L
ANION GAP SERPL CALC-SCNC: 9 MMOL/L
AST SERPL-CCNC: 13 U/L
BACTERIA UR CULT: NORMAL
BASOPHILS # BLD AUTO: 0 K/UL
BASOPHILS NFR BLD: 0.2 %
BILIRUB SERPL-MCNC: 0.4 MG/DL
BUN SERPL-MCNC: 15 MG/DL
CALCIUM SERPL-MCNC: 9.3 MG/DL
CHLORIDE SERPL-SCNC: 106 MMOL/L
CO2 SERPL-SCNC: 24 MMOL/L
CREAT SERPL-MCNC: 1.9 MG/DL
DIFFERENTIAL METHOD: ABNORMAL
EOSINOPHIL # BLD AUTO: 0.3 K/UL
EOSINOPHIL NFR BLD: 4.5 %
ERYTHROCYTE [DISTWIDTH] IN BLOOD BY AUTOMATED COUNT: 13.4 %
EST. GFR  (AFRICAN AMERICAN): 38 ML/MIN/1.73 M^2
EST. GFR  (NON AFRICAN AMERICAN): 33 ML/MIN/1.73 M^2
GLUCOSE SERPL-MCNC: 91 MG/DL
HCT VFR BLD AUTO: 30.1 %
HGB BLD-MCNC: 10 G/DL
LYMPHOCYTES # BLD AUTO: 1.1 K/UL
LYMPHOCYTES NFR BLD: 16.4 %
MAGNESIUM SERPL-MCNC: 1.7 MG/DL
MCH RBC QN AUTO: 32.4 PG
MCHC RBC AUTO-ENTMCNC: 33.3 %
MCV RBC AUTO: 97 FL
MONOCYTES # BLD AUTO: 0.3 K/UL
MONOCYTES NFR BLD: 3.8 %
NEUTROPHILS # BLD AUTO: 5.1 K/UL
NEUTROPHILS NFR BLD: 75.1 %
PHOSPHATE SERPL-MCNC: 3.2 MG/DL
PLATELET # BLD AUTO: 255 K/UL
PMV BLD AUTO: 8.8 FL
POTASSIUM SERPL-SCNC: 3.8 MMOL/L
PROT SERPL-MCNC: 6.5 G/DL
RBC # BLD AUTO: 3.09 M/UL
SODIUM SERPL-SCNC: 139 MMOL/L
VANCOMYCIN TROUGH SERPL-MCNC: 15 UG/ML
WBC # BLD AUTO: 6.8 K/UL

## 2017-07-17 PROCEDURE — 84100 ASSAY OF PHOSPHORUS: CPT

## 2017-07-17 PROCEDURE — 63600175 PHARM REV CODE 636 W HCPCS: Performed by: HOSPITALIST

## 2017-07-17 PROCEDURE — 25000003 PHARM REV CODE 250: Performed by: INTERNAL MEDICINE

## 2017-07-17 PROCEDURE — 63600175 PHARM REV CODE 636 W HCPCS: Performed by: INTERNAL MEDICINE

## 2017-07-17 PROCEDURE — 12000002 HC ACUTE/MED SURGE SEMI-PRIVATE ROOM

## 2017-07-17 PROCEDURE — 25000003 PHARM REV CODE 250: Performed by: HOSPITALIST

## 2017-07-17 PROCEDURE — 63600175 PHARM REV CODE 636 W HCPCS: Performed by: EMERGENCY MEDICINE

## 2017-07-17 PROCEDURE — 85025 COMPLETE CBC W/AUTO DIFF WBC: CPT

## 2017-07-17 PROCEDURE — 83735 ASSAY OF MAGNESIUM: CPT

## 2017-07-17 PROCEDURE — 80202 ASSAY OF VANCOMYCIN: CPT

## 2017-07-17 PROCEDURE — 99232 SBSQ HOSP IP/OBS MODERATE 35: CPT | Mod: ,,, | Performed by: INTERNAL MEDICINE

## 2017-07-17 PROCEDURE — 36415 COLL VENOUS BLD VENIPUNCTURE: CPT

## 2017-07-17 PROCEDURE — 80053 COMPREHEN METABOLIC PANEL: CPT

## 2017-07-17 RX ORDER — HYDROMORPHONE HYDROCHLORIDE 2 MG/ML
0.5 INJECTION, SOLUTION INTRAMUSCULAR; INTRAVENOUS; SUBCUTANEOUS EVERY 4 HOURS PRN
Status: DISCONTINUED | OUTPATIENT
Start: 2017-07-17 | End: 2017-07-18 | Stop reason: HOSPADM

## 2017-07-17 RX ADMIN — HYDROMORPHONE HYDROCHLORIDE 0.5 MG: 2 INJECTION, SOLUTION INTRAMUSCULAR; INTRAVENOUS; SUBCUTANEOUS at 05:07

## 2017-07-17 RX ADMIN — HYDROMORPHONE HYDROCHLORIDE 0.5 MG: 2 INJECTION, SOLUTION INTRAMUSCULAR; INTRAVENOUS; SUBCUTANEOUS at 04:07

## 2017-07-17 RX ADMIN — CEFTRIAXONE 1 G: 1 INJECTION, SOLUTION INTRAVENOUS at 08:07

## 2017-07-17 RX ADMIN — STANDARDIZED SENNA CONCENTRATE AND DOCUSATE SODIUM 1 TABLET: 8.6; 5 TABLET, FILM COATED ORAL at 09:07

## 2017-07-17 RX ADMIN — ZOLPIDEM TARTRATE 5 MG: 5 TABLET, FILM COATED ORAL at 08:07

## 2017-07-17 RX ADMIN — HYDROMORPHONE HYDROCHLORIDE 0.5 MG: 2 INJECTION, SOLUTION INTRAMUSCULAR; INTRAVENOUS; SUBCUTANEOUS at 08:07

## 2017-07-17 RX ADMIN — HYDROMORPHONE HYDROCHLORIDE 0.5 MG: 2 INJECTION, SOLUTION INTRAMUSCULAR; INTRAVENOUS; SUBCUTANEOUS at 12:07

## 2017-07-17 RX ADMIN — SODIUM CHLORIDE: 0.9 INJECTION, SOLUTION INTRAVENOUS at 06:07

## 2017-07-17 RX ADMIN — FAMOTIDINE 20 MG: 20 TABLET, FILM COATED ORAL at 09:07

## 2017-07-17 RX ADMIN — STANDARDIZED SENNA CONCENTRATE AND DOCUSATE SODIUM 1 TABLET: 8.6; 5 TABLET, FILM COATED ORAL at 08:07

## 2017-07-17 RX ADMIN — OXYBUTYNIN CHLORIDE 10 MG: 5 TABLET, EXTENDED RELEASE ORAL at 09:07

## 2017-07-17 RX ADMIN — VANCOMYCIN HYDROCHLORIDE 1000 MG: 1 INJECTION, POWDER, LYOPHILIZED, FOR SOLUTION INTRAVENOUS at 11:07

## 2017-07-17 RX ADMIN — SODIUM CHLORIDE: 0.9 INJECTION, SOLUTION INTRAVENOUS at 08:07

## 2017-07-17 RX ADMIN — ENOXAPARIN SODIUM 40 MG: 100 INJECTION SUBCUTANEOUS at 04:07

## 2017-07-17 RX ADMIN — CEFTRIAXONE 1 G: 1 INJECTION, SOLUTION INTRAVENOUS at 09:07

## 2017-07-17 RX ADMIN — ONDANSETRON 4 MG: 2 INJECTION INTRAMUSCULAR; INTRAVENOUS at 05:07

## 2017-07-17 NOTE — PLAN OF CARE
Problem: Patient Care Overview  Goal: Plan of Care Review  Outcome: Ongoing (interventions implemented as appropriate)  Patient aao x 4. Complains of abdominal pain. Controlled with medication. Plan of care reviewed with patient. Verbalized understanding. IV fluids maintained. Strict I&O's recorded. Tolerating diet. Patient remained free from fall and injury. Bed in lowest position and call light within reach.

## 2017-07-17 NOTE — CONSULTS
Cesilia Dozier [id number] is a 37 y.o. female who has been consulted for vancomycin dosing.    The patient has the following labs:     Date Creatinine (mg/dl)    BUN WBC Count   7/16/2017 Estimated Creatinine Clearance: 32.5 mL/min (based on Cr of 2.2). Lab Results   Component Value Date    BUN 17 07/16/2017     Lab Results   Component Value Date    WBC 6.80 07/16/2017        Current weight is 70.8 kg (156 lb)      The patient received  1000 mg on 7/15 at 2230  The level from 7/16 at 2214 was 11.7    The patient will be continued on vancomycin at a dose of 1000 mg every 24 hours (15mg/kg/dose).  The vancomycin trough has been ordered for 7/17 at 2200.      Patient will be followed by pharmacy for changes in renal function, toxicity, and efficacy.   Thank you for allowing us to participate in this patient's care.     Nasreen Garcia

## 2017-07-17 NOTE — PLAN OF CARE
Problem: Patient Care Overview  Goal: Plan of Care Review  Outcome: Ongoing (interventions implemented as appropriate)  PT AAO X4. PT able to verbalize needs/want. Plan of care reviewed with patient at the beginning of the shift. Patient verbalized understanding.  IV antibiotics given per orders. Pain controlled with PRN IV pain medication. Patient denies nauseua and vomiting this shift. VS stable. Telemetry monitoring in place. Patient ambulates independently.Patient has remained free from fall/injury. Side rails up X2, bed in lowest, locked position, needs attended to, call light within reach will continue to monitor.

## 2017-07-17 NOTE — NURSING
Dr. Mortensen notified regarding patient's output today. Total output :185cc. Patient intake was 900cc and 75ml/hr of IV fluids. Patient voided an additional 100 cc prior to bladder scan. Post void residual was 100cc. Patient did not have any distention or discomfort. MD will order consult for Urologist.

## 2017-07-17 NOTE — PLAN OF CARE
Sr Nicholson spoke with the pt regarding her need for oncology; she states she has called her oncologist, Dr House ([phone]) at [place] and made an appt for 7/25/17 @3:30pm. She was informed that oncology had been consulted to see her while she was in the hospital but to keep her follow up appt with Dr House.....GLADYS Edouard CM

## 2017-07-17 NOTE — PROGRESS NOTES
Progress Note  Hospital Medicine  Patient Name:Cesilia Dozier  MRN:  [id number]  Patient Class: IP- Inpatient  Admit Date: 7/15/2017  Length of Stay: 2 days  Expected Discharge Date:   Attending Physician: Cathy Nicholson MD  Primary Care Provider:  Harsha Sheppard MD    SUBJECTIVE:     Principal Problem: nausea, vomiting and flank pain  Initial history of present illness: This is a 37 year old female with history of cervical cancer, ureteral obstruction with bilateral stents, and recurrent UTI's (enterococcus).  She presented yesterday with two days of nausea, vomiting, back pain, and subjective fevers.  On presentation she was tachycardic although otherwise HD stable and started on Vanc/Rocephin.  Since admit, she has remained stable but continues to complain of severe back pain.  She was recently admitted with similar complaints and her stents most recently exchanged on 6/28 by Dr. Bocanegra.  With regard to her cervical cancer, she is currently in remission and doing quite well.     PMH/PSH/SH/FH/Meds: reviewed.    Symptoms/Review of Systems: Patient is reporting bilateral flank pain with slightly better nausea but no emesis. No shortness of breath, cough, chest pain or headache, fever.  Diet:  Adequate intake.    Activity level: Normal.    Pain: Chronic pain     OBJECTIVE:   Vital Signs (Most Recent):      Temp: 98 °F (36.7 °C) (07/17/17 0405)  Pulse: 86 (07/17/17 0405)  Resp: 18 (07/17/17 0405)  BP: (!) 103/59 (07/17/17 0405)  SpO2: 100 % (07/17/17 0405)       Vital Signs Range (Last 24H):  Temp:  [97.9 °F (36.6 °C)-98.5 °F (36.9 °C)]   Pulse:  [76-89]   Resp:  [18]   BP: ()/(53-59)   SpO2:  [100 %]     Weight: 70.8 kg (156 lb)  Body mass index is 25.18 kg/m².    Intake/Output Summary (Last 24 hours) at 07/17/17 0902  Last data filed at 07/17/17 0603   Gross per 24 hour   Intake           2692.5 ml   Output             1085 ml   Net           1607.5 ml     Physical Examination:  Constitutional: She is oriented  to person, place, and time. She appears well-developed and well-nourished. No distress.   HENT:   Head: Normocephalic and atraumatic.   Eyes: Pupils are equal, round, and reactive to light.   Neck: Neck supple. No thyromegaly present.   Cardiovascular: Normal rate and regular rhythm.  Exam reveals no gallop and no friction rub.    No murmur heard.  Pulmonary/Chest: Effort normal and breath sounds normal. No respiratory distress. She has no wheezes.   Abdominal: Soft. Bowel sounds are normal. She exhibits no distension. There is no tenderness. There is no guarding.   Musculoskeletal: Normal range of motion. She exhibits no edema.   Neurological: She is alert and oriented to person, place, and time.   Skin: Skin is warm and dry. No erythema.   Psychiatric: She has a normal mood and affect.     CBC:    Recent Labs  Lab 07/15/17  1935 07/16/17  0644 07/17/17  0644   WBC 9.20 6.80 6.80   RBC 3.73* 3.04* 3.09*   HGB 12.0 10.2* 10.0*   HCT 36.1* 29.5* 30.1*   * 313 255   MCV 97 97 97   MCH 32.1* 33.4* 32.4*   MCHC 33.3 34.5 33.3   BMP    Recent Labs  Lab 07/15/17  1935 07/16/17  0644 07/17/17  0644   GLU 96 99 91    138 139   K 3.9 3.9 3.8    102 106   CO2 23 24 24   BUN 13 17 15   CREATININE 1.8* 2.2* 1.9*   CALCIUM 10.3 9.5 9.3   MG  --  1.7 1.7      Diagnostic Results:  Microbiology Results (last 7 days)     Procedure Component Value Units Date/Time    Blood culture [[id number]] Collected:  07/15/17 2215    Order Status:  Completed Specimen:  Blood from Peripheral, Left  Hand Updated:  07/17/17 0612     Blood Culture, Routine No Growth to date     Blood Culture, Routine No Growth to date    Narrative:       Aerobic and anaerobic    Blood culture [[id number]] Collected:  07/15/17 2235    Order Status:  Completed Specimen:  Blood from Peripheral, Left  Hand Updated:  07/17/17 0612     Blood Culture, Routine No Growth to date     Blood Culture, Routine No Growth to date    Narrative:       Aerobic and  anaerobic    Urine culture [[id number]] Collected:  07/15/17 1920    Order Status:  Completed Specimen:  Urine from Urine Updated:  07/17/17 0129     Urine Culture, Routine No significant growth    Urine Culture [[id number]]     Order Status:  No result Specimen:  Urine          CT abdomen:  1.  New central mesenteric mass, and additional subcentimeter intraperitoneal nodules, strongly concerning for intraperitoneal metastatic disease.  2.  Status post hysterectomy.  Increasing, ill-defined right adnexal soft tissue prominence, incompletely evaluated on this unenhanced examination with considerations including scar tissue and radiation fibrosis, with malignant mass not excluded.  3.  Appropriately positioned bilateral ureteral stents without hydronephrosis.  4.  Cholelithiasis.    CXR: No acute process.  Assessment/Plan:     * UTI (urinary tract infection)  IV Vancomycin and Rocephin.  Culture urine and blood.      CKD (chronic kidney disease) stage 4, GFR 15-29 ml/min  Stable.  Renal dose medications  IVF hydration to maintain perfusion  Monitor labs     Pelvic pain syndrome  Continue pain management, preferably with oral narcotics and use IV narcotics for breakthrough pain.     History of Cervical Cancer - possible worsening  Possible advancement and metastatic disease. Will consult Dr. Yates.     VTE Risk Mitigation         Ordered     enoxaparin injection 40 mg  Daily     Route:  Subcutaneous        07/15/17 2153     Medium Risk of VTE  Once      07/15/17 2153        Cathy Nicholson MD  Department of Hospital Medicine   [facility]

## 2017-07-18 VITALS
WEIGHT: 156 LBS | SYSTOLIC BLOOD PRESSURE: 106 MMHG | DIASTOLIC BLOOD PRESSURE: 60 MMHG | BODY MASS INDEX: 25.07 KG/M2 | OXYGEN SATURATION: 99 % | HEIGHT: 66 IN | HEART RATE: 71 BPM | RESPIRATION RATE: 18 BRPM | TEMPERATURE: 99 F

## 2017-07-18 LAB
ALBUMIN SERPL BCP-MCNC: 3.4 G/DL
ALP SERPL-CCNC: 74 U/L
ALT SERPL W/O P-5'-P-CCNC: 6 U/L
ANION GAP SERPL CALC-SCNC: 10 MMOL/L
AST SERPL-CCNC: 14 U/L
BASOPHILS # BLD AUTO: 0 K/UL
BASOPHILS NFR BLD: 0.1 %
BILIRUB SERPL-MCNC: 0.4 MG/DL
BUN SERPL-MCNC: 11 MG/DL
CALCIUM SERPL-MCNC: 9.5 MG/DL
CHLORIDE SERPL-SCNC: 108 MMOL/L
CO2 SERPL-SCNC: 22 MMOL/L
CREAT SERPL-MCNC: 1.6 MG/DL
DIFFERENTIAL METHOD: ABNORMAL
EOSINOPHIL # BLD AUTO: 0.2 K/UL
EOSINOPHIL NFR BLD: 3.6 %
ERYTHROCYTE [DISTWIDTH] IN BLOOD BY AUTOMATED COUNT: 13.6 %
EST. GFR  (AFRICAN AMERICAN): 47 ML/MIN/1.73 M^2
EST. GFR  (NON AFRICAN AMERICAN): 41 ML/MIN/1.73 M^2
GLUCOSE SERPL-MCNC: 86 MG/DL
HCT VFR BLD AUTO: 30.1 %
HGB BLD-MCNC: 10.2 G/DL
LYMPHOCYTES # BLD AUTO: 0.7 K/UL
LYMPHOCYTES NFR BLD: 10.2 %
MAGNESIUM SERPL-MCNC: 1.7 MG/DL
MCH RBC QN AUTO: 32.6 PG
MCHC RBC AUTO-ENTMCNC: 33.8 %
MCV RBC AUTO: 97 FL
MONOCYTES # BLD AUTO: 0.2 K/UL
MONOCYTES NFR BLD: 2.8 %
NEUTROPHILS # BLD AUTO: 5.6 K/UL
NEUTROPHILS NFR BLD: 83.3 %
PHOSPHATE SERPL-MCNC: 3.7 MG/DL
PLATELET # BLD AUTO: 243 K/UL
PMV BLD AUTO: 8.7 FL
POTASSIUM SERPL-SCNC: 3.9 MMOL/L
PROT SERPL-MCNC: 6.8 G/DL
RBC # BLD AUTO: 3.12 M/UL
SODIUM SERPL-SCNC: 140 MMOL/L
WBC # BLD AUTO: 6.7 K/UL

## 2017-07-18 PROCEDURE — 63600175 PHARM REV CODE 636 W HCPCS: Performed by: EMERGENCY MEDICINE

## 2017-07-18 PROCEDURE — 85025 COMPLETE CBC W/AUTO DIFF WBC: CPT

## 2017-07-18 PROCEDURE — 36415 COLL VENOUS BLD VENIPUNCTURE: CPT

## 2017-07-18 PROCEDURE — 99239 HOSP IP/OBS DSCHRG MGMT >30: CPT | Mod: ,,, | Performed by: INTERNAL MEDICINE

## 2017-07-18 PROCEDURE — 25000003 PHARM REV CODE 250: Performed by: INTERNAL MEDICINE

## 2017-07-18 PROCEDURE — 84100 ASSAY OF PHOSPHORUS: CPT

## 2017-07-18 PROCEDURE — 63600175 PHARM REV CODE 636 W HCPCS: Performed by: INTERNAL MEDICINE

## 2017-07-18 PROCEDURE — 80053 COMPREHEN METABOLIC PANEL: CPT

## 2017-07-18 PROCEDURE — 25000003 PHARM REV CODE 250: Performed by: HOSPITALIST

## 2017-07-18 PROCEDURE — 99223 1ST HOSP IP/OBS HIGH 75: CPT | Mod: ,,, | Performed by: INTERNAL MEDICINE

## 2017-07-18 PROCEDURE — 83735 ASSAY OF MAGNESIUM: CPT

## 2017-07-18 RX ORDER — LEVOFLOXACIN 250 MG/1
250 TABLET ORAL DAILY
Qty: 7 TABLET | Refills: 0 | Status: ON HOLD | OUTPATIENT
Start: 2017-07-18 | End: 2017-07-25 | Stop reason: HOSPADM

## 2017-07-18 RX ADMIN — HYDROMORPHONE HYDROCHLORIDE 0.5 MG: 2 INJECTION, SOLUTION INTRAMUSCULAR; INTRAVENOUS; SUBCUTANEOUS at 12:07

## 2017-07-18 RX ADMIN — CEFTRIAXONE 1 G: 1 INJECTION, SOLUTION INTRAVENOUS at 08:07

## 2017-07-18 RX ADMIN — HYDROMORPHONE HYDROCHLORIDE 0.5 MG: 2 INJECTION, SOLUTION INTRAMUSCULAR; INTRAVENOUS; SUBCUTANEOUS at 04:07

## 2017-07-18 RX ADMIN — HYDROMORPHONE HYDROCHLORIDE 0.5 MG: 2 INJECTION, SOLUTION INTRAMUSCULAR; INTRAVENOUS; SUBCUTANEOUS at 08:07

## 2017-07-18 RX ADMIN — ONDANSETRON 4 MG: 2 INJECTION INTRAMUSCULAR; INTRAVENOUS at 06:07

## 2017-07-18 RX ADMIN — FAMOTIDINE 20 MG: 20 TABLET, FILM COATED ORAL at 08:07

## 2017-07-18 RX ADMIN — OXYBUTYNIN CHLORIDE 10 MG: 5 TABLET, EXTENDED RELEASE ORAL at 08:07

## 2017-07-18 RX ADMIN — STANDARDIZED SENNA CONCENTRATE AND DOCUSATE SODIUM 1 TABLET: 8.6; 5 TABLET, FILM COATED ORAL at 08:07

## 2017-07-18 NOTE — PROGRESS NOTES
Discharge instructions given. Pt verbalized understanding. Peripheral IV removed. New scripts given. Afebrile and VS stable. Pt ambulated off the unit with staff. All belongings sent.

## 2017-07-18 NOTE — DISCHARGE SUMMARY
Discharge Summary  Hospital Medicine    Admit Date: 7/15/2017    Date and Time: 7/18/201712:09 PM    Discharge Attending Physician: Cathy Nicholson MD    Primary Care Physician: Harsha Sheppard MD    Diagnoses:  Active Hospital Problems    Diagnosis  POA    *Pyelonephritis [N12]  Yes    UTI (urinary tract infection) [N39.0]  Yes    Ureteral stricture s/p bilateral ureteral stents secondary to XRT for cervical cancer [N13.5]  Yes    Cervical cancer [C53.9]  Yes      Resolved Hospital Problems    Diagnosis Date Resolved POA   No resolved problems to display.     Discharged Condition: Good    Hospital Course:   This is a 37 year old female with history of cervical cancer, ureteral obstruction with bilateral stents, and recurrent UTI's (enterococcus).  She presented yesterday with two days of nausea, vomiting, back pain, and subjective fevers.  On presentation she was tachycardic although otherwise HD stable and started on Vanc/Rocephin.  Since admit, she has remained stable but continues to complain of severe back pain.  She was recently admitted with similar complaints and her stents most recently exchanged on 6/28 by Dr. Bocanegra.  With regard to her cervical cancer, she is currently in remission and doing quite well. Patient was admitted to Hospitalist medicine service. Patient was evaluated by  Dr. Yates. Patient was treated with IV antibiotics. Urine and blood cultures remained negative. Patient is afebrile without leukocytosis. Symptoms improved. Patient provided supportive care. Dr. Yates consulted for central mesenteric mass, and subcentimeter intraperitoneal nodules, strongly concerning for intraperitoneal metastatic disease. Patient has appointment with her personal oncologist at [facility] next week and would prefer to follow their and have biopsy completed. She will resume follow up with urologist as well. Patient was discharged home in stable condition with following discharge plan of care. Total time with the  patient was 30 minutes and greater than 50% was spent in counseling and coordination of care. The assessment and plan have been discussed at length. Physicians' notes reviewed. Labs and procedure reviewed.     Consults: Dr. Yates    Significant Diagnostic Studies:   CT abdomen:  1.  New central mesenteric mass, and additional subcentimeter intraperitoneal nodules, strongly concerning for intraperitoneal metastatic disease.  2.  Status post hysterectomy.  Increasing, ill-defined right adnexal soft tissue prominence, incompletely evaluated on this unenhanced examination with considerations including scar tissue and radiation fibrosis, with malignant mass not excluded.  3.  Appropriately positioned bilateral ureteral stents without hydronephrosis.  4.  Cholelithiasis.     CXR: No acute process.    Microbiology Results (last 7 days)     Procedure Component Value Units Date/Time    Blood culture [[id number]] Collected:  07/15/17 2235    Order Status:  Completed Specimen:  Blood from Peripheral, Left  Hand Updated:  07/18/17 0612     Blood Culture, Routine No Growth to date     Blood Culture, Routine No Growth to date     Blood Culture, Routine No Growth to date    Narrative:       Aerobic and anaerobic    Blood culture [[id number]] Collected:  07/15/17 2215    Order Status:  Completed Specimen:  Blood from Peripheral, Left  Hand Updated:  07/18/17 0612     Blood Culture, Routine No Growth to date     Blood Culture, Routine No Growth to date     Blood Culture, Routine No Growth to date    Narrative:       Aerobic and anaerobic    Urine culture [[id number]] Collected:  07/15/17 1920    Order Status:  Completed Specimen:  Urine from Urine Updated:  07/17/17 0129     Urine Culture, Routine No significant growth    Urine Culture [[id number]]     Order Status:  Canceled Specimen:  Urine         Special Treatments/Procedures: None  Disposition: Home or Self Care    Medications:  Reconciled Home Medications: Current Discharge  Medication List      START taking these medications    Details   levoFLOXacin (LEVAQUIN) 250 MG tablet Take 1 tablet (250 mg total) by mouth once daily.  Qty: 7 tablet, Refills: 0         CONTINUE these medications which have NOT CHANGED    Details   iron 18 mg Tab Take by mouth.      tolterodine (DETROL LA) 4 MG 24 hr capsule Take 4 mg by mouth once daily.         STOP taking these medications       linezolid (ZYVOX) 600 mg Tab Comments:   Reason for Stopping:         ondansetron (ZOFRAN-ODT) 8 MG TbDL Comments:   Reason for Stopping:         oxycodone-acetaminophen (PERCOCET) 5-325 mg per tablet Comments:   Reason for Stopping:         tramadol (ULTRAM) 50 mg tablet Comments:   Reason for Stopping:               Discharge Procedure Orders  Diet general   Order Comments: Cardiac/ 2 gram sodium low cholesterol diet     Other restrictions (specify):   Order Comments: Fall precautions     Call MD for:   Order Comments: For worsening symptoms, chest pain, shortness of breath, increased abdominal pain, high grade fever, stroke or stroke like symptoms, immediately go to the nearest Emergency Room or call 911 as soon as possible.       Follow-up Information     Harsha Sheppard MD In 1 week.    Specialty:  Internal Medicine  Contact information:  [address] [place] [address] [facility] [address]  [phone]             Please follow up.    Contact information:  Follow up with your cancer doctor this week and have mesenteric mass biopsied. Please take Radiology CD with you.

## 2017-07-18 NOTE — PLAN OF CARE
Problem: Patient Care Overview  Goal: Plan of Care Review  Outcome: Ongoing (interventions implemented as appropriate)  Patient oriented to room, call light within reach, wheels locked, bed in low position, side rails x 2, instructed to call for assistance prn. POC reviewed with patient, all questions answered, verbalized understanding. VSS. Patient remained free of fall/injury. Comfort level established, c/o abdominal pain controlled with prn medication. IVF and abx infusing per order. Patient repositioned independently, no new breakdown noted. Will continue to monitor.

## 2017-07-18 NOTE — CONSULTS
Cesilia Dozier [id number] is a 37 y.o. female who has been consulted for vancomycin dosing.    The patient has the following labs:     Date Creatinine (mg/dl)    BUN WBC Count   7/17/2017 Estimated Creatinine Clearance: 37.6 mL/min (based on Cr of 1.9). Lab Results   Component Value Date    BUN 15 07/17/2017     Lab Results   Component Value Date    WBC 6.80 07/17/2017        Current weight is 70.8 kg (156 lb)    Pt is receiving vancomycin 1000 mg every 24 hours.  Vancomycin trough from 7/17 at 2200 was 15 mg/dL.  Target trough range is 10-15 mg/dL.   Trough was drawn on time and anticipate it is therapeutic. Pharmacy will continue current dose.   The patient will be continued on a vancomycin dose of 1000 mg every 24 hours. A vancomycin trough has been ordered prior to 3rd dose due 7/19 at 2200.      Patient will be followed by pharmacy for changes in renal function, toxicity, and efficacy.  Thank you for allowing us to participate in this patient's care.     Nasreen Garcia

## 2017-07-18 NOTE — CONSULTS
Consult Note    Consult Requested by:  Dr dinh  Reason for Consult: history cervical cancer  Pt seen July 18  SUBJECTIVE:     History of Present Illness:  Patient is a 37 y.o. female presents with recurrent enterococcal UTI. She is tolerating antimicrobials for such. She has ureteral stents in place for ureteral obstruction. She has a history of cervical cancer. Since admit her Hb has dropped to 10. She is receiving Vancomycin. CT abdomen was performed and a new central mesenteric mass was noted with intraperitoneal nodules suspicious for metastatic disease. She has been diagnosed with CKD stage 4    Review of patient's allergies indicates:   Allergen Reactions    Pcn [penicillins] Anaphylaxis       Past Medical History:   Diagnosis Date    Anemia     Cervical cancer     cervical    Encounter for blood transfusion     Hydronephrosis     PONV (postoperative nausea and vomiting)     Pyelonephritis      Past Surgical History:   Procedure Laterality Date    COLONOSCOPY N/A 9/22/2016    Procedure: COLONOSCOPY;  Surgeon: Joe Moe MD;  Location: [facility];  Service: Endoscopy;  Laterality: N/A;    HYSTERECTOMY      Partial    TUBAL LIGATION      URETERAL STENT PLACEMENT  07/2016     Family History   Problem Relation Age of Onset    No Known Problems Mother     Drug abuse Father     No Known Problems Sister     Asthma Brother      Social History   Substance Use Topics    Smoking status: Never Smoker    Smokeless tobacco: Never Used    Alcohol use No       Review of Systems:    CONSTITUTIONAL: No fevers, chills, night sweats, wt. loss  SKIN: no rashes or itching  ENT: No headaches, head trauma, vision changes, or eye pain  LYMPH NODES: None noticed   CV: No chest pain, palpitations.   RESP: No dyspnea on exertion, cough, wheezing, or hemoptysis  GI: No nausea, emesis, diarrhea, constipation, melena, hematochezia,  Positive pain.   : No dysuria, hematuria, urgency, or frequency   HEME: No easy  bruising, bleeding problems  PSYCHIATRIC: No depression, anxiety, psychosis, hallucinations.  NEURO: No seizures, memory loss, dizziness or difficulty sleeping  MSK: No arthralgias or joint swelling    OBJECTIVE:     Vital Signs:  Temp:  [98.2 °F (36.8 °C)-98.3 °F (36.8 °C)]   Pulse:  []   Resp:  [18]   BP: (106-113)/(69-76)   SpO2:  [97 %-99 %]     Physical Exam:  Gen: NAD, A and O x3  Psych: pleasant affect, normal thought process  Eyes: Pupils round and non dilated, EOM intact  Nose: Nares patent  OP clear, mucosa patent  Neck: suppple, no JVD, trachea midline  Lungs: CTAB, no wheezes  CV: S1S2 with RRR, No mrg  Abd: tenderness pelvic region  Extr: No CCE, MARIIA  Neuro: steady gait, CNs grossly intact  Rheum: No joint swelling      Laboratory:  Lab Results   Component Value Date    WBC 6.80 07/17/2017    HGB 10.0 (L) 07/17/2017    HCT 30.1 (L) 07/17/2017    MCV 97 07/17/2017     07/17/2017     CMP  Sodium   Date Value Ref Range Status   07/17/2017 139 136 - 145 mmol/L Final     Potassium   Date Value Ref Range Status   07/17/2017 3.8 3.5 - 5.1 mmol/L Final     Chloride   Date Value Ref Range Status   07/17/2017 106 95 - 110 mmol/L Final     CO2   Date Value Ref Range Status   07/17/2017 24 23 - 29 mmol/L Final     Glucose   Date Value Ref Range Status   07/17/2017 91 70 - 110 mg/dL Final     BUN, Bld   Date Value Ref Range Status   07/17/2017 15 6 - 20 mg/dL Final     Creatinine   Date Value Ref Range Status   07/17/2017 1.9 (H) 0.5 - 1.4 mg/dL Final     Calcium   Date Value Ref Range Status   07/17/2017 9.3 8.7 - 10.5 mg/dL Final     Total Protein   Date Value Ref Range Status   07/17/2017 6.5 6.0 - 8.4 g/dL Final     Albumin   Date Value Ref Range Status   07/17/2017 3.3 (L) 3.5 - 5.2 g/dL Final     Total Bilirubin   Date Value Ref Range Status   07/17/2017 0.4 0.1 - 1.0 mg/dL Final     Comment:     For infants and newborns, interpretation of results should be based  on gestational age, weight and  in agreement with clinical  observations.  Premature Infant recommended reference ranges:  Up to 24 hours.............<8.0 mg/dL  Up to 48 hours............<12.0 mg/dL  3-5 days..................<15.0 mg/dL  6-29 days.................<15.0 mg/dL       Alkaline Phosphatase   Date Value Ref Range Status   07/17/2017 70 55 - 135 U/L Final     AST   Date Value Ref Range Status   07/17/2017 13 10 - 40 U/L Final     ALT   Date Value Ref Range Status   07/17/2017 6 (L) 10 - 44 U/L Final     Anion Gap   Date Value Ref Range Status   07/17/2017 9 8 - 16 mmol/L Final     eGFR if    Date Value Ref Range Status   07/17/2017 38 (A) >60 mL/min/1.73 m^2 Final     eGFR if non    Date Value Ref Range Status   07/17/2017 33 (A) >60 mL/min/1.73 m^2 Final     Comment:     Calculation used to obtain the estimated glomerular filtration  rate (eGFR) is the CKD-EPI equation. Since race is unknown   in our information system, the eGFR values for   -American and Non--American patients are given   for each creatinine result.       There has been a hysterectomy.  There is irregular soft tissue density within the right adnexal region, through which the right ureteral stent passes, which is nonspecific and may represent scarring or radiation change however an underlying mass is difficult to exclude.  This tissue has become more prominent since at least 2/20/17.  There is no lymphadenopathy.  There is a new 1.9 cm solid mesenteric mass at the level of the umbilicus.  Additional, smaller intraperitoneal nodules are seen, along the omentum in the left upper quadrant measuring 10 mm, and a few subcentimeter nodular foci stud in the surface of the right hepatic lobe.    There are no suspicious osseous lesions.   Impression       1.  New central mesenteric mass, and additional subcentimeter intraperitoneal nodules, strongly concerning for intraperitoneal metastatic disease.  2.  Status post hysterectomy.   Increasing, ill-defined right adnexal soft tissue prominence, incompletely evaluated on this unenhanced examination with considerations including scar tissue and radiation fibrosis, with malignant mass not excluded.  3.  Appropriately positioned bilateral ureteral stents without hydronephrosis.  4.  Cholelithiasis.               Diagnostic Results:  US Pelvis Comp with Transvag NON-OB (xpd) 05/10/2017 None Specified          RESULTS:  Sagittal and transverse images are obtained through the filled bladder.  An endovaginal probe is then placed and additional images are obtained.     The uterus has been removed.     The right ovary measures 2.7 x 1.9 cm without mass or cyst. The left ovary is not visualized.     No other masses or fluid collections are seen in the pelvis.  IMPRESSION:    Prior hysterectomy.  The left ovary is not seen.  Right ovary is of normal size and other masses or fluid collections are not seen.        Electronically signed by:           Jarod Tate MD  Date:                                                                                    05/10/17  Time:                                                                                   11:38              ASSESSMENT/PLAN:     1. History of cervical cancer  2. New mesenteric mass noted on Ct scan: mass by umbilicus should be readily accessible for biopsy via IR  3. Anemia with renal disease  4. UTI enterococcal: melvin vancomycin and rocephin  5. Pelvic pain syndrome: cont pain meds as ordered  6. Bilateral ureteral stents    Records being obtained from [facility]  Pt has oncologist at [facility] Dr House  Will discuss with Dr Nicholson, depending on planned length of stay P biopsy of mass near umbilicus could be performed here or at [facility]. Once pt stable for discharge she can follow up with oncologist as an outpatient

## 2017-07-19 ENCOUNTER — PATIENT OUTREACH (OUTPATIENT)
Dept: ADMINISTRATIVE | Facility: CLINIC | Age: 37
End: 2017-07-19

## 2017-07-19 NOTE — PLAN OF CARE
07/19/17 1415   Final Note   Assessment Type Final Discharge Note   Discharge Disposition Home   Discharge planning education complete? Yes

## 2017-07-19 NOTE — PROGRESS NOTES
C3 nurse attempted to contact patient. No answer. The following message was left for the patient to return the call:  Good afternoon  I am a nurse calling on behalf of [facility] from the [facility].  This is a Transitional Care Call for Cesilia Dozier . When you have a moment please contact us at [phone] or [phone] Monday through Friday, between the hours of 8 am to 4 pm. We look forward to speaking with you. On behalf of [facility] have a nice day.    The patient has a scheduled HOSFU appointment with Dr. Harsha Sheppard on 7/20/17 @ 10:45am. Message sent to Physician staff.

## 2017-07-20 ENCOUNTER — HOSPITAL ENCOUNTER (INPATIENT)
Facility: HOSPITAL | Age: 37
LOS: 5 days | Discharge: HOME | DRG: 690 | End: 2017-07-25
Attending: EMERGENCY MEDICINE | Admitting: INTERNAL MEDICINE
Payer: MEDICARE

## 2017-07-20 DIAGNOSIS — R10.9 FLANK PAIN: ICD-10-CM

## 2017-07-20 DIAGNOSIS — N18.30 CKD (CHRONIC KIDNEY DISEASE) STAGE 3, GFR 30-59 ML/MIN: ICD-10-CM

## 2017-07-20 DIAGNOSIS — K63.89 MESENTERIC MASS: ICD-10-CM

## 2017-07-20 DIAGNOSIS — R10.9 INTRACTABLE ABDOMINAL PAIN: Primary | ICD-10-CM

## 2017-07-20 DIAGNOSIS — N30.01 ACUTE CYSTITIS WITH HEMATURIA: ICD-10-CM

## 2017-07-20 DIAGNOSIS — N13.5 URETERAL STRICTURE: ICD-10-CM

## 2017-07-20 LAB
ALBUMIN SERPL BCP-MCNC: 3.4 G/DL
ALP SERPL-CCNC: 67 U/L
ALT SERPL W/O P-5'-P-CCNC: 5 U/L
ANION GAP SERPL CALC-SCNC: 11 MMOL/L
AST SERPL-CCNC: 11 U/L
BACTERIA #/AREA URNS HPF: ABNORMAL /HPF
BASOPHILS # BLD AUTO: 0.1 K/UL
BASOPHILS NFR BLD: 0.7 %
BILIRUB SERPL-MCNC: 0.3 MG/DL
BILIRUB UR QL STRIP: NEGATIVE
BUN SERPL-MCNC: 15 MG/DL
CALCIUM SERPL-MCNC: 9.3 MG/DL
CHLORIDE SERPL-SCNC: 105 MMOL/L
CLARITY UR: ABNORMAL
CO2 SERPL-SCNC: 25 MMOL/L
COLOR UR: YELLOW
CREAT SERPL-MCNC: 1.6 MG/DL
DIFFERENTIAL METHOD: ABNORMAL
EOSINOPHIL # BLD AUTO: 0.4 K/UL
EOSINOPHIL NFR BLD: 4.2 %
ERYTHROCYTE [DISTWIDTH] IN BLOOD BY AUTOMATED COUNT: 13.8 %
EST. GFR  (AFRICAN AMERICAN): 47 ML/MIN/1.73 M^2
EST. GFR  (NON AFRICAN AMERICAN): 41 ML/MIN/1.73 M^2
GLUCOSE SERPL-MCNC: 84 MG/DL
GLUCOSE UR QL STRIP: NEGATIVE
HCT VFR BLD AUTO: 29.6 %
HGB BLD-MCNC: 10 G/DL
HGB UR QL STRIP: ABNORMAL
HYALINE CASTS #/AREA URNS LPF: 0 /LPF
KETONES UR QL STRIP: NEGATIVE
LEUKOCYTE ESTERASE UR QL STRIP: ABNORMAL
LYMPHOCYTES # BLD AUTO: 1.4 K/UL
LYMPHOCYTES NFR BLD: 14.6 %
MCH RBC QN AUTO: 32.9 PG
MCHC RBC AUTO-ENTMCNC: 33.9 G/DL
MCV RBC AUTO: 97 FL
MICROSCOPIC COMMENT: ABNORMAL
MONOCYTES # BLD AUTO: 0.6 K/UL
MONOCYTES NFR BLD: 5.9 %
NEUTROPHILS # BLD AUTO: 7 K/UL
NEUTROPHILS NFR BLD: 74.6 %
NITRITE UR QL STRIP: NEGATIVE
PH UR STRIP: 7 [PH] (ref 5–8)
PLATELET # BLD AUTO: 290 K/UL
PMV BLD AUTO: 8.9 FL
POTASSIUM SERPL-SCNC: 3.8 MMOL/L
PROT SERPL-MCNC: 6.8 G/DL
PROT UR QL STRIP: ABNORMAL
RBC # BLD AUTO: 3.05 M/UL
RBC #/AREA URNS HPF: 10 /HPF (ref 0–4)
SODIUM SERPL-SCNC: 141 MMOL/L
SP GR UR STRIP: 1.01 (ref 1–1.03)
URN SPEC COLLECT METH UR: ABNORMAL
UROBILINOGEN UR STRIP-ACNC: NEGATIVE EU/DL
WBC # BLD AUTO: 9.3 K/UL
WBC #/AREA URNS HPF: >100 /HPF (ref 0–5)
WBC CLUMPS URNS QL MICRO: ABNORMAL

## 2017-07-20 PROCEDURE — 12000002 HC ACUTE/MED SURGE SEMI-PRIVATE ROOM

## 2017-07-20 PROCEDURE — 80053 COMPREHEN METABOLIC PANEL: CPT

## 2017-07-20 PROCEDURE — 36415 COLL VENOUS BLD VENIPUNCTURE: CPT

## 2017-07-20 PROCEDURE — 87086 URINE CULTURE/COLONY COUNT: CPT

## 2017-07-20 PROCEDURE — 99285 EMERGENCY DEPT VISIT HI MDM: CPT | Mod: 25

## 2017-07-20 PROCEDURE — 96374 THER/PROPH/DIAG INJ IV PUSH: CPT

## 2017-07-20 PROCEDURE — 25000003 PHARM REV CODE 250: Performed by: EMERGENCY MEDICINE

## 2017-07-20 PROCEDURE — 96361 HYDRATE IV INFUSION ADD-ON: CPT

## 2017-07-20 PROCEDURE — 81000 URINALYSIS NONAUTO W/SCOPE: CPT

## 2017-07-20 PROCEDURE — 63600175 PHARM REV CODE 636 W HCPCS: Performed by: EMERGENCY MEDICINE

## 2017-07-20 PROCEDURE — 85025 COMPLETE CBC W/AUTO DIFF WBC: CPT

## 2017-07-20 PROCEDURE — 96376 TX/PRO/DX INJ SAME DRUG ADON: CPT

## 2017-07-20 RX ORDER — TRAMADOL HYDROCHLORIDE 50 MG/1
50 TABLET ORAL 2 TIMES DAILY PRN
Status: ON HOLD | COMMUNITY
End: 2017-08-21 | Stop reason: HOSPADM

## 2017-07-20 RX ORDER — LANOLIN ALCOHOL/MO/W.PET/CERES
800 CREAM (GRAM) TOPICAL
Status: DISCONTINUED | OUTPATIENT
Start: 2017-07-20 | End: 2017-07-25 | Stop reason: HOSPADM

## 2017-07-20 RX ORDER — HYDROMORPHONE HYDROCHLORIDE 2 MG/ML
1 INJECTION, SOLUTION INTRAMUSCULAR; INTRAVENOUS; SUBCUTANEOUS EVERY 6 HOURS PRN
Status: DISCONTINUED | OUTPATIENT
Start: 2017-07-20 | End: 2017-07-21

## 2017-07-20 RX ORDER — ACETAMINOPHEN 325 MG/1
650 TABLET ORAL EVERY 6 HOURS PRN
Status: DISCONTINUED | OUTPATIENT
Start: 2017-07-20 | End: 2017-07-22

## 2017-07-20 RX ORDER — HYDROMORPHONE HYDROCHLORIDE 1 MG/ML
1 INJECTION, SOLUTION INTRAMUSCULAR; INTRAVENOUS; SUBCUTANEOUS
Status: COMPLETED | OUTPATIENT
Start: 2017-07-20 | End: 2017-07-20

## 2017-07-20 RX ORDER — AMOXICILLIN 250 MG
1 CAPSULE ORAL 2 TIMES DAILY PRN
Status: DISCONTINUED | OUTPATIENT
Start: 2017-07-20 | End: 2017-07-25 | Stop reason: HOSPADM

## 2017-07-20 RX ORDER — OXYCODONE HYDROCHLORIDE 5 MG/1
10 TABLET ORAL EVERY 6 HOURS PRN
Status: DISCONTINUED | OUTPATIENT
Start: 2017-07-20 | End: 2017-07-22

## 2017-07-20 RX ORDER — POTASSIUM CHLORIDE 20 MEQ/15ML
60 SOLUTION ORAL
Status: DISCONTINUED | OUTPATIENT
Start: 2017-07-20 | End: 2017-07-25 | Stop reason: HOSPADM

## 2017-07-20 RX ORDER — OXYCODONE HYDROCHLORIDE 5 MG/1
5 TABLET ORAL EVERY 6 HOURS PRN
Status: DISCONTINUED | OUTPATIENT
Start: 2017-07-20 | End: 2017-07-21

## 2017-07-20 RX ORDER — ACETAMINOPHEN 325 MG/1
650 TABLET ORAL EVERY 6 HOURS PRN
Status: CANCELLED | OUTPATIENT
Start: 2017-07-20

## 2017-07-20 RX ORDER — ONDANSETRON 2 MG/ML
4 INJECTION INTRAMUSCULAR; INTRAVENOUS EVERY 6 HOURS PRN
Status: DISCONTINUED | OUTPATIENT
Start: 2017-07-20 | End: 2017-07-25 | Stop reason: HOSPADM

## 2017-07-20 RX ORDER — POTASSIUM CHLORIDE 20 MEQ/15ML
40 SOLUTION ORAL
Status: DISCONTINUED | OUTPATIENT
Start: 2017-07-20 | End: 2017-07-25 | Stop reason: HOSPADM

## 2017-07-20 RX ORDER — TRAMADOL HYDROCHLORIDE 50 MG/1
50 TABLET ORAL 2 TIMES DAILY PRN
Status: DISCONTINUED | OUTPATIENT
Start: 2017-07-20 | End: 2017-07-21

## 2017-07-20 RX ADMIN — HYDROMORPHONE HYDROCHLORIDE 1 MG: 1 INJECTION, SOLUTION INTRAMUSCULAR; INTRAVENOUS; SUBCUTANEOUS at 07:07

## 2017-07-20 RX ADMIN — SODIUM CHLORIDE 1000 ML: 0.9 INJECTION, SOLUTION INTRAVENOUS at 05:07

## 2017-07-20 RX ADMIN — HYDROMORPHONE HYDROCHLORIDE 1 MG: 1 INJECTION, SOLUTION INTRAMUSCULAR; INTRAVENOUS; SUBCUTANEOUS at 05:07

## 2017-07-20 NOTE — PATIENT INSTRUCTIONS
Discharge Instructions for Pyelonephritis  You have been told you have a kidney infection. This is called pyelonephritis. The infection can be serious. It can damage your kidneys and cause bacteria to enter your bloodstream. You were treated in the hospital. Once you return home, heres what you can do at home to aid in your recovery and prevent future infections.  Home care  · Take all the medication you were prescribed, even if you feel better. Not finishing the medication can make the infection come back. It may also make a future infection harder to treat.  · Unless told not to by your healthcare provider, drink 8 to 12 glasses of fluid every day. Clear fluids, such as water, are best. This may help flush the infection from your system.  Preventing future infection  · Keep your genital area clean. Use mild soap. Rinse with water.  · If you are a woman, always wipe the genital area from front to back.  · Urinate frequently. Avoid holding urine in the bladder for a long time.  · Always urinate after sexual intercourse.  Follow-up care  Make a follow-up appointment. And see your healthcare provider for regular lab tests as directed. Our staff can help you with this.     When to call your healthcare provider  Call your healthcare provider right away if you have any of the following:  · Decreased urine output or trouble urinating  · Severe pain in the lower back or flank  · Fever of 100.4°F (38 C) or higher, or as directed by your healthcare provider  · Shaking chills  · Vomiting  · Blood in your urine  · Dark-colored or foul-smelling urine  · Nausea or other problems that prevent you from taking your prescribed medicine   Date Last Reviewed: 1/6/2015 © 2000-2017[employer]. [address] [place] [address], [place] [address]. All rights reserved. This information is not intended as a substitute for professional medical care. Always follow your healthcare professional's instructions.

## 2017-07-20 NOTE — ED PROVIDER NOTES
Encounter Date: 7/20/2017    SCRIBE #1 NOTE: I, Renuka Hernandez, am scribing for, and in the presence of, Dr. Jain.       History     Chief Complaint   Patient presents with    Back Pain     pt reports she was recently discharged here for same. denies improvement. also reports pain to abd and pelvic area, reports blood in urine and stool that started today       07/20/2017 4:19 PM     Chief Complaint: Abdominal & flank pain      Cesilia Dozier is a 37 y.o. female with cervical cancer who presents to the ED with an onset of worsening lower abdominal pain and bilateral flank over the last 2 days with associated chills and difficulty urinating. She also endorses hematuria that began today. The patient took Tramadol and Percocet with no relief. She states that she has pelvic pain with urinating but not dysuria. The patient was seen in the ER on 7/3 (17 days ago), admitted for sepsis, and discharged on 7/8 (12 days ago) as well as seen in there ER on 7/15 (5 days ago), admitted for pyelonephritis, and discharged with a course of Levaquin on 7/18 (2 days ago). She reports no improvement since being discharged. The patient has an upcoming appointment with Gynecology, Dr. Valecnia Chairez, on 7/25 (in 5 days) and with Urology, Dr. Turner Bocanegra, on 7/28 (in 8 days). She has bilateral stent placement that are replaced every 3 months or PRN. Her last stent replacement was 2/28. The patient denies fever, dysuria, or any other symptoms at this time.      The history is provided by the patient.     Review of patient's allergies indicates:   Allergen Reactions    Pcn [penicillins] Anaphylaxis     Past Medical History:   Diagnosis Date    Anemia     Cervical cancer     cervical    Encounter for blood transfusion     Hydronephrosis     PONV (postoperative nausea and vomiting)     Pyelonephritis      Past Surgical History:   Procedure Laterality Date    COLONOSCOPY N/A 9/22/2016    Procedure: COLONOSCOPY;  Surgeon: Joe  CELINA Moe MD;  Location: [facility];  Service: Endoscopy;  Laterality: N/A;    HYSTERECTOMY      Partial    TUBAL LIGATION      URETERAL STENT PLACEMENT  07/2016     Family History   Problem Relation Age of Onset    No Known Problems Mother     Drug abuse Father     No Known Problems Sister     Asthma Brother      Social History   Substance Use Topics    Smoking status: Never Smoker    Smokeless tobacco: Never Used    Alcohol use No     Review of Systems   Constitutional: Negative for chills and fever.   HENT: Negative for nosebleeds.    Respiratory: Negative for shortness of breath.    Cardiovascular: Negative for chest pain and palpitations.   Gastrointestinal: Positive for abdominal pain (suprapubic). Negative for diarrhea, nausea and vomiting.   Genitourinary: Positive for difficulty urinating, flank pain (bilateral) and hematuria. Negative for dysuria.   Musculoskeletal: Negative for back pain and neck pain.   All other systems reviewed and are negative.    Physical Exam     Initial Vitals [07/20/17 1412]   BP Pulse Resp Temp SpO2   103/76 108 17 98.8 °F (37.1 °C) 99 %      MAP       85         Physical Exam    Nursing note and vitals reviewed.  Constitutional: She appears well-developed and well-nourished. She is not diaphoretic. No distress.   HENT:   Head: Normocephalic and atraumatic.   Eyes: EOM are normal.   Neck: Normal range of motion. Neck supple.   Cardiovascular: Normal rate, regular rhythm and normal heart sounds. Exam reveals no gallop and no friction rub.    No murmur heard.  Pulmonary/Chest: Breath sounds normal. No respiratory distress. She has no wheezes. She has no rhonchi. She has no rales.   Abdominal: Soft. There is tenderness. There is no rebound and no guarding.   Mild lower abdominal tenderness.   Musculoskeletal: Normal range of motion.   Neurological: She is alert and oriented to person, place, and time.   Skin: Skin is warm and dry.   Psychiatric: She has a normal mood and  affect. Her behavior is normal. Judgment and thought content normal.       ED Course   Procedures  Labs Reviewed   CBC W/ AUTO DIFFERENTIAL - Abnormal; Notable for the following:        Result Value    RBC 3.05 (*)     Hemoglobin 10.0 (*)     Hematocrit 29.6 (*)     MCH 32.9 (*)     MPV 8.9 (*)     Gran% 74.6 (*)     Lymph% 14.6 (*)     All other components within normal limits   COMPREHENSIVE METABOLIC PANEL - Abnormal; Notable for the following:     Creatinine 1.6 (*)     Albumin 3.4 (*)     ALT 5 (*)     eGFR if  47 (*)     eGFR if non  41 (*)     All other components within normal limits   URINALYSIS - Abnormal; Notable for the following:     Appearance, UA Cloudy (*)     Protein, UA 1+ (*)     Occult Blood UA 3+ (*)     Leukocytes, UA 3+ (*)     All other components within normal limits   URINALYSIS MICROSCOPIC - Abnormal; Notable for the following:     RBC, UA 10 (*)     WBC, UA >100 (*)     WBC Clumps, UA Few (*)     Bacteria, UA Many (*)     All other components within normal limits   CULTURE, URINE           Medical Decision Making:   History:   Old Medical Records: I decided to obtain old medical records.  Clinical Tests:   Lab Tests: Reviewed and Ordered            Scribe Attestation:   Scribe #1: I performed the above scribed service and the documentation accurately describes the services I performed. I attest to the accuracy of the note.    Attending Attestation:           Physician Attestation for Scribe:  Physician Attestation Statement for Scribe #1: I, Dr. Jain, reviewed documentation, as scribed by Renuka Hernandez in my presence, and it is both accurate and complete.                 ED Course   Comment By Time   case discussed with Dr. Sultan Zechariah Jain MD 07/20 9141     Clinical Impression:     1. Intractable abdominal pain          Disposition:   Disposition: Admitted       37-year-old with ovarian cancer with metastases presents with her typical pelvic and  lower abdominal pain and dysuria.  Numerous prior presentations and admissions for exactly the same symptoms.  I did discuss the case with Dr. Nicholson.  I don't think we have much to offer her on admission but she states if she is discharged she will simply come right back social be admitted.  Countless CT scans in the past I see no reason to repeat this.                 Zechariah Jain MD  07/20/17 1537

## 2017-07-21 PROBLEM — K63.89 MESENTERIC MASS: Status: ACTIVE | Noted: 2017-07-21

## 2017-07-21 LAB
ALBUMIN SERPL BCP-MCNC: 3.2 G/DL
ALP SERPL-CCNC: 66 U/L
ALT SERPL W/O P-5'-P-CCNC: 7 U/L
ANION GAP SERPL CALC-SCNC: 8 MMOL/L
AST SERPL-CCNC: 12 U/L
BACTERIA BLD CULT: NORMAL
BACTERIA BLD CULT: NORMAL
BASOPHILS # BLD AUTO: 0 K/UL
BASOPHILS NFR BLD: 0.5 %
BILIRUB SERPL-MCNC: 0.3 MG/DL
BUN SERPL-MCNC: 12 MG/DL
CALCIUM SERPL-MCNC: 9 MG/DL
CHLORIDE SERPL-SCNC: 106 MMOL/L
CO2 SERPL-SCNC: 25 MMOL/L
CREAT SERPL-MCNC: 1.5 MG/DL
DIFFERENTIAL METHOD: ABNORMAL
EOSINOPHIL # BLD AUTO: 0.5 K/UL
EOSINOPHIL NFR BLD: 7.3 %
ERYTHROCYTE [DISTWIDTH] IN BLOOD BY AUTOMATED COUNT: 13.4 %
EST. GFR  (AFRICAN AMERICAN): 51 ML/MIN/1.73 M^2
EST. GFR  (NON AFRICAN AMERICAN): 44 ML/MIN/1.73 M^2
GLUCOSE SERPL-MCNC: 97 MG/DL
HCT VFR BLD AUTO: 29.4 %
HGB BLD-MCNC: 9.9 G/DL
LYMPHOCYTES # BLD AUTO: 1.6 K/UL
LYMPHOCYTES NFR BLD: 23.1 %
MAGNESIUM SERPL-MCNC: 1.7 MG/DL
MCH RBC QN AUTO: 32.6 PG
MCHC RBC AUTO-ENTMCNC: 33.6 G/DL
MCV RBC AUTO: 97 FL
MONOCYTES # BLD AUTO: 0.6 K/UL
MONOCYTES NFR BLD: 8.4 %
NEUTROPHILS # BLD AUTO: 4.1 K/UL
NEUTROPHILS NFR BLD: 60.7 %
PHOSPHATE SERPL-MCNC: 3.2 MG/DL
PLATELET # BLD AUTO: 251 K/UL
PMV BLD AUTO: 8.9 FL
POTASSIUM SERPL-SCNC: 3.3 MMOL/L
PROT SERPL-MCNC: 6.3 G/DL
RBC # BLD AUTO: 3.03 M/UL
SODIUM SERPL-SCNC: 139 MMOL/L
WBC # BLD AUTO: 6.8 K/UL

## 2017-07-21 PROCEDURE — 85025 COMPLETE CBC W/AUTO DIFF WBC: CPT

## 2017-07-21 PROCEDURE — 63600175 PHARM REV CODE 636 W HCPCS: Performed by: PHYSICIAN ASSISTANT

## 2017-07-21 PROCEDURE — 63600175 PHARM REV CODE 636 W HCPCS: Performed by: INTERNAL MEDICINE

## 2017-07-21 PROCEDURE — 12000002 HC ACUTE/MED SURGE SEMI-PRIVATE ROOM

## 2017-07-21 PROCEDURE — 36415 COLL VENOUS BLD VENIPUNCTURE: CPT

## 2017-07-21 PROCEDURE — 84100 ASSAY OF PHOSPHORUS: CPT

## 2017-07-21 PROCEDURE — 83735 ASSAY OF MAGNESIUM: CPT

## 2017-07-21 PROCEDURE — 25000003 PHARM REV CODE 250: Performed by: INTERNAL MEDICINE

## 2017-07-21 PROCEDURE — 99222 1ST HOSP IP/OBS MODERATE 55: CPT | Mod: ,,, | Performed by: INTERNAL MEDICINE

## 2017-07-21 PROCEDURE — 25000003 PHARM REV CODE 250: Performed by: PHYSICIAN ASSISTANT

## 2017-07-21 PROCEDURE — 80053 COMPREHEN METABOLIC PANEL: CPT

## 2017-07-21 RX ORDER — PANTOPRAZOLE SODIUM 40 MG/1
40 TABLET, DELAYED RELEASE ORAL DAILY
Status: DISCONTINUED | OUTPATIENT
Start: 2017-07-21 | End: 2017-07-25 | Stop reason: HOSPADM

## 2017-07-21 RX ORDER — POTASSIUM CHLORIDE 20 MEQ/1
40 TABLET, EXTENDED RELEASE ORAL ONCE
Status: COMPLETED | OUTPATIENT
Start: 2017-07-21 | End: 2017-07-21

## 2017-07-21 RX ORDER — ZOLPIDEM TARTRATE 5 MG/1
5 TABLET ORAL NIGHTLY PRN
Status: DISCONTINUED | OUTPATIENT
Start: 2017-07-21 | End: 2017-07-25 | Stop reason: HOSPADM

## 2017-07-21 RX ORDER — ENOXAPARIN SODIUM 100 MG/ML
40 INJECTION SUBCUTANEOUS EVERY 24 HOURS
Status: DISCONTINUED | OUTPATIENT
Start: 2017-07-21 | End: 2017-07-25 | Stop reason: HOSPADM

## 2017-07-21 RX ORDER — HYDROMORPHONE HYDROCHLORIDE 2 MG/ML
1 INJECTION, SOLUTION INTRAMUSCULAR; INTRAVENOUS; SUBCUTANEOUS EVERY 4 HOURS PRN
Status: DISCONTINUED | OUTPATIENT
Start: 2017-07-21 | End: 2017-07-25 | Stop reason: HOSPADM

## 2017-07-21 RX ADMIN — ENOXAPARIN SODIUM 40 MG: 100 INJECTION SUBCUTANEOUS at 05:07

## 2017-07-21 RX ADMIN — HYDROMORPHONE HYDROCHLORIDE 1 MG: 2 INJECTION INTRAMUSCULAR; INTRAVENOUS; SUBCUTANEOUS at 05:07

## 2017-07-21 RX ADMIN — HYDROMORPHONE HYDROCHLORIDE 1 MG: 2 INJECTION INTRAMUSCULAR; INTRAVENOUS; SUBCUTANEOUS at 07:07

## 2017-07-21 RX ADMIN — POTASSIUM CHLORIDE 40 MEQ: 20 TABLET, EXTENDED RELEASE ORAL at 10:07

## 2017-07-21 RX ADMIN — VANCOMYCIN HYDROCHLORIDE 1000 MG: 1 INJECTION, POWDER, LYOPHILIZED, FOR SOLUTION INTRAVENOUS at 05:07

## 2017-07-21 RX ADMIN — HYDROMORPHONE HYDROCHLORIDE 1 MG: 2 INJECTION INTRAMUSCULAR; INTRAVENOUS; SUBCUTANEOUS at 01:07

## 2017-07-21 RX ADMIN — ZOLPIDEM TARTRATE 5 MG: 5 TABLET, FILM COATED ORAL at 11:07

## 2017-07-21 RX ADMIN — HYDROMORPHONE HYDROCHLORIDE 1 MG: 2 INJECTION INTRAMUSCULAR; INTRAVENOUS; SUBCUTANEOUS at 09:07

## 2017-07-21 RX ADMIN — HYDROMORPHONE HYDROCHLORIDE 1 MG: 2 INJECTION INTRAMUSCULAR; INTRAVENOUS; SUBCUTANEOUS at 02:07

## 2017-07-21 RX ADMIN — OXYCODONE HYDROCHLORIDE 10 MG: 5 TABLET ORAL at 12:07

## 2017-07-21 RX ADMIN — OXYCODONE HYDROCHLORIDE 10 MG: 5 TABLET ORAL at 06:07

## 2017-07-21 RX ADMIN — PANTOPRAZOLE SODIUM 40 MG: 40 TABLET, DELAYED RELEASE ORAL at 10:07

## 2017-07-21 NOTE — PLAN OF CARE
Problem: Patient Care Overview  Goal: Plan of Care Review  Outcome: Ongoing (interventions implemented as appropriate)  Pt remains free from injury or falls. Vital signs stable throughout night on room air. Positions self independently. Pain managed with IV and PO medications, no complaints of nausea.  Bed in low locked position and call light within reach. Will continue to monitor.

## 2017-07-21 NOTE — ASSESSMENT & PLAN NOTE
Most recent urine culture is without growth. The prior urine culture reveals e Faecalis which is sensitive to Ampicillin, Nitrofurantoin, Vancomycin. Unfortunately she has anaphylactic reaction to penicillins and CKD III precludes her from receiving Nitrofurantoin.  Consult pharmacist to dose Vancomycin  Consider further testing to confirm ureteral stents are appropriately positioned  Consider consulting a urologist

## 2017-07-21 NOTE — PLAN OF CARE
Problem: Patient Care Overview  Goal: Plan of Care Review  Pt alert and oriented. Pain well controlled with current pain regimen. No complaints of nausea. IV saline locked, clean dry intact. VSS. All safety precautions maintained. Will continue to monitor.

## 2017-07-21 NOTE — H&P
[facility] Medicine  History & Physical    Patient Name: Cesilia Dozier  MRN: [id number]  Admission Date: 7/20/2017  Attending Physician: Cathy Nicholson MD   Primary Care Provider: Harsha Sheppard MD         Patient information was obtained from patient, past medical records and ER records.     Subjective:     Principal Problem:Intractable abdominal pain    Chief Complaint:   Chief Complaint   Patient presents with    Back Pain     pt reports she was recently discharged here for same. denies improvement. also reports pain to abd and pelvic area, reports blood in urine and stool that started today        HPI: Miss Dozier presents for evaluation of abdominal pain. She states the pain is 10/10. She describes the pain as a dull and sharp pain without localization. She reports lower back pain. She denies fever or chills. She reports hematuria but denies dysuria. She denies diarrhea or abdominal distention. She reports hematochezia. She reports being recently admitted for abdominal pain and was discharged in good condition but the pain recurred. She was prescribed Tramadol. 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 She has history of ureteral stricture and underwent ureteral stent placement at [facility] 6/27 with Dr. Bocanegra.    Past Medical History:   Diagnosis Date    Anemia     Cervical cancer     cervical    Encounter for blood transfusion     Hydronephrosis     PONV (postoperative nausea and vomiting)     Pyelonephritis        Past Surgical History:   Procedure Laterality Date    COLONOSCOPY N/A 9/22/2016    Procedure: COLONOSCOPY;  Surgeon: Joe Moe MD;  Location: [facility];  Service: Endoscopy;  Laterality: N/A;    HYSTERECTOMY      Partial    TUBAL LIGATION      URETERAL STENT PLACEMENT  07/2016        Review of patient's allergies indicates:   Allergen Reactions    Pcn [penicillins] Anaphylaxis       No current facility-administered medications on file prior to encounter.      Current Outpatient Prescriptions on File Prior to Encounter   Medication Sig    iron 18 mg Tab Take by mouth.    levoFLOXacin (LEVAQUIN) 250 MG tablet Take 1 tablet (250 mg total) by mouth once daily.    tolterodine (DETROL LA) 4 MG 24 hr capsule Take 4 mg by mouth once daily.     Family History     Problem Relation (Age of Onset)    Asthma Brother    Drug abuse Father    No Known Problems Mother, Sister        Social History Main Topics    Smoking status: Never Smoker    Smokeless tobacco: Never Used    Alcohol use No    Drug use: No    Sexual activity: Yes     Partners: Male     Birth control/ protection: See Surgical Hx     Review of Systems   Constitutional: Negative for chills and fever.   HENT: Negative for congestion and sore throat.    Eyes: Negative for photophobia and visual disturbance.   Respiratory: Negative for cough and chest tightness.    Cardiovascular: Negative for palpitations and leg swelling.   Gastrointestinal: Positive for abdominal pain, blood in stool and nausea. Negative for abdominal distention and diarrhea.   Genitourinary: Positive for hematuria. Negative for dysuria.   Musculoskeletal: Negative for neck pain and neck stiffness.   Skin: Negative for rash and wound.   Neurological: Negative for dizziness and syncope.   Psychiatric/Behavioral: Negative for dysphoric mood. The patient is not nervous/anxious.      Objective:     Vital Signs (Most Recent):  Temp: 98 °F (36.7 °C) (07/21/17 0000)  Pulse: 81 (07/21/17 0000)  Resp: 18 (07/21/17 0000)  BP: 100/63 (07/21/17 0000)  SpO2: 100 % (07/21/17 0000) Vital Signs (24h Range):  Temp:  [98 °F (36.7 °C)-98.8 °F (37.1 °C)] 98 °F (36.7 °C)  Pulse:  [] 81  Resp:  [17-20] 18  SpO2:  [99 %-100 %] 100 %  BP: (100-113)/(61-76) 100/63     Weight: 70.8 kg  (156 lb)  Body mass index is 25.18 kg/m².    Physical Exam   Constitutional: She is oriented to person, place, and time. She appears well-developed and well-nourished. No distress.   HENT:   Head: Normocephalic and atraumatic.   Nose: Nose normal.   Eyes: Right eye exhibits no discharge. Left eye exhibits no discharge. No scleral icterus.   Neck: Neck supple. No JVD present.   Cardiovascular: Normal rate and regular rhythm.    Pulmonary/Chest: Effort normal and breath sounds normal.   Abdominal: Soft. Bowel sounds are normal. She exhibits no distension. There is tenderness. There is no rebound and no guarding.   Musculoskeletal: She exhibits no edema or tenderness.   Neurological: She is alert and oriented to person, place, and time.   Skin: Skin is warm and dry. She is not diaphoretic.   Psychiatric: She has a normal mood and affect. Her behavior is normal.   Nursing note and vitals reviewed.       Significant Labs:   Recent Lab Results       07/20/17  1728 07/20/17  1653      Albumin 3.4(L)      Alkaline Phosphatase 67      ALT 5(L)      Anion Gap 11      Appearance, UA  Cloudy(A)     AST 11      Bacteria, UA  Many(A)     Baso # 0.10      Basophil% 0.7      Bilirubin (UA)  Negative     Total Bilirubin 0.3  Comment:  For infants and newborns, interpretation of results should be based  on gestational age, weight and in agreement with clinical  observations.  Premature Infant recommended reference ranges:  Up to 24 hours.............<8.0 mg/dL  Up to 48 hours............<12.0 mg/dL  3-5 days..................<15.0 mg/dL  6-29 days.................<15.0 mg/dL        BUN, Bld 15      Calcium 9.3      Chloride 105      CO2 25      Color, UA  Yellow     Creatinine 1.6(H)      Differential Method Automated      eGFR if  47(A)      eGFR if non  41  Comment:  Calculation used to obtain the estimated glomerular filtration  rate (eGFR) is the CKD-EPI equation. Since race is unknown   in our  information system, the eGFR values for   -American and Non--American patients are given   for each creatinine result.  (A)      Eos # 0.4      Eosinophil% 4.2      Glucose 84      Glucose, UA  Negative     Gran # 7.0      Gran% 74.6(H)      Hematocrit 29.6(L)      Hemoglobin 10.0(L)      Hyaline Casts, UA  0     Ketones, UA  Negative     Leukocytes, UA  3+(A)     Lymph # 1.4      Lymph% 14.6(L)      MCH 32.9(H)      MCHC 33.9      MCV 97      Microscopic Comment  SEE COMMENT  Comment:  Other formed elements not mentioned in the report are not   present in the microscopic examination.        Mono # 0.6      Mono% 5.9      MPV 8.9(L)      Nitrite, UA  Negative     Occult Blood UA  3+(A)     pH, UA  7.0     Platelets 290      Potassium 3.8      Total Protein 6.8      Protein, UA  1+  Comment:  Recommend a 24 hour urine protein or a urine   protein/creatinine ratio if globulin induced proteinuria is  clinically suspected.  (A)     RBC 3.05(L)      RBC, UA  10(H)     RDW 13.8      Sodium 141      Specific Gravity, UA  1.010     Specimen UA  Urine, Clean Catch     Urobilinogen, UA  Negative     WBC Clumps, UA  Few(A)     WBC, UA  >100(H)     WBC 9.30          All pertinent labs within the past 24 hours have been reviewed.    Assessment/Plan:     * Intractable abdominal pain    Possibly due to UTI +/- pyelonephritis vs mesenteric mass vs colon origin  PRN analgesics  Serial abdominal exams  Continue antibiotic as per below  Consider further testing - US or XR to confirm appropriate position of ureteral stent and no hydronephrosis  Consider cystoscopy, ureteroscopy          Acute cystitis with hematuria    Most recent urine culture is without growth. The prior urine culture reveals e Faecalis which is sensitive to Ampicillin, Nitrofurantoin, Vancomycin. Unfortunately she has anaphylactic reaction to penicillins and CKD III precludes her from receiving Nitrofurantoin.  Consult pharmacist to dose  Vancomycin  Consider further testing to confirm ureteral stents are appropriately positioned  Consider consulting a urologist          Mesenteric mass    Patient has an appointment on the 25th at [facility] for evaluation of this  Consider repeat imaging to rule out complication of the mass        Ureteral stricture s/p bilateral ureteral stents secondary to XRT for cervical cancer    Consider further testing to confirm ureteral stents are appropriately positioned  Consider consulting a urologist          CKD (chronic kidney disease) stage 3, GFR 30-59 ml/min    Avoid non-essential nephrotoxins, dose medications according to GFR  Monitor I/O, daily weight. Monitor for volume overload            VTE Risk Mitigation         Ordered     enoxaparin injection 40 mg  Daily     Route:  Subcutaneous        07/21/17 0222     Low Risk of VTE  Once      07/20/17 2124        Bethany Ocampo PA-C  Department of Hospital Medicine   [facility]

## 2017-07-21 NOTE — CONSULTS
Cesilia Dozier [id number] is a 37 y.o. female who has been consulted for vancomycin dosing.    The patient has the following labs:     Date Creatinine (mg/dl)    BUN WBC Count   7/21/2017 Estimated Creatinine Clearance: 44.6 mL/min (based on Cr of 1.6). Lab Results   Component Value Date    BUN 15 07/20/2017     Lab Results   Component Value Date    WBC 9.30 07/20/2017        Current weight is 70.8 kg (156 lb)      The patient will be started on vancomycin at a dose of 1000 mg every 24 hours. The vancomycin trough has been ordered for 7/23 at 0430.  Target trough goal is 10 to 15.    Patient will be followed by pharmacy for changes in renal function, toxicity, and efficacy.   Thank you for allowing us to participate in this patient's care.     Krystle Vieira

## 2017-07-21 NOTE — ASSESSMENT & PLAN NOTE
Possibly due to UTI +/- pyelonephritis vs mesenteric mass vs colon origin  PRN analgesics  Serial abdominal exams  Continue antibiotic as per below  Consider further testing - US or XR to confirm appropriate position of ureteral stent and no hydronephrosis  Consider cystoscopy, ureteroscopy

## 2017-07-21 NOTE — PLAN OF CARE
CM met with patient at bedside and verified demographic and insurance information. Patient lives with spouse, Darlin Dozier [phone], reports independence at home, pharmacy is [facility] in [place], denies HH, POA or living will.  PCP is dr. Sheppard.  Karla olea to return home with no discharge needs. CM instructed patient on dc planning folder and left folder in room.      07/21/17 0963   Discharge Assessment   Assessment Type Discharge Planning Assessment   Confirmed/corrected address and phone number on facesheet? Yes   Assessment information obtained from? Patient   Communicated expected length of stay with patient/caregiver yes   Prior to hospitilization cognitive status: Alert/Oriented   Prior to hospitalization functional status: Independent   Current cognitive status: Alert/Oriented   Current Functional Status: Independent   Arrived From home or self-care   Lives With child(garo), dependent;spouse   Able to Return to Prior Arrangements yes   Is patient able to care for self after discharge? Yes   Does the patient have family/friends to help with healtcare needs after discharge? yes   How many people do you have in your home that can help with your care after discharge? 1   Who are your caregiver(s) and their phone number(s)? Darlin Dozier [phone]   Patient's perception of discharge disposition home or selfcare   Readmission Within The Last 30 Days previous discharge plan unsuccessful   Patient currently being followed by outpatient case management? No   Patient currently receives home health services? No   Does the patient currently use HME? No   Patient currently receives private duty nursing? No   Patient currently receives any other outside agency services? No   Equipment Currently Used at Home none   Do you have any problems affording any of your prescribed medications? No   Is the patient taking medications as prescribed? yes   Do you have any financial concerns preventing you from receiving the  healthcare you need? No   Does the patient have transportation to healthcare appointments? Yes   On Dialysis? No   Does the patient receive services at the [facility]? No   Are there any open cases? No   Discharge Plan A Home   Patient/Family In Agreement With Plan yes

## 2017-07-21 NOTE — CONSULTS
Cesilia Dozier [id number] is a 37 y.o. female who has been consulted for vancomycin dosing.    The patient has the following labs:     Date Creatinine (mg/dl)    BUN WBC Count   7/21/2017 Estimated Creatinine Clearance: 47.6 mL/min (based on Cr of 1.5). Lab Results   Component Value Date    BUN 12 07/21/2017     Lab Results   Component Value Date    WBC 6.80 07/21/2017        Current weight is 70.8 kg (156 lb)    Pt is receiving vancomycin 1000 mg every 24 hours. Target trough range is 10 -15 mg/dL. Patient will be changed to a vancomycin 1250 mg every 24 hours. A vancomycin trough has been ordered prior to 3rd dose due 7/23/17 at 0430.      Patient will be followed by pharmacy for changes in renal function, toxicity, and efficacy.  Thank you for allowing us to participate in this patient's care.     Audie Ayala   07/21/2017

## 2017-07-21 NOTE — ASSESSMENT & PLAN NOTE
Consider further testing to confirm ureteral stents are appropriately positioned  Consider consulting a urologist

## 2017-07-21 NOTE — ASSESSMENT & PLAN NOTE
Patient has an appointment on the 25th at [facility] for evaluation of this  Consider repeat imaging to rule out complication of the mass

## 2017-07-21 NOTE — SUBJECTIVE & OBJECTIVE
Past Medical History:   Diagnosis Date    Anemia     Cervical cancer     cervical    Encounter for blood transfusion     Hydronephrosis     PONV (postoperative nausea and vomiting)     Pyelonephritis        Past Surgical History:   Procedure Laterality Date    COLONOSCOPY N/A 9/22/2016    Procedure: COLONOSCOPY;  Surgeon: Joe Moe MD;  Location: [facility];  Service: Endoscopy;  Laterality: N/A;    HYSTERECTOMY      Partial    TUBAL LIGATION      URETERAL STENT PLACEMENT  07/2016       Review of patient's allergies indicates:   Allergen Reactions    Pcn [penicillins] Anaphylaxis       No current facility-administered medications on file prior to encounter.      Current Outpatient Prescriptions on File Prior to Encounter   Medication Sig    iron 18 mg Tab Take by mouth.    levoFLOXacin (LEVAQUIN) 250 MG tablet Take 1 tablet (250 mg total) by mouth once daily.    tolterodine (DETROL LA) 4 MG 24 hr capsule Take 4 mg by mouth once daily.     Family History     Problem Relation (Age of Onset)    Asthma Brother    Drug abuse Father    No Known Problems Mother, Sister        Social History Main Topics    Smoking status: Never Smoker    Smokeless tobacco: Never Used    Alcohol use No    Drug use: No    Sexual activity: Yes     Partners: Male     Birth control/ protection: See Surgical Hx     Review of Systems   Constitutional: Negative for chills and fever.   HENT: Negative for congestion and sore throat.    Eyes: Negative for photophobia and visual disturbance.   Respiratory: Negative for cough and chest tightness.    Cardiovascular: Negative for palpitations and leg swelling.   Gastrointestinal: Positive for abdominal pain, blood in stool and nausea. Negative for abdominal distention and diarrhea.   Genitourinary: Positive for hematuria. Negative for dysuria.   Musculoskeletal: Negative for neck pain and neck stiffness.   Skin: Negative for rash and wound.   Neurological: Negative for dizziness  and syncope.   Psychiatric/Behavioral: Negative for dysphoric mood. The patient is not nervous/anxious.      Objective:     Vital Signs (Most Recent):  Temp: 98 °F (36.7 °C) (07/21/17 0000)  Pulse: 81 (07/21/17 0000)  Resp: 18 (07/21/17 0000)  BP: 100/63 (07/21/17 0000)  SpO2: 100 % (07/21/17 0000) Vital Signs (24h Range):  Temp:  [98 °F (36.7 °C)-98.8 °F (37.1 °C)] 98 °F (36.7 °C)  Pulse:  [] 81  Resp:  [17-20] 18  SpO2:  [99 %-100 %] 100 %  BP: (100-113)/(61-76) 100/63     Weight: 70.8 kg (156 lb)  Body mass index is 25.18 kg/m².    Physical Exam   Constitutional: She is oriented to person, place, and time. She appears well-developed and well-nourished. No distress.   HENT:   Head: Normocephalic and atraumatic.   Nose: Nose normal.   Eyes: Right eye exhibits no discharge. Left eye exhibits no discharge. No scleral icterus.   Neck: Neck supple. No JVD present.   Cardiovascular: Normal rate and regular rhythm.    Pulmonary/Chest: Effort normal and breath sounds normal.   Abdominal: Soft. Bowel sounds are normal. She exhibits no distension. There is tenderness. There is no rebound and no guarding.   Musculoskeletal: She exhibits no edema or tenderness.   Neurological: She is alert and oriented to person, place, and time.   Skin: Skin is warm and dry. She is not diaphoretic.   Psychiatric: She has a normal mood and affect. Her behavior is normal.   Nursing note and vitals reviewed.       Significant Labs:   Recent Lab Results       07/20/17  1728 07/20/17  1653      Albumin 3.4(L)      Alkaline Phosphatase 67      ALT 5(L)      Anion Gap 11      Appearance, UA  Cloudy(A)     AST 11      Bacteria, UA  Many(A)     Baso # 0.10      Basophil% 0.7      Bilirubin (UA)  Negative     Total Bilirubin 0.3  Comment:  For infants and newborns, interpretation of results should be based  on gestational age, weight and in agreement with clinical  observations.  Premature Infant recommended reference ranges:  Up to 24  hours.............<8.0 mg/dL  Up to 48 hours............<12.0 mg/dL  3-5 days..................<15.0 mg/dL  6-29 days.................<15.0 mg/dL        BUN, Bld 15      Calcium 9.3      Chloride 105      CO2 25      Color, UA  Yellow     Creatinine 1.6(H)      Differential Method Automated      eGFR if  47(A)      eGFR if non  41  Comment:  Calculation used to obtain the estimated glomerular filtration  rate (eGFR) is the CKD-EPI equation. Since race is unknown   in our information system, the eGFR values for   -American and Non--American patients are given   for each creatinine result.  (A)      Eos # 0.4      Eosinophil% 4.2      Glucose 84      Glucose, UA  Negative     Gran # 7.0      Gran% 74.6(H)      Hematocrit 29.6(L)      Hemoglobin 10.0(L)      Hyaline Casts, UA  0     Ketones, UA  Negative     Leukocytes, UA  3+(A)     Lymph # 1.4      Lymph% 14.6(L)      MCH 32.9(H)      MCHC 33.9      MCV 97      Microscopic Comment  SEE COMMENT  Comment:  Other formed elements not mentioned in the report are not   present in the microscopic examination.        Mono # 0.6      Mono% 5.9      MPV 8.9(L)      Nitrite, UA  Negative     Occult Blood UA  3+(A)     pH, UA  7.0     Platelets 290      Potassium 3.8      Total Protein 6.8      Protein, UA  1+  Comment:  Recommend a 24 hour urine protein or a urine   protein/creatinine ratio if globulin induced proteinuria is  clinically suspected.  (A)     RBC 3.05(L)      RBC, UA  10(H)     RDW 13.8      Sodium 141      Specific Gravity, UA  1.010     Specimen UA  Urine, Clean Catch     Urobilinogen, UA  Negative     WBC Clumps, UA  Few(A)     WBC, UA  >100(H)     WBC 9.30          All pertinent labs within the past 24 hours have been reviewed.

## 2017-07-22 LAB
ALBUMIN SERPL BCP-MCNC: 3.2 G/DL
ALP SERPL-CCNC: 69 U/L
ALT SERPL W/O P-5'-P-CCNC: 6 U/L
ANION GAP SERPL CALC-SCNC: 9 MMOL/L
AST SERPL-CCNC: 12 U/L
BACTERIA UR CULT: NO GROWTH
BASOPHILS # BLD AUTO: 0 K/UL
BASOPHILS NFR BLD: 0.5 %
BILIRUB SERPL-MCNC: 0.2 MG/DL
BUN SERPL-MCNC: 10 MG/DL
CALCIUM SERPL-MCNC: 9.6 MG/DL
CHLORIDE SERPL-SCNC: 104 MMOL/L
CO2 SERPL-SCNC: 25 MMOL/L
CREAT SERPL-MCNC: 1.4 MG/DL
CRP SERPL-MCNC: 5.5 MG/L
DIFFERENTIAL METHOD: ABNORMAL
EOSINOPHIL # BLD AUTO: 0.5 K/UL
EOSINOPHIL NFR BLD: 8.3 %
ERYTHROCYTE [DISTWIDTH] IN BLOOD BY AUTOMATED COUNT: 13.3 %
EST. GFR  (AFRICAN AMERICAN): 55 ML/MIN/1.73 M^2
EST. GFR  (NON AFRICAN AMERICAN): 48 ML/MIN/1.73 M^2
GLUCOSE SERPL-MCNC: 86 MG/DL
HCT VFR BLD AUTO: 29.4 %
HGB BLD-MCNC: 9.9 G/DL
LYMPHOCYTES # BLD AUTO: 1.6 K/UL
LYMPHOCYTES NFR BLD: 25.4 %
MAGNESIUM SERPL-MCNC: 1.6 MG/DL
MCH RBC QN AUTO: 32.4 PG
MCHC RBC AUTO-ENTMCNC: 33.5 G/DL
MCV RBC AUTO: 97 FL
MONOCYTES # BLD AUTO: 0.6 K/UL
MONOCYTES NFR BLD: 9.1 %
NEUTROPHILS # BLD AUTO: 3.6 K/UL
NEUTROPHILS NFR BLD: 56.7 %
PHOSPHATE SERPL-MCNC: 3.5 MG/DL
PLATELET # BLD AUTO: 242 K/UL
PMV BLD AUTO: 8.6 FL
POTASSIUM SERPL-SCNC: 4.7 MMOL/L
PROT SERPL-MCNC: 6.4 G/DL
RBC # BLD AUTO: 3.04 M/UL
SODIUM SERPL-SCNC: 138 MMOL/L
WBC # BLD AUTO: 6.3 K/UL

## 2017-07-22 PROCEDURE — 63600175 PHARM REV CODE 636 W HCPCS: Performed by: HOSPITALIST

## 2017-07-22 PROCEDURE — 84100 ASSAY OF PHOSPHORUS: CPT

## 2017-07-22 PROCEDURE — 12000002 HC ACUTE/MED SURGE SEMI-PRIVATE ROOM

## 2017-07-22 PROCEDURE — 36415 COLL VENOUS BLD VENIPUNCTURE: CPT

## 2017-07-22 PROCEDURE — 86140 C-REACTIVE PROTEIN: CPT

## 2017-07-22 PROCEDURE — 25000003 PHARM REV CODE 250: Performed by: PHYSICIAN ASSISTANT

## 2017-07-22 PROCEDURE — 25000003 PHARM REV CODE 250: Performed by: INTERNAL MEDICINE

## 2017-07-22 PROCEDURE — 83735 ASSAY OF MAGNESIUM: CPT

## 2017-07-22 PROCEDURE — 85025 COMPLETE CBC W/AUTO DIFF WBC: CPT

## 2017-07-22 PROCEDURE — 63600175 PHARM REV CODE 636 W HCPCS: Performed by: INTERNAL MEDICINE

## 2017-07-22 PROCEDURE — 25000003 PHARM REV CODE 250: Performed by: HOSPITALIST

## 2017-07-22 PROCEDURE — 80053 COMPREHEN METABOLIC PANEL: CPT

## 2017-07-22 PROCEDURE — 63600175 PHARM REV CODE 636 W HCPCS: Performed by: PHYSICIAN ASSISTANT

## 2017-07-22 RX ORDER — PHENAZOPYRIDINE HYDROCHLORIDE 100 MG/1
100 TABLET, FILM COATED ORAL
Status: DISCONTINUED | OUTPATIENT
Start: 2017-07-22 | End: 2017-07-25 | Stop reason: HOSPADM

## 2017-07-22 RX ORDER — DAPTOMYCIN 50 MG/ML
4 INJECTION, POWDER, LYOPHILIZED, FOR SOLUTION INTRAVENOUS
Status: DISCONTINUED | OUTPATIENT
Start: 2017-07-22 | End: 2017-07-22

## 2017-07-22 RX ORDER — ACETAMINOPHEN 10 MG/ML
1000 INJECTION, SOLUTION INTRAVENOUS EVERY 6 HOURS
Status: COMPLETED | OUTPATIENT
Start: 2017-07-22 | End: 2017-07-23

## 2017-07-22 RX ORDER — DIPHENHYDRAMINE HCL 25 MG
25 CAPSULE ORAL EVERY 6 HOURS PRN
Status: DISCONTINUED | OUTPATIENT
Start: 2017-07-22 | End: 2017-07-25 | Stop reason: HOSPADM

## 2017-07-22 RX ADMIN — HYDROMORPHONE HYDROCHLORIDE 1 MG: 2 INJECTION INTRAMUSCULAR; INTRAVENOUS; SUBCUTANEOUS at 07:07

## 2017-07-22 RX ADMIN — ACETAMINOPHEN 1000 MG: 10 INJECTION, SOLUTION INTRAVENOUS at 05:07

## 2017-07-22 RX ADMIN — PHENAZOPYRIDINE HYDROCHLORIDE 100 MG: 100 TABLET ORAL at 12:07

## 2017-07-22 RX ADMIN — HYDROMORPHONE HYDROCHLORIDE 1 MG: 2 INJECTION INTRAMUSCULAR; INTRAVENOUS; SUBCUTANEOUS at 06:07

## 2017-07-22 RX ADMIN — DAPTOMYCIN 285 MG: 500 INJECTION, POWDER, LYOPHILIZED, FOR SOLUTION INTRAVENOUS at 05:07

## 2017-07-22 RX ADMIN — VANCOMYCIN HYDROCHLORIDE 1250 MG: 1 INJECTION, POWDER, LYOPHILIZED, FOR SOLUTION INTRAVENOUS at 05:07

## 2017-07-22 RX ADMIN — PANTOPRAZOLE SODIUM 40 MG: 40 TABLET, DELAYED RELEASE ORAL at 08:07

## 2017-07-22 RX ADMIN — DIPHENHYDRAMINE HYDROCHLORIDE 25 MG: 25 CAPSULE ORAL at 03:07

## 2017-07-22 RX ADMIN — HYDROMORPHONE HYDROCHLORIDE 1 MG: 2 INJECTION INTRAMUSCULAR; INTRAVENOUS; SUBCUTANEOUS at 02:07

## 2017-07-22 RX ADMIN — CEFTRIAXONE 2 G: 2 INJECTION, SOLUTION INTRAVENOUS at 03:07

## 2017-07-22 RX ADMIN — ZOLPIDEM TARTRATE 5 MG: 5 TABLET, FILM COATED ORAL at 09:07

## 2017-07-22 RX ADMIN — HYDROMORPHONE HYDROCHLORIDE 1 MG: 2 INJECTION INTRAMUSCULAR; INTRAVENOUS; SUBCUTANEOUS at 03:07

## 2017-07-22 RX ADMIN — HYDROMORPHONE HYDROCHLORIDE 1 MG: 2 INJECTION INTRAMUSCULAR; INTRAVENOUS; SUBCUTANEOUS at 10:07

## 2017-07-22 RX ADMIN — PHENAZOPYRIDINE HYDROCHLORIDE 100 MG: 100 TABLET ORAL at 05:07

## 2017-07-22 RX ADMIN — HYDROMORPHONE HYDROCHLORIDE 1 MG: 2 INJECTION INTRAMUSCULAR; INTRAVENOUS; SUBCUTANEOUS at 11:07

## 2017-07-22 RX ADMIN — ACETAMINOPHEN 1000 MG: 10 INJECTION, SOLUTION INTRAVENOUS at 12:07

## 2017-07-22 RX ADMIN — ACETAMINOPHEN 1000 MG: 10 INJECTION, SOLUTION INTRAVENOUS at 11:07

## 2017-07-22 RX ADMIN — OXYCODONE HYDROCHLORIDE 10 MG: 5 TABLET ORAL at 08:07

## 2017-07-22 RX ADMIN — ENOXAPARIN SODIUM 40 MG: 100 INJECTION SUBCUTANEOUS at 05:07

## 2017-07-22 NOTE — PLAN OF CARE
Problem: Patient Care Overview  Goal: Plan of Care Review  Outcome: Ongoing (interventions implemented as appropriate)  Pt remains free from injury or falls. Vital signs stable throughout night on room air. Positions self independently. Pain managed with IV medication, no complaints of nausea.  Bed in low locked position and call light within reach. Will continue to monitor.

## 2017-07-22 NOTE — CONSULTS
Cesilia Dozier [id number] is a 37 y.o. female who has been consulted for vancomycin dosing.    Pharmacy consult for vancomycin dosing in no longer required.  Vancomycin was discontinued.    Thank you for allowing us to participate in this patient's care.     Howie Farley, Pharmacist

## 2017-07-22 NOTE — CONSULTS
"Consult Note  Infectious Disease    Reason for Consult:  Recurrent UTIs    HPI: Cesilia Dozier is a  37 y.o. female known to me from prior consults, with recurring admissions for flank pain, pyuria, and "sepsis". Most urine cultures are negative, though the last positive was 2 weeks ago with enterococcus, treated with zyvox. She did required outpatient IV vanc a couple of years ago for enterococcal pyelonephritis. She has bilateral ureteral stents due to obstruction associated with prior treatment for cervical cancer. She has a new mesenteric mass which has yet to be biopsied. Last stent exchange was 6/28 at [facility].   She has not had any fever, gets chills now and then, but her left flank pain is the most persistent and disabling. She reports that laying in a hot tub gets her temporary relief. Her description is muscular but not spine related. No history of MRI lumbar spine. She can only remember an occasion when she felt that antibiotics made a difference, but can endorse that her urine quality is best during and after a hospitalization with gradual resumption of darker, stronger smelling urine with subsequent bladder discomfort and general malaise. She has lost a substantial amount of weight since my last consult with her.       Review of patient's allergies indicates:   Allergen Reactions    Pcn [penicillins] Anaphylaxis     Past Medical History:   Diagnosis Date    Anemia     Cervical cancer     S/p chemo, radiation with bilateral ureteral obstruction    Encounter for blood transfusion     Hydronephrosis     PONV (postoperative nausea and vomiting)     Pyelonephritis    Recurrent flank pain, and py uria  Bilateral ureteral obstruction requiring chronic stents    Past Surgical History:   Procedure Laterality Date    COLONOSCOPY N/A 9/22/2016    Procedure: COLONOSCOPY;  Surgeon: Joe Moe MD;  Location: [facility];  Service: Endoscopy;  Laterality: N/A;    HYSTERECTOMY      Partial    " TUBAL LIGATION      URETERAL STENT PLACEMENT  07/2016     Social History     Social History    Marital status:      Spouse name: N/A    Number of children: N/A    Years of education: N/A     Social History Main Topics    Smoking status: Never Smoker    Smokeless tobacco: Never Used    Alcohol use No    Drug use: No    Sexual activity: Yes     Partners: Male     Birth control/ protection: See Surgical Hx     Other Topics Concern    None     Social History Narrative    None     Family History   Problem Relation Age of Onset    No Known Problems Mother     Drug abuse Father     No Known Problems Sister     Asthma Brother        Pertinent medications noted:     Review of Systems:   No recent chills, fever, sweats  No change in vision,  No sinus congestion,   No pain in mouth or throat. No chest pain,  No cough, sputum production, pleurisy,  shortness of breath,   No recent nausea, vomiting, diarrhea,   No dysuria, hematuria,but has occasional strangury, no retention,   She does experience bladder pressure and discomfort prior to increasing flank pain.  No swelling of joints, redness of joints, injuries, or new focal pain  No unusual headaches, dizziness,  No anxiety, depression, substance abuse, sleep disturbance  No diabetes, thyroid, hypogonadal conditions  No bleeding, lymphadenopathy. She is seeing gyn onc tues 7/25 at 1045am    EXAM & DIAGNOSTICS REVIEWED:   Vitals:     Temp:  [97.4 °F (36.3 °C)-98.3 °F (36.8 °C)]   Temp: 98.1 °F (36.7 °C) (07/22/17 1145)  Pulse: 90 (07/22/17 1145)  Resp: 18 (07/22/17 1145)  BP: 100/62 (07/22/17 1145)  SpO2: 100 % (07/22/17 1145)    Intake/Output Summary (Last 24 hours) at 07/22/17 1422  Last data filed at 07/22/17 0600   Gross per 24 hour   Intake             1040 ml   Output             1450 ml   Net             -410 ml       General:  In NAD. Looks non toxic but thinner and less well. Alert and attentive, cooperative  Eyes:  Anicteric, PERRL,  "EOMI  ENT:  Mouth w/ pink MMM, no lesions/exudate, good dentition  Neck:  Trachea midline, supple, no adenopathy appreciated  Lungs: clear  Heart:  RRR, no gallop/murmur noted  Abd:  soft, NT, ND, normal BS, no masses/organomegaly appreciated.  :  Voids, no suprapubic distention, tenderness, no flank tenderness  Musc:  Joints without effusion, swelling,  erythema, synovitis, and no tenderness to spinal palpation  Skin:  Generally warm, dry, normal for color. No rashes. No palmar or plantar    lesions. No subungual petechiae  Wound:   Neuro: AAOx3, speech clear, moves all extrems equally  Extrem: No edema, erythema, phlebitis, cellulitis,   VAD:    Lines/Tubes/Drains:    General Labs reviewed:    Recent Labs  Lab 07/22/17  0537   WBC 6.30   RBC 3.04*   HGB 9.9*   HCT 29.4*      MCV 97   MCH 32.4*   MCHC 33.5       Recent Labs  Lab 07/22/17  0537   CALCIUM 9.6   PROT 6.4      K 4.7   CO2 25      BUN 10   CREATININE 1.4   ALKPHOS 69   ALT 6*   AST 12   BILITOT 0.2           Micro:   2wk ago   Urine Culture, Routine  ENTEROCOCCUS FAECALIS   >100,000 cfu/ml    Resulting Agency [facility]   Susceptibility      Enterococcus faecalis     CULTURE, URINE     Ampicillin <=2 "><=2  Sensitive     Ciprofloxacin >2  Resistant     Nitrofurantoin <=32 "><=32  Sensitive     Tetracycline >8  Resistant     Vancomycin 2  Sensitive           Microbiology Results (last 7 days)     Procedure Component Value Units Date/Time    Urine culture [[id number]] Collected:  07/20/17 1652    Order Status:  Completed Specimen:  Urine from Urine, Clean Catch Updated:  07/22/17 0914     Urine Culture, Routine No growth        Imaging Reviewed:   abd CT 7/15/17      IMPRESSION & PLAN   1.  Very nice woman with tremendous challenges from a  and gyn perspective  2. Persistent flank pain, responds best to percocet/flexeril, but usually blamed on urinary tract infection. Multiple negative urine cultures but extensive antibiotic exposure makes " suppression of growth without cure possible  3. Pyuria in the presence of chronic ureteral stents is not unexpected but infection is also possible  4. Cervical cancer with new mesenteric mass, worrisome for recurrence, with weight loss  5. CKD    Recommendation: While I am not convinced that antibiotics will clear her urinary tract or reduce her flank pain, I am willing to try this.   Rec: outpatient daptomycin 4 mg/kg IV daily and rocephin 2 g IV daily from now until August 4th through a picc line

## 2017-07-22 NOTE — PLAN OF CARE
Problem: Patient Care Overview  Goal: Plan of Care Review  Outcome: Ongoing (interventions implemented as appropriate)  A&Ox3. Continent of b&b, ambulates to bathroom. Cloudy yellow urine noted. LBM today. ID consulted today,  seen pt. New orders for IV abt therapy, PICC line placement ordered, Charge nurse aware of PICC line orders. C/o LLQ pain that radiates to her back, Good pain control with IV diluadid, IV tylenol, and PO pyridium. Remains free from falls. Bed in low position, call light within reach. Verbalized understanding of plan of care.

## 2017-07-23 LAB
ALBUMIN SERPL BCP-MCNC: 3.1 G/DL
ALP SERPL-CCNC: 68 U/L
ALT SERPL W/O P-5'-P-CCNC: <5 U/L
ANION GAP SERPL CALC-SCNC: 11 MMOL/L
AST SERPL-CCNC: 12 U/L
BASOPHILS # BLD AUTO: 0.1 K/UL
BASOPHILS NFR BLD: 1.2 %
BILIRUB SERPL-MCNC: 0.2 MG/DL
BUN SERPL-MCNC: 12 MG/DL
CALCIUM SERPL-MCNC: 9.4 MG/DL
CHLORIDE SERPL-SCNC: 103 MMOL/L
CO2 SERPL-SCNC: 25 MMOL/L
CREAT SERPL-MCNC: 1.4 MG/DL
DIFFERENTIAL METHOD: ABNORMAL
EOSINOPHIL # BLD AUTO: 0.5 K/UL
EOSINOPHIL NFR BLD: 9.9 %
ERYTHROCYTE [DISTWIDTH] IN BLOOD BY AUTOMATED COUNT: 13.4 %
EST. GFR  (AFRICAN AMERICAN): 55 ML/MIN/1.73 M^2
EST. GFR  (NON AFRICAN AMERICAN): 48 ML/MIN/1.73 M^2
GLUCOSE SERPL-MCNC: 84 MG/DL
HCT VFR BLD AUTO: 29.3 %
HGB BLD-MCNC: 10 G/DL
LYMPHOCYTES # BLD AUTO: 1.5 K/UL
LYMPHOCYTES NFR BLD: 28 %
MAGNESIUM SERPL-MCNC: 1.5 MG/DL
MCH RBC QN AUTO: 33.3 PG
MCHC RBC AUTO-ENTMCNC: 34.3 G/DL
MCV RBC AUTO: 97 FL
MONOCYTES # BLD AUTO: 0.4 K/UL
MONOCYTES NFR BLD: 8.2 %
NEUTROPHILS # BLD AUTO: 2.7 K/UL
NEUTROPHILS NFR BLD: 52.7 %
PHOSPHATE SERPL-MCNC: 4 MG/DL
PLATELET # BLD AUTO: 240 K/UL
PMV BLD AUTO: 9 FL
POTASSIUM SERPL-SCNC: 4.3 MMOL/L
PROT SERPL-MCNC: 6.3 G/DL
RBC # BLD AUTO: 3.01 M/UL
SODIUM SERPL-SCNC: 139 MMOL/L
WBC # BLD AUTO: 5.2 K/UL

## 2017-07-23 PROCEDURE — 85025 COMPLETE CBC W/AUTO DIFF WBC: CPT

## 2017-07-23 PROCEDURE — 25000003 PHARM REV CODE 250: Performed by: INTERNAL MEDICINE

## 2017-07-23 PROCEDURE — 63600175 PHARM REV CODE 636 W HCPCS: Performed by: INTERNAL MEDICINE

## 2017-07-23 PROCEDURE — 84100 ASSAY OF PHOSPHORUS: CPT

## 2017-07-23 PROCEDURE — 25000003 PHARM REV CODE 250: Performed by: HOSPITALIST

## 2017-07-23 PROCEDURE — C1751 CATH, INF, PER/CENT/MIDLINE: HCPCS

## 2017-07-23 PROCEDURE — 80053 COMPREHEN METABOLIC PANEL: CPT

## 2017-07-23 PROCEDURE — 12000002 HC ACUTE/MED SURGE SEMI-PRIVATE ROOM

## 2017-07-23 PROCEDURE — 83735 ASSAY OF MAGNESIUM: CPT

## 2017-07-23 PROCEDURE — 63600175 PHARM REV CODE 636 W HCPCS: Performed by: HOSPITALIST

## 2017-07-23 PROCEDURE — 63600175 PHARM REV CODE 636 W HCPCS: Performed by: PHYSICIAN ASSISTANT

## 2017-07-23 PROCEDURE — 02HV33Z INSERTION OF INFUSION DEVICE INTO SUPERIOR VENA CAVA, PERCUTANEOUS APPROACH: ICD-10-PCS | Performed by: INTERNAL MEDICINE

## 2017-07-23 PROCEDURE — 36415 COLL VENOUS BLD VENIPUNCTURE: CPT

## 2017-07-23 PROCEDURE — 36569 INSJ PICC 5 YR+ W/O IMAGING: CPT

## 2017-07-23 RX ORDER — LANOLIN ALCOHOL/MO/W.PET/CERES
400 CREAM (GRAM) TOPICAL DAILY
Status: DISCONTINUED | OUTPATIENT
Start: 2017-07-23 | End: 2017-07-25 | Stop reason: HOSPADM

## 2017-07-23 RX ORDER — MAGNESIUM SULFATE HEPTAHYDRATE 40 MG/ML
2 INJECTION, SOLUTION INTRAVENOUS ONCE
Status: COMPLETED | OUTPATIENT
Start: 2017-07-23 | End: 2017-07-23

## 2017-07-23 RX ADMIN — DAPTOMYCIN 285 MG: 500 INJECTION, POWDER, LYOPHILIZED, FOR SOLUTION INTRAVENOUS at 08:07

## 2017-07-23 RX ADMIN — PHENAZOPYRIDINE HYDROCHLORIDE 100 MG: 100 TABLET ORAL at 12:07

## 2017-07-23 RX ADMIN — MAGNESIUM SULFATE HEPTAHYDRATE 2 G: 40 INJECTION, SOLUTION INTRAVENOUS at 08:07

## 2017-07-23 RX ADMIN — HYDROMORPHONE HYDROCHLORIDE 1 MG: 2 INJECTION INTRAMUSCULAR; INTRAVENOUS; SUBCUTANEOUS at 11:07

## 2017-07-23 RX ADMIN — HYDROMORPHONE HYDROCHLORIDE 1 MG: 2 INJECTION INTRAMUSCULAR; INTRAVENOUS; SUBCUTANEOUS at 03:07

## 2017-07-23 RX ADMIN — PHENAZOPYRIDINE HYDROCHLORIDE 100 MG: 100 TABLET ORAL at 05:07

## 2017-07-23 RX ADMIN — HYDROMORPHONE HYDROCHLORIDE 1 MG: 2 INJECTION INTRAMUSCULAR; INTRAVENOUS; SUBCUTANEOUS at 12:07

## 2017-07-23 RX ADMIN — HYDROMORPHONE HYDROCHLORIDE 1 MG: 2 INJECTION INTRAMUSCULAR; INTRAVENOUS; SUBCUTANEOUS at 07:07

## 2017-07-23 RX ADMIN — ENOXAPARIN SODIUM 40 MG: 100 INJECTION SUBCUTANEOUS at 05:07

## 2017-07-23 RX ADMIN — MAGNESIUM OXIDE TAB 400 MG (241.3 MG ELEMENTAL MG) 400 MG: 400 (241.3 MG) TAB at 05:07

## 2017-07-23 RX ADMIN — ZOLPIDEM TARTRATE 5 MG: 5 TABLET, FILM COATED ORAL at 10:07

## 2017-07-23 RX ADMIN — PANTOPRAZOLE SODIUM 40 MG: 40 TABLET, DELAYED RELEASE ORAL at 07:07

## 2017-07-23 RX ADMIN — ACETAMINOPHEN 1000 MG: 10 INJECTION, SOLUTION INTRAVENOUS at 06:07

## 2017-07-23 RX ADMIN — PHENAZOPYRIDINE HYDROCHLORIDE 100 MG: 100 TABLET ORAL at 07:07

## 2017-07-23 RX ADMIN — CEFTRIAXONE 2 G: 2 INJECTION, SOLUTION INTRAVENOUS at 07:07

## 2017-07-23 NOTE — PLAN OF CARE
Problem: Patient Care Overview  Goal: Plan of Care Review  Outcome: Ongoing (interventions implemented as appropriate)  A&Ox3. Continent of b&b, ambulates to bathroom. C/o lower left abd pain that radiates to her back. Good pain control with PO phenazopyridine, IV dilaudid, and IV tylenol. Cont to receive IV abt therapy, remains afebrile. PICC line order per , awaiting placement, charge nurse aware. Remains free from falls. Bed in low position, call light within reach. Verbalized understanding of plan of care.

## 2017-07-23 NOTE — PLAN OF CARE
Problem: Patient Care Overview  Goal: Plan of Care Review  Outcome: Ongoing (interventions implemented as appropriate)  Patient is AAOx4.   Patient is up ad kiet, continent of b&b.  Patient complained of pain, IV pain medication prn given, mild relief obtained.  PRN ambien given, tolerated well.  VS stable, patient remains afebrile.  Patient remains free from injury.  Bed in low position, wheels locked, call light within reach.  Patient verbalized understanding of plan of care.  Will continue to monitor.

## 2017-07-23 NOTE — PROGRESS NOTES
"Progress Note  Hospital Medicine    Admit Date: 7/20/2017    SUBJECTIVE:     Follow-up For:  Intractable abdominal pain      Interval history (See H&P for complete P,F,SHx) : Patient better with dilaudid. Still c/o bladder spasm with urination.    Review of Systems:   Pain scale: 0/10  Gen- Weakness  GI- Pain          OBJECTIVE:     Vital Signs Range (Last 24H):  Temp:  [97.6 °F (36.4 °C)-98.2 °F (36.8 °C)]   Pulse:  [79-97]   Resp:  [16-18]   BP: ()/(54-89)   SpO2:  [97 %-100 %]     I & O (Last 24H):    Intake/Output Summary (Last 24 hours) at 07/23/17 1545  Last data filed at 07/23/17 0700   Gross per 24 hour   Intake             1420 ml   Output             1100 ml   Net              320 ml       Estimated body mass index is 25.18 kg/m² as calculated from the following:    Height as of this encounter: 5' 6" (1.676 m).    Weight as of this encounter: 70.8 kg (156 lb).    Physical Exam:  General- Patient alert and oriented x3 in NAD + cachexia noted  HEENT- PERRLA, EOMI, OP clear, + temporal wasting  Neck- No JVD, Lymphadenopathy, Thyromegaly  CV- Regular rate and rhythm, No Murmur/minerva/rubs  Resp- Lungs CTA Bilaterally, No increased WOB  GI- + tenderness in pelvis  Extrem- No cyanosis, clubbing, edema. Pulses 2+ and symmetric    Laboratory/Diagnostic Data:  Reviewed and noted in plan where applicable- Please see chart for full lab data.    Medications:  Medication list was reviewed and changes noted under Assessment/Plan.    ASSESSMENT/PLAN:     Active Problems:    Active Hospital Problems    Diagnosis  POA    *Intractable abdominal pain [R10.9]-  Likely related to either UTI versus new cancer growth. Manage with IV narcotics + pyridium for spasm.  Yes    Mesenteric mass [K63.9]-  Patient has appt with oncologist at [facility] on Tuesday 7/25.  Yes    CKD (chronic kidney disease) stage 3, GFR 30-59 ml/min [N18.3]- Creatine stable for now. Monitor UOP and serial BMP and adjust therapy as needed. Renally dose " meds.  Yes    Acute cystitis with hematuria [N30.01]-  ID consulted. Enterococcus in previous culture. PICC line ordered and outpatient daptomycin will be set up for her.  Yes    Ureteral stricture s/p bilateral ureteral stents secondary to XRT for cervical cancer [N13.5]-  Chronic. Managed by [facility] urology.  Yes      Resolved Hospital Problems    Diagnosis Date Resolved POA   No resolved problems to display.         Disposition- Home    VTE Risk Mitigation         Ordered     enoxaparin injection 40 mg  Daily     Route:  Subcutaneous        07/21/17 0222     Low Risk of VTE  Once      07/20/17 6774

## 2017-07-23 NOTE — PROCEDURES
"Cesilia Dozier is a 37 y.o. female patient.    Temp: 98.1 °F (36.7 °C) (07/23/17 1513)  Pulse: 79 (07/23/17 1513)  Resp: 18 (07/23/17 1513)  BP: (!) 106/54 (07/23/17 1513)  SpO2: 100 % (07/23/17 1513)  Weight: 70.8 kg (156 lb) (07/20/17 2100)  Height: 5' 6" (167.6 cm) (07/20/17 2100)    PICC  Date/Time: 7/23/2017 5:13 PM  Performed by: NASREEN PAPPAS  Pre-operative Diagnosis: uti   Post-operative diagnosis: uti   Consent Done: Yes  Time out: Immediately prior to procedure a time out was called to verify the correct patient, procedure, equipment, support staff and site/side marked as required  Indications: med administration and vascular access  Anesthesia: local infiltration  Local anesthetic: lidocaine 1% without epinephrine  Anesthetic Total (mL): 4  Preparation: skin prepped with ChloraPrep  Skin prep agent dried: skin prep agent completely dried prior to procedure  Sterile barriers: all five maximum sterile barriers used - cap, mask, sterile gown, sterile gloves, and large sterile sheet  Hand hygiene: hand hygiene performed prior to central venous catheter insertion  Location details: right brachial  Catheter type: double lumen  Catheter size: 5 Fr  Catheter Length: 40cm    Ultrasound guidance: yes  Vessel Caliber: large and patent, compressibility normal  Vascular Doppler: not done  Sterile sheath used.  no esophageal manometryNumber of attempts: 1  Post-procedure: blood return through all ports, chlorhexidine patch and sterile dressing applied  Estimated blood loss (mL): 0  Specimens: No  Implants: No  Assessment: placement verified by x-ray, tip termination and successful placement  Tip Termination Explanation: distal svc  Complications: none  Other complications: pulled back 3 cm and secured at 37 cm  xray called for   Comments: pulled back 3 cm and secured at 37 cm  xray called for         Nasreen Pappas  7/23/2017  "

## 2017-07-24 LAB
ALBUMIN SERPL BCP-MCNC: 3.3 G/DL
ALP SERPL-CCNC: 68 U/L
ALT SERPL W/O P-5'-P-CCNC: 7 U/L
ANION GAP SERPL CALC-SCNC: 9 MMOL/L
AST SERPL-CCNC: 12 U/L
BASOPHILS # BLD AUTO: 0 K/UL
BASOPHILS NFR BLD: 0.6 %
BILIRUB SERPL-MCNC: 0.3 MG/DL
BUN SERPL-MCNC: 11 MG/DL
CALCIUM SERPL-MCNC: 9.6 MG/DL
CHLORIDE SERPL-SCNC: 101 MMOL/L
CO2 SERPL-SCNC: 28 MMOL/L
CREAT SERPL-MCNC: 1.4 MG/DL
DIFFERENTIAL METHOD: ABNORMAL
EOSINOPHIL # BLD AUTO: 0.6 K/UL
EOSINOPHIL NFR BLD: 8.2 %
ERYTHROCYTE [DISTWIDTH] IN BLOOD BY AUTOMATED COUNT: 13.7 %
EST. GFR  (AFRICAN AMERICAN): 55 ML/MIN/1.73 M^2
EST. GFR  (NON AFRICAN AMERICAN): 48 ML/MIN/1.73 M^2
GLUCOSE SERPL-MCNC: 85 MG/DL
HCT VFR BLD AUTO: 29.9 %
HGB BLD-MCNC: 10.2 G/DL
LYMPHOCYTES # BLD AUTO: 1.2 K/UL
LYMPHOCYTES NFR BLD: 18.3 %
MAGNESIUM SERPL-MCNC: 2.3 MG/DL
MCH RBC QN AUTO: 32.7 PG
MCHC RBC AUTO-ENTMCNC: 34.1 G/DL
MCV RBC AUTO: 96 FL
MONOCYTES # BLD AUTO: 0.6 K/UL
MONOCYTES NFR BLD: 8.3 %
NEUTROPHILS # BLD AUTO: 4.3 K/UL
NEUTROPHILS NFR BLD: 64.6 %
PHOSPHATE SERPL-MCNC: 3.8 MG/DL
PLATELET # BLD AUTO: 268 K/UL
PMV BLD AUTO: 8.6 FL
POTASSIUM SERPL-SCNC: 4.3 MMOL/L
PROT SERPL-MCNC: 6.7 G/DL
RBC # BLD AUTO: 3.12 M/UL
SODIUM SERPL-SCNC: 138 MMOL/L
WBC # BLD AUTO: 6.7 K/UL

## 2017-07-24 PROCEDURE — 25000003 PHARM REV CODE 250: Performed by: INTERNAL MEDICINE

## 2017-07-24 PROCEDURE — 99232 SBSQ HOSP IP/OBS MODERATE 35: CPT | Mod: ,,, | Performed by: INTERNAL MEDICINE

## 2017-07-24 PROCEDURE — 63600175 PHARM REV CODE 636 W HCPCS: Performed by: PHYSICIAN ASSISTANT

## 2017-07-24 PROCEDURE — 63600175 PHARM REV CODE 636 W HCPCS: Performed by: INTERNAL MEDICINE

## 2017-07-24 PROCEDURE — 36415 COLL VENOUS BLD VENIPUNCTURE: CPT

## 2017-07-24 PROCEDURE — 80053 COMPREHEN METABOLIC PANEL: CPT

## 2017-07-24 PROCEDURE — 85025 COMPLETE CBC W/AUTO DIFF WBC: CPT

## 2017-07-24 PROCEDURE — 84100 ASSAY OF PHOSPHORUS: CPT

## 2017-07-24 PROCEDURE — 25000003 PHARM REV CODE 250: Performed by: HOSPITALIST

## 2017-07-24 PROCEDURE — 83735 ASSAY OF MAGNESIUM: CPT

## 2017-07-24 PROCEDURE — 12000002 HC ACUTE/MED SURGE SEMI-PRIVATE ROOM

## 2017-07-24 RX ADMIN — DAPTOMYCIN 285 MG: 500 INJECTION, POWDER, LYOPHILIZED, FOR SOLUTION INTRAVENOUS at 08:07

## 2017-07-24 RX ADMIN — MAGNESIUM OXIDE TAB 400 MG (241.3 MG ELEMENTAL MG) 400 MG: 400 (241.3 MG) TAB at 09:07

## 2017-07-24 RX ADMIN — PHENAZOPYRIDINE HYDROCHLORIDE 100 MG: 100 TABLET ORAL at 07:07

## 2017-07-24 RX ADMIN — HYDROMORPHONE HYDROCHLORIDE 1 MG: 2 INJECTION INTRAMUSCULAR; INTRAVENOUS; SUBCUTANEOUS at 08:07

## 2017-07-24 RX ADMIN — HYDROMORPHONE HYDROCHLORIDE 1 MG: 2 INJECTION INTRAMUSCULAR; INTRAVENOUS; SUBCUTANEOUS at 11:07

## 2017-07-24 RX ADMIN — ONDANSETRON 4 MG: 2 INJECTION INTRAMUSCULAR; INTRAVENOUS at 12:07

## 2017-07-24 RX ADMIN — CEFTRIAXONE 2 G: 2 INJECTION, SOLUTION INTRAVENOUS at 05:07

## 2017-07-24 RX ADMIN — ZOLPIDEM TARTRATE 5 MG: 5 TABLET, FILM COATED ORAL at 09:07

## 2017-07-24 RX ADMIN — ENOXAPARIN SODIUM 40 MG: 100 INJECTION SUBCUTANEOUS at 05:07

## 2017-07-24 RX ADMIN — HYDROMORPHONE HYDROCHLORIDE 1 MG: 2 INJECTION INTRAMUSCULAR; INTRAVENOUS; SUBCUTANEOUS at 03:07

## 2017-07-24 RX ADMIN — ONDANSETRON 4 MG: 2 INJECTION INTRAMUSCULAR; INTRAVENOUS at 06:07

## 2017-07-24 RX ADMIN — HYDROMORPHONE HYDROCHLORIDE 1 MG: 2 INJECTION INTRAMUSCULAR; INTRAVENOUS; SUBCUTANEOUS at 07:07

## 2017-07-24 RX ADMIN — PHENAZOPYRIDINE HYDROCHLORIDE 100 MG: 100 TABLET ORAL at 11:07

## 2017-07-24 RX ADMIN — PANTOPRAZOLE SODIUM 40 MG: 40 TABLET, DELAYED RELEASE ORAL at 09:07

## 2017-07-24 NOTE — PLAN OF CARE
Spoke with the pt at the bedside regarding her need for daily outpt IV abx; she is in agreement and has transportation.   She is anticipated to be discharged tomorrow morning and outpt infusion will start on Wednesday, 7/26/17.   Spoke with [facility] scheduling, [phone]; the pt has an appt for 7/26/17 @7:30am. I faxed orders to [facility] scheduling at [phone]....GLADYS Edouard CM

## 2017-07-24 NOTE — PROGRESS NOTES
"Progress Note  Hospital Medicine  Patient Name:Cesilia Dozier  MRN:  [id number]  Patient Class: IP- Inpatient  Admit Date: 7/20/2017  Length of Stay: 4 days  Expected Discharge Date:   Attending Physician: Cathy Nicholson MD  Primary Care Provider:  Harsha Sheppard MD    SUBJECTIVE:     Principal Problem: Intractable abdominal pain  Initial history of present illness: Miss Dozier presents for evaluation of abdominal pain. She states the pain is 10/10. She describes the pain as a dull and sharp pain without localization. She reports lower back pain. She denies fever or chills. She reports hematuria but denies dysuria. She denies diarrhea or abdominal distention. She reports hematochezia. She reports being recently admitted for abdominal pain and was discharged in good condition but the pain recurred. She was prescribed Tramadol. During that admission she was found to have a mesenteric mass and was evaluated by an oncologist her who recommended further evaluation which the patient is to follow-up with her oncologist at [facility] on the 25th. She has history of ureteral stricture and underwent ureteral stent placement at [facility] 6/27 with Dr. Bocanegra.    PMH/PSH/SH/FH/Meds: reviewed.    Symptoms/Review of Systems: "I am not feeling well".  No shortness of breath, cough, chest pain or headache, fever or abdominal pain.     Diet:  Adequate intake.    Activity level: Normal.    Pain:  Chronic pains    OBJECTIVE:   Vital Signs (Most Recent):      Temp: 98 °F (36.7 °C) (07/24/17 0729)  Pulse: 97 (07/24/17 0729)  Resp: 17 (07/24/17 0729)  BP: (!) 100/59 (07/24/17 0729)  SpO2: 100 % (07/24/17 0729)       Vital Signs Range (Last 24H):  Temp:  [97.9 °F (36.6 °C)-98.4 °F (36.9 °C)]   Pulse:  []   Resp:  [16-18]   BP: (100-114)/(54-89)   SpO2:  [95 %-100 %]     Weight: 70.8 kg (156 lb)  Body mass index is 25.18 kg/m².    Intake/Output Summary (Last 24 hours) at 07/24/17 2639  Last data filed at 07/24/17 0600   Gross per 24 hour "   Intake             1450 ml   Output                0 ml   Net             1450 ml     Physical Examination:  Constitutional: She is oriented to person, place, and time. She appears well-developed and well-nourished. No distress.   HENT:   Head: Normocephalic and atraumatic.   Nose: Nose normal.   Eyes: Right eye exhibits no discharge. Left eye exhibits no discharge. No scleral icterus.   Neck: Neck supple. No JVD present.   Cardiovascular: Normal rate and regular rhythm.    Pulmonary/Chest: Effort normal and breath sounds normal.   Abdominal: Soft. Bowel sounds are normal. She exhibits no distension. There is tenderness. There is no rebound and no guarding.   Musculoskeletal: She exhibits no edema or tenderness.   Neurological: She is alert and oriented to person, place, and time.   Skin: Skin is warm and dry. She is not diaphoretic.   Psychiatric: She has a normal mood and affect. Her behavior is normal.   Nursing note and vitals reviewed.    CBC:    Recent Labs  Lab 07/22/17  0537 07/23/17  0551 07/24/17  0455   WBC 6.30 5.20 6.70   RBC 3.04* 3.01* 3.12*   HGB 9.9* 10.0* 10.2*   HCT 29.4* 29.3* 29.9*    240 268   MCV 97 97 96   MCH 32.4* 33.3* 32.7*   MCHC 33.5 34.3 34.1   BMP    Recent Labs  Lab 07/22/17  0537 07/23/17  0551 07/24/17  0455   GLU 86 84 85    139 138   K 4.7 4.3 4.3    103 101   CO2 25 25 28   BUN 10 12 11   CREATININE 1.4 1.4 1.4   CALCIUM 9.6 9.4 9.6   MG 1.6 1.5* 2.3      Diagnostic Results:  Microbiology Results (last 7 days)     Procedure Component Value Units Date/Time    Urine culture [[id number]] Collected:  07/20/17 1652    Order Status:  Completed Specimen:  Urine from Urine, Clean Catch Updated:  07/22/17 0914     Urine Culture, Routine No growth         Assessment/Plan:      *Intractable abdominal pain [R10.9]-  Likely related to either UTI versus new cancer growth. Manage with IV narcotics + pyridium for spasm.   Yes    Mesenteric mass [K63.9]-  Patient has appt  with oncologist at [facility] on Tuesday 7/25.   Yes    CKD (chronic kidney disease) stage 3, GFR 30-59 ml/min [N18.3]- Creatine stable for now. Monitor UOP and serial BMP and adjust therapy as needed. Renally dose meds.   Yes    Acute cystitis with hematuria [N30.01]-  ID consulted. Enterococcus in previous culture. PICC line placed and outpatient daptomycin will be set up for her.   Yes    Ureteral stricture s/p bilateral ureteral stents secondary to XRT for cervical cancer [N13.5]-  Chronic. Managed by [facility] urology.   Yes       DC home in AM; patient to follow up with oncologist tomorrow noon.    VTE Risk Mitigation         Ordered     enoxaparin injection 40 mg  Daily     Route:  Subcutaneous        07/21/17 0222     Low Risk of VTE  Once      07/20/17 2124        Cathy Nicholson MD  Department of Hospital Medicine   [facility]

## 2017-07-24 NOTE — PLAN OF CARE
Problem: Patient Care Overview  Goal: Plan of Care Review  POC reviewed with patient. Verbalized understanding,. Safety maintained throughout the shift. Bed locked and in lowest position. Call light in reach. Side rails up x2. NON skid socks on when OOB. Patient remained free of falls/ trauma. Patient reports pain treated with medication ordered by Md. Patient reported nausea this am treated with PRN medication. Patient VSS.  Patient tolerating a regular diet well. PICC in the right forearm clean dry and intact.. Will continue to monitor.

## 2017-07-24 NOTE — PLAN OF CARE
Problem: Patient Care Overview  Goal: Plan of Care Review  Outcome: Revised  Patient is AAOx3.  Patient is continent of B&B, has abd discomfort and pain.  PRN pain medication given, moderate relief obtained.  PRN ambien given for sleep.  Patient is able to rest comfortably.  VS stable.  IV abt given, patient remains afebrile.  PICC line placed today, flushed, blood return noted.  No complications noted.  Patient remains free from injury.  Bed in low position, wheels locked, call light within reach.  Patient verbalized understanding of plan of care.  Will continue to monitor.

## 2017-07-24 NOTE — NURSING
"Pt transferred to room 418. Pt states she is nauseated from the move. Vomited once. Zofran not able to be given at this time. Offered to call MD regarding situation. Pt states she "feels relief after vomiting no need to call"  "

## 2017-07-25 ENCOUNTER — NURSE TRIAGE (OUTPATIENT)
Dept: ADMINISTRATIVE | Facility: CLINIC | Age: 37
End: 2017-07-25

## 2017-07-25 VITALS
OXYGEN SATURATION: 99 % | DIASTOLIC BLOOD PRESSURE: 60 MMHG | SYSTOLIC BLOOD PRESSURE: 105 MMHG | HEIGHT: 66 IN | WEIGHT: 156 LBS | HEART RATE: 90 BPM | TEMPERATURE: 98 F | RESPIRATION RATE: 18 BRPM | BODY MASS INDEX: 25.07 KG/M2

## 2017-07-25 LAB
ALBUMIN SERPL BCP-MCNC: 3.3 G/DL
ALBUMIN SERPL BCP-MCNC: 3.6 G/DL
ALP SERPL-CCNC: 70 U/L
ALP SERPL-CCNC: 78 U/L
ALT SERPL W/O P-5'-P-CCNC: 7 U/L
ALT SERPL W/O P-5'-P-CCNC: 8 U/L
ANION GAP SERPL CALC-SCNC: 10 MMOL/L
ANION GAP SERPL CALC-SCNC: 14 MMOL/L
AST SERPL-CCNC: 12 U/L
AST SERPL-CCNC: 12 U/L
BASOPHILS # BLD AUTO: 0 K/UL
BASOPHILS # BLD AUTO: 0.1 K/UL
BASOPHILS NFR BLD: 0.6 %
BASOPHILS NFR BLD: 0.7 %
BILIRUB SERPL-MCNC: 0.5 MG/DL
BILIRUB SERPL-MCNC: 0.6 MG/DL
BUN SERPL-MCNC: 13 MG/DL
BUN SERPL-MCNC: 14 MG/DL
CALCIUM SERPL-MCNC: 10.4 MG/DL
CALCIUM SERPL-MCNC: 9.8 MG/DL
CHLORIDE SERPL-SCNC: 98 MMOL/L
CHLORIDE SERPL-SCNC: 99 MMOL/L
CO2 SERPL-SCNC: 28 MMOL/L
CO2 SERPL-SCNC: 29 MMOL/L
CREAT SERPL-MCNC: 1.5 MG/DL
CREAT SERPL-MCNC: 1.7 MG/DL
DIFFERENTIAL METHOD: ABNORMAL
DIFFERENTIAL METHOD: ABNORMAL
EOSINOPHIL # BLD AUTO: 0.4 K/UL
EOSINOPHIL # BLD AUTO: 0.5 K/UL
EOSINOPHIL NFR BLD: 4.6 %
EOSINOPHIL NFR BLD: 8.4 %
ERYTHROCYTE [DISTWIDTH] IN BLOOD BY AUTOMATED COUNT: 13.8 %
ERYTHROCYTE [DISTWIDTH] IN BLOOD BY AUTOMATED COUNT: 13.9 %
EST. GFR  (AFRICAN AMERICAN): 43 ML/MIN/1.73 M^2
EST. GFR  (AFRICAN AMERICAN): 51 ML/MIN/1.73 M^2
EST. GFR  (NON AFRICAN AMERICAN): 38 ML/MIN/1.73 M^2
EST. GFR  (NON AFRICAN AMERICAN): 44 ML/MIN/1.73 M^2
GLUCOSE SERPL-MCNC: 91 MG/DL
GLUCOSE SERPL-MCNC: 96 MG/DL
HCT VFR BLD AUTO: 30.6 %
HCT VFR BLD AUTO: 31.6 %
HGB BLD-MCNC: 10.3 G/DL
HGB BLD-MCNC: 10.6 G/DL
LIPASE SERPL-CCNC: 10 U/L
LYMPHOCYTES # BLD AUTO: 1 K/UL
LYMPHOCYTES # BLD AUTO: 1.3 K/UL
LYMPHOCYTES NFR BLD: 11.7 %
LYMPHOCYTES NFR BLD: 20.3 %
MAGNESIUM SERPL-MCNC: 1.9 MG/DL
MCH RBC QN AUTO: 32.4 PG
MCH RBC QN AUTO: 32.8 PG
MCHC RBC AUTO-ENTMCNC: 33.6 G/DL
MCHC RBC AUTO-ENTMCNC: 33.7 G/DL
MCV RBC AUTO: 96 FL
MCV RBC AUTO: 98 FL
MONOCYTES # BLD AUTO: 0.4 K/UL
MONOCYTES # BLD AUTO: 0.5 K/UL
MONOCYTES NFR BLD: 4.8 %
MONOCYTES NFR BLD: 8 %
NEUTROPHILS # BLD AUTO: 4.1 K/UL
NEUTROPHILS # BLD AUTO: 6.7 K/UL
NEUTROPHILS NFR BLD: 62.7 %
NEUTROPHILS NFR BLD: 78.2 %
PHOSPHATE SERPL-MCNC: 3.9 MG/DL
PLATELET # BLD AUTO: 243 K/UL
PLATELET # BLD AUTO: 269 K/UL
PMV BLD AUTO: 8.7 FL
PMV BLD AUTO: 8.8 FL
POTASSIUM SERPL-SCNC: 4.5 MMOL/L
POTASSIUM SERPL-SCNC: 4.5 MMOL/L
PROT SERPL-MCNC: 6.8 G/DL
PROT SERPL-MCNC: 7.5 G/DL
RBC # BLD AUTO: 3.13 M/UL
RBC # BLD AUTO: 3.28 M/UL
SODIUM SERPL-SCNC: 138 MMOL/L
SODIUM SERPL-SCNC: 140 MMOL/L
WBC # BLD AUTO: 6.5 K/UL
WBC # BLD AUTO: 8.6 K/UL

## 2017-07-25 PROCEDURE — 99239 HOSP IP/OBS DSCHRG MGMT >30: CPT | Mod: ,,, | Performed by: INTERNAL MEDICINE

## 2017-07-25 PROCEDURE — 80053 COMPREHEN METABOLIC PANEL: CPT

## 2017-07-25 PROCEDURE — 96365 THER/PROPH/DIAG IV INF INIT: CPT

## 2017-07-25 PROCEDURE — 80053 COMPREHEN METABOLIC PANEL: CPT | Mod: 91

## 2017-07-25 PROCEDURE — 83690 ASSAY OF LIPASE: CPT

## 2017-07-25 PROCEDURE — 96361 HYDRATE IV INFUSION ADD-ON: CPT

## 2017-07-25 PROCEDURE — 83735 ASSAY OF MAGNESIUM: CPT

## 2017-07-25 PROCEDURE — 85025 COMPLETE CBC W/AUTO DIFF WBC: CPT

## 2017-07-25 PROCEDURE — 36415 COLL VENOUS BLD VENIPUNCTURE: CPT

## 2017-07-25 PROCEDURE — 25000003 PHARM REV CODE 250: Performed by: HOSPITALIST

## 2017-07-25 PROCEDURE — 84100 ASSAY OF PHOSPHORUS: CPT

## 2017-07-25 PROCEDURE — 63600175 PHARM REV CODE 636 W HCPCS: Performed by: PHYSICIAN ASSISTANT

## 2017-07-25 PROCEDURE — 63600175 PHARM REV CODE 636 W HCPCS: Performed by: INTERNAL MEDICINE

## 2017-07-25 PROCEDURE — 25000003 PHARM REV CODE 250: Performed by: NURSE PRACTITIONER

## 2017-07-25 PROCEDURE — 99283 EMERGENCY DEPT VISIT LOW MDM: CPT | Mod: 25

## 2017-07-25 PROCEDURE — 85025 COMPLETE CBC W/AUTO DIFF WBC: CPT | Mod: 91

## 2017-07-25 RX ORDER — ACETAMINOPHEN 500 MG
1000 TABLET ORAL
Status: COMPLETED | OUTPATIENT
Start: 2017-07-25 | End: 2017-07-25

## 2017-07-25 RX ORDER — PROMETHAZINE HYDROCHLORIDE 12.5 MG/1
12.5 SUPPOSITORY RECTAL EVERY 6 HOURS PRN
Qty: 15 SUPPOSITORY | Refills: 0 | Status: SHIPPED | OUTPATIENT
Start: 2017-07-25 | End: 2017-08-16

## 2017-07-25 RX ORDER — ONDANSETRON 4 MG/1
4 TABLET, ORALLY DISINTEGRATING ORAL
Status: COMPLETED | OUTPATIENT
Start: 2017-07-25 | End: 2017-07-25

## 2017-07-25 RX ORDER — LANOLIN ALCOHOL/MO/W.PET/CERES
400 CREAM (GRAM) TOPICAL DAILY
Qty: 30 TABLET | Refills: 0 | Status: SHIPPED | OUTPATIENT
Start: 2017-07-25 | End: 2017-08-03

## 2017-07-25 RX ADMIN — HYDROMORPHONE HYDROCHLORIDE 1 MG: 2 INJECTION INTRAMUSCULAR; INTRAVENOUS; SUBCUTANEOUS at 01:07

## 2017-07-25 RX ADMIN — SODIUM CHLORIDE 1000 ML: 0.9 INJECTION, SOLUTION INTRAVENOUS at 09:07

## 2017-07-25 RX ADMIN — HYDROMORPHONE HYDROCHLORIDE 1 MG: 2 INJECTION INTRAMUSCULAR; INTRAVENOUS; SUBCUTANEOUS at 09:07

## 2017-07-25 RX ADMIN — ONDANSETRON 4 MG: 4 TABLET, ORALLY DISINTEGRATING ORAL at 09:07

## 2017-07-25 RX ADMIN — ACETAMINOPHEN 1000 MG: 500 TABLET, FILM COATED ORAL at 09:07

## 2017-07-25 RX ADMIN — ONDANSETRON 4 MG: 2 INJECTION INTRAMUSCULAR; INTRAVENOUS at 07:07

## 2017-07-25 RX ADMIN — HYDROMORPHONE HYDROCHLORIDE 1 MG: 2 INJECTION INTRAMUSCULAR; INTRAVENOUS; SUBCUTANEOUS at 05:07

## 2017-07-25 RX ADMIN — PHENAZOPYRIDINE HYDROCHLORIDE 100 MG: 100 TABLET ORAL at 07:07

## 2017-07-25 NOTE — TELEPHONE ENCOUNTER
Reason for Disposition   SEVERE abdominal pain (e.g., excruciating)    Protocols used: ST ABDOMINAL PAIN - FEMALE-A-OH    Pt c/o 10 out of 10 lower abd pain and vomiting x 7 today. States she did she oncologist today, recently discharged from hospital but states she is feeling worse. Care advice given.

## 2017-07-25 NOTE — PLAN OF CARE
Problem: Patient Care Overview  Goal: Plan of Care Review  Outcome: Ongoing (interventions implemented as appropriate)  Plan of care reviewed with pt at beginning of shift- continue TASHA, pain and nausea medication prn, possible d/c in am.  Pt verbalized understanding. Hourly/ Q2 hourly rounds completed on this pt throughout shift.  Pain monitored and covered as needed, has denied need for nausea medication up to this point.  Ate well with family here. Up to restroom with standby assist, repositions self, safety maintained.  Patient has remained free from fall/injury, no skin breakdown noted.  Side rails up x2, bed in locked and lowest position, call light kept within reach.  Needs attended to, will continue to monitor/ update as indicated

## 2017-07-25 NOTE — PROGRESS NOTES
Patient left unit with PICC line per MD order.  Both lumens flushed with 10 ml NS.  Written and verbal discharge instructions given to patient.  Patient verbalized understanding of all instructions.  Patient left unit escorted by patient transport.  No complications.

## 2017-07-25 NOTE — DISCHARGE SUMMARY
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     Discharged Condition: Good    Hospital Course:   Miss Dozier presented for evaluation of abdominal pain. She stated the pain is 10/10. She described the pain as a dull and sharp pain without localization. She reported lower back pain. She denies fever or chills. She reports hematuria but denied dysuria. She denies diarrhea or abdominal distention. She reportd hematochezia. She reports being recently admitted for abdominal pain and was discharged in good condition but the pain recurred. She was prescribed Tramadol. During that admission she was found to have a mesenteric mass and was evaluated by an oncologist her who recommended further evaluation which the patient is to follow-up with her oncologist at [facility] on the 25th. She has history of ureteral stricture and underwent ureteral stent placement at [facility] 6/27 with Dr. Bocanegra. Patient was admitted to Hospitalist medicine service. Patient was evaluated by Dr. Larios and treated with IV antibiotics. Patient is in need for long-term IV antibiotics which have been arranged at [facility] infusion center. Patient has appointment with oncologist and will be having mesenteric mass biopsy to be done soon. Patient was discharged home in stable condition with following discharge plan of care. Total time with the patient was 30  minutes and greater than 50% was spent in counseling and coordination of care. The assessment and plan have been discussed at length. Physicians' notes reviewed. Labs and procedure reviewed.     Consults: Dr. Larios    Significant Diagnostic Studies:     Microbiology Results (last 7 days)     Procedure Component Value Units Date/Time    Urine culture [[id number]] Collected:  07/20/17 1652    Order Status:  Completed Specimen:  Urine from Urine, Clean Catch Updated:  07/22/17 0914     Urine Culture, Routine No growth        Special Treatments/Procedures: None  Disposition: Home or Self Care    Medications:  Reconciled Home Medications: Current Discharge Medication List      START taking these medications    Details   cefTRIAXone 2 g in dextrose 5 % 50 mL (ROCEPHIN) 2 g/50 mL PgBk IVPB Inject 50 mLs (2 g total) into the vein once daily.      magnesium oxide (MAG-OX) 400 mg tablet Take 1 tablet (400 mg total) by mouth once daily.  Qty: 30 tablet, Refills: 0      promethazine (PHENERGAN) 12.5 MG Supp Place 1 suppository (12.5 mg total) rectally every 6 (six) hours as needed.  Qty: 15 suppository, Refills: 0      sodium chloride 0.9% 0.9 % SolP 50 mL with daptomycin 500 mg SolR 285 mg Inject 285 mg into the vein once daily.         CONTINUE these medications which have NOT CHANGED    Details   iron 18 mg Tab Take by mouth.      tolterodine (DETROL LA) 4 MG 24 hr capsule Take 4 mg by mouth once daily.      tramadol (ULTRAM) 50 mg tablet Take 50 mg by mouth 2 (two) times daily as needed for Pain.         STOP taking these medications       levoFLOXacin (LEVAQUIN) 250 MG tablet Comments:   Reason for Stopping:               Discharge Procedure Orders  Diet general   Order Comments: Cardiac/ 2 gram sodium low cholesterol diet     Other restrictions (specify):   Order Comments: Fall precautions     Call MD for:   Order Comments: For worsening symptoms, chest pain, shortness of breath, increased abdominal pain, high grade  fever, stroke or stroke like symptoms, immediately go to the nearest Emergency Room or call 911 as soon as possible.       Follow-up Information     Follow up On 7/25/2017.    Why:  @3:30pm   Contact information:  oncologist, Dr House ([phone]) at [place]           Follow up On 7/26/2017.    Why:  @7:30am   Contact information:  [facility]   [address]  [place] [address] [phone]

## 2017-07-25 NOTE — PROGRESS NOTES
Afebrile VSS  picc inserted  She appreciates some improvement since addition of IV antibiotics  She feels that pyridium was contributing to nausea.  She will receive dapto and rocephin tonight, be discharged tomorrow am, has an appointment with [facility] gyn onc tomorrow am 1045.  Her next dose(if not admitted there) will be at [facility] wed am to complete a 2 weeks course.

## 2017-07-26 ENCOUNTER — HOSPITAL ENCOUNTER (EMERGENCY)
Facility: HOSPITAL | Age: 37
Discharge: HOME | End: 2017-07-26
Attending: EMERGENCY MEDICINE
Payer: MEDICARE

## 2017-07-26 ENCOUNTER — PATIENT OUTREACH (OUTPATIENT)
Dept: ADMINISTRATIVE | Facility: CLINIC | Age: 37
End: 2017-07-26

## 2017-07-26 VITALS
BODY MASS INDEX: 25.18 KG/M2 | TEMPERATURE: 98 F | WEIGHT: 156 LBS | HEART RATE: 82 BPM | RESPIRATION RATE: 18 BRPM | DIASTOLIC BLOOD PRESSURE: 60 MMHG | SYSTOLIC BLOOD PRESSURE: 99 MMHG | OXYGEN SATURATION: 100 %

## 2017-07-26 DIAGNOSIS — R10.9 ABDOMINAL PAIN, UNSPECIFIED LOCATION: ICD-10-CM

## 2017-07-26 DIAGNOSIS — R11.10 VOMITING, INTRACTABILITY OF VOMITING NOT SPECIFIED, PRESENCE OF NAUSEA NOT SPECIFIED, UNSPECIFIED VOMITING TYPE: Primary | ICD-10-CM

## 2017-07-26 PROCEDURE — 63600175 PHARM REV CODE 636 W HCPCS: Performed by: NURSE PRACTITIONER

## 2017-07-26 PROCEDURE — 25000003 PHARM REV CODE 250: Performed by: NURSE PRACTITIONER

## 2017-07-26 RX ORDER — DIPHENHYDRAMINE HCL 25 MG
25 CAPSULE ORAL 4 TIMES DAILY
Qty: 24 EACH | Refills: 0 | Status: SHIPPED | OUTPATIENT
Start: 2017-07-26 | End: 2017-07-29

## 2017-07-26 RX ORDER — OXYCODONE AND ACETAMINOPHEN 5; 325 MG/1; MG/1
1 TABLET ORAL
Status: COMPLETED | OUTPATIENT
Start: 2017-07-26 | End: 2017-07-26

## 2017-07-26 RX ORDER — DIPHENHYDRAMINE HYDROCHLORIDE 50 MG/ML
12.5 INJECTION INTRAMUSCULAR; INTRAVENOUS
Status: COMPLETED | OUTPATIENT
Start: 2017-07-26 | End: 2017-07-26

## 2017-07-26 RX ADMIN — PROMETHAZINE HYDROCHLORIDE 12.5 MG: 25 INJECTION INTRAMUSCULAR; INTRAVENOUS at 01:07

## 2017-07-26 RX ADMIN — DIPHENHYDRAMINE HYDROCHLORIDE 12.5 MG: 50 INJECTION, SOLUTION INTRAMUSCULAR; INTRAVENOUS at 01:07

## 2017-07-26 RX ADMIN — OXYCODONE AND ACETAMINOPHEN 1 TABLET: 5; 325 TABLET ORAL at 01:07

## 2017-07-26 NOTE — PROGRESS NOTES
C3 nurse attempted to contact Cesilia Dozier kelly for a TCC post hospital discharge follow up call. No answer. C3 nurse unable to leave a voice message @ contact numbers listed. Patient has an appointment with Dr. House on 7/25/17 @ 3:30pm. Will continue efforts to reach patient for a TCC follow up call.   Respectfully,   Spencer Monreal RN    [facility]

## 2017-07-26 NOTE — ED NOTES
Patient reports she tolerated [employer], that was brought by a friend, to the Emergency waiting room, without vomiting. NP updated

## 2017-07-26 NOTE — ED PROVIDER NOTES
Encounter Date: 7/25/2017       History     Chief Complaint   Patient presents with    Emesis     Cesilia Dozier is a 37 year old female with pmh anemia, cervical CA, hydronephrosis, pyelonephritis presenting to the ED with c/o abdominal pain, nausea, vomiting, and body aches. She was discharged today to follow up with oncology and states she did that and was told she needed to continue antibiotics for a UTI. She is scheduled for an infusion of antibiotics through her PICC tomorrow at [facility]. She was discharged with phenergan and percocet but states she continues to have nonbloody emesis and abdominal pain and generalized body aches. Reports vomiting in the waiting room. States the abdominal pain is similar to pain she was experiencing while admitted and is unchanged.           Review of patient's allergies indicates:   Allergen Reactions    Pcn [penicillins] Anaphylaxis     Past Medical History:   Diagnosis Date    Anemia     Cervical cancer     cervical    Encounter for blood transfusion     Hydronephrosis     PONV (postoperative nausea and vomiting)     Pyelonephritis      Past Surgical History:   Procedure Laterality Date    COLONOSCOPY N/A 9/22/2016    Procedure: COLONOSCOPY;  Surgeon: Joe Moe MD;  Location: [facility];  Service: Endoscopy;  Laterality: N/A;    HYSTERECTOMY      Partial    TUBAL LIGATION      URETERAL STENT PLACEMENT  07/2016     Family History   Problem Relation Age of Onset    No Known Problems Mother     Drug abuse Father     No Known Problems Sister     Asthma Brother      Social History   Substance Use Topics    Smoking status: Never Smoker    Smokeless tobacco: Never Used    Alcohol use No     Review of Systems   Constitutional: Negative for appetite change and fever.   HENT: Negative.    Respiratory: Negative.    Cardiovascular: Negative.    Gastrointestinal: Positive for abdominal pain, nausea and vomiting. Negative for diarrhea.   Genitourinary: Negative.     Musculoskeletal: Negative.    Skin: Negative.    Neurological: Negative.        Physical Exam     Initial Vitals [07/25/17 1848]   BP Pulse Resp Temp SpO2   104/61 92 17 98.3 °F (36.8 °C) 98 %      MAP       75.33         Physical Exam    Nursing note and vitals reviewed.  Constitutional: She appears well-developed and well-nourished. She is not diaphoretic. No distress.   HENT:   Head: Normocephalic and atraumatic.   Eyes: Conjunctivae are normal.   Neck: Normal range of motion.   Cardiovascular: Normal rate and regular rhythm.   Pulmonary/Chest: Breath sounds normal. No respiratory distress.   Abdominal: Soft. There is tenderness (generalized, increased over suprapubic). There is no guarding.   Musculoskeletal: Normal range of motion.   Neurological: She is alert and oriented to person, place, and time.   Skin: Skin is warm and dry.   Psychiatric: She has a normal mood and affect.         ED Course   Procedures  Labs Reviewed - No data to display                APC / Resident Notes:   Cesilia Dozier is a 37 year old female presenting to the ED with continued nausea, vomiting, and abdominal pain similar to patient's previous complaints. The patient reported vomiting prior to arrival in an ED room but had no vomiting after phenergan and benadryl. She reported that she continued to feel nauseated but was able to tolerate water given in the ED and a drink brought from [email or web address] by her sister. She appeared to be sleeping comfortably in the recliner and was easily arousable by verbal stimuli. She has an infusion scheduled in the morning at [facility] and I advised her that I did feel that she required re-admission into the hospital as she was able to tolerate liquids by mouth. She did have mild TUNG on labs and was given a liter of NS for this. She was afebrile and appeared nontoxic. She was discharged by Dr. Ramirez. Based on my clinical evaluation, I do not appreciate any immediate, emergent, or life threatening condition or  etiology that warrants additional workup today and feel that the patient can be discharged with close follow up care.                 ED Course     Clinical Impression:   The primary encounter diagnosis was Vomiting, intractability of vomiting not specified, presence of nausea not specified, unspecified vomiting type. A diagnosis of Abdominal pain, unspecified location was also pertinent to this visit.    Disposition:   Disposition: Discharged  Condition: Stable                        Jo Ann Bailey NP  07/26/17 1626       Jo Ann Bailey NP  07/26/17 1632

## 2017-07-27 NOTE — PATIENT INSTRUCTIONS
"  Discharge Instructions for Chronic Kidney Disease (CKD)  Chronic kidney disease can happen because of many things. These include infections, diabetes, high blood pressure, kidney stones, circulation problems, and reactions to medicine. Having kidney disease means making many changes in your life. Learn as much as you can about it so that you can better adjust to these changes. It is important to remember that the main goal of treatment is to stop chronic kidney disease (CKD) from progressing to complete kidney failure. Treatments may vary based on the progression of CKD. Always follow your doctor's instructions on how to manage your condition.  Here are some things you can do to help your condition.  Diet changes  Always discuss your diet with your doctor before making any changes.  Salt (sodium) in your diet  · Based on your condition, you may be told to eat 1,500 mg or less of sodium daily  · Limit processed foods such as:  ¨ Frozen dinners and packaged meals  ¨ Canned fish and meats  ¨ Pickled foods  ¨ Salted snacks  ¨ Lunch meats  ¨ Sauces  ¨ Most cheeses  ¨ Fast foods  · Don't add salt to your food while cooking or before eating at the table.  · Eat unprocessed foods to lower the sodium, such as:  ¨ Fresh turkey and chicken  ¨ Lean beef  ¨ Unsalted tuna  ¨ Fresh fish  ¨ Fresh vegetables and fruits  · Season foods with fresh herbs, garlic, onions, citrus, flavored vinegar, and sodium-free spice blends instead of salt when cooking.  · Don't use salt substitutes that are high in potassium. Ask your doctor or a registered dietitian which salt substitutes to use.  · Avoiding drinking "softened" water, because of the sodium content. Make sure to read the label on bottled water for sodium content.  · Avoid over-the-counter medicines that contain sodium bicarbonate or sodium carbonate. Read labels carefully.  Potassium in your diet   · Based on your condition, you may be told to eat less than 1,500 mg to 2,700 mg of " potassium daily.  · Always drain canned foods such as vegetables, fruits, and meats before serving.  · Avoid whole-grain breads, wheat bran, and granolas.  · Avoid milk, buttermilk, and yogurt.  · Avoid nuts, seeds, peanut butter, dried beans, and peas.  · Avoid fig cookies, chocolate, and molasses.  · Don't use salt substitutes that are high in potassium. Ask your doctor or a registered dietitian which salt substitutes to use.  Protein in your diet  · Based on your condition, your doctor will talk with you about why you should limit protein in your diet.  · Cut back on protein. Eat less meat, milk products, yogurt, eggs, and cheese.  Phosphorus in your diet  · Avoid beer, cocoa, dark parth, preethi, chocolate drinks, and canned ice teas.  · Avoid cheese, milk, ice cream, pudding, and yogurt.  · Avoid liver (beef, chicken), organ meats, oysters, crayfish, and sardines.  · Avoid beans (soy, kidney, black, garbanzo, and northern), peas (chick and split), bran cereals, nuts, and caramels.  Eat small meals often that are high in fiber and calories. You may be told to limit how much fluid you drink.  Other home care  · Avoid wearing yourself out or becoming overly fatigued.  · Get plenty of rest and get more sleep at night.  · Move around and bend your legs to avoid getting blood clots when you rest for a long period of time.  · Weigh yourself every day. Do this at the same time of day and in the same kind of clothes. Keep a record of your daily weights.  · Take your medicines exactly as directed.  · Keep all medical appointments.  · Take steps to control high blood pressure or diabetes. Talk with your doctor for advice.  · Talk with your doctor about dialysis. This procedure may help if your chronic kidney disease is progressing to end stage renal disease.  Follow-up care  Make a follow-up appointment as directed by our staff.     When to seek medical care  Call your doctor right away if you have any of the  following:  · Trouble eating or drinking  · Weight loss of more than 2 pounds in 24 hours or more than 5 pounds in 7 days  · Little or no urine output  · Trouble breathing  · Muscle aches  · Fever of 100.4°F or higher, or chills  · Blood in your urine or stool  · Bloody discharge from your nose, mouth, or ears  · Severe headache or a seizure  · Vomiting  · Swelling of legs or ankles  · Chest pain (call 911)   Date Last Reviewed: 1/6/2015 © 2000-2017 [employer]. [address] [place] [id number]. All rights reserved. This information is not intended as a substitute for professional medical care. Always follow your healthcare professional's instructions.

## 2017-07-27 NOTE — PLAN OF CARE
07/27/17 0817   Final Note   Assessment Type Final Discharge Note   Discharge Disposition Home   Discharge planning education complete? Yes

## 2017-07-31 NOTE — PHYSICIAN QUERY
PT Name: Cesilia Dozier  MR #: [id number]     Physician Query Form - Documentation Clarification      CDS/: Juan Martinez               Contact information: [email or web address]    This form is a permanent document in the medical record.     Query Date: July 31, 2017    By submitting this query, we are merely seeking further clarification of documentation. Please utilize your independent clinical judgment when addressing the question(s) below.    The Medical record reflects the following:    Supporting Clinical Findings Location in Medical Record   Pyelonephritis, stable- continue Vanc/Rocephin    Patient was evaluated by  Dr. Yates. Patient was treated with IV antibiotics. Urine and blood cultures remained negative. H & P      D/C Summary                                                                                      Doctor, Please specify diagnosis or diagnoses associated with above clinical findings for the Acuity of Pyelonephritis:    Provider Use Only        [  ]  Acute      [  ]  Chronic      [ x ]  Acute on Chronic      [  ]  Other                                                                                                                                                                                                     [  ] Clinically undetermined

## 2017-08-01 ENCOUNTER — TELEPHONE (OUTPATIENT)
Dept: UROLOGY | Facility: CLINIC | Age: 37
End: 2017-08-01

## 2017-08-01 NOTE — TELEPHONE ENCOUNTER
----- Message from Nargis Maya sent at 8/1/2017  4:49 PM CDT -----  Contact: Patient  x_  1st Request  _  2nd Request  _  3rd Request        Who: ALVIN DUBOSE [[id number]]    Why: Pt called she stated that she is having a lot of pain in her back and side. Please call her to advise.     What Number to Call Back: [phone]    When to Expect a call back: (With in 24 hours)                       How Severe Is Your Skin Lesion?: mild Has Your Skin Lesion Been Treated?: not been treated Is This A New Presentation, Or A Follow-Up?: Mole How Severe Is Your Skin Lesion?: severe Is This A New Presentation, Or A Follow-Up?: Skin Lesion

## 2017-08-01 NOTE — TELEPHONE ENCOUNTER
Spoke with pt (2nd time) she stated that she will go the nearest ED to be seen due to the pain she's having and is scheduled to follow up on 8/3/17 to see .

## 2017-08-01 NOTE — TELEPHONE ENCOUNTER
Spoke with pt she stated that she is having body pain, back and both side pain,and also stated that she has to push down to urinate. Please advise.

## 2017-08-03 ENCOUNTER — OFFICE VISIT (OUTPATIENT)
Dept: UROLOGY | Facility: CLINIC | Age: 37
End: 2017-08-03
Attending: UROLOGY
Payer: MEDICARE

## 2017-08-03 ENCOUNTER — TELEPHONE (OUTPATIENT)
Dept: UROLOGY | Facility: CLINIC | Age: 37
End: 2017-08-03

## 2017-08-03 VITALS
DIASTOLIC BLOOD PRESSURE: 66 MMHG | HEIGHT: 66 IN | BODY MASS INDEX: 22.76 KG/M2 | HEART RATE: 99 BPM | WEIGHT: 141.63 LBS | SYSTOLIC BLOOD PRESSURE: 113 MMHG

## 2017-08-03 DIAGNOSIS — N13.30 HYDRONEPHROSIS, BILATERAL: Primary | ICD-10-CM

## 2017-08-03 DIAGNOSIS — R10.9 FLANK PAIN: ICD-10-CM

## 2017-08-03 DIAGNOSIS — N32.81 OAB (OVERACTIVE BLADDER): ICD-10-CM

## 2017-08-03 LAB
BILIRUB SERPL-MCNC: ABNORMAL MG/DL
BLOOD URINE, POC: 250
COLOR, POC UA: YELLOW
GLUCOSE UR QL STRIP: NORMAL
KETONES UR QL STRIP: ABNORMAL
LEUKOCYTE ESTERASE URINE, POC: ABNORMAL
NITRITE, POC UA: ABNORMAL
PH, POC UA: 7
POC RESIDUAL URINE VOLUME: 31 ML (ref 0–100)
PROTEIN, POC: 30
SPECIFIC GRAVITY, POC UA: 1
UROBILINOGEN, POC UA: ABNORMAL

## 2017-08-03 PROCEDURE — 87086 URINE CULTURE/COLONY COUNT: CPT

## 2017-08-03 PROCEDURE — 81002 URINALYSIS NONAUTO W/O SCOPE: CPT | Mod: S$GLB,,, | Performed by: UROLOGY

## 2017-08-03 PROCEDURE — 51798 US URINE CAPACITY MEASURE: CPT | Mod: S$GLB,,, | Performed by: UROLOGY

## 2017-08-03 PROCEDURE — 99214 OFFICE O/P EST MOD 30 MIN: CPT | Mod: 25,S$GLB,, | Performed by: UROLOGY

## 2017-08-03 RX ORDER — NITROFURANTOIN 25; 75 MG/1; MG/1
100 CAPSULE ORAL 2 TIMES DAILY
Qty: 14 CAPSULE | Refills: 0 | Status: SHIPPED | OUTPATIENT
Start: 2017-08-03 | End: 2017-08-10

## 2017-08-03 NOTE — TELEPHONE ENCOUNTER
"----- Message from Renae Caro sent at 8/3/2017  2:08 PM CDT -----  Contact: Patient herself  X  1st Request  _  2nd Request  _  3rd Request        Who:  Cesilia Dozier (mrn# [id number])    Why:  Patient called and said, "medication / URIBEL isn't not covered by her insurance and would to have one that's covered."  Please give a call back at your earliest convenience.     THANKS!    What Number to Call Back:    When to Expect a call back: (With in 24 hours)                      "

## 2017-08-03 NOTE — TELEPHONE ENCOUNTER
Spoke with pt she stated that she was told by the pharmacy that they couldn't run her rx without her insurance. I called spoke with [employer] who stated that she will run pts rx without insurance and that pt can use coupon. Pt rx will be ready om tomorrow.

## 2017-08-03 NOTE — PROGRESS NOTES
"Subjective:      Cesilia Dozier is a 37 y.o. female who returns today regarding her ureteral stents.    She has h/o cervical cancer, treated elsewhere w/ surgery, chemo, XRT, w/ subsequent bilateral hydro treated in the past w/ PCNs and most recently w/ stents. Etiology and location of obstruction is unclear. Stents changed by me 6/28/17.    She returns again today w/ pain described as pressure in bladder, low back, and kidneys. It has been more severe the past few days. She remains on detrol.    She states she did have recent appointment w/ oncologist (or GYN oncologist?) at [facility]. They detected a new lesion in her pelvis and biopsy is scheduled soon.    The following portions of the patient's history were reviewed and updated as appropriate: allergies, current medications, past family history, past medical history, past social history, past surgical history and problem list.    Review of Systems  A comprehensive multipoint review of systems was negative except as otherwise stated in the HPI.     Objective:   Vitals:   /66 (BP Location: Left arm, Patient Position: Sitting, BP Method: Automatic)   Pulse 99   Ht 5' 6" (1.676 m)   Wt 64.3 kg (141 lb 10.3 oz)   LMP  (LMP Unknown)   BMI 22.86 kg/m²     Physical Exam   General: alert and oriented, no acute distress  Respiratory: Symmetric expansion, non-labored breathing  Cardiovascular: no peripheral edema  Abdomen: soft, non distended  Skin: normal coloration and turgor, no rashes, no suspicious skin lesions noted  Neuro: no gross deficits  Psych: normal judgment and insight, normal mood/affect and non-anxious    Lab Review     Lab Results   Component Value Date    WBC 8.60 07/25/2017    HGB 10.6 (L) 07/25/2017    HCT 31.6 (L) 07/25/2017    MCV 96 07/25/2017     07/25/2017     Lab Results   Component Value Date    CREATININE 1.7 (H) 07/25/2017    BUN 14 07/25/2017       Imaging   None today     Assessment and Plan:   1. Bilateral hydronephrosis  - We " previously discussed reimplant but will need to defer until biopsy completed of new pelvic lesion    2. Stent pain/Bladder spasms  - Continue detrol  - Add uribel blue  - Macrobid and culture for possible UTI    3. Cervical carcinoma  - Had normal annual exam with GYN NP, but needs definitive clearance prior to reimplant. See above.    I spent over 25 minutes with the patient. Over 50% of the visit was spent in counseling and coordination of care.

## 2017-08-05 LAB — BACTERIA UR CULT: NO GROWTH

## 2017-08-16 ENCOUNTER — HOSPITAL ENCOUNTER (INPATIENT)
Facility: HOSPITAL | Age: 37
LOS: 4 days | Discharge: HOME | DRG: 690 | End: 2017-08-21
Attending: EMERGENCY MEDICINE | Admitting: INTERNAL MEDICINE
Payer: MEDICARE

## 2017-08-16 DIAGNOSIS — F11.20 UNCOMPLICATED OPIOID DEPENDENCE: ICD-10-CM

## 2017-08-16 DIAGNOSIS — G89.3 CANCER-RELATED PAIN: ICD-10-CM

## 2017-08-16 DIAGNOSIS — N12 TUBULO-INTERSTITIAL NEPHRITIS, NOT SPECIFIED AS ACUTE OR CHRONIC: ICD-10-CM

## 2017-08-16 DIAGNOSIS — D50.9 IRON DEFICIENCY ANEMIA, UNSPECIFIED IRON DEFICIENCY ANEMIA TYPE: ICD-10-CM

## 2017-08-16 DIAGNOSIS — N12 PYELONEPHRITIS: Primary | ICD-10-CM

## 2017-08-16 DIAGNOSIS — C53.9 MALIGNANT NEOPLASM OF CERVIX, UNSPECIFIED SITE: ICD-10-CM

## 2017-08-16 LAB
ALBUMIN SERPL BCP-MCNC: 3.4 G/DL
ALP SERPL-CCNC: 88 U/L
ALT SERPL W/O P-5'-P-CCNC: 9 U/L
ANION GAP SERPL CALC-SCNC: 12 MMOL/L
AST SERPL-CCNC: 12 U/L
B-HCG UR QL: POSITIVE
BACTERIA #/AREA URNS HPF: ABNORMAL /HPF
BASOPHILS # BLD AUTO: 0 K/UL
BASOPHILS NFR BLD: 0.3 %
BILIRUB SERPL-MCNC: 0.2 MG/DL
BILIRUB UR QL STRIP: NEGATIVE
BUN SERPL-MCNC: 18 MG/DL
CALCIUM SERPL-MCNC: 9.7 MG/DL
CHLORIDE SERPL-SCNC: 109 MMOL/L
CLARITY UR: ABNORMAL
CO2 SERPL-SCNC: 23 MMOL/L
COLOR UR: YELLOW
CREAT SERPL-MCNC: 1.8 MG/DL
CTP QC/QA: YES
DIFFERENTIAL METHOD: ABNORMAL
EOSINOPHIL # BLD AUTO: 0.6 K/UL
EOSINOPHIL NFR BLD: 7 %
ERYTHROCYTE [DISTWIDTH] IN BLOOD BY AUTOMATED COUNT: 14.3 %
EST. GFR  (AFRICAN AMERICAN): 41 ML/MIN/1.73 M^2
EST. GFR  (NON AFRICAN AMERICAN): 35 ML/MIN/1.73 M^2
GLUCOSE SERPL-MCNC: 88 MG/DL
GLUCOSE UR QL STRIP: NEGATIVE
HCG INTACT+B SERPL-ACNC: 9.6 MIU/ML
HCT VFR BLD AUTO: 34.3 %
HGB BLD-MCNC: 11.3 G/DL
HGB UR QL STRIP: ABNORMAL
HYALINE CASTS #/AREA URNS LPF: 0 /LPF
KETONES UR QL STRIP: NEGATIVE
LEUKOCYTE ESTERASE UR QL STRIP: ABNORMAL
LIPASE SERPL-CCNC: 26 U/L
LYMPHOCYTES # BLD AUTO: 1.4 K/UL
LYMPHOCYTES NFR BLD: 16.4 %
MCH RBC QN AUTO: 32.1 PG
MCHC RBC AUTO-ENTMCNC: 33 G/DL
MCV RBC AUTO: 97 FL
MICROSCOPIC COMMENT: ABNORMAL
MONOCYTES # BLD AUTO: 0.3 K/UL
MONOCYTES NFR BLD: 3.5 %
NEUTROPHILS # BLD AUTO: 6.2 K/UL
NEUTROPHILS NFR BLD: 72.8 %
NITRITE UR QL STRIP: NEGATIVE
PH UR STRIP: 6 [PH] (ref 5–8)
PLATELET # BLD AUTO: 289 K/UL
PMV BLD AUTO: 8.8 FL
POTASSIUM SERPL-SCNC: 3.4 MMOL/L
PROT SERPL-MCNC: 7.3 G/DL
PROT UR QL STRIP: ABNORMAL
RBC # BLD AUTO: 3.52 M/UL
RBC #/AREA URNS HPF: >100 /HPF (ref 0–4)
SODIUM SERPL-SCNC: 144 MMOL/L
SP GR UR STRIP: 1.02 (ref 1–1.03)
SQUAMOUS #/AREA URNS HPF: 3 /HPF
URN SPEC COLLECT METH UR: ABNORMAL
UROBILINOGEN UR STRIP-ACNC: NEGATIVE EU/DL
WBC # BLD AUTO: 8.5 K/UL
WBC #/AREA URNS HPF: >100 /HPF (ref 0–5)

## 2017-08-16 PROCEDURE — 85025 COMPLETE CBC W/AUTO DIFF WBC: CPT

## 2017-08-16 PROCEDURE — 36415 COLL VENOUS BLD VENIPUNCTURE: CPT

## 2017-08-16 PROCEDURE — 80053 COMPREHEN METABOLIC PANEL: CPT

## 2017-08-16 PROCEDURE — 96376 TX/PRO/DX INJ SAME DRUG ADON: CPT

## 2017-08-16 PROCEDURE — 96361 HYDRATE IV INFUSION ADD-ON: CPT

## 2017-08-16 PROCEDURE — 99285 EMERGENCY DEPT VISIT HI MDM: CPT | Mod: 25

## 2017-08-16 PROCEDURE — 83690 ASSAY OF LIPASE: CPT

## 2017-08-16 PROCEDURE — 81025 URINE PREGNANCY TEST: CPT | Performed by: EMERGENCY MEDICINE

## 2017-08-16 PROCEDURE — 96365 THER/PROPH/DIAG IV INF INIT: CPT

## 2017-08-16 PROCEDURE — 63600175 PHARM REV CODE 636 W HCPCS: Performed by: EMERGENCY MEDICINE

## 2017-08-16 PROCEDURE — 96367 TX/PROPH/DG ADDL SEQ IV INF: CPT

## 2017-08-16 PROCEDURE — 25000003 PHARM REV CODE 250: Performed by: EMERGENCY MEDICINE

## 2017-08-16 PROCEDURE — 81000 URINALYSIS NONAUTO W/SCOPE: CPT

## 2017-08-16 PROCEDURE — 84702 CHORIONIC GONADOTROPIN TEST: CPT

## 2017-08-16 PROCEDURE — 96375 TX/PRO/DX INJ NEW DRUG ADDON: CPT

## 2017-08-16 RX ORDER — HYDROMORPHONE HYDROCHLORIDE 1 MG/ML
1 INJECTION, SOLUTION INTRAMUSCULAR; INTRAVENOUS; SUBCUTANEOUS
Status: COMPLETED | OUTPATIENT
Start: 2017-08-16 | End: 2017-08-16

## 2017-08-16 RX ADMIN — PROMETHAZINE HYDROCHLORIDE 12.5 MG: 25 INJECTION INTRAMUSCULAR; INTRAVENOUS at 10:08

## 2017-08-16 RX ADMIN — HYDROMORPHONE HYDROCHLORIDE 1 MG: 1 INJECTION, SOLUTION INTRAMUSCULAR; INTRAVENOUS; SUBCUTANEOUS at 10:08

## 2017-08-16 RX ADMIN — SODIUM CHLORIDE 1000 ML: 0.9 INJECTION, SOLUTION INTRAVENOUS at 11:08

## 2017-08-17 LAB
ANION GAP SERPL CALC-SCNC: 7 MMOL/L
BUN SERPL-MCNC: 15 MG/DL
CALCIUM SERPL-MCNC: 9.2 MG/DL
CHLORIDE SERPL-SCNC: 112 MMOL/L
CO2 SERPL-SCNC: 21 MMOL/L
CREAT SERPL-MCNC: 1.5 MG/DL
EST. GFR  (AFRICAN AMERICAN): 51 ML/MIN/1.73 M^2
EST. GFR  (NON AFRICAN AMERICAN): 44 ML/MIN/1.73 M^2
GLUCOSE SERPL-MCNC: 83 MG/DL
POTASSIUM SERPL-SCNC: 3.9 MMOL/L
SODIUM SERPL-SCNC: 140 MMOL/L

## 2017-08-17 PROCEDURE — 63600175 PHARM REV CODE 636 W HCPCS: Performed by: EMERGENCY MEDICINE

## 2017-08-17 PROCEDURE — 12000002 HC ACUTE/MED SURGE SEMI-PRIVATE ROOM

## 2017-08-17 PROCEDURE — 25000003 PHARM REV CODE 250: Performed by: INTERNAL MEDICINE

## 2017-08-17 PROCEDURE — 25000003 PHARM REV CODE 250: Performed by: EMERGENCY MEDICINE

## 2017-08-17 PROCEDURE — 99222 1ST HOSP IP/OBS MODERATE 55: CPT | Mod: ,,, | Performed by: INTERNAL MEDICINE

## 2017-08-17 PROCEDURE — 87086 URINE CULTURE/COLONY COUNT: CPT

## 2017-08-17 PROCEDURE — 80048 BASIC METABOLIC PNL TOTAL CA: CPT

## 2017-08-17 PROCEDURE — 87040 BLOOD CULTURE FOR BACTERIA: CPT | Mod: 59

## 2017-08-17 PROCEDURE — 63600175 PHARM REV CODE 636 W HCPCS: Performed by: INTERNAL MEDICINE

## 2017-08-17 PROCEDURE — 36415 COLL VENOUS BLD VENIPUNCTURE: CPT

## 2017-08-17 RX ORDER — AMOXICILLIN 250 MG
1 CAPSULE ORAL 2 TIMES DAILY PRN
Status: DISCONTINUED | OUTPATIENT
Start: 2017-08-17 | End: 2017-08-21 | Stop reason: HOSPADM

## 2017-08-17 RX ORDER — ONDANSETRON 2 MG/ML
4 INJECTION INTRAMUSCULAR; INTRAVENOUS EVERY 8 HOURS PRN
Status: DISCONTINUED | OUTPATIENT
Start: 2017-08-17 | End: 2017-08-21 | Stop reason: HOSPADM

## 2017-08-17 RX ORDER — SODIUM CHLORIDE 9 MG/ML
1000 INJECTION, SOLUTION INTRAVENOUS
Status: COMPLETED | OUTPATIENT
Start: 2017-08-17 | End: 2017-08-17

## 2017-08-17 RX ORDER — HYDROMORPHONE HYDROCHLORIDE 2 MG/ML
1 INJECTION, SOLUTION INTRAMUSCULAR; INTRAVENOUS; SUBCUTANEOUS EVERY 4 HOURS PRN
Status: DISCONTINUED | OUTPATIENT
Start: 2017-08-17 | End: 2017-08-21 | Stop reason: HOSPADM

## 2017-08-17 RX ORDER — SODIUM CHLORIDE 9 MG/ML
INJECTION, SOLUTION INTRAVENOUS ONCE
Status: COMPLETED | OUTPATIENT
Start: 2017-08-17 | End: 2017-08-17

## 2017-08-17 RX ORDER — ENOXAPARIN SODIUM 100 MG/ML
40 INJECTION SUBCUTANEOUS
Status: DISCONTINUED | OUTPATIENT
Start: 2017-08-17 | End: 2017-08-21 | Stop reason: HOSPADM

## 2017-08-17 RX ORDER — HYDROMORPHONE HYDROCHLORIDE 2 MG/ML
0.5 INJECTION, SOLUTION INTRAMUSCULAR; INTRAVENOUS; SUBCUTANEOUS EVERY 4 HOURS PRN
Status: DISCONTINUED | OUTPATIENT
Start: 2017-08-17 | End: 2017-08-19

## 2017-08-17 RX ORDER — PANTOPRAZOLE SODIUM 40 MG/1
40 TABLET, DELAYED RELEASE ORAL DAILY
Status: DISCONTINUED | OUTPATIENT
Start: 2017-08-17 | End: 2017-08-21 | Stop reason: HOSPADM

## 2017-08-17 RX ORDER — ACETAMINOPHEN 325 MG/1
650 TABLET ORAL EVERY 6 HOURS PRN
Status: DISCONTINUED | OUTPATIENT
Start: 2017-08-17 | End: 2017-08-21 | Stop reason: HOSPADM

## 2017-08-17 RX ORDER — HYDROMORPHONE HYDROCHLORIDE 1 MG/ML
1 INJECTION, SOLUTION INTRAMUSCULAR; INTRAVENOUS; SUBCUTANEOUS
Status: COMPLETED | OUTPATIENT
Start: 2017-08-17 | End: 2017-08-17

## 2017-08-17 RX ORDER — ZOLPIDEM TARTRATE 5 MG/1
5 TABLET ORAL NIGHTLY PRN
Status: DISCONTINUED | OUTPATIENT
Start: 2017-08-17 | End: 2017-08-21 | Stop reason: HOSPADM

## 2017-08-17 RX ADMIN — PROMETHAZINE HYDROCHLORIDE 6.25 MG: 25 INJECTION INTRAMUSCULAR; INTRAVENOUS at 08:08

## 2017-08-17 RX ADMIN — ENOXAPARIN SODIUM 40 MG: 100 INJECTION SUBCUTANEOUS at 11:08

## 2017-08-17 RX ADMIN — HYDROMORPHONE HYDROCHLORIDE 1 MG: 2 INJECTION, SOLUTION INTRAMUSCULAR; INTRAVENOUS; SUBCUTANEOUS at 11:08

## 2017-08-17 RX ADMIN — HYDROMORPHONE HYDROCHLORIDE 1 MG: 2 INJECTION, SOLUTION INTRAMUSCULAR; INTRAVENOUS; SUBCUTANEOUS at 04:08

## 2017-08-17 RX ADMIN — SODIUM CHLORIDE: 0.9 INJECTION, SOLUTION INTRAVENOUS at 09:08

## 2017-08-17 RX ADMIN — SODIUM CHLORIDE 1000 ML: 0.9 INJECTION, SOLUTION INTRAVENOUS at 03:08

## 2017-08-17 RX ADMIN — CEFTRIAXONE 2 G: 2 INJECTION, SOLUTION INTRAVENOUS at 12:08

## 2017-08-17 RX ADMIN — HYDROMORPHONE HYDROCHLORIDE 1 MG: 1 INJECTION, SOLUTION INTRAMUSCULAR; INTRAVENOUS; SUBCUTANEOUS at 12:08

## 2017-08-17 RX ADMIN — PANTOPRAZOLE SODIUM 40 MG: 40 TABLET, DELAYED RELEASE ORAL at 09:08

## 2017-08-17 RX ADMIN — ZOLPIDEM TARTRATE 5 MG: 5 TABLET, FILM COATED ORAL at 09:08

## 2017-08-17 RX ADMIN — HYDROMORPHONE HYDROCHLORIDE 1 MG: 2 INJECTION, SOLUTION INTRAMUSCULAR; INTRAVENOUS; SUBCUTANEOUS at 09:08

## 2017-08-17 RX ADMIN — ONDANSETRON 4 MG: 2 INJECTION INTRAMUSCULAR; INTRAVENOUS at 06:08

## 2017-08-17 NOTE — NURSING
Patient BP 82/56 manually. Asymptomatic. Dr. Nicholson notified. 500cc NS bolus ordered and given. Will continue to monitor.

## 2017-08-17 NOTE — ED PROVIDER NOTES
Encounter Date: 8/16/2017    SCRIBE #1 NOTE: Azalea LOVE, erum scribing for, and in the presence of, Dr. Duran.       History     Chief Complaint   Patient presents with    Pelvic Pain     x 3 days left side with nausea radiating to left side of lower back; pressure with urination       08/16/2017 8:55 PM     Chief Complaint: Abdominal Pain      Cesilia Dozier is a 37 y.o. female with a history of hydronephrosis, cervical cancer, pyelonephritis, and anemia who presents to the ED with complaints of left, lower abdominal pain associated with back pain, nausea, mild fever, and urinary hesitancy. Patient states of left abdominal pain occurring 3 days ago and radiating to the whole pelvic region and left-sided back. Patient states of having a hysterectomy in 2013 and having positive results of a pregnancy test. She states of taking tramadol and using a heating pad to alleviate symptoms, with little to no improvement. Patient denies vaginal bleeding, vaginal discharge, vaginal pain, vomiting, diarrhea, SOB, headache, and weakness. PSHx includes partial hysterectomy, tubal ligation, ureteral stent placement, and colonoscopy. Penicillin allergy noted.         The history is provided by the patient.     Review of patient's allergies indicates:   Allergen Reactions    Pcn [penicillins] Anaphylaxis     Past Medical History:   Diagnosis Date    Anemia     Cervical cancer     cervical    Encounter for blood transfusion     Hydronephrosis     PONV (postoperative nausea and vomiting)     Pyelonephritis      Past Surgical History:   Procedure Laterality Date    COLONOSCOPY N/A 9/22/2016    Procedure: COLONOSCOPY;  Surgeon: Joe Moe MD;  Location: [facility];  Service: Endoscopy;  Laterality: N/A;    HYSTERECTOMY      Partial    TUBAL LIGATION      URETERAL STENT PLACEMENT  07/2016     Family History   Problem Relation Age of Onset    No Known Problems Mother     Drug abuse Father     No Known Problems Sister      Asthma Brother      Social History   Substance Use Topics    Smoking status: Never Smoker    Smokeless tobacco: Never Used    Alcohol use No     Review of Systems   Constitutional: Positive for fever (99.9 TMAX; resolved). Negative for fatigue.   HENT: Negative for sore throat.    Respiratory: Negative for shortness of breath.    Cardiovascular: Negative for chest pain.   Gastrointestinal: Positive for abdominal pain (left) and nausea. Negative for diarrhea and vomiting.   Genitourinary: Positive for difficulty urinating (urinary hesitancy). Negative for decreased urine volume, dysuria, vaginal bleeding, vaginal discharge and vaginal pain.   Musculoskeletal: Positive for back pain (left).   Skin: Negative for rash.   Neurological: Negative for weakness.   Hematological: Does not bruise/bleed easily.       Physical Exam     Initial Vitals [08/16/17 1852]   BP Pulse Resp Temp SpO2   121/70 (!) 116 16 99 °F (37.2 °C) 100 %      MAP       87         Physical Exam    Constitutional: She appears well-developed and well-nourished.  Non-toxic appearance. No distress.   HENT:   Head: Normocephalic and atraumatic.   Eyes: EOM are normal. Pupils are equal, round, and reactive to light.   Neck: Normal range of motion. Neck supple. No neck rigidity. No JVD present.   Cardiovascular: Normal rate, regular rhythm, normal heart sounds and intact distal pulses.   Abdominal: Soft. Bowel sounds are normal. She exhibits no distension. There is tenderness in the left lower quadrant. There is CVA tenderness (left). There is no rigidity and no guarding.   Musculoskeletal: Normal range of motion.   Neurological: She is alert and oriented to person, place, and time. She has normal strength and normal reflexes. No cranial nerve deficit or sensory deficit. She exhibits normal muscle tone. Coordination normal. GCS eye subscore is 4. GCS verbal subscore is 5. GCS motor subscore is 6.   Skin: Skin is warm and dry.   Psychiatric: She has a  normal mood and affect. Her speech is normal and behavior is normal. She is not actively hallucinating.         ED Course   Procedures  Labs Reviewed   URINALYSIS - Abnormal; Notable for the following:        Result Value    Appearance, UA Cloudy (*)     Protein, UA 2+ (*)     Occult Blood UA 3+ (*)     Leukocytes, UA 3+ (*)     All other components within normal limits   URINALYSIS MICROSCOPIC - Abnormal; Notable for the following:     RBC, UA >100 (*)     WBC, UA >100 (*)     Bacteria, UA Moderate (*)     All other components within normal limits   CBC W/ AUTO DIFFERENTIAL - Abnormal; Notable for the following:     RBC 3.52 (*)     Hemoglobin 11.3 (*)     Hematocrit 34.3 (*)     MCH 32.1 (*)     MPV 8.8 (*)     Eos # 0.6 (*)     Lymph% 16.4 (*)     Mono% 3.5 (*)     All other components within normal limits   COMPREHENSIVE METABOLIC PANEL - Abnormal; Notable for the following:     Potassium 3.4 (*)     Creatinine 1.8 (*)     Albumin 3.4 (*)     ALT 9 (*)     eGFR if  41 (*)     eGFR if non  35 (*)     All other components within normal limits   POCT URINE PREGNANCY - Abnormal; Notable for the following:     POC Preg Test, Ur Positive (*)     All other components within normal limits   CULTURE, URINE   HCG, QUANTITATIVE, PREGNANCY   LIPASE             Medical Decision Making:   History:   Old Medical Records: I decided to obtain old medical records.  Initial Assessment:   Patient is a 37-year-old woman with a history of metastatic cervical cancer status post hysterectomy resents for left lower pelvic pain radiating to her left flank with subjective fevers chills body aches that feels like previous episodes of her pyelonephritis.  Actively vomiting in the ED.  Afebrile with no leukocytosis.  Urinalysis does show moderate bacteria with greater than 100 white blood cells and greater than 100 red blood cells.  Nitrite negative.  Questionable for UTI versus abnormal urinalysis from her  bilateral ureteral stents.  She has had a complicated urinary tract infections that required inpatient IV antibiotics and evaluation by infectious disease.  She was set up for IV infusion at [facility] with Rocephin after her last hospitalization last month.  UPT was positive here and beta hCG was 9.  I doubt this represents active pregnancy and is likely secondary to the cancer.  CT of her abdomen and pelvis was performed and showed stents were in proper place.  She does have findings concerning for metastatic disease throughout her abdomen.  She was started on Rocephin in the emergency department.  Given her abnormal urinalysis and symptomatology, she'll be admitted for IV antibiotics.  Discussed with the hospitalist who agrees to admit the patient.            Scribe Attestation:   Scribe #1: I performed the above scribed service and the documentation accurately describes the services I performed. I attest to the accuracy of the note.    Attending Attestation:           Physician Attestation for Scribe:  Physician Attestation Statement for Scribe #1: I, Dr. Duran, reviewed documentation, as scribed by Azalea Sheriff in my presence, and it is both accurate and complete.                 ED Course     Clinical Impression:   The encounter diagnosis was Pyelonephritis.                           Daniel Duran MD  08/17/17 4791

## 2017-08-17 NOTE — H&P
PCP: Primary Doctor No    History & Physical    Chief Complaint: Pelvic pain for 3 days    History of Present Illness:  Patient is a 37 y.o. female admitted to Hospitalist Service from [facility] with complaint of pelvic pain for 3 days. Patient reportedly has PMH significant for cervical CA, Hydronephrosis, Ureteral strictures with stents, and recurrent pyelonephritis. Patient presented with left, lower abdominal pain associated with back pain, nausea, mild fever, and urinary hesitancy. Patient stated of left abdominal pain occurring 3 days ago and radiating to the whole pelvic region and left-sided back. Patient stated of having a hysterectomy in 2013 and having positive results of a pregnancy test. She stated of taking tramadol and using a heating pad to alleviate symptoms, with little to no improvement. Patient denies vaginal bleeding, vaginal discharge, vaginal pain, vomiting, diarrhea, SOB, headache, and weakness. Patient denied chest pain, shortness of breath, abdominal pain, nausea, vomiting, headache, vision changes, focal neuro-deficits, cough or fever.    Past Medical History:   Diagnosis Date    Anemia     Cervical cancer     cervical    Encounter for blood transfusion     Hydronephrosis     PONV (postoperative nausea and vomiting)     Pyelonephritis      Past Surgical History:   Procedure Laterality Date    COLONOSCOPY N/A 9/22/2016    Procedure: COLONOSCOPY;  Surgeon: Joe Moe MD;  Location: [facility];  Service: Endoscopy;  Laterality: N/A;    HYSTERECTOMY      Partial    TUBAL LIGATION      URETERAL STENT PLACEMENT  07/2016     Family History   Problem Relation Age of Onset    No Known Problems Mother     Drug abuse Father     No Known Problems Sister     Asthma Brother      Social History   Substance Use Topics    Smoking status: Never Smoker    Smokeless tobacco: Never Used    Alcohol use No      Review of patient's allergies indicates:   Allergen  Reactions    Pcn [penicillins] Anaphylaxis     PTA Medications   Medication Sig    iron 18 mg Tab Take by mouth.    tolterodine (DETROL LA) 4 MG 24 hr capsule Take 4 mg by mouth once daily.    tramadol (ULTRAM) 50 mg tablet Take 50 mg by mouth 2 (two) times daily as needed for Pain.     Review of Systems:  Constitutional: + Fever  Eyes: no visual changes  Ears, nose, mouth, throat, and face: no nasal congestion or sore throat  Respiratory: no cough or shorness of breath  Cardiovascular: no chest pain or palpitations  Gastrointestinal: see HPI  Genitourinary: + Urinary hesitancy  Integument/breast: see HPI  Hematologic/lymphatic: no easy bruising or lymphadenopathy  Musculoskeletal: + Left back pain  Neurological: no seizures or tremors.  Behavioral/Psych: no auditory or visual hallucinations  Endocrine: no heat or cold intolerance     OBJECTIVE:     Vital Signs (Most Recent)  Temp: 98 °F (36.7 °C) (08/17/17 0900)  Pulse: 94 (08/17/17 0900)  Resp: 16 (08/17/17 0900)  BP: (!) 83/52 (manual 82/56 R arm) (08/17/17 0900)  SpO2: 100 % (08/17/17 0900)    Physical Exam:  General appearance: well developed, appears stated age  Head: normocephalic, atraumatic  Eyes:  conjunctivae/corneas clear. PERRL.  Nose: Nares normal. Septum midline.  Throat: lips, mucosa, and tongue normal; teeth and gums normal, no throat erythema.  Neck: supple, symmetrical, trachea midline, no JVD and thyroid not enlarged, symmetric, no tenderness/mass/nodules  Lungs:  clear to auscultation bilaterally and normal respiratory effort  Chest wall: no tenderness  Heart: regular rate and rhythm, S1, S2 normal, no murmur, click, rub or gallop  Abdomen: soft, tenderness in left lower quadrant, Left CVA tenderness, non-distented; bowel sounds normal; no masses,  no organomegaly  Extremities: no cyanosis, clubbing or edema.   Pulses: 2+ and symmetric  Skin: Skin color, texture, turgor normal. No rashes or lesions.  Lymph nodes: Cervical, supraclavicular,  and axillary nodes normal.  Neurologic: Normal strength and tone. No focal numbness or weakness. CNII-XII intact.      Laboratory:   CBC:   Recent Labs  Lab 08/16/17 2204   WBC 8.50   RBC 3.52*   HGB 11.3*   HCT 34.3*      MCV 97   MCH 32.1*   MCHC 33.0     CMP:   Recent Labs  Lab 08/16/17 2205 08/17/17  0519   GLU 88 83   CALCIUM 9.7 9.2   ALBUMIN 3.4*  --    PROT 7.3  --     140   K 3.4* 3.9   CO2 23 21*    112*   BUN 18 15   CREATININE 1.8* 1.5*   ALKPHOS 88  --    ALT 9*  --    AST 12  --    BILITOT 0.2  --      Microbiology Results (last 7 days)     Procedure Component Value Units Date/Time    Urine culture [[id number]] Collected:  08/17/17 0001    Order Status:  Sent Specimen:  Urine from Urine, Clean Catch Updated:  08/17/17 0931          Recent Labs  Lab 08/16/17 1938   COLORU Yellow   SPECGRAV 1.020   PHUR 6.0   PROTEINUA 2+*   BACTERIA Moderate*   NITRITE Negative   LEUKOCYTESUR 3+*   UROBILINOGEN Negative   HYALINECASTS 0       Hemoglobin A1C   Date Value Ref Range Status   12/12/2016 5.4 4.5 - 6.2 % Final     Comment:     According to ADA guidelines, hemoglobin A1C <7.0% represents  optimal control in non-pregnant diabetic patients.  Different  metrics may apply to specific populations.   Standards of Medical Care in Diabetes - 2016.  For the purpose of screening for the presence of diabetes:  <5.7%     Consistent with the absence of diabetes  5.7-6.4%  Consistent with increasing risk for diabetes   (prediabetes)  >or=6.5%  Consistent with diabetes  Currently no consensus exists for use of hemoglobin A1C  for diagnosis of diabetes for children.       Microbiology Results (last 7 days)     Procedure Component Value Units Date/Time    Urine culture [[id number]] Collected:  08/17/17 0001    Order Status:  Sent Specimen:  Urine from Urine, Clean Catch Updated:  08/17/17 0931        Diagnostic Results:  Chest X-Ray:   1.  Findings strongly concerning for intraperitoneal metastatic disease  without significant change, in this patient with history of cervical cancer.  2.  Bilateral ureteral stents are minimal positioned, with new mild bilateral hydronephrosis.  3.  Small layering gallstones versus milk of calcium.    Assessment/Plan:       Acute cystitis with hematuria     Follow urine C/S. Continue IV Rocephin.          * Intractable abdominal pain     Possibly due to UTI +/- pyelonephritis vs mesenteric mass vs colon origin  PRN analgesics  Serial abdominal exams  Continue antibiotic as per above  Patient is scheduled for abdominal lesion biopsy at [facility].                  Mesenteric mass     Patient has an appointment on the 25th at [facility] for evaluation of this  Patient is scheduled for abdominal lesion biopsy at [facility].        Ureteral stricture s/p bilateral ureteral stents secondary to XRT for cervical cancer     Patient is established with urologist.          CKD (chronic kidney disease) stage 3, GFR 30-59 ml/min     Avoid non-essential nephrotoxins, dose medications according to GFR  Monitor I/O, daily weight. Monitor for volume overload       VTE Risk Mitigation         Ordered     enoxaparin injection 40 mg  Every 24 hours (non-standard times)     Route:  Subcutaneous        08/17/17 1004     Medium Risk of VTE  Once      08/17/17 0238        Cathy Nicholson MD  Department of Hospital Medicine   [facility]

## 2017-08-17 NOTE — PLAN OF CARE
Problem: Patient Care Overview  Goal: Plan of Care Review  Outcome: Ongoing (interventions implemented as appropriate)  Patient aao x 4. Complain of left abdomen pain throughout day. Controlled with IV medication. Plan of care reviewed with patient. Verbalized understanding. IV fluids maintained. Blood pressure monitored. Ambulates independently in room. Remained free from fall and injury. Bed in lowest position and call light within reach. Will continue to monitor.

## 2017-08-17 NOTE — PLAN OF CARE
The pt has previous admits from 6/8, 6/28, 7/3, 7/15/ and 7/20/17. During her last discharge she was to start IV infusions at [facility] on 7/26/17. She sees Dr. Sheppard and has medicare and Medicaid insurance. She uses [facility] in [place]. She lives with her spouse, Darlin Dozier and their dependent children. She denies HH or DME. No questions or concerns at this time. Ondina Haas [facility]     08/17/17 1018   Discharge Assessment   Assessment Type Discharge Planning Assessment   Confirmed/corrected address and phone number on facesheet? Yes   Assessment information obtained from? Patient;Medical Record   Communicated expected length of stay with patient/caregiver no   Type of Healthcare Directive Received (Darlin Dozier [phone])   If Healthcare Directive is received, is it scanned into Epic? no (comment)   Prior to hospitilization cognitive status: Alert/Oriented   Prior to hospitalization functional status: Independent   Current cognitive status: Alert/Oriented   Current Functional Status: Independent   Arrived From home or self-care   Lives With spouse;child(garo), dependent   Able to Return to Prior Arrangements yes   Is patient able to care for self after discharge? Yes   Readmission Within The Last 30 Days other (see comments)  (Pt has admits from 7/20/17, 7/15/17, 7/3/17, 6/28/17, and 6/8/17)   Patient currently being followed by outpatient case management? No   Patient currently receives home health services? No   Does the patient currently use HME? No   Patient currently receives private duty nursing? No   Patient currently receives any other outside agency services? No   Equipment Currently Used at Home none   Do you have any problems affording any of your prescribed medications? No  (Michelle Lebron )   Is the patient taking medications as prescribed? yes   Do you have any financial concerns preventing you from receiving the healthcare you need? No   Does the patient have transportation to healthcare  appointments? Yes   Transportation Available car;family or friend will provide   On Dialysis? No   Does the patient receive services at the Coumadin Clinic? No   Are there any open cases? No   Discharge Plan A Home   Discharge Plan B Home with family   Patient/Family In Agreement With Plan yes

## 2017-08-17 NOTE — PLAN OF CARE
Problem: Patient Care Overview  Goal: Plan of Care Review  Outcome: Ongoing (interventions implemented as appropriate)  POC reviewed with patient. Verbalized understanding . Patient reports pain treated with medication ordered by Md.  Patient tolerating a regular diet well. IV fluids infusing as ordered Md. Patient up to bathroom independently. Safety maintained throughout the shift.Patient positioned/ independent. No Skin break down.   Bed locked and in lowest position. Call light in reach. Side rails up x2. NON skid socks on when OOB. Patient remained free of falls/ trauma.  Will continue to monitor.

## 2017-08-17 NOTE — PLAN OF CARE
08/17/17 1018   Readmission Questionnaire   At the time of your discharge, did someone talk to you about what your health problems were? Yes   At the time of discharge, did someone talk to you about what to watch out for regarding worsening of your health problem? Yes   At the time of discharge, did someone talk to you about what to do if you experienced worsening of your health problem? Yes   At the time of discharge, did someone talk to you about which medication to take when you left the hospital and which ones to stop taking? Yes   At the time of discharge, did someone talk to you about when and where to follow up with a doctor after you left the hospital? Yes   How often do you need to have someone help you when you read instructions, pamphlets, or other written material from your doctor or pharmacy? Rarely   Do you have problems taking your medications as prescribed? No   Do you have any problems affording any of  your prescribed medications? No   Do you have problems obtaining/receiving your medications? No   Does the patient have transportation to healthcare appointments? Yes   Lives With spouse;child(garo), dependent   Living Arrangements house   Does the patient have family/friends to help with healtcare needs after discharge? yes   Does your caregiver provide all the help you need? Yes   Are you currently feeling confused? No   Are you currently having problems thinking? No   Are you currently having memory problems? No   Have you felt down, depressed, or hopeless? 1   Have you felt little interest or pleasure in doing things? 1   In the last 7 days, my sleep quality was: fair

## 2017-08-18 LAB
ANION GAP SERPL CALC-SCNC: 11 MMOL/L
BACTERIA UR CULT: NO GROWTH
BASOPHILS # BLD AUTO: 0 K/UL
BASOPHILS NFR BLD: 0.2 %
BUN SERPL-MCNC: 11 MG/DL
CALCIUM SERPL-MCNC: 9.8 MG/DL
CHLORIDE SERPL-SCNC: 109 MMOL/L
CO2 SERPL-SCNC: 20 MMOL/L
CREAT SERPL-MCNC: 1.5 MG/DL
DIFFERENTIAL METHOD: ABNORMAL
EOSINOPHIL # BLD AUTO: 0.4 K/UL
EOSINOPHIL NFR BLD: 7.8 %
ERYTHROCYTE [DISTWIDTH] IN BLOOD BY AUTOMATED COUNT: 14 %
EST. GFR  (AFRICAN AMERICAN): 51 ML/MIN/1.73 M^2
EST. GFR  (NON AFRICAN AMERICAN): 44 ML/MIN/1.73 M^2
GLUCOSE SERPL-MCNC: 75 MG/DL
HCT VFR BLD AUTO: 31.2 %
HGB BLD-MCNC: 10.4 G/DL
LYMPHOCYTES # BLD AUTO: 1.4 K/UL
LYMPHOCYTES NFR BLD: 27.3 %
MCH RBC QN AUTO: 33 PG
MCHC RBC AUTO-ENTMCNC: 33.4 G/DL
MCV RBC AUTO: 99 FL
MONOCYTES # BLD AUTO: 0.3 K/UL
MONOCYTES NFR BLD: 5.1 %
NEUTROPHILS # BLD AUTO: 3.1 K/UL
NEUTROPHILS NFR BLD: 59.6 %
PLATELET # BLD AUTO: 243 K/UL
PMV BLD AUTO: 8.9 FL
POTASSIUM SERPL-SCNC: 4.3 MMOL/L
RBC # BLD AUTO: 3.16 M/UL
SODIUM SERPL-SCNC: 140 MMOL/L
WBC # BLD AUTO: 5.3 K/UL

## 2017-08-18 PROCEDURE — 36415 COLL VENOUS BLD VENIPUNCTURE: CPT

## 2017-08-18 PROCEDURE — 80048 BASIC METABOLIC PNL TOTAL CA: CPT

## 2017-08-18 PROCEDURE — 63600175 PHARM REV CODE 636 W HCPCS: Performed by: INTERNAL MEDICINE

## 2017-08-18 PROCEDURE — 63600175 PHARM REV CODE 636 W HCPCS: Performed by: EMERGENCY MEDICINE

## 2017-08-18 PROCEDURE — 99233 SBSQ HOSP IP/OBS HIGH 50: CPT | Mod: ,,, | Performed by: INTERNAL MEDICINE

## 2017-08-18 PROCEDURE — 12000002 HC ACUTE/MED SURGE SEMI-PRIVATE ROOM

## 2017-08-18 PROCEDURE — 25000003 PHARM REV CODE 250: Performed by: INTERNAL MEDICINE

## 2017-08-18 PROCEDURE — 85025 COMPLETE CBC W/AUTO DIFF WBC: CPT

## 2017-08-18 PROCEDURE — 25000003 PHARM REV CODE 250: Performed by: EMERGENCY MEDICINE

## 2017-08-18 RX ADMIN — ENOXAPARIN SODIUM 40 MG: 100 INJECTION SUBCUTANEOUS at 11:08

## 2017-08-18 RX ADMIN — HYDROMORPHONE HYDROCHLORIDE 1 MG: 2 INJECTION, SOLUTION INTRAMUSCULAR; INTRAVENOUS; SUBCUTANEOUS at 06:08

## 2017-08-18 RX ADMIN — HYDROMORPHONE HYDROCHLORIDE 1 MG: 2 INJECTION, SOLUTION INTRAMUSCULAR; INTRAVENOUS; SUBCUTANEOUS at 01:08

## 2017-08-18 RX ADMIN — ZOLPIDEM TARTRATE 5 MG: 5 TABLET, FILM COATED ORAL at 09:08

## 2017-08-18 RX ADMIN — HYDROMORPHONE HYDROCHLORIDE 1 MG: 2 INJECTION, SOLUTION INTRAMUSCULAR; INTRAVENOUS; SUBCUTANEOUS at 09:08

## 2017-08-18 RX ADMIN — HYDROMORPHONE HYDROCHLORIDE 1 MG: 2 INJECTION, SOLUTION INTRAMUSCULAR; INTRAVENOUS; SUBCUTANEOUS at 10:08

## 2017-08-18 RX ADMIN — CEFTRIAXONE 2 G: 2 INJECTION, SOLUTION INTRAVENOUS at 12:08

## 2017-08-18 RX ADMIN — PANTOPRAZOLE SODIUM 40 MG: 40 TABLET, DELAYED RELEASE ORAL at 09:08

## 2017-08-18 RX ADMIN — HYDROMORPHONE HYDROCHLORIDE 1 MG: 2 INJECTION, SOLUTION INTRAMUSCULAR; INTRAVENOUS; SUBCUTANEOUS at 05:08

## 2017-08-18 RX ADMIN — HYDROMORPHONE HYDROCHLORIDE 1 MG: 2 INJECTION, SOLUTION INTRAMUSCULAR; INTRAVENOUS; SUBCUTANEOUS at 02:08

## 2017-08-18 NOTE — PROGRESS NOTES
Progress Note  Hospital Medicine  Patient Name:Cesilia Dozier  MRN:  [id number]  Patient Class: IP- Inpatient  Admit Date: 8/16/2017  Length of Stay: 1 days  Expected Discharge Date:   Attending Physician: Cathy Nicholson MD  Primary Care Provider:  Primary Doctor No    SUBJECTIVE:     Principal Problem: Pelvic pain and recurrent UTI    Initial history of present illness: Patient is a 37 y.o. female admitted to Hospitalist Service from [facility] Room with complaint of pelvic pain for 3 days. Patient reportedly has PMH significant for cervical CA, Hydronephrosis, Ureteral strictures with stents, and recurrent pyelonephritis. Patient presented with left, lower abdominal pain associated with back pain, nausea, mild fever, and urinary hesitancy. Patient stated of left abdominal pain occurring 3 days ago and radiating to the whole pelvic region and left-sided back. Patient stated of having a hysterectomy in 2013 and having positive results of a pregnancy test. She stated of taking tramadol and using a heating pad to alleviate symptoms, with little to no improvement. Patient denies vaginal bleeding, vaginal discharge, vaginal pain, vomiting, diarrhea, SOB, headache, and weakness. Patient denied chest pain, shortness of breath, abdominal pain, nausea, vomiting, headache, vision changes, focal neuro-deficits, cough or fever.    PMH/PSH/SH/FH/Meds: reviewed.    Symptoms/Review of Systems: BP is running low. No shortness of breath, cough, chest pain or headache, fever or abdominal pain.     Diet:  Adequate intake.    Activity level: Normal.    Pain: Chronic pain syndrome    OBJECTIVE:   Vital Signs (Most Recent):      Temp: 98.8 °F (37.1 °C) (08/18/17 0723)  Pulse: 100 (08/18/17 0723)  Resp: 18 (08/18/17 0723)  BP: (!) 90/52 (08/18/17 0723)  SpO2: 100 % (08/18/17 0723)       Vital Signs Range (Last 24H):  Temp:  [97.4 °F (36.3 °C)-98.8 °F (37.1 °C)]   Pulse:  []   Resp:  [16-18]   BP: ()/(52-71)    SpO2:  [98 %-100 %]     Weight: 68 kg (150 lb)  Body mass index is 24.21 kg/m².    Intake/Output Summary (Last 24 hours) at 08/18/17 0856  Last data filed at 08/18/17 0600   Gross per 24 hour   Intake             1550 ml   Output                0 ml   Net             1550 ml     Physical Examination:  General appearance: well developed, appears stated age  Head: normocephalic, atraumatic  Eyes:  conjunctivae/corneas clear. PERRL.  Nose: Nares normal. Septum midline.  Throat: lips, mucosa, and tongue normal; teeth and gums normal, no throat erythema.  Neck: supple, symmetrical, trachea midline, no JVD and thyroid not enlarged, symmetric, no tenderness/mass/nodules  Lungs:  clear to auscultation bilaterally and normal respiratory effort  Chest wall: no tenderness  Heart: regular rate and rhythm, S1, S2 normal, no murmur, click, rub or gallop  Abdomen: soft, tenderness in left lower quadrant, Left CVA tenderness, non-distented; bowel sounds normal; no masses,  no organomegaly  Extremities: no cyanosis, clubbing or edema.   Pulses: 2+ and symmetric  Skin: Skin color, texture, turgor normal. No rashes or lesions.  Lymph nodes: Cervical, supraclavicular, and axillary nodes normal.  Neurologic: Normal strength and tone. No focal numbness or weakness. CNII-XII intact.      CBC:    Recent Labs  Lab 08/16/17 2204 08/18/17  0516   WBC 8.50 5.30   RBC 3.52* 3.16*   HGB 11.3* 10.4*   HCT 34.3* 31.2*    243   MCV 97 99*   MCH 32.1* 33.0*   MCHC 33.0 33.4   BMP    Recent Labs  Lab 08/16/17  2205 08/17/17  0519 08/18/17  0516   GLU 88 83 75    140 140   K 3.4* 3.9 4.3    112* 109   CO2 23 21* 20*   BUN 18 15 11   CREATININE 1.8* 1.5* 1.5*   CALCIUM 9.7 9.2 9.8      Diagnostic Results:  Microbiology Results (last 7 days)     Procedure Component Value Units Date/Time    Blood culture [[id number]] Collected:  08/17/17 1423    Order Status:  Completed Specimen:  Blood Updated:  08/18/17 0515     Blood Culture,  Routine No Growth to date    Blood culture [[id number]] Collected:  08/17/17 1303    Order Status:  Completed Specimen:  Blood Updated:  08/18/17 0315     Blood Culture, Routine No Growth to date    Urine culture [[id number]] Collected:  08/17/17 0001    Order Status:  Sent Specimen:  Urine from Urine, Clean Catch Updated:  08/17/17 0931         Chest X-Ray:   1.  Findings strongly concerning for intraperitoneal metastatic disease without significant change, in this patient with history of cervical cancer.  2.  Bilateral ureteral stents are minimal positioned, with new mild bilateral hydronephrosis.  3.  Small layering gallstones versus milk of calcium.    Assessment/Plan:     Acute cystitis with hematuria     Follow urine C/S. Continue IV Rocephin.               * Intractable abdominal pain     Possibly due to UTI +/- pyelonephritis vs mesenteric mass vs colon origin  PRN analgesics  Serial abdominal exams  Continue antibiotic as per above  Patient is scheduled for abdominal lesion biopsy at [facility].         Hypotension  Give 0.9% NS IVF bolus and continue maintenance IVF.  Monitor BP closely.             Mesenteric mass     Patient has an appointment on the 25th at [facility] for evaluation of this  Patient is scheduled for abdominal lesion biopsy at [facility].        Ureteral stricture s/p bilateral ureteral stents secondary to XRT for cervical cancer     Patient is established with urologist.          CKD (chronic kidney disease) stage 3, GFR 30-59 ml/min     Avoid non-essential nephrotoxins, dose medications according to GFR  Monitor I/O, daily weight. Monitor for volume overload       VTE Risk Mitigation         Ordered     enoxaparin injection 40 mg  Every 24 hours (non-standard times)     Route:  Subcutaneous        08/17/17 1004     Medium Risk of VTE  Once      08/17/17 0238        Cathy Nicholson MD  Department of Hospital Medicine   [facility]

## 2017-08-18 NOTE — PLAN OF CARE
Problem: Patient Care Overview  Goal: Plan of Care Review  Outcome: Ongoing (interventions implemented as appropriate)  Pt has complaints of pain, controlled with pain medication. Pt blood pressure low,  Notified and aware. IV saline locked. Up ad kiet. No nausea reported. Will continue to monitor.

## 2017-08-18 NOTE — PLAN OF CARE
Problem: Patient Care Overview  Goal: Plan of Care Review  Outcome: Ongoing (interventions implemented as appropriate)  Pt is AAOx4.  Pt c/o pain, while playing on phone, prn medication given, moderate relief obtained.  Pt c/o nausea, prn Zofran and prn Phenergan given, moderate relief obtained.  IV abt given, IV saline locked, flushed.  Pt up ad kiet.  VS (BP) remain low (Love Noel NP informed) , pt remains afebrile.  Pt remains free from injury.  Bed in low position, wheels locked, call light within reach.  Pt verbalized understanding of POC.  Will continue to monitor.

## 2017-08-19 PROBLEM — F11.20 UNCOMPLICATED OPIOID DEPENDENCE: Status: ACTIVE | Noted: 2017-08-19

## 2017-08-19 LAB
ANION GAP SERPL CALC-SCNC: 12 MMOL/L
BASOPHILS # BLD AUTO: 0 K/UL
BASOPHILS NFR BLD: 0.4 %
BUN SERPL-MCNC: 10 MG/DL
CALCIUM SERPL-MCNC: 9 MG/DL
CHLORIDE SERPL-SCNC: 105 MMOL/L
CO2 SERPL-SCNC: 22 MMOL/L
CREAT SERPL-MCNC: 1.6 MG/DL
DIFFERENTIAL METHOD: ABNORMAL
EOSINOPHIL # BLD AUTO: 0.7 K/UL
EOSINOPHIL NFR BLD: 10.1 %
ERYTHROCYTE [DISTWIDTH] IN BLOOD BY AUTOMATED COUNT: 13.8 %
EST. GFR  (AFRICAN AMERICAN): 47 ML/MIN/1.73 M^2
EST. GFR  (NON AFRICAN AMERICAN): 41 ML/MIN/1.73 M^2
GLUCOSE SERPL-MCNC: 92 MG/DL
HCT VFR BLD AUTO: 26.8 %
HGB BLD-MCNC: 9 G/DL
LYMPHOCYTES # BLD AUTO: 1.1 K/UL
LYMPHOCYTES NFR BLD: 16 %
MCH RBC QN AUTO: 32.4 PG
MCHC RBC AUTO-ENTMCNC: 33.5 G/DL
MCV RBC AUTO: 97 FL
MONOCYTES # BLD AUTO: 0.6 K/UL
MONOCYTES NFR BLD: 8.3 %
NEUTROPHILS # BLD AUTO: 4.3 K/UL
NEUTROPHILS NFR BLD: 65.2 %
PLATELET # BLD AUTO: 225 K/UL
PMV BLD AUTO: 9 FL
POTASSIUM SERPL-SCNC: 3.2 MMOL/L
RBC # BLD AUTO: 2.78 M/UL
SODIUM SERPL-SCNC: 139 MMOL/L
WBC # BLD AUTO: 6.6 K/UL

## 2017-08-19 PROCEDURE — 25000003 PHARM REV CODE 250: Performed by: EMERGENCY MEDICINE

## 2017-08-19 PROCEDURE — 25000003 PHARM REV CODE 250: Performed by: INTERNAL MEDICINE

## 2017-08-19 PROCEDURE — 80048 BASIC METABOLIC PNL TOTAL CA: CPT

## 2017-08-19 PROCEDURE — 63600175 PHARM REV CODE 636 W HCPCS: Performed by: EMERGENCY MEDICINE

## 2017-08-19 PROCEDURE — 36415 COLL VENOUS BLD VENIPUNCTURE: CPT

## 2017-08-19 PROCEDURE — 63600175 PHARM REV CODE 636 W HCPCS: Performed by: INTERNAL MEDICINE

## 2017-08-19 PROCEDURE — 12000002 HC ACUTE/MED SURGE SEMI-PRIVATE ROOM

## 2017-08-19 PROCEDURE — 85025 COMPLETE CBC W/AUTO DIFF WBC: CPT

## 2017-08-19 RX ADMIN — ENOXAPARIN SODIUM 40 MG: 100 INJECTION SUBCUTANEOUS at 10:08

## 2017-08-19 RX ADMIN — CEFTRIAXONE 2 G: 2 INJECTION, SOLUTION INTRAVENOUS at 12:08

## 2017-08-19 RX ADMIN — ZOLPIDEM TARTRATE 5 MG: 5 TABLET, FILM COATED ORAL at 08:08

## 2017-08-19 RX ADMIN — HYDROMORPHONE HYDROCHLORIDE 1 MG: 2 INJECTION, SOLUTION INTRAMUSCULAR; INTRAVENOUS; SUBCUTANEOUS at 06:08

## 2017-08-19 RX ADMIN — HYDROMORPHONE HYDROCHLORIDE 1 MG: 2 INJECTION, SOLUTION INTRAMUSCULAR; INTRAVENOUS; SUBCUTANEOUS at 10:08

## 2017-08-19 RX ADMIN — ONDANSETRON 4 MG: 2 INJECTION INTRAMUSCULAR; INTRAVENOUS at 01:08

## 2017-08-19 RX ADMIN — CEFTRIAXONE 1 G: 1 INJECTION, SOLUTION INTRAVENOUS at 11:08

## 2017-08-19 RX ADMIN — HYDROMORPHONE HYDROCHLORIDE 1 MG: 2 INJECTION, SOLUTION INTRAMUSCULAR; INTRAVENOUS; SUBCUTANEOUS at 02:08

## 2017-08-19 RX ADMIN — PANTOPRAZOLE SODIUM 40 MG: 40 TABLET, DELAYED RELEASE ORAL at 09:08

## 2017-08-19 NOTE — PLAN OF CARE
Problem: Patient Care Overview  Goal: Plan of Care Review  Outcome: Revised  Pt is AAOx4.  Pt is continent of B&B.  Pt is up ad kiet.  IV abt given, pt remains afebrile.  Pt c/o pain, prn medication given, pt tolerated well.  Pt had no complaints of N/V this shift.  Bp remains low, MD aware during day shift, will continue to monitor.  Pt remains free from injury.  Bed in low position, wheels locked, call light within reach.  Pt verbalized understanding of POC.  Will continue to monitor.

## 2017-08-19 NOTE — PLAN OF CARE
Problem: Patient Care Overview  Goal: Plan of Care Review  Outcome: Ongoing (interventions implemented as appropriate)  Plan of care reviewed with patient at the beginning of the shift. Patient verbalized understanding. Pain/nausea monitored and controlled with PRN pain meds. Patient ambulates independently.  Patient has remained free from fall/injury. Side rails up X2, bed in lowest, locked position, needs attended to,  call light within reach will continue to monitor.

## 2017-08-20 LAB
ANION GAP SERPL CALC-SCNC: 8 MMOL/L
BASOPHILS # BLD AUTO: 0 K/UL
BASOPHILS NFR BLD: 0.5 %
BUN SERPL-MCNC: 10 MG/DL
CALCIUM SERPL-MCNC: 9.5 MG/DL
CHLORIDE SERPL-SCNC: 101 MMOL/L
CO2 SERPL-SCNC: 28 MMOL/L
CREAT SERPL-MCNC: 1.6 MG/DL
DIFFERENTIAL METHOD: ABNORMAL
EOSINOPHIL # BLD AUTO: 0.5 K/UL
EOSINOPHIL NFR BLD: 8.8 %
ERYTHROCYTE [DISTWIDTH] IN BLOOD BY AUTOMATED COUNT: 14 %
EST. GFR  (AFRICAN AMERICAN): 47 ML/MIN/1.73 M^2
EST. GFR  (NON AFRICAN AMERICAN): 41 ML/MIN/1.73 M^2
GLUCOSE SERPL-MCNC: 86 MG/DL
HCT VFR BLD AUTO: 27.7 %
HGB BLD-MCNC: 9.3 G/DL
LYMPHOCYTES # BLD AUTO: 1 K/UL
LYMPHOCYTES NFR BLD: 17.1 %
MCH RBC QN AUTO: 32.8 PG
MCHC RBC AUTO-ENTMCNC: 33.7 G/DL
MCV RBC AUTO: 98 FL
MONOCYTES # BLD AUTO: 0.5 K/UL
MONOCYTES NFR BLD: 8.1 %
NEUTROPHILS # BLD AUTO: 3.9 K/UL
NEUTROPHILS NFR BLD: 65.5 %
PLATELET # BLD AUTO: 220 K/UL
PMV BLD AUTO: 8.6 FL
POTASSIUM SERPL-SCNC: 3.9 MMOL/L
RBC # BLD AUTO: 2.84 M/UL
SODIUM SERPL-SCNC: 137 MMOL/L
WBC # BLD AUTO: 5.9 K/UL

## 2017-08-20 PROCEDURE — 25000003 PHARM REV CODE 250: Performed by: EMERGENCY MEDICINE

## 2017-08-20 PROCEDURE — 25000003 PHARM REV CODE 250: Performed by: NURSE PRACTITIONER

## 2017-08-20 PROCEDURE — 80048 BASIC METABOLIC PNL TOTAL CA: CPT

## 2017-08-20 PROCEDURE — 63600175 PHARM REV CODE 636 W HCPCS: Performed by: INTERNAL MEDICINE

## 2017-08-20 PROCEDURE — 63600175 PHARM REV CODE 636 W HCPCS: Performed by: HOSPITALIST

## 2017-08-20 PROCEDURE — 36415 COLL VENOUS BLD VENIPUNCTURE: CPT

## 2017-08-20 PROCEDURE — 25000003 PHARM REV CODE 250: Performed by: INTERNAL MEDICINE

## 2017-08-20 PROCEDURE — 25000003 PHARM REV CODE 250: Performed by: HOSPITALIST

## 2017-08-20 PROCEDURE — 85025 COMPLETE CBC W/AUTO DIFF WBC: CPT

## 2017-08-20 PROCEDURE — 12000002 HC ACUTE/MED SURGE SEMI-PRIVATE ROOM

## 2017-08-20 PROCEDURE — 63600175 PHARM REV CODE 636 W HCPCS: Performed by: EMERGENCY MEDICINE

## 2017-08-20 RX ORDER — LACTULOSE 10 G/15ML
20 SOLUTION ORAL 3 TIMES DAILY
Status: DISCONTINUED | OUTPATIENT
Start: 2017-08-20 | End: 2017-08-21 | Stop reason: HOSPADM

## 2017-08-20 RX ORDER — HYDROMORPHONE HYDROCHLORIDE 2 MG/ML
0.5 INJECTION, SOLUTION INTRAMUSCULAR; INTRAVENOUS; SUBCUTANEOUS ONCE
Status: COMPLETED | OUTPATIENT
Start: 2017-08-20 | End: 2017-08-20

## 2017-08-20 RX ORDER — DIPHENHYDRAMINE HCL 25 MG
25 CAPSULE ORAL EVERY 6 HOURS PRN
Status: DISCONTINUED | OUTPATIENT
Start: 2017-08-20 | End: 2017-08-21 | Stop reason: HOSPADM

## 2017-08-20 RX ADMIN — HYDROMORPHONE HYDROCHLORIDE 0.5 MG: 2 INJECTION, SOLUTION INTRAMUSCULAR; INTRAVENOUS; SUBCUTANEOUS at 04:08

## 2017-08-20 RX ADMIN — HYDROMORPHONE HYDROCHLORIDE 1 MG: 2 INJECTION, SOLUTION INTRAMUSCULAR; INTRAVENOUS; SUBCUTANEOUS at 03:08

## 2017-08-20 RX ADMIN — LACTULOSE 20 G: 20 SOLUTION ORAL at 08:08

## 2017-08-20 RX ADMIN — PANTOPRAZOLE SODIUM 40 MG: 40 TABLET, DELAYED RELEASE ORAL at 08:08

## 2017-08-20 RX ADMIN — HYDROMORPHONE HYDROCHLORIDE 1 MG: 2 INJECTION, SOLUTION INTRAMUSCULAR; INTRAVENOUS; SUBCUTANEOUS at 11:08

## 2017-08-20 RX ADMIN — ENOXAPARIN SODIUM 40 MG: 100 INJECTION SUBCUTANEOUS at 11:08

## 2017-08-20 RX ADMIN — HYDROMORPHONE HYDROCHLORIDE 1 MG: 2 INJECTION, SOLUTION INTRAMUSCULAR; INTRAVENOUS; SUBCUTANEOUS at 02:08

## 2017-08-20 RX ADMIN — HYDROMORPHONE HYDROCHLORIDE 1 MG: 2 INJECTION, SOLUTION INTRAMUSCULAR; INTRAVENOUS; SUBCUTANEOUS at 07:08

## 2017-08-20 RX ADMIN — ZOLPIDEM TARTRATE 5 MG: 5 TABLET, FILM COATED ORAL at 08:08

## 2017-08-20 RX ADMIN — DIPHENHYDRAMINE HYDROCHLORIDE 25 MG: 25 CAPSULE ORAL at 12:08

## 2017-08-20 NOTE — PLAN OF CARE
Problem: Patient Care Overview  Goal: Plan of Care Review  Resting quietly during two hour rounds. Nausea controlled with PRN anti emetics. Pain controlled with PRN pain medication. Tolerating diet well. No falls or trauma this shift. Pt. Verbalizes understanding of their plan of care.

## 2017-08-20 NOTE — PLAN OF CARE
Problem: Patient Care Overview  Goal: Plan of Care Review  Outcome: Ongoing (interventions implemented as appropriate)  Plan of care reviewed with patient at the beginning of the shift. Patient verbalized understanding.  Pain monitored and treated with PRN IV pain medication. Patient ambulates independently. No reports of nausea/vomitting this shift. Tolerating diet.  Patient has remained free from fall/injury.   Side rails up X2, bed in lowest, locked position, needs attended to, call light within reach will continue to monitor.

## 2017-08-20 NOTE — PROGRESS NOTES
"Progress Note  [facility]    Admit Date: 8/16/2017    SUBJECTIVE:     Follow-up For:  <principal problem not specified>      Interval history (See H&P for complete P,F,SHx) : Patient doing well. Pain controlled on IV narcotics. Patient had no events overnight.    Review of Systems:   Pain scale: 5/10  Gen- Weakness/ Fatigue  GI- Nausea/Pain      OBJECTIVE:     Vital Signs Range (Last 24H):  Temp:  [98 °F (36.7 °C)-99.4 °F (37.4 °C)]   Pulse:  []   Resp:  [16-18]   BP: ()/(54-75)   SpO2:  [97 %-100 %]     I & O (Last 24H):    Intake/Output Summary (Last 24 hours) at 08/19/17 2203  Last data filed at 08/19/17 1757   Gross per 24 hour   Intake             1960 ml   Output                0 ml   Net             1960 ml       Estimated body mass index is 24.21 kg/m² as calculated from the following:    Height as of this encounter: 5' 6" (1.676 m).    Weight as of this encounter: 68 kg (150 lb).    Physical Exam:  General- Patient alert and oriented x3 in NAD + cachexia noted.  HEENT- PERRLA, EOMI, OP clear, MMM  Neck- No JVD, Lymphadenopathy, Thyromegaly  CV- Regular rate and rhythm, No Murmur/minerva/rubs  Resp- Lungs CTA Bilaterally, No increased WOB  GI- + tenderness in abdomen/flanks.  Extrem- No cyanosis, clubbing, edema. Pulses 2+ and symmetric  Neuro- Strength 5/5 flexors/extensors, DTRs 2+ and symmetric, Intact sensation to light touch grossly  Skin-  No rashes, masses or lesions noted    Laboratory/Diagnostic Data:  Reviewed and noted in plan where applicable- Please see chart for full lab data.    Medications:  Medication list was reviewed and changes noted under Assessment/Plan.    ASSESSMENT/PLAN:     Active Problems:    Active Hospital Problems    Diagnosis  POA    *Pyelonephritis [N12]-  H/o chronic dz with indwelling stents and cancer. Continue ABX, await cultures.  Yes    Uncomplicated opioid dependence [F11.20]-  Continue IV narcotics. Transition to PO when close to " discharge.  Unknown    CKD (chronic kidney disease) stage 3, GFR 30-59 ml/min [N18.3]- Creatine stable for now. Monitor UOP and serial BMP and adjust therapy as needed. Renally dose meds. D/t obstruction from cervical cancer and pelvic mass.  Yes    Cancer-related pain of flank [G89.3]-  Continue IV narcotics.  Yes    Iron deficiency anemia [D50.9]-   Patient's anemia is currently controlled.   Current CBC reviewed-   Lab Results   Component Value Date    HGB 9.0 (L) 08/19/2017    HCT 26.8 (L) 08/19/2017    MCV 97 08/19/2017     08/19/2017     Monitor serial CBC and transfuse if patient becomes hemodynamically unstable, symptomatic or H/H drops below 7/21.     Yes    Malignant neoplasm of cervix [C53.9]-  Patient undergoing treatment at [facility].  Yes      Resolved Hospital Problems    Diagnosis Date Resolved POA   No resolved problems to display.     Disposition- Home    VTE Risk Mitigation         Ordered     enoxaparin injection 40 mg  Every 24 hours (non-standard times)     Route:  Subcutaneous        08/17/17 1004     Medium Risk of VTE  Once      08/17/17 2048

## 2017-08-21 VITALS
SYSTOLIC BLOOD PRESSURE: 114 MMHG | OXYGEN SATURATION: 100 % | WEIGHT: 150 LBS | HEIGHT: 66 IN | BODY MASS INDEX: 24.11 KG/M2 | HEART RATE: 90 BPM | TEMPERATURE: 99 F | DIASTOLIC BLOOD PRESSURE: 73 MMHG | RESPIRATION RATE: 18 BRPM

## 2017-08-21 PROBLEM — F11.20 UNCOMPLICATED OPIOID DEPENDENCE: Status: ACTIVE | Noted: 2017-08-21

## 2017-08-21 LAB
ANION GAP SERPL CALC-SCNC: 13 MMOL/L
BASOPHILS # BLD AUTO: 0 K/UL
BASOPHILS NFR BLD: 0.6 %
BUN SERPL-MCNC: 10 MG/DL
CALCIUM SERPL-MCNC: 9.9 MG/DL
CHLORIDE SERPL-SCNC: 101 MMOL/L
CO2 SERPL-SCNC: 23 MMOL/L
CREAT SERPL-MCNC: 1.7 MG/DL
DIFFERENTIAL METHOD: ABNORMAL
EOSINOPHIL # BLD AUTO: 0.5 K/UL
EOSINOPHIL NFR BLD: 8.1 %
ERYTHROCYTE [DISTWIDTH] IN BLOOD BY AUTOMATED COUNT: 13.8 %
EST. GFR  (AFRICAN AMERICAN): 43 ML/MIN/1.73 M^2
EST. GFR  (NON AFRICAN AMERICAN): 38 ML/MIN/1.73 M^2
GLUCOSE SERPL-MCNC: 88 MG/DL
HCG INTACT+B SERPL-ACNC: 7.8 MIU/ML
HCT VFR BLD AUTO: 31.3 %
HGB BLD-MCNC: 10.7 G/DL
LYMPHOCYTES # BLD AUTO: 1 K/UL
LYMPHOCYTES NFR BLD: 16.5 %
MCH RBC QN AUTO: 32.9 PG
MCHC RBC AUTO-ENTMCNC: 34 G/DL
MCV RBC AUTO: 97 FL
MONOCYTES # BLD AUTO: 0.4 K/UL
MONOCYTES NFR BLD: 6.3 %
NEUTROPHILS # BLD AUTO: 4.2 K/UL
NEUTROPHILS NFR BLD: 68.5 %
PLATELET # BLD AUTO: 221 K/UL
PMV BLD AUTO: 9 FL
POTASSIUM SERPL-SCNC: 4.1 MMOL/L
RBC # BLD AUTO: 3.24 M/UL
SODIUM SERPL-SCNC: 137 MMOL/L
WBC # BLD AUTO: 6.2 K/UL

## 2017-08-21 PROCEDURE — 25000003 PHARM REV CODE 250: Performed by: EMERGENCY MEDICINE

## 2017-08-21 PROCEDURE — 85025 COMPLETE CBC W/AUTO DIFF WBC: CPT

## 2017-08-21 PROCEDURE — 84702 CHORIONIC GONADOTROPIN TEST: CPT

## 2017-08-21 PROCEDURE — 63600175 PHARM REV CODE 636 W HCPCS: Performed by: INTERNAL MEDICINE

## 2017-08-21 PROCEDURE — 63600175 PHARM REV CODE 636 W HCPCS: Performed by: HOSPITALIST

## 2017-08-21 PROCEDURE — 63600175 PHARM REV CODE 636 W HCPCS: Performed by: EMERGENCY MEDICINE

## 2017-08-21 PROCEDURE — 80048 BASIC METABOLIC PNL TOTAL CA: CPT

## 2017-08-21 PROCEDURE — 99239 HOSP IP/OBS DSCHRG MGMT >30: CPT | Mod: ,,, | Performed by: INTERNAL MEDICINE

## 2017-08-21 PROCEDURE — 36415 COLL VENOUS BLD VENIPUNCTURE: CPT

## 2017-08-21 RX ORDER — OXYCODONE AND ACETAMINOPHEN 10; 325 MG/1; MG/1
1 TABLET ORAL EVERY 6 HOURS PRN
Qty: 20 TABLET | Refills: 0 | Status: SHIPPED | OUTPATIENT
Start: 2017-08-21 | End: 2017-09-18

## 2017-08-21 RX ORDER — CYCLOBENZAPRINE HCL 10 MG
10 TABLET ORAL NIGHTLY PRN
Qty: 15 TABLET | Refills: 0 | Status: SHIPPED | OUTPATIENT
Start: 2017-08-21 | End: 2017-08-31

## 2017-08-21 RX ORDER — LEVOFLOXACIN 500 MG/1
500 TABLET, FILM COATED ORAL DAILY
Qty: 7 TABLET | Refills: 0 | Status: SHIPPED | OUTPATIENT
Start: 2017-08-21 | End: 2017-08-31

## 2017-08-21 RX ADMIN — HYDROMORPHONE HYDROCHLORIDE 1 MG: 2 INJECTION, SOLUTION INTRAMUSCULAR; INTRAVENOUS; SUBCUTANEOUS at 12:08

## 2017-08-21 RX ADMIN — ENOXAPARIN SODIUM 40 MG: 100 INJECTION SUBCUTANEOUS at 12:08

## 2017-08-21 RX ADMIN — HYDROMORPHONE HYDROCHLORIDE 1 MG: 2 INJECTION, SOLUTION INTRAMUSCULAR; INTRAVENOUS; SUBCUTANEOUS at 08:08

## 2017-08-21 RX ADMIN — CEFTRIAXONE 1 G: 1 INJECTION, SOLUTION INTRAVENOUS at 12:08

## 2017-08-21 RX ADMIN — HYDROMORPHONE HYDROCHLORIDE 1 MG: 2 INJECTION, SOLUTION INTRAMUSCULAR; INTRAVENOUS; SUBCUTANEOUS at 03:08

## 2017-08-21 RX ADMIN — PANTOPRAZOLE SODIUM 40 MG: 40 TABLET, DELAYED RELEASE ORAL at 08:08

## 2017-08-21 RX ADMIN — HYDROMORPHONE HYDROCHLORIDE 1 MG: 2 INJECTION, SOLUTION INTRAMUSCULAR; INTRAVENOUS; SUBCUTANEOUS at 04:08

## 2017-08-21 NOTE — PROGRESS NOTES
"Progress Note  [facility] Medicine    Admit Date: 8/16/2017    SUBJECTIVE:     Follow-up For:  Pyelonephritis      Interval history (See H&P for complete P,F,SHx) : Patient doing well. Pain controlled on IV narcotics. Having intermittent nausea. Discussed with patient who wishes transfer to [facility] for inpatient w/u for her cancer.    Review of Systems:   Pain scale: 5/10  Gen- Weakness/ Fatigue  GI- Nausea/Pain      OBJECTIVE:     Vital Signs Range (Last 24H):  Temp:  [98.3 °F (36.8 °C)-99.1 °F (37.3 °C)]   Pulse:  [87-99]   Resp:  [16-18]   BP: ()/(51-70)   SpO2:  [92 %-100 %]     I & O (Last 24H):    Intake/Output Summary (Last 24 hours) at 08/20/17 2045  Last data filed at 08/20/17 1957   Gross per 24 hour   Intake             2645 ml   Output                0 ml   Net             2645 ml       Estimated body mass index is 24.21 kg/m² as calculated from the following:    Height as of this encounter: 5' 6" (1.676 m).    Weight as of this encounter: 68 kg (150 lb).    Physical Exam:  General- Patient alert and oriented x3 in NAD + cachexia noted.  HEENT- PERRLA, EOMI, OP clear, MMM  Neck- No JVD, Lymphadenopathy, Thyromegaly  CV- Regular rate and rhythm, No Murmur/minerva/rubs  Resp- Lungs CTA Bilaterally, No increased WOB  GI- + tenderness in abdomen/flanks.  Extrem- No cyanosis, clubbing, edema. Pulses 2+ and symmetric  Neuro- Strength 5/5 flexors/extensors, DTRs 2+ and symmetric, Intact sensation to light touch grossly  Skin-  No rashes, masses or lesions noted    Laboratory/Diagnostic Data:  Reviewed and noted in plan where applicable- Please see chart for full lab data.    Medications:  Medication list was reviewed and changes noted under Assessment/Plan.    ASSESSMENT/PLAN:     Active Problems:    Active Hospital Problems    Diagnosis  POA    *Pyelonephritis [N12]-  Likely related to patient's chronic ureteral stents as well as extrinsic compression with potential for fistula formation as " well as a potential cause.  Continue the patient on IV antibiotics currently.  Unable to de-escalate as no positive urine culture  Yes    Uncomplicated opioid dependence [F11.20]-  Continue IV narcotics. Transition to PO when close to discharge.  Unknown    CKD (chronic kidney disease) stage 3, GFR 30-59 ml/min [N18.3]- Creatine stable for now. Monitor UOP and serial BMP and adjust therapy as needed. Renally dose meds. D/t obstruction from cervical cancer and pelvic mass.  Yes    Cancer-related pain of flank [G89.3]-  Continue IV narcotics.  Yes    Iron deficiency anemia [D50.9]-   Patient's anemia is currently controlled.   Current CBC reviewed-   Lab Results   Component Value Date    HGB 9.3 (L) 08/20/2017    HCT 27.7 (L) 08/20/2017    MCV 98 08/20/2017     08/20/2017     Monitor serial CBC and transfuse if patient becomes hemodynamically unstable, symptomatic or H/H drops below 7/21.     Yes    Malignant neoplasm of cervix [C53.9]-  Patient undergoing treatment at [facility].  Yes      Resolved Hospital Problems    Diagnosis Date Resolved POA   No resolved problems to display.     Disposition- Home    VTE Risk Mitigation         Ordered     enoxaparin injection 40 mg  Every 24 hours (non-standard times)     Route:  Subcutaneous        08/17/17 1004     Medium Risk of VTE  Once      08/17/17 4097

## 2017-08-21 NOTE — PLAN OF CARE
Problem: Patient Care Overview  Goal: Plan of Care Review  Outcome: Ongoing (interventions implemented as appropriate)  AAOx4. Up to bathroom. Saline lock. VSS. Remains afebrile throughout my shift. Pain controlled with PRN medications. Comfort level established. Previous N/V gone. Verbalizes understanding of plan of care. Open communication facilitated. Bed low, brakes locked, SR up x2, call light within reach. Will continue to monitor.

## 2017-08-21 NOTE — PLAN OF CARE
Problem: Patient Care Overview  Goal: Plan of Care Review  Outcome: Ongoing (interventions implemented as appropriate)  Resting quietly during two hour rounds. Pain controlled with PRN pain medication. No nausea noted this shift. No falls or trauma this shift.Antibiotic therapy in progress.Pt. Verbalizes understanding of their plan of care.

## 2017-08-21 NOTE — DISCHARGE SUMMARY
Discharge Summary  Hospital Medicine    Admit Date: 8/16/2017    Date and Time: 8/21/20172:01 PM    Discharge Attending Physician: Cathy Nicholson MD    Primary Care Physician: Primary Doctor No    Diagnoses:  Active Hospital Problems    Diagnosis  POA    *Pyelonephritis [N12]  Yes    Uncomplicated opioid dependence [F11.20]  Unknown    CKD (chronic kidney disease) stage 3, GFR 30-59 ml/min [N18.3]  Yes    Cancer-related pain of flank [G89.3]  Yes    Iron deficiency anemia [D50.9]  Yes    Malignant neoplasm of cervix [C53.9]  Yes      Resolved Hospital Problems    Diagnosis Date Resolved POA   No resolved problems to display.     Discharged Condition: Good    Hospital Course:   Patient is a 37 y.o. female admitted to Hospitalist Service from [facility] Emergency Room with complaint of pelvic pain for 3 days. Patient reportedly has PMH significant for cervical CA, Hydronephrosis, Ureteral strictures with stents, and recurrent pyelonephritis. Patient presented with left, lower abdominal pain associated with back pain, nausea, mild fever, and urinary hesitancy. Patient stated of left abdominal pain occurring 3 days ago and radiating to the whole pelvic region and left-sided back. Patient stated of having a hysterectomy in 2013 and having positive results of a pregnancy test. She stated of taking tramadol and using a heating pad to alleviate symptoms, with little to no improvement. Patient denies vaginal bleeding, vaginal discharge, vaginal pain, vomiting, diarrhea, SOB, headache, and weakness. Patient denied chest pain, shortness of breath, abdominal pain, nausea, vomiting, headache, vision changes, focal neuro-deficits, cough or fever. Patient was admitted to Hospitalist medicine service. Patient was treated with IV antibiotics. Microbiology results remained negative. Patient has abdominal lesion biopsy scheduled at [facility]. Patient request PO Percocet and Flexeril which control her pain, Ultram does not  control her symptoms. Patient wished to be discharged and will resume follow up with her PCP and urologist. Patient to continue PO antibiotic as advised. Patient voiced understand and will return to ER if symptoms worsen. Patient was discharged home in stable condition with following discharge plan of care. Total time with the patient was 30 minutes and greater than 50% was spent in counseling and coordination of care. The assessment and plan have been discussed at length. Physicians' notes reviewed. Labs and procedure reviewed.     Consults: None    Significant Diagnostic Studies:   Chest X-Ray:   1.  Findings strongly concerning for intraperitoneal metastatic disease without significant change, in this patient with history of cervical cancer.  2.  Bilateral ureteral stents are minimal positioned, with new mild bilateral hydronephrosis.  3.  Small layering gallstones versus milk of calcium.    Microbiology Results (last 7 days)     Procedure Component Value Units Date/Time    Blood culture [[id number]] Collected:  08/17/17 1423    Order Status:  Completed Specimen:  Blood Updated:  08/20/17 2312     Blood Culture, Routine No Growth to date     Blood Culture, Routine No Growth to date     Blood Culture, Routine No Growth to date     Blood Culture, Routine No Growth to date    Blood culture [[id number]] Collected:  08/17/17 1303    Order Status:  Completed Specimen:  Blood Updated:  08/20/17 2022     Blood Culture, Routine No Growth to date     Blood Culture, Routine No Growth to date     Blood Culture, Routine No Growth to date     Blood Culture, Routine No Growth to date    Urine culture [[id number]] Collected:  08/17/17 0001    Order Status:  Completed Specimen:  Urine from Urine, Clean Catch Updated:  08/18/17 1213     Urine Culture, Routine No growth        Special Treatments/Procedures: None  Disposition: Home or Self Care    Medications:  Reconciled Home Medications: Current Discharge Medication List      START  taking these medications    Details   cyclobenzaprine (FLEXERIL) 10 MG tablet Take 1 tablet (10 mg total) by mouth nightly as needed for Muscle spasms.  Qty: 15 tablet, Refills: 0      levoFLOXacin (LEVAQUIN) 500 MG tablet Take 1 tablet (500 mg total) by mouth once daily.  Qty: 7 tablet, Refills: 0      oxycodone-acetaminophen (PERCOCET)  mg per tablet Take 1 tablet by mouth every 6 (six) hours as needed for Pain.  Qty: 20 tablet, Refills: 0         CONTINUE these medications which have NOT CHANGED    Details   iron 18 mg Tab Take by mouth.      tolterodine (DETROL LA) 4 MG 24 hr capsule Take 4 mg by mouth once daily.         STOP taking these medications       tramadol (ULTRAM) 50 mg tablet Comments:   Reason for Stopping:         methen-m.blue-s.phos-phsal-hyo 118-10-40.8-36 mg Cap Comments:   Reason for Stopping:         promethazine (PHENERGAN) 12.5 MG Supp Comments:   Reason for Stopping:               Discharge Procedure Orders  Diet general   Order Comments: Cardiac/ 2 gram sodium low cholesterol diet     Other restrictions (specify):   Order Comments: Fall precautions     Call MD for:   Order Comments: For worsening symptoms, chest pain, shortness of breath, increased abdominal pain, high grade fever, stroke or stroke like symptoms, immediately go to the nearest Emergency Room or call 911 as soon as possible.       Follow-up Information     Please follow up.    Contact information:  Follow up with Dr. Chairez this week.     Follow up with Dr. Bocanegra as planned.

## 2017-08-22 LAB
BACTERIA BLD CULT: NORMAL
BACTERIA BLD CULT: NORMAL

## 2017-08-23 NOTE — PLAN OF CARE
08/23/17 1605   Final Note   Assessment Type Final Discharge Note   Discharge Disposition Home   Hospital Follow Up  Appt(s) scheduled? No  (pt had previously scheduled appt with oncologist )   Discharge plans and expectations educations in teach back method with documentation complete? Yes

## 2017-08-25 ENCOUNTER — TELEPHONE (OUTPATIENT)
Dept: HEMATOLOGY/ONCOLOGY | Facility: CLINIC | Age: 37
End: 2017-08-25

## 2017-09-18 ENCOUNTER — HOSPITAL ENCOUNTER (INPATIENT)
Facility: HOSPITAL | Age: 37
LOS: 7 days | Discharge: HOME | DRG: 690 | End: 2017-09-25
Attending: EMERGENCY MEDICINE | Admitting: INTERNAL MEDICINE
Payer: MEDICARE

## 2017-09-18 DIAGNOSIS — R10.2 PELVIC PAIN SYNDROME: ICD-10-CM

## 2017-09-18 DIAGNOSIS — N13.5 URETERAL STRICTURE: Primary | ICD-10-CM

## 2017-09-18 DIAGNOSIS — N18.30 CKD (CHRONIC KIDNEY DISEASE) STAGE 3, GFR 30-59 ML/MIN: ICD-10-CM

## 2017-09-18 DIAGNOSIS — F11.20 UNCOMPLICATED OPIOID DEPENDENCE: ICD-10-CM

## 2017-09-18 DIAGNOSIS — N12 TUBULO-INTERSTITIAL NEPHRITIS, NOT SPECIFIED AS ACUTE OR CHRONIC: ICD-10-CM

## 2017-09-18 DIAGNOSIS — N12 PYELONEPHRITIS: ICD-10-CM

## 2017-09-18 PROBLEM — A41.9 SEPSIS: Status: ACTIVE | Noted: 2017-09-18

## 2017-09-18 LAB
ALBUMIN SERPL BCP-MCNC: 3.3 G/DL
ALP SERPL-CCNC: 186 U/L
ALT SERPL W/O P-5'-P-CCNC: 23 U/L
ANION GAP SERPL CALC-SCNC: 12 MMOL/L
AST SERPL-CCNC: 33 U/L
BACTERIA #/AREA URNS HPF: ABNORMAL /HPF
BASOPHILS # BLD AUTO: 0 K/UL
BASOPHILS NFR BLD: 0.2 %
BILIRUB SERPL-MCNC: 0.4 MG/DL
BILIRUB UR QL STRIP: NEGATIVE
BUN SERPL-MCNC: 18 MG/DL
CALCIUM SERPL-MCNC: 10.1 MG/DL
CHLORIDE SERPL-SCNC: 102 MMOL/L
CLARITY UR: ABNORMAL
CO2 SERPL-SCNC: 25 MMOL/L
COLOR UR: YELLOW
CREAT SERPL-MCNC: 1.9 MG/DL
DIFFERENTIAL METHOD: ABNORMAL
EOSINOPHIL # BLD AUTO: 0.6 K/UL
EOSINOPHIL NFR BLD: 4.8 %
EPITH CASTS #/AREA URNS LPF: ABNORMAL /LPF
ERYTHROCYTE [DISTWIDTH] IN BLOOD BY AUTOMATED COUNT: 13.7 %
EST. GFR  (AFRICAN AMERICAN): 38 ML/MIN/1.73 M^2
EST. GFR  (NON AFRICAN AMERICAN): 33 ML/MIN/1.73 M^2
GLUCOSE SERPL-MCNC: 89 MG/DL
GLUCOSE UR QL STRIP: NEGATIVE
HCT VFR BLD AUTO: 34.3 %
HGB BLD-MCNC: 11.3 G/DL
HGB UR QL STRIP: ABNORMAL
HYALINE CASTS #/AREA URNS LPF: 0 /LPF
KETONES UR QL STRIP: NEGATIVE
LEUKOCYTE ESTERASE UR QL STRIP: ABNORMAL
LIPASE SERPL-CCNC: 13 U/L
LYMPHOCYTES # BLD AUTO: 1.3 K/UL
LYMPHOCYTES NFR BLD: 10.7 %
MCH RBC QN AUTO: 31.4 PG
MCHC RBC AUTO-ENTMCNC: 32.8 G/DL
MCV RBC AUTO: 96 FL
MICROSCOPIC COMMENT: ABNORMAL
MONOCYTES # BLD AUTO: 0.8 K/UL
MONOCYTES NFR BLD: 6.4 %
NEUTROPHILS # BLD AUTO: 9.5 K/UL
NEUTROPHILS NFR BLD: 77.9 %
NITRITE UR QL STRIP: POSITIVE
PH UR STRIP: 7 [PH] (ref 5–8)
PLATELET # BLD AUTO: 464 K/UL
PMV BLD AUTO: 8.1 FL
POTASSIUM SERPL-SCNC: 3.8 MMOL/L
PROT SERPL-MCNC: 8.1 G/DL
PROT UR QL STRIP: ABNORMAL
RBC # BLD AUTO: 3.59 M/UL
RBC #/AREA URNS HPF: 26 /HPF (ref 0–4)
SODIUM SERPL-SCNC: 139 MMOL/L
SP GR UR STRIP: 1.01 (ref 1–1.03)
SQUAMOUS #/AREA URNS HPF: 2 /HPF
URN SPEC COLLECT METH UR: ABNORMAL
UROBILINOGEN UR STRIP-ACNC: NEGATIVE EU/DL
WBC # BLD AUTO: 12.2 K/UL
WBC #/AREA URNS HPF: >100 /HPF (ref 0–5)

## 2017-09-18 PROCEDURE — 96365 THER/PROPH/DIAG IV INF INIT: CPT

## 2017-09-18 PROCEDURE — 81000 URINALYSIS NONAUTO W/SCOPE: CPT

## 2017-09-18 PROCEDURE — 85025 COMPLETE CBC W/AUTO DIFF WBC: CPT

## 2017-09-18 PROCEDURE — 36415 COLL VENOUS BLD VENIPUNCTURE: CPT

## 2017-09-18 PROCEDURE — 87184 SC STD DISK METHOD PER PLATE: CPT

## 2017-09-18 PROCEDURE — 83690 ASSAY OF LIPASE: CPT

## 2017-09-18 PROCEDURE — 87186 SC STD MICRODIL/AGAR DIL: CPT

## 2017-09-18 PROCEDURE — 99285 EMERGENCY DEPT VISIT HI MDM: CPT | Mod: 25

## 2017-09-18 PROCEDURE — 12000002 HC ACUTE/MED SURGE SEMI-PRIVATE ROOM

## 2017-09-18 PROCEDURE — 80053 COMPREHEN METABOLIC PANEL: CPT

## 2017-09-18 PROCEDURE — 87088 URINE BACTERIA CULTURE: CPT

## 2017-09-18 PROCEDURE — 96375 TX/PRO/DX INJ NEW DRUG ADDON: CPT

## 2017-09-18 PROCEDURE — 63600175 PHARM REV CODE 636 W HCPCS: Performed by: EMERGENCY MEDICINE

## 2017-09-18 PROCEDURE — 87086 URINE CULTURE/COLONY COUNT: CPT

## 2017-09-18 PROCEDURE — 96361 HYDRATE IV INFUSION ADD-ON: CPT

## 2017-09-18 PROCEDURE — 87077 CULTURE AEROBIC IDENTIFY: CPT | Mod: 59

## 2017-09-18 RX ORDER — CEFEPIME HYDROCHLORIDE 1 G/50ML
1 INJECTION, SOLUTION INTRAVENOUS
Status: COMPLETED | OUTPATIENT
Start: 2017-09-19 | End: 2017-09-19

## 2017-09-18 RX ORDER — ONDANSETRON 2 MG/ML
4 INJECTION INTRAMUSCULAR; INTRAVENOUS
Status: COMPLETED | OUTPATIENT
Start: 2017-09-18 | End: 2017-09-18

## 2017-09-18 RX ADMIN — ONDANSETRON 4 MG: 2 INJECTION INTRAMUSCULAR; INTRAVENOUS at 11:09

## 2017-09-19 LAB
ANION GAP SERPL CALC-SCNC: 6 MMOL/L
APTT BLDCRRT: 30.6 SEC
BASOPHILS # BLD AUTO: 0 K/UL
BASOPHILS NFR BLD: 0.4 %
BUN SERPL-MCNC: 16 MG/DL
CALCIUM SERPL-MCNC: 8.8 MG/DL
CHLORIDE SERPL-SCNC: 106 MMOL/L
CO2 SERPL-SCNC: 26 MMOL/L
CREAT SERPL-MCNC: 1.7 MG/DL
DIFFERENTIAL METHOD: ABNORMAL
EOSINOPHIL # BLD AUTO: 0.5 K/UL
EOSINOPHIL NFR BLD: 4.5 %
ERYTHROCYTE [DISTWIDTH] IN BLOOD BY AUTOMATED COUNT: 13.8 %
EST. GFR  (AFRICAN AMERICAN): 43 ML/MIN/1.73 M^2
EST. GFR  (NON AFRICAN AMERICAN): 38 ML/MIN/1.73 M^2
GLUCOSE SERPL-MCNC: 99 MG/DL
HCT VFR BLD AUTO: 29.6 %
HGB BLD-MCNC: 9.9 G/DL
INR PPP: 1.1
LACTATE SERPL-SCNC: 0.7 MMOL/L
LACTATE SERPL-SCNC: 1 MMOL/L
LYMPHOCYTES # BLD AUTO: 1.4 K/UL
LYMPHOCYTES NFR BLD: 12.1 %
MAGNESIUM SERPL-MCNC: 1.6 MG/DL
MCH RBC QN AUTO: 32.7 PG
MCHC RBC AUTO-ENTMCNC: 33.4 G/DL
MCV RBC AUTO: 98 FL
MONOCYTES # BLD AUTO: 0.7 K/UL
MONOCYTES NFR BLD: 6.3 %
NEUTROPHILS # BLD AUTO: 8.7 K/UL
NEUTROPHILS NFR BLD: 76.7 %
PHOSPHATE SERPL-MCNC: 3.4 MG/DL
PLATELET # BLD AUTO: 385 K/UL
PMV BLD AUTO: 8.3 FL
POTASSIUM SERPL-SCNC: 4.2 MMOL/L
PROTHROMBIN TIME: 11.2 SEC
RBC # BLD AUTO: 3.03 M/UL
SODIUM SERPL-SCNC: 138 MMOL/L
WBC # BLD AUTO: 11.3 K/UL

## 2017-09-19 PROCEDURE — 12000002 HC ACUTE/MED SURGE SEMI-PRIVATE ROOM

## 2017-09-19 PROCEDURE — 36415 COLL VENOUS BLD VENIPUNCTURE: CPT

## 2017-09-19 PROCEDURE — 87184 SC STD DISK METHOD PER PLATE: CPT

## 2017-09-19 PROCEDURE — 87088 URINE BACTERIA CULTURE: CPT

## 2017-09-19 PROCEDURE — 83605 ASSAY OF LACTIC ACID: CPT | Mod: 91

## 2017-09-19 PROCEDURE — 63600175 PHARM REV CODE 636 W HCPCS: Performed by: NURSE PRACTITIONER

## 2017-09-19 PROCEDURE — 85025 COMPLETE CBC W/AUTO DIFF WBC: CPT

## 2017-09-19 PROCEDURE — 87086 URINE CULTURE/COLONY COUNT: CPT

## 2017-09-19 PROCEDURE — 25000003 PHARM REV CODE 250: Performed by: EMERGENCY MEDICINE

## 2017-09-19 PROCEDURE — 99222 1ST HOSP IP/OBS MODERATE 55: CPT | Mod: ,,, | Performed by: INTERNAL MEDICINE

## 2017-09-19 PROCEDURE — 87077 CULTURE AEROBIC IDENTIFY: CPT

## 2017-09-19 PROCEDURE — 85610 PROTHROMBIN TIME: CPT

## 2017-09-19 PROCEDURE — 63600175 PHARM REV CODE 636 W HCPCS: Performed by: EMERGENCY MEDICINE

## 2017-09-19 PROCEDURE — 84100 ASSAY OF PHOSPHORUS: CPT

## 2017-09-19 PROCEDURE — 63600175 PHARM REV CODE 636 W HCPCS: Performed by: INTERNAL MEDICINE

## 2017-09-19 PROCEDURE — 83605 ASSAY OF LACTIC ACID: CPT

## 2017-09-19 PROCEDURE — 25000003 PHARM REV CODE 250: Performed by: NURSE PRACTITIONER

## 2017-09-19 PROCEDURE — 87186 SC STD MICRODIL/AGAR DIL: CPT | Mod: 59

## 2017-09-19 PROCEDURE — 87040 BLOOD CULTURE FOR BACTERIA: CPT | Mod: 59

## 2017-09-19 PROCEDURE — 80048 BASIC METABOLIC PNL TOTAL CA: CPT

## 2017-09-19 PROCEDURE — 85730 THROMBOPLASTIN TIME PARTIAL: CPT

## 2017-09-19 PROCEDURE — 83735 ASSAY OF MAGNESIUM: CPT

## 2017-09-19 RX ORDER — PANTOPRAZOLE SODIUM 40 MG/1
40 TABLET, DELAYED RELEASE ORAL DAILY
Status: DISCONTINUED | OUTPATIENT
Start: 2017-09-19 | End: 2017-09-25 | Stop reason: HOSPADM

## 2017-09-19 RX ORDER — ACETAMINOPHEN 325 MG/1
650 TABLET ORAL EVERY 6 HOURS PRN
Status: DISCONTINUED | OUTPATIENT
Start: 2017-09-19 | End: 2017-09-25 | Stop reason: HOSPADM

## 2017-09-19 RX ORDER — AMOXICILLIN 250 MG
1 CAPSULE ORAL 2 TIMES DAILY
Status: DISCONTINUED | OUTPATIENT
Start: 2017-09-19 | End: 2017-09-25 | Stop reason: HOSPADM

## 2017-09-19 RX ORDER — IBUPROFEN 200 MG
24 TABLET ORAL
Status: DISCONTINUED | OUTPATIENT
Start: 2017-09-19 | End: 2017-09-25 | Stop reason: HOSPADM

## 2017-09-19 RX ORDER — GLUCAGON 1 MG
1 KIT INJECTION
Status: DISCONTINUED | OUTPATIENT
Start: 2017-09-19 | End: 2017-09-25 | Stop reason: HOSPADM

## 2017-09-19 RX ORDER — SODIUM CHLORIDE 9 MG/ML
INJECTION, SOLUTION INTRAVENOUS CONTINUOUS
Status: DISCONTINUED | OUTPATIENT
Start: 2017-09-19 | End: 2017-09-22

## 2017-09-19 RX ORDER — HYDROMORPHONE HYDROCHLORIDE 1 MG/ML
1 INJECTION, SOLUTION INTRAMUSCULAR; INTRAVENOUS; SUBCUTANEOUS EVERY 4 HOURS PRN
Status: DISCONTINUED | OUTPATIENT
Start: 2017-09-19 | End: 2017-09-19 | Stop reason: SDUPTHER

## 2017-09-19 RX ORDER — ENOXAPARIN SODIUM 100 MG/ML
30 INJECTION SUBCUTANEOUS EVERY 24 HOURS
Status: DISCONTINUED | OUTPATIENT
Start: 2017-09-19 | End: 2017-09-25 | Stop reason: HOSPADM

## 2017-09-19 RX ORDER — CEFEPIME HYDROCHLORIDE 1 G/50ML
1 INJECTION, SOLUTION INTRAVENOUS
Status: DISCONTINUED | OUTPATIENT
Start: 2017-09-19 | End: 2017-09-25 | Stop reason: HOSPADM

## 2017-09-19 RX ORDER — IBUPROFEN 200 MG
16 TABLET ORAL
Status: DISCONTINUED | OUTPATIENT
Start: 2017-09-19 | End: 2017-09-25 | Stop reason: HOSPADM

## 2017-09-19 RX ORDER — HYDROCODONE BITARTRATE AND ACETAMINOPHEN 5; 325 MG/1; MG/1
2 TABLET ORAL EVERY 6 HOURS PRN
Status: DISCONTINUED | OUTPATIENT
Start: 2017-09-19 | End: 2017-09-21

## 2017-09-19 RX ORDER — DIPHENHYDRAMINE HCL 25 MG
25 CAPSULE ORAL EVERY 6 HOURS PRN
Status: DISCONTINUED | OUTPATIENT
Start: 2017-09-19 | End: 2017-09-25 | Stop reason: HOSPADM

## 2017-09-19 RX ORDER — ZOLPIDEM TARTRATE 5 MG/1
5 TABLET ORAL NIGHTLY PRN
Status: DISCONTINUED | OUTPATIENT
Start: 2017-09-19 | End: 2017-09-25 | Stop reason: HOSPADM

## 2017-09-19 RX ORDER — ONDANSETRON 2 MG/ML
4 INJECTION INTRAMUSCULAR; INTRAVENOUS EVERY 6 HOURS PRN
Status: DISCONTINUED | OUTPATIENT
Start: 2017-09-19 | End: 2017-09-25 | Stop reason: HOSPADM

## 2017-09-19 RX ORDER — HYDROMORPHONE HYDROCHLORIDE 2 MG/ML
1 INJECTION, SOLUTION INTRAMUSCULAR; INTRAVENOUS; SUBCUTANEOUS EVERY 4 HOURS PRN
Status: DISCONTINUED | OUTPATIENT
Start: 2017-09-19 | End: 2017-09-25 | Stop reason: HOSPADM

## 2017-09-19 RX ADMIN — HYDROMORPHONE HYDROCHLORIDE 1 MG: 2 INJECTION, SOLUTION INTRAMUSCULAR; INTRAVENOUS; SUBCUTANEOUS at 01:09

## 2017-09-19 RX ADMIN — CEFEPIME HYDROCHLORIDE 1 G: 1 INJECTION, POWDER, FOR SOLUTION INTRAMUSCULAR; INTRAVENOUS at 12:09

## 2017-09-19 RX ADMIN — HYDROMORPHONE HYDROCHLORIDE 1 MG: 2 INJECTION, SOLUTION INTRAMUSCULAR; INTRAVENOUS; SUBCUTANEOUS at 10:09

## 2017-09-19 RX ADMIN — SODIUM CHLORIDE 1800 ML: 0.9 INJECTION, SOLUTION INTRAVENOUS at 12:09

## 2017-09-19 RX ADMIN — ENOXAPARIN SODIUM 30 MG: 100 INJECTION SUBCUTANEOUS at 06:09

## 2017-09-19 RX ADMIN — PANTOPRAZOLE SODIUM 40 MG: 40 TABLET, DELAYED RELEASE ORAL at 08:09

## 2017-09-19 RX ADMIN — SODIUM CHLORIDE: 0.9 INJECTION, SOLUTION INTRAVENOUS at 07:09

## 2017-09-19 RX ADMIN — CEFEPIME HYDROCHLORIDE 1 G: 1 INJECTION, SOLUTION INTRAVENOUS at 12:09

## 2017-09-19 RX ADMIN — SODIUM CHLORIDE: 0.9 INJECTION, SOLUTION INTRAVENOUS at 11:09

## 2017-09-19 RX ADMIN — HYDROMORPHONE HYDROCHLORIDE 1 MG: 1 INJECTION, SOLUTION INTRAMUSCULAR; INTRAVENOUS; SUBCUTANEOUS at 01:09

## 2017-09-19 RX ADMIN — SODIUM CHLORIDE: 0.9 INJECTION, SOLUTION INTRAVENOUS at 01:09

## 2017-09-19 RX ADMIN — HYDROMORPHONE HYDROCHLORIDE 1 MG: 2 INJECTION, SOLUTION INTRAMUSCULAR; INTRAVENOUS; SUBCUTANEOUS at 09:09

## 2017-09-19 RX ADMIN — HYDROMORPHONE HYDROCHLORIDE 1 MG: 2 INJECTION, SOLUTION INTRAMUSCULAR; INTRAVENOUS; SUBCUTANEOUS at 06:09

## 2017-09-19 RX ADMIN — STANDARDIZED SENNA CONCENTRATE AND DOCUSATE SODIUM 1 TABLET: 8.6; 5 TABLET, FILM COATED ORAL at 08:09

## 2017-09-19 RX ADMIN — HYDROMORPHONE HYDROCHLORIDE 1 MG: 2 INJECTION, SOLUTION INTRAMUSCULAR; INTRAVENOUS; SUBCUTANEOUS at 05:09

## 2017-09-19 RX ADMIN — ZOLPIDEM TARTRATE 5 MG: 5 TABLET, FILM COATED ORAL at 09:09

## 2017-09-19 NOTE — PLAN OF CARE
I discussed the patient with Dr. Huynh and reviewed the chart remotely.    Patient is well-known with frequent admissions 2/2 pyelonephritis. She has a history of cervical cancer for which she starts chemo next month.  She has hx of ureteral strictures with stents and frequent pyelonephritis.    She presents today with a 2 day history of abdominal pain, N/V.  Her UA reveals at nitrite + UTI.  She was initiated on cefepime and urine was sent for culture.    Assessment/plan:   -Sepsis: defined by tachycardia + Leukocytosis (mildly > 12k) + UTI.  No evidence of severe sepsis at this time, lactic acid pending.  Did receive bolus 30 ml/kg in ED.  Will repeat lactic acid as per sepsis protocol, continue cefepime as initiated in ED, and follow blood cultures and urine culture.  -pyelonephritis: IV cefepime, antiemetics.  PRN IV narcotics for pain control. Consider CT imaging and urology consultation as warranted-- will defer for now.    Care to be turned over to Dr. Nicholson at 0600.

## 2017-09-19 NOTE — PLAN OF CARE
09/19/17 1220   Discharge Assessment   Assessment Type Discharge Planning Assessment   CM attempted to perform discharge planning and readmission assessment but several nurse in room CM will followup.

## 2017-09-19 NOTE — PLAN OF CARE
Problem: Patient Care Overview  Goal: Plan of Care Review  Outcome: Ongoing (interventions implemented as appropriate)  Admitted from ER with pyelonephritis. IV atxs given. IV fluids admin. AAOx4. Ambulatory. Pain controlled with Prn medications. Pt stayed awake all night.

## 2017-09-19 NOTE — ED PROVIDER NOTES
Encounter Date: 9/18/2017       History     Chief Complaint   Patient presents with    Abdominal Pain     times 2 days    Vomiting     Patient is a 37-year-old female with a past medical history of recurrent cervical cancer from 4 years ago even though she was status post partial hysterectomy she now has a mass in the lower abdomen who has had recurrent pyelonephritis and been admitted to the hospital recently who presents to emergency performed for evaluation of lower abdominal pain vomiting and left flank pain.  She denies any fevers but states she just is unable to hold anything else down.  She starts chemotherapy again for recurrent cervical cancer next month at [facility].  She has no headache chest pain fevers shortness breath cough or any other complaints.           Review of patient's allergies indicates:   Allergen Reactions    Pcn [penicillins] Anaphylaxis     Past Medical History:   Diagnosis Date    Anemia     Cervical cancer     cervical    Encounter for blood transfusion     Hydronephrosis     PONV (postoperative nausea and vomiting)     Pyelonephritis      Past Surgical History:   Procedure Laterality Date    COLONOSCOPY N/A 9/22/2016    Procedure: COLONOSCOPY;  Surgeon: Joe Moe MD;  Location: [facility];  Service: Endoscopy;  Laterality: N/A;    HYSTERECTOMY      Partial    TUBAL LIGATION      URETERAL STENT PLACEMENT  07/2016     Family History   Problem Relation Age of Onset    No Known Problems Mother     Drug abuse Father     No Known Problems Sister     Asthma Brother      Social History   Substance Use Topics    Smoking status: Never Smoker    Smokeless tobacco: Never Used    Alcohol use No     Review of Systems   Constitutional: Negative for fever.   HENT: Negative for congestion, ear pain, rhinorrhea and sore throat.    Eyes: Negative for pain and visual disturbance.   Respiratory: Negative for cough and shortness of breath.    Cardiovascular: Negative for chest pain.    Gastrointestinal: Positive for vomiting. Negative for abdominal pain, diarrhea and nausea.   Genitourinary: Positive for flank pain and pelvic pain. Negative for difficulty urinating, vaginal bleeding, vaginal discharge and vaginal pain.   Musculoskeletal: Negative for arthralgias.   Skin: Negative for rash.   Neurological: Negative for weakness, numbness and headaches.   Psychiatric/Behavioral: Negative for agitation and confusion.   All other systems reviewed and are negative.      Physical Exam     Initial Vitals [09/18/17 2159]   BP Pulse Resp Temp SpO2   107/61 (!) 133 16 98.6 °F (37 °C) 97 %      MAP       76.33         Physical Exam    Nursing note and vitals reviewed.  Constitutional: She appears well-developed and well-nourished. No distress.   HENT:   Head: Normocephalic and atraumatic.   Mouth/Throat: Oropharynx is clear and moist.   Eyes: Conjunctivae and EOM are normal. Pupils are equal, round, and reactive to light.   Neck: Normal range of motion. Neck supple.   Cardiovascular: Regular rhythm, normal heart sounds and intact distal pulses. Exam reveals no gallop and no friction rub.    No murmur heard.  Tachycardic     Pulmonary/Chest: Breath sounds normal. No respiratory distress. She has no decreased breath sounds. She has no wheezes. She has no rhonchi. She has no rales.   Abdominal: Soft. Bowel sounds are normal. There is tenderness. There is no rebound and no guarding.   Left flank pain     Musculoskeletal: Normal range of motion. She exhibits no edema.   Neurological: She is alert and oriented to person, place, and time. She has normal strength. No sensory deficit.   Skin: Skin is warm and dry.   Psychiatric: She has a normal mood and affect.         ED Course   Procedures  Labs Reviewed   CBC W/ AUTO DIFFERENTIAL - Abnormal; Notable for the following:        Result Value    RBC 3.59 (*)     Hemoglobin 11.3 (*)     Hematocrit 34.3 (*)     MCH 31.4 (*)     Platelets 464 (*)     MPV 8.1 (*)      Gran # 9.5 (*)     Eos # 0.6 (*)     Gran% 77.9 (*)     Lymph% 10.7 (*)     All other components within normal limits   COMPREHENSIVE METABOLIC PANEL - Abnormal; Notable for the following:     Creatinine 1.9 (*)     Albumin 3.3 (*)     Alkaline Phosphatase 186 (*)     eGFR if  38 (*)     eGFR if non  33 (*)     All other components within normal limits   URINALYSIS - Abnormal; Notable for the following:     Appearance, UA Cloudy (*)     Protein, UA 2+ (*)     Occult Blood UA 2+ (*)     Nitrite, UA Positive (*)     Leukocytes, UA 2+ (*)     All other components within normal limits   URINALYSIS MICROSCOPIC - Abnormal; Notable for the following:     RBC, UA 26 (*)     WBC, UA >100 (*)     Bacteria, UA Moderate (*)     All other components within normal limits   CULTURE, URINE   CULTURE, BLOOD   CULTURE, BLOOD   LIPASE   LACTIC ACID, PLASMA             Medical Decision Making:   Patient appears to have pyelonephritis.  Given multiple recent CT scans order an ultrasound to evaluate for hydronephrosis.  I will start broad-spectrum antibiotics given that she has ureteral stents.  She is tachycardic and a lactic acid and blood cultures are pending at this time.  Patient will be admitted to the hospitalist.                   ED Course      Clinical Impression:   The encounter diagnosis was Pyelonephritis.                           Moisse Huynh MD  09/18/17 8144

## 2017-09-19 NOTE — H&P
PCP: Harsha Sheppard MD    History & Physical    Chief Complaint: Abdominal pain for 2 days    History of Present Illness:  Patient is a 37 y.o. female admitted to Hospitalist Service from [facility] with complaint of pelvic pain for 2 days. Patient reportedly has PMH significant for cervical CA, Hydronephrosis, Ureteral strictures with stents, and recurrent pyelonephritis. Patient presented with left, lower abdominal pain associated with back pain, nausea, mild fever, and urinary hesitancy. Patient stated of left abdominal pain occurring 2 days ago and radiating to the whole pelvic region and left-sided back. Patient stated of having a hysterectomy in 2013 and having positive results of a pregnancy test. Patient denied vaginal bleeding, vaginal discharge, vaginal pain, vomiting, diarrhea, SOB, headache, and weakness. Patient denied chest pain, shortness of breath, nausea, vomiting, headache, vision changes, focal neuro-deficits, cough or fever.    Past Medical History:   Diagnosis Date    Anemia     Cervical cancer     cervical    Encounter for blood transfusion     Hydronephrosis     PONV (postoperative nausea and vomiting)     Pyelonephritis      Past Surgical History:   Procedure Laterality Date    COLONOSCOPY N/A 9/22/2016    Procedure: COLONOSCOPY;  Surgeon: Joe Moe MD;  Location: [facility];  Service: Endoscopy;  Laterality: N/A;    HYSTERECTOMY      Partial    TUBAL LIGATION      URETERAL STENT PLACEMENT  07/2016     Family History   Problem Relation Age of Onset    No Known Problems Mother     Drug abuse Father     No Known Problems Sister     Asthma Brother      Social History   Substance Use Topics    Smoking status: Never Smoker    Smokeless tobacco: Never Used    Alcohol use No      Review of patient's allergies indicates:   Allergen Reactions    Pcn [penicillins] Anaphylaxis     PTA Medications   Medication Sig    iron 18 mg Tab Take by mouth.     tolterodine (DETROL LA) 4 MG 24 hr capsule Take 4 mg by mouth once daily.     Review of Systems:  Constitutional: no fever or chills  Eyes: no visual changes  Ears, nose, mouth, throat, and face: no nasal congestion or sore throat  Respiratory: no cough or shorness of breath  Cardiovascular: no chest pain or palpitations  Gastrointestinal: no nausea or vomiting, no abdominal pain or change in bowel habits  Genitourinary: no hematuria or dysuria  Integument/breast: no rash or pruritis  Hematologic/lymphatic: no easy bruising or lymphadenopathy  Musculoskeletal: no arthralgias or myalgias  Neurological: no seizures or tremors.  Behavioral/Psych: no auditory or visual hallucinations  Endocrine: no heat or cold intolerance     OBJECTIVE:     Vital Signs (Most Recent)  Temp: 97.1 °F (36.2 °C) (09/19/17 0756)  Pulse: 79 (09/19/17 0756)  Resp: 18 (09/19/17 0756)  BP: 116/60 (09/19/17 0756)  SpO2: 99 % (09/19/17 0756)    Physical Exam:  General appearance: well developed, appears stated age  Head: normocephalic, atraumatic  Eyes:  conjunctivae/corneas clear. PERRL.  Nose: Nares normal. Septum midline.  Throat: lips, mucosa, and tongue normal; teeth and gums normal, no throat erythema.  Neck: supple, symmetrical, trachea midline, no JVD and thyroid not enlarged, symmetric, no tenderness/mass/nodules  Lungs:  clear to auscultation bilaterally and normal respiratory effort  Chest wall: no tenderness  Heart: regular rate and rhythm, S1, S2 normal, no murmur, click, rub or gallop  Abdomen: soft, tenderness in left lower quadrant, Left CVA tenderness, non-distented; bowel sounds normal; no masses,  no organomegaly  Extremities: no cyanosis, clubbing or edema.   Pulses: 2+ and symmetric  Skin: Skin color, texture, turgor normal. No rashes or lesions.  Lymph nodes: Cervical, supraclavicular, and axillary nodes normal.  Neurologic: Normal strength and tone. No focal numbness or weakness. CNII-XII intact.      Laboratory:   CBC:    Recent Labs  Lab 09/19/17  0645   WBC 11.30   RBC 3.03*   HGB 9.9*   HCT 29.6*   *   MCV 98   MCH 32.7*   MCHC 33.4     CMP:   Recent Labs  Lab 09/18/17  2302 09/19/17  0645   GLU 89 99   CALCIUM 10.1 8.8   ALBUMIN 3.3*  --    PROT 8.1  --     138   K 3.8 4.2   CO2 25 26    106   BUN 18 16   CREATININE 1.9* 1.7*   ALKPHOS 186*  --    ALT 23  --    AST 33  --    BILITOT 0.4  --      Coagulation:   Recent Labs  Lab 09/19/17  0645   LABPROT 11.2   INR 1.1   APTT 30.6     Microbiology Results (last 7 days)     Procedure Component Value Units Date/Time    Blood Culture #2 [[id number]] Collected:  09/19/17 0015    Order Status:  Sent Specimen:  Blood from Antecubital, Right  Arm Updated:  09/19/17 1109    Blood Culture #2 [[id number]] Collected:  09/19/17 0010    Order Status:  Sent Specimen:  Blood from Antecubital, Left  Arm Updated:  09/19/17 1109    Urine culture [[id number]] Collected:  09/18/17 2312    Order Status:  Sent Specimen:  Urine from Urine, Clean Catch Updated:  09/19/17 1105    Urine Culture [[id number]] Collected:  09/19/17 0857    Order Status:  Sent Specimen:  Urine from Urine, Clean Catch Updated:  09/19/17 0858    Urine Culture [[id number]]     Order Status:  Canceled Specimen:  Urine           Recent Labs  Lab 09/18/17 2312   COLORU Yellow   SPECGRAV 1.015   PHUR 7.0   PROTEINUA 2+*   BACTERIA Moderate*   NITRITE Positive*   LEUKOCYTESUR 2+*   UROBILINOGEN Negative   HYALINECASTS 0       Hemoglobin A1C   Date Value Ref Range Status   12/12/2016 5.4 4.5 - 6.2 % Final     Comment:     According to ADA guidelines, hemoglobin A1C <7.0% represents  optimal control in non-pregnant diabetic patients.  Different  metrics may apply to specific populations.   Standards of Medical Care in Diabetes - 2016.  For the purpose of screening for the presence of diabetes:  <5.7%     Consistent with the absence of diabetes  5.7-6.4%  Consistent with increasing risk for diabetes    (prediabetes)  >or=6.5%  Consistent with diabetes  Currently no consensus exists for use of hemoglobin A1C  for diagnosis of diabetes for children.       Microbiology Results (last 7 days)     Procedure Component Value Units Date/Time    Blood Culture #2 [[id number]] Collected:  09/19/17 0015    Order Status:  Sent Specimen:  Blood from Antecubital, Right  Arm Updated:  09/19/17 1109    Blood Culture #2 [[id number]] Collected:  09/19/17 0010    Order Status:  Sent Specimen:  Blood from Antecubital, Left  Arm Updated:  09/19/17 1109    Urine culture [[id number]] Collected:  09/18/17 2312    Order Status:  Sent Specimen:  Urine from Urine, Clean Catch Updated:  09/19/17 1105    Urine Culture [[id number]] Collected:  09/19/17 0857    Order Status:  Sent Specimen:  Urine from Urine, Clean Catch Updated:  09/19/17 0858    Urine Culture [[id number]]     Order Status:  Canceled Specimen:  Urine         Diagnostic Results:  Renal US: 1.  Mild bilateral hydronephrosis, noting presence of bilateral double-J ureteral stents on recent CT examination.    Assessment/Plan:     Acute cystitis with hematuria     Follow urine C/S. Continue IV Merrem              * Intractable abdominal pain     Possibly due to UTI +/- pyelonephritis vs mesenteric mass vs colon origin  PRN analgesics  Serial abdominal exams  Continue antibiotic as per above  Abdominal lesion biopsy at [facility] is still pending.                     Mesenteric mass     Abdominal lesion biopsy at [facility] is still pending.        Ureteral stricture s/p bilateral ureteral stents secondary to XRT for cervical cancer     Patient is established with urologist.          CKD (chronic kidney disease) stage 3, GFR 30-59 ml/min     Avoid non-essential nephrotoxins, dose medications according to GFR  Monitor I/O, daily weight. Monitor for volume overload         VTE Risk Mitigation         Ordered     enoxaparin injection 30 mg  Daily     Route:  Subcutaneous        09/19/17 0133     Medium  Risk of VTE  Once      09/19/17 0133        Cathy Nicholson MD  Department of Hospital Medicine   [facility]

## 2017-09-19 NOTE — UM SECONDARY REVIEW
Physician Advisor External    Level of Care Issue    Approved Inpatient for 9/18/17 per dr mendoza at [facility].

## 2017-09-20 LAB
ANION GAP SERPL CALC-SCNC: 9 MMOL/L
BASOPHILS # BLD AUTO: 0 K/UL
BASOPHILS NFR BLD: 0.5 %
BUN SERPL-MCNC: 12 MG/DL
CALCIUM SERPL-MCNC: 8.7 MG/DL
CHLORIDE SERPL-SCNC: 107 MMOL/L
CO2 SERPL-SCNC: 19 MMOL/L
CREAT SERPL-MCNC: 1.5 MG/DL
DIFFERENTIAL METHOD: ABNORMAL
EOSINOPHIL # BLD AUTO: 0.5 K/UL
EOSINOPHIL NFR BLD: 6.2 %
ERYTHROCYTE [DISTWIDTH] IN BLOOD BY AUTOMATED COUNT: 14 %
EST. GFR  (AFRICAN AMERICAN): 51 ML/MIN/1.73 M^2
EST. GFR  (NON AFRICAN AMERICAN): 44 ML/MIN/1.73 M^2
GLUCOSE SERPL-MCNC: 77 MG/DL
HCT VFR BLD AUTO: 29.3 %
HGB BLD-MCNC: 9.9 G/DL
LYMPHOCYTES # BLD AUTO: 1.4 K/UL
LYMPHOCYTES NFR BLD: 16.6 %
MCH RBC QN AUTO: 32.9 PG
MCHC RBC AUTO-ENTMCNC: 33.8 G/DL
MCV RBC AUTO: 97 FL
MONOCYTES # BLD AUTO: 0.7 K/UL
MONOCYTES NFR BLD: 8.6 %
NEUTROPHILS # BLD AUTO: 5.8 K/UL
NEUTROPHILS NFR BLD: 68.1 %
PLATELET # BLD AUTO: 282 K/UL
PMV BLD AUTO: 8.5 FL
POTASSIUM SERPL-SCNC: 4 MMOL/L
RBC # BLD AUTO: 3.01 M/UL
SODIUM SERPL-SCNC: 135 MMOL/L
WBC # BLD AUTO: 8.5 K/UL

## 2017-09-20 PROCEDURE — 25000003 PHARM REV CODE 250: Performed by: NURSE PRACTITIONER

## 2017-09-20 PROCEDURE — 12000002 HC ACUTE/MED SURGE SEMI-PRIVATE ROOM

## 2017-09-20 PROCEDURE — 80048 BASIC METABOLIC PNL TOTAL CA: CPT

## 2017-09-20 PROCEDURE — 99232 SBSQ HOSP IP/OBS MODERATE 35: CPT | Mod: ,,, | Performed by: INTERNAL MEDICINE

## 2017-09-20 PROCEDURE — 36415 COLL VENOUS BLD VENIPUNCTURE: CPT

## 2017-09-20 PROCEDURE — 85025 COMPLETE CBC W/AUTO DIFF WBC: CPT

## 2017-09-20 PROCEDURE — 63600175 PHARM REV CODE 636 W HCPCS: Performed by: INTERNAL MEDICINE

## 2017-09-20 PROCEDURE — 63600175 PHARM REV CODE 636 W HCPCS: Performed by: NURSE PRACTITIONER

## 2017-09-20 RX ADMIN — CEFEPIME HYDROCHLORIDE 1 G: 1 INJECTION, POWDER, FOR SOLUTION INTRAMUSCULAR; INTRAVENOUS at 12:09

## 2017-09-20 RX ADMIN — PANTOPRAZOLE SODIUM 40 MG: 40 TABLET, DELAYED RELEASE ORAL at 08:09

## 2017-09-20 RX ADMIN — ONDANSETRON 4 MG: 2 INJECTION INTRAMUSCULAR; INTRAVENOUS at 07:09

## 2017-09-20 RX ADMIN — PROMETHAZINE HYDROCHLORIDE 6.25 MG: 25 INJECTION INTRAMUSCULAR; INTRAVENOUS at 08:09

## 2017-09-20 RX ADMIN — ONDANSETRON 4 MG: 2 INJECTION INTRAMUSCULAR; INTRAVENOUS at 11:09

## 2017-09-20 RX ADMIN — HYDROMORPHONE HYDROCHLORIDE 1 MG: 2 INJECTION, SOLUTION INTRAMUSCULAR; INTRAVENOUS; SUBCUTANEOUS at 06:09

## 2017-09-20 RX ADMIN — SODIUM CHLORIDE: 0.9 INJECTION, SOLUTION INTRAVENOUS at 02:09

## 2017-09-20 RX ADMIN — STANDARDIZED SENNA CONCENTRATE AND DOCUSATE SODIUM 1 TABLET: 8.6; 5 TABLET, FILM COATED ORAL at 08:09

## 2017-09-20 RX ADMIN — ZOLPIDEM TARTRATE 5 MG: 5 TABLET, FILM COATED ORAL at 08:09

## 2017-09-20 RX ADMIN — HYDROMORPHONE HYDROCHLORIDE 1 MG: 2 INJECTION, SOLUTION INTRAMUSCULAR; INTRAVENOUS; SUBCUTANEOUS at 10:09

## 2017-09-20 RX ADMIN — CEFEPIME HYDROCHLORIDE 1 G: 1 INJECTION, POWDER, FOR SOLUTION INTRAMUSCULAR; INTRAVENOUS at 11:09

## 2017-09-20 RX ADMIN — ENOXAPARIN SODIUM 30 MG: 100 INJECTION SUBCUTANEOUS at 05:09

## 2017-09-20 RX ADMIN — HYDROMORPHONE HYDROCHLORIDE 1 MG: 2 INJECTION, SOLUTION INTRAMUSCULAR; INTRAVENOUS; SUBCUTANEOUS at 11:09

## 2017-09-20 RX ADMIN — HYDROMORPHONE HYDROCHLORIDE 1 MG: 2 INJECTION, SOLUTION INTRAMUSCULAR; INTRAVENOUS; SUBCUTANEOUS at 02:09

## 2017-09-20 NOTE — PLAN OF CARE
Problem: Patient Care Overview  Goal: Plan of Care Review  Outcome: Ongoing (interventions implemented as appropriate)  Pt alert and oriented, pain meds given as ordered, ambulated to BR without difficulty, no resp distress, safety monitored and maintained, call light next to pt

## 2017-09-20 NOTE — PLAN OF CARE
Problem: Patient Care Overview  Goal: Plan of Care Review  Outcome: Ongoing (interventions implemented as appropriate)  POC reviewed with pt, pt verbalized understanding. Safety maintained throughout shift, bed locked and in lowest position, call light in reach, Side rails up X 2. Non skid sock on when OOB. Pt remained free of fall/trauma. Pt self reports of pain treated with IV pain medication as ordered. VSS, pt remained afebrile this shift. Pt ambulating to bathroom independently. Pt advanced to regular diet, tolerated well no reports of nausea and vomiting. Nausea reported at 0700 treated with PRN medication as ordered. IVF and IV abx  infused as ordered. Will continue to monitor.

## 2017-09-20 NOTE — PLAN OF CARE
Pt is a readmit from 8/16/17-8/21/17. Ondina Haas, [facility] [place]     09/19/17 0211   Readmission Questionnaire   At the time of your discharge, did someone talk to you about what your health problems were? Yes   At the time of discharge, did someone talk to you about what to watch out for regarding worsening of your health problem? Yes   At the time of discharge, did someone talk to you about what to do if you experienced worsening of your health problem? Yes   At the time of discharge, did someone talk to you about which medication to take when you left the hospital and which ones to stop taking? Yes   At the time of discharge, did someone talk to you about when and where to follow up with a doctor after you left the hospital? Yes   How often do you need to have someone help you when you read instructions, pamphlets, or other written material from your doctor or pharmacy? Never   Do you have problems taking your medications as prescribed? No   Do you have any problems affording any of  your prescribed medications? No   Do you have problems obtaining/receiving your medications? No   Does the patient have transportation to healthcare appointments? No   Lives With child(garo), dependent;spouse   Living Arrangements house   Does the patient have family/friends to help with healtcare needs after discharge? yes   Does your caregiver provide all the help you need? Yes   Are you currently feeling confused? No   Are you currently having problems thinking? No   Are you currently having memory problems? No   Have you felt down, depressed, or hopeless? 2   Have you felt little interest or pleasure in doing things? 2   In the last 7 days, my sleep quality was: good

## 2017-09-20 NOTE — PLAN OF CARE
The pt lives at home with her spouse and dependent children. She denies HH and DME. She uses [employer] She is a readmit from 8/16/17-8/21/17. I educated her on the blue discharge folder. I wrote my name and number on the pts white board. She has no questions or concerns at this time. Ondina ISIDORO Haas, [facility] [place], [place]     09/20/17 1241   Discharge Assessment   Assessment Type Discharge Planning Assessment   Confirmed/corrected address and phone number on facesheet? Yes   Assessment information obtained from? Patient   Communicated expected length of stay with patient/caregiver no   Prior to hospitilization cognitive status: Alert/Oriented   Prior to hospitalization functional status: Independent   Current cognitive status: Alert/Oriented   Current Functional Status: Independent   Lives With child(garo), dependent;spouse   Able to Return to Prior Arrangements yes   Is patient able to care for self after discharge? Yes   Readmission Within The Last 30 Days other (see comments)  (Pt was admittd 8/16/17-8/21/17)   Patient currently being followed by outpatient case management? No   Patient currently receives any other outside agency services? No   Equipment Currently Used at Home none   Do you have any problems affording any of your prescribed medications? No  (Medicare and Medicaid )   Is the patient taking medications as prescribed? yes   Does the patient have transportation home? Yes   Transportation Available car;family or friend will provide   Does the patient receive services at the [facility]? No   Discharge Plan A Home   Discharge Plan B Home with family   Patient/Family In Agreement With Plan yes

## 2017-09-20 NOTE — PLAN OF CARE
Problem: Patient Care Overview  Goal: Plan of Care Review  Outcome: Ongoing (interventions implemented as appropriate)  POC reviewed with pt, pt verbalized understanding. Safety maintained throughout shift, bed locked and in lowest position, call light in reach, Side rails up X 2. Non skid sock on when OOB. Pt remained free of fall/trauma. Pt self reports of pain treated with IV pain medication as ordered. VSS, pt remained afebrile this shift. Pt ambulating to bathroom independently. Pt tolerating clear liquid diet, pt had outside food at dinner( chicken) that was tolerated well no reports of nausea and vomiting. IVF and IV abx  infused as ordered. Will continue to monitor.

## 2017-09-20 NOTE — PROGRESS NOTES
Progress Note  Hospital Medicine  Patient Name:Cesilia Dozier  MRN:  [id number]  Patient Class: IP- Inpatient  Admit Date: 9/18/2017  Length of Stay: 2 days  Expected Discharge Date:   Attending Physician: Cathy Nicholson MD  Primary Care Provider:  Harsha Sheppard MD    SUBJECTIVE:     Principal Problem: Pyelonephritis  Initial history of present illness: Patient is a 37 y.o. female admitted to Hospitalist Service from [facility] with complaint of pelvic pain for 2 days. Patient reportedly has PMH significant for cervical CA, Hydronephrosis, Ureteral strictures with stents, and recurrent pyelonephritis. Patient presented with left, lower abdominal pain associated with back pain, nausea, mild fever, and urinary hesitancy. Patient stated of left abdominal pain occurring 2 days ago and radiating to the whole pelvic region and left-sided back. Patient stated of having a hysterectomy in 2013 and having positive results of a pregnancy test. Patient denied vaginal bleeding, vaginal discharge, vaginal pain, vomiting, diarrhea, SOB, headache, and weakness. Patient denied chest pain, shortness of breath, nausea, vomiting, headache, vision changes, focal neuro-deficits, cough or fever.    PMH/PSH/SH/FH/Meds: reviewed.    Symptoms/Review of Systems: Abdominal/pelvic pain better. No shortness of breath, cough, chest pain or headache, fever.   Diet:  Adequate intake.    Activity level: Normal.    Pain:  Chronic pain    OBJECTIVE:   Vital Signs (Most Recent):      Temp: 97.8 °F (36.6 °C) (09/20/17 0710)  Pulse: 92 (09/20/17 0710)  Resp: 18 (09/20/17 0710)  BP: (!) 99/54 (09/20/17 0710)  SpO2: 100 % (09/20/17 0710)       Vital Signs Range (Last 24H):  Temp:  [97.7 °F (36.5 °C)-98.2 °F (36.8 °C)]   Pulse:  []   Resp:  [17-18]   BP: ()/(54-74)   SpO2:  [98 %-100 %]     Weight: 59.6 kg (131 lb 6.3 oz)  Body mass index is 21.21 kg/m².    Intake/Output Summary (Last 24 hours) at 09/20/17 0841  Last  data filed at 09/19/17 1957   Gross per 24 hour   Intake          2393.75 ml   Output                0 ml   Net          2393.75 ml     Physical Examination:  General appearance: well developed, appears stated age  Head: normocephalic, atraumatic  Eyes:  conjunctivae/corneas clear. PERRL.  Nose: Nares normal. Septum midline.  Throat: lips, mucosa, and tongue normal; teeth and gums normal, no throat erythema.  Neck: supple, symmetrical, trachea midline, no JVD and thyroid not enlarged, symmetric, no tenderness/mass/nodules  Lungs:  clear to auscultation bilaterally and normal respiratory effort  Chest wall: no tenderness  Heart: regular rate and rhythm, S1, S2 normal, no murmur, click, rub or gallop  Abdomen: soft, tenderness in left lower quadrant, Left CVA tenderness, non-distented; bowel sounds normal; no masses,  no organomegaly  Extremities: no cyanosis, clubbing or edema.   Pulses: 2+ and symmetric  Skin: Skin color, texture, turgor normal. No rashes or lesions.  Lymph nodes: Cervical, supraclavicular, and axillary nodes normal.  Neurologic: Normal strength and tone. No focal numbness or weakness. CNII-XII intact.    CBC:    Recent Labs  Lab 09/18/17 2302 09/19/17  0645 09/20/17  0644   WBC 12.20 11.30 8.50   RBC 3.59* 3.03* 3.01*   HGB 11.3* 9.9* 9.9*   HCT 34.3* 29.6* 29.3*   * 385* 282   MCV 96 98 97   MCH 31.4* 32.7* 32.9*   MCHC 32.8 33.4 33.8   BMP    Recent Labs  Lab 09/18/17  2302 09/19/17  0645 09/20/17  0644   GLU 89 99 77    138 135*   K 3.8 4.2 4.0    106 107   CO2 25 26 19*   BUN 18 16 12   CREATININE 1.9* 1.7* 1.5*   CALCIUM 10.1 8.8 8.7   MG  --  1.6  --       Diagnostic Results:  Microbiology Results (last 7 days)     Procedure Component Value Units Date/Time    Blood Culture #2 [[id number]] Collected:  09/19/17 0015    Order Status:  Completed Specimen:  Blood from Antecubital, Right  Arm Updated:  09/19/17 3245     Blood Culture, Routine No Growth to date    Blood Culture #2  [[id number]] Collected:  09/19/17 0010    Order Status:  Completed Specimen:  Blood from Antecubital, Left  Arm Updated:  09/19/17 1745     Blood Culture, Routine No Growth to date    Urine Culture [[id number]] Collected:  09/19/17 0857    Order Status:  Sent Specimen:  Urine from Urine, Clean Catch Updated:  09/19/17 1555    Urine culture [[id number]] Collected:  09/18/17 2312    Order Status:  Sent Specimen:  Urine from Urine, Clean Catch Updated:  09/19/17 1105    Urine Culture [[id number]]     Order Status:  Canceled Specimen:  Urine          Renal US: Mild bilateral hydronephrosis, noting presence of bilateral double-J ureteral stents on recent CT examination.    Assessment/Plan:     Acute cystitis with hematuria     Follow urine C/S. Continue IV Merrem                * Intractable abdominal pain     Possibly due to UTI +/- pyelonephritis vs mesenteric mass vs colon origin  PRN analgesics  Serial abdominal exams  Continue antibiotic as per above  Abdominal lesion biopsy at [facility] is still pending.                Mesenteric mass     Abdominal lesion biopsy at [facility] is still pending.        Ureteral stricture s/p bilateral ureteral stents secondary to XRT for cervical cancer     Patient is established with urologist.          CKD (chronic kidney disease) stage 3, GFR 30-59 ml/min     Avoid non-essential nephrotoxins, dose medications according to GFR  Monitor I/O, daily weight. Monitor for volume overload          VTE Risk Mitigation         Ordered     enoxaparin injection 30 mg  Daily     Route:  Subcutaneous        09/19/17 0133     Medium Risk of VTE  Once      09/19/17 0133        Cathy Nicholson MD  Department of Hospital Medicine   [facility]

## 2017-09-21 LAB
ANION GAP SERPL CALC-SCNC: 10 MMOL/L
BASOPHILS # BLD AUTO: 0 K/UL
BASOPHILS NFR BLD: 0 %
BUN SERPL-MCNC: 12 MG/DL
CALCIUM SERPL-MCNC: 8.3 MG/DL
CHLORIDE SERPL-SCNC: 110 MMOL/L
CO2 SERPL-SCNC: 18 MMOL/L
CREAT SERPL-MCNC: 1.8 MG/DL
DIFFERENTIAL METHOD: ABNORMAL
EOSINOPHIL # BLD AUTO: 0.6 K/UL
EOSINOPHIL NFR BLD: 5.3 %
ERYTHROCYTE [DISTWIDTH] IN BLOOD BY AUTOMATED COUNT: 14.1 %
EST. GFR  (AFRICAN AMERICAN): 41 ML/MIN/1.73 M^2
EST. GFR  (NON AFRICAN AMERICAN): 35 ML/MIN/1.73 M^2
GLUCOSE SERPL-MCNC: 85 MG/DL
HCT VFR BLD AUTO: 25.4 %
HGB BLD-MCNC: 8.4 G/DL
LYMPHOCYTES # BLD AUTO: 0.8 K/UL
LYMPHOCYTES NFR BLD: 7.4 %
MCH RBC QN AUTO: 32.3 PG
MCHC RBC AUTO-ENTMCNC: 33.2 G/DL
MCV RBC AUTO: 97 FL
MONOCYTES # BLD AUTO: 0.7 K/UL
MONOCYTES NFR BLD: 6.8 %
NEUTROPHILS # BLD AUTO: 8.4 K/UL
NEUTROPHILS NFR BLD: 80.5 %
PLATELET # BLD AUTO: 241 K/UL
PMV BLD AUTO: 9.1 FL
POTASSIUM SERPL-SCNC: 3.8 MMOL/L
RBC # BLD AUTO: 2.61 M/UL
SODIUM SERPL-SCNC: 138 MMOL/L
WBC # BLD AUTO: 10.4 K/UL

## 2017-09-21 PROCEDURE — 63600175 PHARM REV CODE 636 W HCPCS: Performed by: NURSE PRACTITIONER

## 2017-09-21 PROCEDURE — 80048 BASIC METABOLIC PNL TOTAL CA: CPT

## 2017-09-21 PROCEDURE — 99232 SBSQ HOSP IP/OBS MODERATE 35: CPT | Mod: ,,, | Performed by: INTERNAL MEDICINE

## 2017-09-21 PROCEDURE — 11000001 HC ACUTE MED/SURG PRIVATE ROOM

## 2017-09-21 PROCEDURE — 36415 COLL VENOUS BLD VENIPUNCTURE: CPT

## 2017-09-21 PROCEDURE — 63600175 PHARM REV CODE 636 W HCPCS: Performed by: INTERNAL MEDICINE

## 2017-09-21 PROCEDURE — 25000003 PHARM REV CODE 250: Performed by: NURSE PRACTITIONER

## 2017-09-21 PROCEDURE — 85025 COMPLETE CBC W/AUTO DIFF WBC: CPT

## 2017-09-21 PROCEDURE — 25000003 PHARM REV CODE 250: Performed by: INTERNAL MEDICINE

## 2017-09-21 RX ORDER — OXYCODONE AND ACETAMINOPHEN 10; 325 MG/1; MG/1
1 TABLET ORAL EVERY 4 HOURS PRN
Status: DISCONTINUED | OUTPATIENT
Start: 2017-09-21 | End: 2017-09-25 | Stop reason: HOSPADM

## 2017-09-21 RX ADMIN — OXYCODONE AND ACETAMINOPHEN 1 TABLET: 10; 325 TABLET ORAL at 02:09

## 2017-09-21 RX ADMIN — HYDROMORPHONE HYDROCHLORIDE 1 MG: 2 INJECTION, SOLUTION INTRAMUSCULAR; INTRAVENOUS; SUBCUTANEOUS at 03:09

## 2017-09-21 RX ADMIN — PROMETHAZINE HYDROCHLORIDE 6.25 MG: 25 INJECTION INTRAMUSCULAR; INTRAVENOUS at 01:09

## 2017-09-21 RX ADMIN — DIPHENHYDRAMINE HYDROCHLORIDE 25 MG: 25 CAPSULE ORAL at 08:09

## 2017-09-21 RX ADMIN — ONDANSETRON 4 MG: 2 INJECTION INTRAMUSCULAR; INTRAVENOUS at 08:09

## 2017-09-21 RX ADMIN — ENOXAPARIN SODIUM 30 MG: 100 INJECTION SUBCUTANEOUS at 07:09

## 2017-09-21 RX ADMIN — HYDROMORPHONE HYDROCHLORIDE 1 MG: 2 INJECTION, SOLUTION INTRAMUSCULAR; INTRAVENOUS; SUBCUTANEOUS at 04:09

## 2017-09-21 RX ADMIN — HYDROMORPHONE HYDROCHLORIDE 1 MG: 2 INJECTION, SOLUTION INTRAMUSCULAR; INTRAVENOUS; SUBCUTANEOUS at 11:09

## 2017-09-21 RX ADMIN — STANDARDIZED SENNA CONCENTRATE AND DOCUSATE SODIUM 1 TABLET: 8.6; 5 TABLET, FILM COATED ORAL at 08:09

## 2017-09-21 RX ADMIN — VANCOMYCIN HYDROCHLORIDE 1000 MG: 1 INJECTION, POWDER, LYOPHILIZED, FOR SOLUTION INTRAVENOUS at 07:09

## 2017-09-21 RX ADMIN — STANDARDIZED SENNA CONCENTRATE AND DOCUSATE SODIUM 1 TABLET: 8.6; 5 TABLET, FILM COATED ORAL at 09:09

## 2017-09-21 RX ADMIN — CEFEPIME HYDROCHLORIDE 1 G: 1 INJECTION, POWDER, FOR SOLUTION INTRAMUSCULAR; INTRAVENOUS at 01:09

## 2017-09-21 RX ADMIN — PANTOPRAZOLE SODIUM 40 MG: 40 TABLET, DELAYED RELEASE ORAL at 09:09

## 2017-09-21 RX ADMIN — HYDROMORPHONE HYDROCHLORIDE 1 MG: 2 INJECTION, SOLUTION INTRAMUSCULAR; INTRAVENOUS; SUBCUTANEOUS at 07:09

## 2017-09-21 RX ADMIN — ONDANSETRON 4 MG: 2 INJECTION INTRAMUSCULAR; INTRAVENOUS at 04:09

## 2017-09-21 RX ADMIN — HYDROMORPHONE HYDROCHLORIDE 1 MG: 2 INJECTION, SOLUTION INTRAMUSCULAR; INTRAVENOUS; SUBCUTANEOUS at 08:09

## 2017-09-21 RX ADMIN — ZOLPIDEM TARTRATE 5 MG: 5 TABLET, FILM COATED ORAL at 08:09

## 2017-09-21 NOTE — PROGRESS NOTES
Progress Note  Hospital Medicine  Patient Name:Cesilia Dozier  MRN:  [id number]  Patient Class: IP- Inpatient  Admit Date: 9/18/2017  Length of Stay: 3 days  Expected Discharge Date:   Attending Physician: Cathy Nicholson MD  Primary Care Provider:  Harsha Sheppard MD    SUBJECTIVE:     Principal Problem: Pyelonephritis  Initial history of present illness: Patient is a 37 y.o. female admitted to Hospitalist Service from [facility] with complaint of pelvic pain for 2 days. Patient reportedly has PMH significant for cervical CA, Hydronephrosis, Ureteral strictures with stents, and recurrent pyelonephritis. Patient presented with left, lower abdominal pain associated with back pain, nausea, mild fever, and urinary hesitancy. Patient stated of left abdominal pain occurring 2 days ago and radiating to the whole pelvic region and left-sided back. Patient stated of having a hysterectomy in 2013 and having positive results of a pregnancy test. Patient denied vaginal bleeding, vaginal discharge, vaginal pain, vomiting, diarrhea, SOB, headache, and weakness. Patient denied chest pain, shortness of breath, nausea, vomiting, headache, vision changes, focal neuro-deficits, cough or fever.    PMH/PSH/SH/FH/Meds: reviewed.    Symptoms/Review of Systems: Abdominal/pelvic pain better. No shortness of breath, cough, chest pain or headache, fever.   Diet:  Adequate intake.    Activity level: Normal.    Pain:  Chronic pain    OBJECTIVE:   Vital Signs (Most Recent):      Temp: 100.2 °F (37.9 °C) (09/21/17 1040)  Pulse: (!) 117 (09/21/17 1040)  Resp: 18 (09/21/17 1040)  BP: (!) 109/59 (09/21/17 1040)  SpO2: 100 % (09/21/17 1040)       Vital Signs Range (Last 24H):  Temp:  [97.8 °F (36.6 °C)-100.2 °F (37.9 °C)]   Pulse:  []   Resp:  [17-18]   BP: (106-120)/(58-85)   SpO2:  [95 %-100 %]     Weight: 59.6 kg (131 lb 6.3 oz)  Body mass index is 21.21 kg/m².    Intake/Output Summary (Last 24 hours) at 09/21/17  1108  Last data filed at 09/21/17 0600   Gross per 24 hour   Intake          5466.25 ml   Output                0 ml   Net          5466.25 ml     Physical Examination:  General appearance: well developed, appears stated age  Head: normocephalic, atraumatic  Eyes:  conjunctivae/corneas clear. PERRL.  Nose: Nares normal. Septum midline.  Throat: lips, mucosa, and tongue normal; teeth and gums normal, no throat erythema.  Neck: supple, symmetrical, trachea midline, no JVD and thyroid not enlarged, symmetric, no tenderness/mass/nodules  Lungs:  clear to auscultation bilaterally and normal respiratory effort  Chest wall: no tenderness  Heart: regular rate and rhythm, S1, S2 normal, no murmur, click, rub or gallop  Abdomen: soft, tenderness in left lower quadrant, Left CVA tenderness, non-distented; bowel sounds normal; no masses,  no organomegaly  Extremities: no cyanosis, clubbing or edema.   Pulses: 2+ and symmetric  Skin: Skin color, texture, turgor normal. No rashes or lesions.  Lymph nodes: Cervical, supraclavicular, and axillary nodes normal.  Neurologic: Normal strength and tone. No focal numbness or weakness. CNII-XII intact.    CBC:    Recent Labs  Lab 09/19/17  0645 09/20/17  0644 09/21/17  0554   WBC 11.30 8.50 10.40   RBC 3.03* 3.01* 2.61*   HGB 9.9* 9.9* 8.4*   HCT 29.6* 29.3* 25.4*   * 282 241   MCV 98 97 97   MCH 32.7* 32.9* 32.3*   MCHC 33.4 33.8 33.2   BMP    Recent Labs  Lab 09/19/17  0645 09/20/17  0644 09/21/17  0554   GLU 99 77 85    135* 138   K 4.2 4.0 3.8    107 110   CO2 26 19* 18*   BUN 16 12 12   CREATININE 1.7* 1.5* 1.8*   CALCIUM 8.8 8.7 8.3*   MG 1.6  --   --       Diagnostic Results:  Microbiology Results (last 7 days)     Procedure Component Value Units Date/Time    Urine Culture [[id number]] Collected:  09/19/17 0857    Order Status:  Completed Specimen:  Urine from Urine, Clean Catch Updated:  09/21/17 1019     Urine Culture, Routine Further report to follow    Blood  Culture #2 [[id number]] Collected:  09/19/17 0015    Order Status:  Completed Specimen:  Blood from Antecubital, Right  Arm Updated:  09/20/17 1212     Blood Culture, Routine No Growth to date     Blood Culture, Routine No Growth to date    Blood Culture #2 [[id number]] Collected:  09/19/17 0010    Order Status:  Completed Specimen:  Blood from Antecubital, Left  Arm Updated:  09/20/17 1212     Blood Culture, Routine No Growth to date     Blood Culture, Routine No Growth to date    Urine culture [[id number]] Collected:  09/18/17 2312    Order Status:  Completed Specimen:  Urine from Urine, Clean Catch Updated:  09/20/17 1128     Urine Culture, Routine --     PRESUMPTIVE E COLI  >100,000 cfu/ml  Identification and susceptibility pending       Urine Culture, Routine --     ENTEROCOCCUS SPECIES  > 100,000 cfu/ml  Identification and susceptibility pending      Urine Culture [[id number]]     Order Status:  Canceled Specimen:  Urine          Renal US: Mild bilateral hydronephrosis, noting presence of bilateral double-J ureteral stents on recent CT examination.    Assessment/Plan:     Acute cystitis with hematuria (E. Coli and Enterococcus sp)     Follow urine C/S. Continue IV Merrem                * Intractable abdominal pain     Possibly due to UTI +/- pyelonephritis vs mesenteric mass vs colon origin  PRN analgesics  Serial abdominal exams  Continue antibiotic as per above  Abdominal lesion biopsy at [facility] is still pending.                Mesenteric mass     Abdominal lesion biopsy at [facility] is still pending.        Ureteral stricture s/p bilateral ureteral stents secondary to XRT for cervical cancer     Patient is established with urologist.          CKD (chronic kidney disease) stage 3, GFR 30-59 ml/min     Avoid non-essential nephrotoxins, dose medications according to GFR  Monitor I/O, daily weight. Monitor for volume overload          VTE Risk Mitigation         Ordered     enoxaparin injection 30 mg  Daily     Route:   Subcutaneous        09/19/17 0133     Medium Risk of VTE  Once      09/19/17 0133        Cathy Nicholson MD  Department of Hospital Medicine   [facility]

## 2017-09-21 NOTE — PLAN OF CARE
Problem: Patient Care Overview  Goal: Plan of Care Review  Outcome: Ongoing (interventions implemented as appropriate)  AAOx4.  VSS.  Fall armband on. Bed in lowest position, antiskid socks, bed locked.  Patient has had intermittent nausea throughout shift.  Offered crackers and Sprite.  Nausea completely relieved with anti-emetics.  Pain controled by analgesics.  Non-pharmacological means provided by reducing stimuli.  Patient was moved into a private room in the middle of the shift.

## 2017-09-21 NOTE — PLAN OF CARE
Problem: Patient Care Overview  Goal: Plan of Care Review  Outcome: Ongoing (interventions implemented as appropriate)  Patient AAOx4.  VSS.  Has remained afebrile this shift.  Intermittent nausea.  Zofran given, patient did not receive relief.  Phenergan given, patient tolerated well. IV abx given per orders.  POC reviewed with patient.  Open discussion was facilitated.  Patient verbalized understanding.  Bed in lowest position, side rails up x2, call light within reach, and safety measures maintained throughout shift.  Patient denies any needs at this time.  Will continue to monitor.

## 2017-09-21 NOTE — CONSULTS
Cesilia Dozier [id number] is a 37 y.o. female who has been consulted for vancomycin dosing.    The patient has the following labs:     Date Creatinine (mg/dl)    BUN WBC Count   9/21/2017 Estimated Creatinine Clearance: 39.7 mL/min (based on SCr of 1.8 mg/dL (H)). Lab Results   Component Value Date    BUN 12 09/21/2017     Lab Results   Component Value Date    WBC 10.40 09/21/2017        Current weight is 59.6 kg (131 lb 6.3 oz)  Patient will be started on Vancomycin 1000 mg every 24 hours (17mg/kg/dose). Vancomycin levels has been ordered before 2nd dose on 9/22/17 at 1700 to determine clearance.    Patient will be followed by pharmacy for changes in renal function, toxicity, and efficacy.   Thank you for allowing us to participate in this patient's care.     Audie Ayala   09/21/2017

## 2017-09-22 LAB
ANION GAP SERPL CALC-SCNC: 8 MMOL/L
BASOPHILS # BLD AUTO: 0 K/UL
BASOPHILS NFR BLD: 0.1 %
BUN SERPL-MCNC: 14 MG/DL
CALCIUM SERPL-MCNC: 8.5 MG/DL
CHLORIDE SERPL-SCNC: 112 MMOL/L
CO2 SERPL-SCNC: 17 MMOL/L
CREAT SERPL-MCNC: 1.9 MG/DL
DIFFERENTIAL METHOD: ABNORMAL
EOSINOPHIL # BLD AUTO: 0.8 K/UL
EOSINOPHIL NFR BLD: 8.1 %
ERYTHROCYTE [DISTWIDTH] IN BLOOD BY AUTOMATED COUNT: 14.1 %
EST. GFR  (AFRICAN AMERICAN): 38 ML/MIN/1.73 M^2
EST. GFR  (NON AFRICAN AMERICAN): 33 ML/MIN/1.73 M^2
GLUCOSE SERPL-MCNC: 79 MG/DL
HCT VFR BLD AUTO: 24.9 %
HGB BLD-MCNC: 8.4 G/DL
LYMPHOCYTES # BLD AUTO: 0.8 K/UL
LYMPHOCYTES NFR BLD: 9.2 %
MCH RBC QN AUTO: 32.8 PG
MCHC RBC AUTO-ENTMCNC: 33.7 G/DL
MCV RBC AUTO: 97 FL
MONOCYTES # BLD AUTO: 0.8 K/UL
MONOCYTES NFR BLD: 8.3 %
NEUTROPHILS # BLD AUTO: 6.9 K/UL
NEUTROPHILS NFR BLD: 74.3 %
PLATELET # BLD AUTO: 235 K/UL
PMV BLD AUTO: 8.9 FL
POTASSIUM SERPL-SCNC: 3.9 MMOL/L
RBC # BLD AUTO: 2.56 M/UL
SODIUM SERPL-SCNC: 137 MMOL/L
VANCOMYCIN SERPL-MCNC: 12.6 UG/ML
WBC # BLD AUTO: 9.3 K/UL

## 2017-09-22 PROCEDURE — 25000003 PHARM REV CODE 250: Performed by: INTERNAL MEDICINE

## 2017-09-22 PROCEDURE — 25000003 PHARM REV CODE 250: Performed by: NURSE PRACTITIONER

## 2017-09-22 PROCEDURE — 63600175 PHARM REV CODE 636 W HCPCS: Performed by: INTERNAL MEDICINE

## 2017-09-22 PROCEDURE — 80048 BASIC METABOLIC PNL TOTAL CA: CPT

## 2017-09-22 PROCEDURE — 11000001 HC ACUTE MED/SURG PRIVATE ROOM

## 2017-09-22 PROCEDURE — 63600175 PHARM REV CODE 636 W HCPCS: Performed by: NURSE PRACTITIONER

## 2017-09-22 PROCEDURE — 80202 ASSAY OF VANCOMYCIN: CPT

## 2017-09-22 PROCEDURE — 85025 COMPLETE CBC W/AUTO DIFF WBC: CPT

## 2017-09-22 PROCEDURE — 36415 COLL VENOUS BLD VENIPUNCTURE: CPT

## 2017-09-22 PROCEDURE — 99232 SBSQ HOSP IP/OBS MODERATE 35: CPT | Mod: ,,, | Performed by: INTERNAL MEDICINE

## 2017-09-22 RX ADMIN — OXYCODONE AND ACETAMINOPHEN 1 TABLET: 10; 325 TABLET ORAL at 12:09

## 2017-09-22 RX ADMIN — CEFEPIME HYDROCHLORIDE 1 G: 1 INJECTION, POWDER, FOR SOLUTION INTRAMUSCULAR; INTRAVENOUS at 12:09

## 2017-09-22 RX ADMIN — PROMETHAZINE HYDROCHLORIDE 6.25 MG: 25 INJECTION INTRAMUSCULAR; INTRAVENOUS at 12:09

## 2017-09-22 RX ADMIN — HYDROMORPHONE HYDROCHLORIDE 1 MG: 2 INJECTION, SOLUTION INTRAMUSCULAR; INTRAVENOUS; SUBCUTANEOUS at 08:09

## 2017-09-22 RX ADMIN — STANDARDIZED SENNA CONCENTRATE AND DOCUSATE SODIUM 1 TABLET: 8.6; 5 TABLET, FILM COATED ORAL at 09:09

## 2017-09-22 RX ADMIN — HYDROMORPHONE HYDROCHLORIDE 1 MG: 2 INJECTION, SOLUTION INTRAMUSCULAR; INTRAVENOUS; SUBCUTANEOUS at 01:09

## 2017-09-22 RX ADMIN — CEFEPIME HYDROCHLORIDE 1 G: 1 INJECTION, POWDER, FOR SOLUTION INTRAMUSCULAR; INTRAVENOUS at 01:09

## 2017-09-22 RX ADMIN — ENOXAPARIN SODIUM 30 MG: 100 INJECTION SUBCUTANEOUS at 04:09

## 2017-09-22 RX ADMIN — PROMETHAZINE HYDROCHLORIDE 6.25 MG: 25 INJECTION INTRAMUSCULAR; INTRAVENOUS at 09:09

## 2017-09-22 RX ADMIN — OXYCODONE AND ACETAMINOPHEN 1 TABLET: 10; 325 TABLET ORAL at 05:09

## 2017-09-22 RX ADMIN — PANTOPRAZOLE SODIUM 40 MG: 40 TABLET, DELAYED RELEASE ORAL at 09:09

## 2017-09-22 RX ADMIN — DIPHENHYDRAMINE HYDROCHLORIDE 25 MG: 25 CAPSULE ORAL at 12:09

## 2017-09-22 RX ADMIN — PROMETHAZINE HYDROCHLORIDE 6.25 MG: 25 INJECTION INTRAMUSCULAR; INTRAVENOUS at 08:09

## 2017-09-22 RX ADMIN — SODIUM CHLORIDE: 0.9 INJECTION, SOLUTION INTRAVENOUS at 07:09

## 2017-09-22 RX ADMIN — VANCOMYCIN HYDROCHLORIDE 1000 MG: 1 INJECTION, POWDER, LYOPHILIZED, FOR SOLUTION INTRAVENOUS at 04:09

## 2017-09-22 NOTE — PHYSICIAN QUERY
PT Name: Cesilia Dozier  MR #: [id number]     Physician Query Form - Documentation Clarification      CDS/: Madeline Grayson RN, CCDS               Contact information: [phone]    This form is a permanent document in the medical record.     Query Date: September 22, 2017    By submitting this query, we are merely seeking further clarification of documentation. Please utilize your independent clinical judgment when addressing the question(s) below.    The Medical record reflects the following:    Supporting Clinical Findings Location in Medical Record   Assessment/plan:   -Sepsis: defined by tachycardia + Leukocytosis (mildly > 12k) + UTI.      No evidence of severe sepsis at this time, lactic acid pending.      Did receive bolus 30 ml/kg in ED.      Will repeat lactic acid as per sepsis protocol, continue cefepime as initiated in  ED, and follow blood cultures and urine culture.     -pyelonephritis: IV cefepime, antiemetics.      PRN IV narcotics for pain control.     Consider CT imaging and urology consultation as warranted-- will defer for now.    NP Care Plan 9/18    PMH significant for cervical CA, Hydronephrosis, Ureteral strictures with stents, and recurrent pyelonephritis.     Acute cystitis with hematuria (E. Coli and Enterococcus sp)     Follow urine C/S. Continue IV Merrem     Intractable abdominal pain     Possibly due to UTI +/- pyelonephritis vs mesenteric mass vs colon origin  PRN analgesics  Serial abdominal exams  Continue antibiotic as per above  Abdominal lesion biopsy at [facility] is still pending.     Ureteral stricture s/p bilateral ureteral stents secondary to XRT for cervical cancer     Patient is established with urologist.    [place] PN 9/21   WBC: 12.20 - 8.50  Bands: none reported  Lactate: 1.0 - 0.7    Urine Culture, Routine ESCHERICHIA COLI> 100,000 cfu/ml Susceptibility pending Susceptibility pending    Urine Culture, Routine ENTEROCOCCUS FAECIUM VRE> 100,000 cfu/ml     Lab 9/18 - 21    BP: 106/64 - 120/75  HR: 133 - 80  RR: 16 - 18  Temp: 100.2 - 97.7   VS Flow Sheet 9/18-21   IVF:   NS 1 Liter bolus in ED X1  NS @ 125 ml/hr 9/19-22    Vancomycin IV Q 24 hours start 9/21  Cefepime IV Q 12 hours start 9/19  Cefepime IV in ED x1 MAR                                                                            Doctor, Please specify diagnosis or diagnoses associated with above clinical findings.    Provider Use Only      (  ) Sepsis due to Acute cystitis w/hematuria (E. Coli and Enterococcus sp), POA=yes    (  ) Sepsis due to Acute cystitis w/hematuria (E. Coli and Enterococcus sp); not poa    (  ) Sepsis, POA-yes and  due to other source - specify: _______________    (  ) Sepsis, not poa;  and  due to other source - specify: _______________    ( x ) No Sepsis    (  ) Sepsis due to - specify source & organism: ____________; Unknown if POA    (  ) Undeterminable    (  ) Other diagnosis- specify: _____________                                                                                                                     [  ] Clinically undetermined

## 2017-09-22 NOTE — PROGRESS NOTES
Progress Note  Hospital Medicine  Patient Name:Cesilia Dozier  MRN:  [id number]  Patient Class: IP- Inpatient  Admit Date: 9/18/2017  Length of Stay: 4 days  Expected Discharge Date:   Attending Physician: Cathy Nicholson MD  Primary Care Provider:  Harsha Sheppard MD    SUBJECTIVE:     Principal Problem: Pyelonephritis  Initial history of present illness: Patient is a 37 y.o. female admitted to Hospitalist Service from [facility] with complaint of pelvic pain for 2 days. Patient reportedly has PMH significant for cervical CA, Hydronephrosis, Ureteral strictures with stents, and recurrent pyelonephritis. Patient presented with left, lower abdominal pain associated with back pain, nausea, mild fever, and urinary hesitancy. Patient stated of left abdominal pain occurring 2 days ago and radiating to the whole pelvic region and left-sided back. Patient stated of having a hysterectomy in 2013 and having positive results of a pregnancy test. Patient denied vaginal bleeding, vaginal discharge, vaginal pain, vomiting, diarrhea, SOB, headache, and weakness. Patient denied chest pain, shortness of breath, nausea, vomiting, headache, vision changes, focal neuro-deficits, cough or fever.    PMH/PSH/SH/FH/Meds: reviewed.    Symptoms/Review of Systems: Abdominal/pelvic pain better. No shortness of breath, cough, chest pain or headache, fever.   Diet:  Adequate intake.    Activity level: Normal.    Pain:  Chronic pain    OBJECTIVE:   Vital Signs (Most Recent):      Temp: 98.6 °F (37 °C) (09/22/17 0732)  Pulse: 102 (09/22/17 0732)  Resp: 16 (09/22/17 0732)  BP: (!) 107/59 (09/22/17 0732)  SpO2: 100 % (09/22/17 0732)       Vital Signs Range (Last 24H):  Temp:  [98.3 °F (36.8 °C)-100.2 °F (37.9 °C)]   Pulse:  [102-117]   Resp:  [16-18]   BP: (102-120)/(59-73)   SpO2:  [100 %]     Weight: 59.6 kg (131 lb 6.3 oz)  Body mass index is 21.21 kg/m².    Intake/Output Summary (Last 24 hours) at 09/22/17 0930  Last data  filed at 09/22/17 0500   Gross per 24 hour   Intake              240 ml   Output                0 ml   Net              240 ml     Physical Examination:  General appearance: well developed, appears stated age  Head: normocephalic, atraumatic  Eyes:  conjunctivae/corneas clear. PERRL.  Nose: Nares normal. Septum midline.  Throat: lips, mucosa, and tongue normal; teeth and gums normal, no throat erythema.  Neck: supple, symmetrical, trachea midline, no JVD and thyroid not enlarged, symmetric, no tenderness/mass/nodules  Lungs:  clear to auscultation bilaterally and normal respiratory effort  Chest wall: no tenderness  Heart: regular rate and rhythm, S1, S2 normal, no murmur, click, rub or gallop  Abdomen: soft, tenderness in left lower quadrant, Left CVA tenderness, non-distented; bowel sounds normal; no masses,  no organomegaly  Extremities: no cyanosis, clubbing or edema.   Pulses: 2+ and symmetric  Skin: Skin color, texture, turgor normal. No rashes or lesions.  Lymph nodes: Cervical, supraclavicular, and axillary nodes normal.  Neurologic: Normal strength and tone. No focal numbness or weakness. CNII-XII intact.    CBC:    Recent Labs  Lab 09/20/17  0644 09/21/17  0554 09/22/17  0545   WBC 8.50 10.40 9.30   RBC 3.01* 2.61* 2.56*   HGB 9.9* 8.4* 8.4*   HCT 29.3* 25.4* 24.9*    241 235   MCV 97 97 97   MCH 32.9* 32.3* 32.8*   MCHC 33.8 33.2 33.7   BMP    Recent Labs  Lab 09/19/17  0645 09/20/17  0644 09/21/17  0554 09/22/17  0545   GLU 99 77 85 79    135* 138 137   K 4.2 4.0 3.8 3.9    107 110 112*   CO2 26 19* 18* 17*   BUN 16 12 12 14   CREATININE 1.7* 1.5* 1.8* 1.9*   CALCIUM 8.8 8.7 8.3* 8.5*   MG 1.6  --   --   --       Diagnostic Results:  Microbiology Results (last 7 days)     Procedure Component Value Units Date/Time    Urine Culture [[id number]] Collected:  09/19/17 0857    Order Status:  Completed Specimen:  Urine from Urine, Clean Catch Updated:  09/21/17 1416     Urine Culture, Routine  --     PRESUMPTIVE E COLI  >100,000 cfu/ml  Identification and susceptibility pending       Urine Culture, Routine --     ENTEROCOCCUS FAECIUM  > 100,000 cfu/ml  Susceptibility pending      Blood Culture #2 [[id number]] Collected:  09/19/17 0010    Order Status:  Completed Specimen:  Blood from Antecubital, Left  Arm Updated:  09/21/17 1212     Blood Culture, Routine No Growth to date     Blood Culture, Routine No Growth to date     Blood Culture, Routine No Growth to date    Blood Culture #2 [[id number]] Collected:  09/19/17 0015    Order Status:  Completed Specimen:  Blood from Antecubital, Right  Arm Updated:  09/21/17 1212     Blood Culture, Routine No Growth to date     Blood Culture, Routine No Growth to date     Blood Culture, Routine No Growth to date    Urine culture [[id number]]  (Susceptibility) Collected:  09/18/17 2312    Order Status:  Completed Specimen:  Urine from Urine, Clean Catch Updated:  09/21/17 1144     Urine Culture, Routine --     ESCHERICHIA COLI  >100,000 cfu/ml  Susceptibility pending       Urine Culture, Routine --     ENTEROCOCCUS SPECIES  > 100,000 cfu/ml  Identification and susceptibility pending      Urine Culture [[id number]]     Order Status:  Canceled Specimen:  Urine          Renal US: Mild bilateral hydronephrosis, noting presence of bilateral double-J ureteral stents on recent CT examination.    Assessment/Plan:     Acute cystitis with hematuria (E. Coli and Enterococcus sp)     Follow urine C/S. Continue IV Merrem                * Intractable abdominal pain     Possibly due to UTI +/- pyelonephritis vs mesenteric mass vs colon origin  PRN analgesics  Serial abdominal exams  Continue antibiotic as per above  Abdominal lesion biopsy at [facility] is still pending.                Mesenteric mass     Abdominal lesion biopsy at [facility] is still pending.        Ureteral stricture s/p bilateral ureteral stents secondary to XRT for cervical cancer     Patient is established with urologist.           CKD (chronic kidney disease) stage 3, GFR 30-59 ml/min     Avoid non-essential nephrotoxins, dose medications according to GFR  Monitor I/O, daily weight. Monitor for volume overload          VTE Risk Mitigation         Ordered     enoxaparin injection 30 mg  Daily     Route:  Subcutaneous        09/19/17 0133     Medium Risk of VTE  Once      09/19/17 0133        Cathy Nicholson MD  Department of Hospital Medicine   [facility]

## 2017-09-22 NOTE — PLAN OF CARE
"Problem: Patient Care Overview  Goal: Plan of Care Review  Outcome: Ongoing (interventions implemented as appropriate)  Extensive teaching performed on hand hygiene and proper wiping when using the bathroom due to e.coli in urine, pt states "i do those things, I don't know how I got that," pain manage with prn medication due to c/o lower abdominal pain radiating to left side of abdomen, pt ambulates without difficulty, compliant with care, continue to monitor, observe and note any changes, safety maintain      "

## 2017-09-22 NOTE — PLAN OF CARE
Problem: Patient Care Overview  Goal: Plan of Care Review  Outcome: Ongoing (interventions implemented as appropriate)   Pt VSS and afebrile, free of falls, trauma, injury, and skin breakdowns, pain and nausea controlled with medication, ambulates to RR, positions self in bed. Pt rested well in a low locked bed, call light in reach, safety precautions maintained.

## 2017-09-22 NOTE — PHYSICIAN QUERY
"PT Name: Cesilia Dozier  MR #: [id number]    Physician Query Form - Hematology Clarification      CDS/: Madeline Grayson RN, CCDS               Contact information: [phone]    This form is a permanent document in the medical record.      Query Date: September 22, 2017    By submitting this query, we are merely seeking further clarification of documentation. Please utilize your independent clinical judgment when addressing the question(s) below.    The Medical record contains the following:   Indicators  Supporting Clinical Findings Location in Medical Record   x "Anemia" documented Hx of Anemia ED MD Note and H&P   x H & H =    9/18  23:02 9/19  06:45 9/20  06:44 9/21  05:54 9/22  05:45   Hgb 11.3 (L) 9.9 (L) 9.9 (L) 8.4 (L) 8.4 (L)   Hct 34.3 (L) 29.6 (L) 29.3 (L) 25.4 (L) 24.9 (L)      Lab    BP =                     HR=      "GI bleeding" documented     x Acute bleeding (Non GI site) Acute Cystitis with hematuria H&P    Transfusion(s)     x Treatment: CBC Daily Orders     x Other:  Home Meds:  Iron Tab 18 mg po     · CKD 3  · Ureteral stricture s/p bilateral ureteral stents secondary to XRT for cervical cancer  · Mesenteric Mass - Abd lesion bx at [facility] still pending  · PMH significant for cervical CA, Hydronephrosis, Ureteral strictures with stents, and recurrent pyelonephritis   Home Meds List        [place] PN 9/21     Provider, please specify diagnosis or diagnoses associated with above clinical findings.    [  ] Acute blood loss anemia  [  ] Iron deficiency anemia  [  ] Chronic blood loss anemia  [  ] Precipitous drop in Hematocrit    [ x ] Anemia of chronic disease ( Specify chronic disease)      [x  ] CKD (specify stage) _________3__________________     [  ] Neoplastic disease      [  ] Due to Chemotherapy      [  ] Other (Specify) _______________________________     [  ] Clinically Undetermined     [  ] Other Hematological Diagnosis (please specify): _________________________________    [  ] Clinically " Undetermined       Please document in your progress notes daily for the duration of treatment, until resolved, and include in your discharge summary.

## 2017-09-22 NOTE — PLAN OF CARE
Spoke with Aleisha in micro regarding the sensitivities on the enterococcus in the urine; she does not anticipate a result on it until late tonight or in the morning.   Dr Nicholson notified of the above.....Sean Edouard CM

## 2017-09-23 LAB
ANION GAP SERPL CALC-SCNC: 8 MMOL/L
BACTERIA UR CULT: NORMAL
BASOPHILS # BLD AUTO: 0 K/UL
BASOPHILS NFR BLD: 0.1 %
BUN SERPL-MCNC: 16 MG/DL
CALCIUM SERPL-MCNC: 8.6 MG/DL
CHLORIDE SERPL-SCNC: 109 MMOL/L
CO2 SERPL-SCNC: 20 MMOL/L
CREAT SERPL-MCNC: 2.3 MG/DL
DIFFERENTIAL METHOD: ABNORMAL
EOSINOPHIL # BLD AUTO: 0.8 K/UL
EOSINOPHIL NFR BLD: 7.3 %
ERYTHROCYTE [DISTWIDTH] IN BLOOD BY AUTOMATED COUNT: 14.1 %
EST. GFR  (AFRICAN AMERICAN): 30 ML/MIN/1.73 M^2
EST. GFR  (NON AFRICAN AMERICAN): 26 ML/MIN/1.73 M^2
GLUCOSE SERPL-MCNC: 79 MG/DL
HCT VFR BLD AUTO: 25.4 %
HGB BLD-MCNC: 8.7 G/DL
LYMPHOCYTES # BLD AUTO: 0.6 K/UL
LYMPHOCYTES NFR BLD: 5.8 %
MCH RBC QN AUTO: 33 PG
MCHC RBC AUTO-ENTMCNC: 34.2 G/DL
MCV RBC AUTO: 96 FL
MONOCYTES # BLD AUTO: 0.7 K/UL
MONOCYTES NFR BLD: 6.7 %
NEUTROPHILS # BLD AUTO: 8.2 K/UL
NEUTROPHILS NFR BLD: 80.1 %
PLATELET # BLD AUTO: 241 K/UL
PMV BLD AUTO: 8.7 FL
POTASSIUM SERPL-SCNC: 3.8 MMOL/L
RBC # BLD AUTO: 2.64 M/UL
SODIUM SERPL-SCNC: 137 MMOL/L
VANCOMYCIN TROUGH SERPL-MCNC: 22 UG/ML
WBC # BLD AUTO: 10.2 K/UL

## 2017-09-23 PROCEDURE — 25000003 PHARM REV CODE 250: Performed by: INTERNAL MEDICINE

## 2017-09-23 PROCEDURE — 80202 ASSAY OF VANCOMYCIN: CPT

## 2017-09-23 PROCEDURE — 80048 BASIC METABOLIC PNL TOTAL CA: CPT

## 2017-09-23 PROCEDURE — 63600175 PHARM REV CODE 636 W HCPCS: Performed by: NURSE PRACTITIONER

## 2017-09-23 PROCEDURE — 36415 COLL VENOUS BLD VENIPUNCTURE: CPT

## 2017-09-23 PROCEDURE — 85025 COMPLETE CBC W/AUTO DIFF WBC: CPT

## 2017-09-23 PROCEDURE — 11000001 HC ACUTE MED/SURG PRIVATE ROOM

## 2017-09-23 PROCEDURE — 63600175 PHARM REV CODE 636 W HCPCS: Performed by: INTERNAL MEDICINE

## 2017-09-23 PROCEDURE — 25000003 PHARM REV CODE 250: Performed by: NURSE PRACTITIONER

## 2017-09-23 RX ADMIN — ENOXAPARIN SODIUM 30 MG: 100 INJECTION SUBCUTANEOUS at 04:09

## 2017-09-23 RX ADMIN — PROMETHAZINE HYDROCHLORIDE 6.25 MG: 25 INJECTION INTRAMUSCULAR; INTRAVENOUS at 04:09

## 2017-09-23 RX ADMIN — Medication 1000 MG: at 08:09

## 2017-09-23 RX ADMIN — OXYCODONE AND ACETAMINOPHEN 1 TABLET: 10; 325 TABLET ORAL at 06:09

## 2017-09-23 RX ADMIN — HYDROMORPHONE HYDROCHLORIDE 1 MG: 2 INJECTION, SOLUTION INTRAMUSCULAR; INTRAVENOUS; SUBCUTANEOUS at 08:09

## 2017-09-23 RX ADMIN — PROMETHAZINE HYDROCHLORIDE 6.25 MG: 25 INJECTION INTRAMUSCULAR; INTRAVENOUS at 01:09

## 2017-09-23 RX ADMIN — HYDROMORPHONE HYDROCHLORIDE 1 MG: 2 INJECTION, SOLUTION INTRAMUSCULAR; INTRAVENOUS; SUBCUTANEOUS at 12:09

## 2017-09-23 RX ADMIN — STANDARDIZED SENNA CONCENTRATE AND DOCUSATE SODIUM 1 TABLET: 8.6; 5 TABLET, FILM COATED ORAL at 08:09

## 2017-09-23 RX ADMIN — OXYCODONE AND ACETAMINOPHEN 1 TABLET: 10; 325 TABLET ORAL at 01:09

## 2017-09-23 RX ADMIN — CEFEPIME HYDROCHLORIDE 1 G: 1 INJECTION, POWDER, FOR SOLUTION INTRAMUSCULAR; INTRAVENOUS at 02:09

## 2017-09-23 RX ADMIN — VANCOMYCIN HYDROCHLORIDE 1000 MG: 1 INJECTION, POWDER, LYOPHILIZED, FOR SOLUTION INTRAVENOUS at 04:09

## 2017-09-23 RX ADMIN — CEFEPIME HYDROCHLORIDE 1 G: 1 INJECTION, POWDER, FOR SOLUTION INTRAMUSCULAR; INTRAVENOUS at 12:09

## 2017-09-23 RX ADMIN — HYDROMORPHONE HYDROCHLORIDE 1 MG: 2 INJECTION, SOLUTION INTRAMUSCULAR; INTRAVENOUS; SUBCUTANEOUS at 04:09

## 2017-09-23 RX ADMIN — ZOLPIDEM TARTRATE 5 MG: 5 TABLET, FILM COATED ORAL at 08:09

## 2017-09-23 RX ADMIN — PANTOPRAZOLE SODIUM 40 MG: 40 TABLET, DELAYED RELEASE ORAL at 08:09

## 2017-09-23 NOTE — CONSULTS
Cesilia Dozier [id number] is a 37 y.o. female who has been consulted for vancomycin dosing.    The patient has the following labs:     Date Creatinine (mg/dl)    BUN WBC Count   9/23/2017 Estimated Creatinine Clearance: 31 mL/min (based on SCr of 2.3 mg/dL (H)). Lab Results   Component Value Date    BUN 16 09/23/2017     Lab Results   Component Value Date    WBC 10.20 09/23/2017        Current weight is 59.6 kg (131 lb 6.3 oz)    Pt is receiving vancomycin 1000 mg every 24 hours.  Vancomycin trough from 9/23/17 at 1635 was 22 mg/dL.  Target trough range is 15-20 mg/dL. Trough was possibly drawn after hanging Vancomycin based on MAR. Patient will be continued on current regimen of  vancomycin 1000 mg every 24 hours. Vancomycin random levels has for 9/24/17 W/Am Labs.     Patient will be followed by pharmacy for changes in renal function, toxicity, and efficacy.  Thank you for allowing us to participate in this patient's care.     Audie Ayala   09/23/2017

## 2017-09-23 NOTE — PLAN OF CARE
Problem: Patient Care Overview  Goal: Discharge Needs Assessment  Outcome: Ongoing (interventions implemented as appropriate)  Pt remained free of falls, injuries, or trauma during shift. Fall precautions maintained throughout shift. Bed kept in lowest position, locked. Call light within reach. Fall risk band onPt rounded on hourly until midnight, then every 2 hours after midnight. No acute events throughout the shift. PRN pain meds given as needed. Pt ran low grade temp (see flowsheets) in the middle of the night. VS and assessment performed per orders. Patient progressing towards goals as tolerated. Plan of care reviewed with pt, all concerns addressed. Will continue to monitor

## 2017-09-23 NOTE — PLAN OF CARE
Problem: Patient Care Overview  Goal: Plan of Care Review  Outcome: Ongoing (interventions implemented as appropriate)  Plan of care reviewed with patient, patient verbalized understanding. Safety maintained throughout shift.   Pt remained free of fall/trauma. Vs stable. Iv antibiotics infusing per orders. Pt tolerating diet. No temp this shift. Pain controlled with prn pain medication. Nausea controlled with phenergan. Pt ambulates without difficulty. Bed low, call light in reach. Will continue to monitor patient.

## 2017-09-23 NOTE — PROGRESS NOTES
"[facility] Medicine    CC: heaven    HPI 37 y.o. female seen and examined.  No specific complaints and remains stable but still fatigued.       Past Medical History:   Diagnosis Date    Anemia     Cervical cancer     cervical    Encounter for blood transfusion     Hydronephrosis     PONV (postoperative nausea and vomiting)     Pyelonephritis          Review of Systems  General ROS: negative for chills, fever or weight loss; positive for fatigue  Cardiovascular ROS: no chest pain or dyspnea on exertion  Gastrointestinal ROS: no abdominal pain, change in bowel habits, or black/ bloody stools    Physical Examination  BP (!) 97/58   Pulse 104   Temp 99.1 °F (37.3 °C) (Oral)   Resp 18   Ht 5' 6" (1.676 m)   Wt 59.6 kg (131 lb 6.3 oz)   LMP  (LMP Unknown)   SpO2 100%   Breastfeeding? No   BMI 21.21 kg/m²   General appearance: alert, cooperative, no distress  HENT: Normocephalic, atraumatic, neck symmetrical, no nasal discharge   Lungs: clear to auscultation bilaterally, no dullness to percussion bilaterally  Heart: regular rate and rhythm without rub; no displacement of the PMI   Abdomen: soft, non-tender; bowel sounds normoactive; no organomegaly  Extremities: extremities symmetric; no clubbing, cyanosis, or edema  Neurologic: Alert and oriented X 3, normal strength, normal coordination and gait    Labs:  Lab Results   Component Value Date    WBC 10.20 09/23/2017    HGB 8.7 (L) 09/23/2017    HCT 25.4 (L) 09/23/2017    MCV 96 09/23/2017     09/23/2017         Imaging: none    Assessment and Plan:  Acute cystitis with hematuria (E. Coli and Enterococcus sp)     On vanc/Cefepime which is sensitive.  Limited oral regimens so will keep for IV abx at least until tomorrow.  Hd stable at this time.                 * Intractable abdominal pain     Possibly due to UTI +/- pyelonephritis vs mesenteric mass vs colon origin  PRN analgesics  Serial abdominal exams  Continue antibiotic as per above  Abdominal lesion " biopsy at [facility] is still pending.                     Mesenteric mass     Abdominal lesion biopsy at [facility] is still pending.        Ureteral stricture s/p bilateral ureteral stents secondary to XRT for cervical cancer     Patient is established with urologist.          CKD (chronic kidney disease) stage 3, GFR 30-59 ml/min     Avoid non-essential nephrotoxins, dose medications according to GFR  Monitor I/O, daily weight. Monitor for volume overload                  VTE Risk Mitigation          Ordered       enoxaparin injection 30 mg  Daily     Route:  Subcutaneous

## 2017-09-23 NOTE — CONSULTS
Cesilia Dozier [id number] is a 37 y.o. female who has been consulted for vancomycin dosing.    The patient has the following labs:     Date Creatinine (mg/dl)    BUN WBC Count   9/22/2017 Estimated Creatinine Clearance: 37.6 mL/min (based on SCr of 1.9 mg/dL (H)). Lab Results   Component Value Date    BUN 14 09/22/2017     Lab Results   Component Value Date    WBC 9.30 09/22/2017        Current weight is 59.6 kg (131 lb 6.3 oz)    Pt is receiving vancomycin 1000 mg every 24 hours.  Vancomycin trough from 09/22 at 1416 was 12.6 mg/dL.  Target trough range is 15-20 mg/dL.   Trough was drawn about 5 hrs early and anticipate it is therapeutic.  Pharmacy will continue current dosing.   A vancomycin trough has been ordered prior to 3rd dose due 09/23 at 1630.      Patient will be followed by pharmacy for changes in renal function, toxicity, and efficacy.  Thank you for allowing us to participate in this patient's care.     Jose Garcia, PharmD

## 2017-09-24 LAB
ANION GAP SERPL CALC-SCNC: 9 MMOL/L
BACTERIA BLD CULT: NORMAL
BACTERIA BLD CULT: NORMAL
BASOPHILS NFR BLD: 0 %
BUN SERPL-MCNC: 18 MG/DL
CALCIUM SERPL-MCNC: 8.5 MG/DL
CHLORIDE SERPL-SCNC: 110 MMOL/L
CO2 SERPL-SCNC: 17 MMOL/L
CREAT SERPL-MCNC: 2.5 MG/DL
DIFFERENTIAL METHOD: ABNORMAL
EOSINOPHIL NFR BLD: 9 %
ERYTHROCYTE [DISTWIDTH] IN BLOOD BY AUTOMATED COUNT: 13.8 %
EST. GFR  (AFRICAN AMERICAN): 27 ML/MIN/1.73 M^2
EST. GFR  (NON AFRICAN AMERICAN): 24 ML/MIN/1.73 M^2
GLUCOSE SERPL-MCNC: 80 MG/DL
HCT VFR BLD AUTO: 25.8 %
HGB BLD-MCNC: 8.9 G/DL
LYMPHOCYTES NFR BLD: 13 %
MCH RBC QN AUTO: 32.6 PG
MCHC RBC AUTO-ENTMCNC: 34.5 G/DL
MCV RBC AUTO: 95 FL
MONOCYTES NFR BLD: 11 %
NEUTROPHILS NFR BLD: 67 %
PLATELET # BLD AUTO: 221 K/UL
PLATELET BLD QL SMEAR: ABNORMAL
PMV BLD AUTO: 9.6 FL
POTASSIUM SERPL-SCNC: 4.3 MMOL/L
RBC # BLD AUTO: 2.73 M/UL
SODIUM SERPL-SCNC: 136 MMOL/L
VANCOMYCIN SERPL-MCNC: 39.8 UG/ML
WBC # BLD AUTO: 12.8 K/UL

## 2017-09-24 PROCEDURE — 85027 COMPLETE CBC AUTOMATED: CPT

## 2017-09-24 PROCEDURE — 36415 COLL VENOUS BLD VENIPUNCTURE: CPT

## 2017-09-24 PROCEDURE — 85007 BL SMEAR W/DIFF WBC COUNT: CPT

## 2017-09-24 PROCEDURE — 63600175 PHARM REV CODE 636 W HCPCS: Performed by: INTERNAL MEDICINE

## 2017-09-24 PROCEDURE — 25000003 PHARM REV CODE 250: Performed by: NURSE PRACTITIONER

## 2017-09-24 PROCEDURE — 63600175 PHARM REV CODE 636 W HCPCS: Performed by: NURSE PRACTITIONER

## 2017-09-24 PROCEDURE — 11000001 HC ACUTE MED/SURG PRIVATE ROOM

## 2017-09-24 PROCEDURE — 80048 BASIC METABOLIC PNL TOTAL CA: CPT

## 2017-09-24 PROCEDURE — 25000003 PHARM REV CODE 250: Performed by: INTERNAL MEDICINE

## 2017-09-24 PROCEDURE — 80202 ASSAY OF VANCOMYCIN: CPT

## 2017-09-24 RX ORDER — PROMETHAZINE HYDROCHLORIDE 25 MG/1
25 TABLET ORAL EVERY 6 HOURS PRN
Status: DISCONTINUED | OUTPATIENT
Start: 2017-09-24 | End: 2017-09-25 | Stop reason: HOSPADM

## 2017-09-24 RX ORDER — ONDANSETRON 4 MG/1
4 TABLET, ORALLY DISINTEGRATING ORAL EVERY 6 HOURS PRN
Status: DISCONTINUED | OUTPATIENT
Start: 2017-09-24 | End: 2017-09-25 | Stop reason: HOSPADM

## 2017-09-24 RX ADMIN — HYDROMORPHONE HYDROCHLORIDE 1 MG: 2 INJECTION, SOLUTION INTRAMUSCULAR; INTRAVENOUS; SUBCUTANEOUS at 06:09

## 2017-09-24 RX ADMIN — CEFEPIME HYDROCHLORIDE 1 G: 1 INJECTION, POWDER, FOR SOLUTION INTRAMUSCULAR; INTRAVENOUS at 12:09

## 2017-09-24 RX ADMIN — HYDROMORPHONE HYDROCHLORIDE 1 MG: 2 INJECTION, SOLUTION INTRAMUSCULAR; INTRAVENOUS; SUBCUTANEOUS at 12:09

## 2017-09-24 RX ADMIN — HYDROMORPHONE HYDROCHLORIDE 1 MG: 2 INJECTION, SOLUTION INTRAMUSCULAR; INTRAVENOUS; SUBCUTANEOUS at 05:09

## 2017-09-24 RX ADMIN — OXYCODONE AND ACETAMINOPHEN 1 TABLET: 10; 325 TABLET ORAL at 08:09

## 2017-09-24 RX ADMIN — CEFEPIME HYDROCHLORIDE 1 G: 1 INJECTION, POWDER, FOR SOLUTION INTRAMUSCULAR; INTRAVENOUS at 11:09

## 2017-09-24 RX ADMIN — STANDARDIZED SENNA CONCENTRATE AND DOCUSATE SODIUM 1 TABLET: 8.6; 5 TABLET, FILM COATED ORAL at 08:09

## 2017-09-24 RX ADMIN — STANDARDIZED SENNA CONCENTRATE AND DOCUSATE SODIUM 1 TABLET: 8.6; 5 TABLET, FILM COATED ORAL at 09:09

## 2017-09-24 RX ADMIN — PROMETHAZINE HYDROCHLORIDE 25 MG: 25 TABLET ORAL at 09:09

## 2017-09-24 RX ADMIN — ENOXAPARIN SODIUM 30 MG: 100 INJECTION SUBCUTANEOUS at 04:09

## 2017-09-24 RX ADMIN — ZOLPIDEM TARTRATE 5 MG: 5 TABLET, FILM COATED ORAL at 09:09

## 2017-09-24 RX ADMIN — HYDROMORPHONE HYDROCHLORIDE 1 MG: 2 INJECTION, SOLUTION INTRAMUSCULAR; INTRAVENOUS; SUBCUTANEOUS at 02:09

## 2017-09-24 RX ADMIN — PANTOPRAZOLE SODIUM 40 MG: 40 TABLET, DELAYED RELEASE ORAL at 08:09

## 2017-09-24 RX ADMIN — HYDROMORPHONE HYDROCHLORIDE 1 MG: 2 INJECTION, SOLUTION INTRAMUSCULAR; INTRAVENOUS; SUBCUTANEOUS at 10:09

## 2017-09-24 NOTE — PLAN OF CARE
Problem: Patient Care Overview  Goal: Plan of Care Review  Outcome: Ongoing (interventions implemented as appropriate)  Plan of care reviewed with patient, patient verbalized understanding. Safety maintained throughout shift.   Pt remained free of fall/trauma. Pain controlled with prn pain medication. Patient up ad kiet. Patient tolerating diet. No temp throughout shift. Bed low, call light in reach. Will continue to monitor patient.

## 2017-09-24 NOTE — CONSULTS
Cesilia Dozier [id number] is a 37 y.o. female who has been consulted for vancomycin dosing.    The patient has the following labs:     Date Creatinine (mg/dl)    WBC Count   9/24/2017 Estimated Creatinine Clearance: 28.6 mL/min (based on SCr of 2.5 mg/dL (H)). Lab Results   Component Value Date    WBC 10.20 09/23/2017        Current weight is 59.6 kg (131 lb 6.3 oz)    Pt received vancomycin 1000 mg every 24 hours, received partial dose at 1623, stopped dose due to infiltration and restarted bag at 2000.  Vancomycin trough from 09/24 at 0533 was 39.8 mg/dL.  Target goal is 15-20 mg/dL.    Trough was drawn early and anticipate it is  Supratherapeutic. Pharmacy will hold dose for 48 hours and redraw with AM labs 09/25. Patient will be dosed by level.   Patient will be followed by pharmacy for changes in renal function, toxicity, and efficacy.    Thank you for allowing us to participate in this patient's care.     Sarah Preston, GalileoD

## 2017-09-24 NOTE — PROGRESS NOTES
"Hospital Medicine    CC: heaven    HPI 37 y.o. female seen and examined. Worsening nausea today.  Afebrile and no additional complaints.     Past Medical History:   Diagnosis Date    Anemia     Cervical cancer     cervical    Encounter for blood transfusion     Hydronephrosis     PONV (postoperative nausea and vomiting)     Pyelonephritis          Review of Systems  General ROS: negative for chills, fever or weight loss; positive for fatigue  Cardiovascular ROS: no chest pain or dyspnea on exertion  Gastrointestinal ROS: no abdominal pain, change in bowel habits, or black/ bloody stools    Physical Examination  BP (!) 120/59 (Patient Position: Sitting)   Pulse (!) 121   Temp 98.7 °F (37.1 °C) (Oral)   Resp 18   Ht 5' 6" (1.676 m)   Wt 59.6 kg (131 lb 6.3 oz)   LMP  (LMP Unknown)   SpO2 97%   Breastfeeding? No   BMI 21.21 kg/m²   General appearance: alert, cooperative, no distress  HENT: Normocephalic, atraumatic, neck symmetrical, no nasal discharge   Lungs: clear to auscultation bilaterally, no dullness to percussion bilaterally  Heart: regular rate and rhythm without rub; no displacement of the PMI   Abdomen: soft, non-tender; bowel sounds normoactive; no organomegaly  Extremities: extremities symmetric; no clubbing, cyanosis, or edema  Neurologic: Alert and oriented X 3, normal strength, normal coordination and gait    Labs:  Lab Results   Component Value Date    WBC 12.80 (H) 09/24/2017    HGB 8.9 (L) 09/24/2017    HCT 25.8 (L) 09/24/2017    MCV 95 09/24/2017     09/24/2017         Imaging: none    Assessment and Plan:  Acute cystitis with hematuria (E. Coli and Enterococcus sp)     On vanc/Cefepime which is sensitive.  Could go home on tetracycline based regimen but given persistent nausea and vomiting will continue IV in house for now.                 * Intractable abdominal pain     Possibly due to UTI +/- pyelonephritis vs mesenteric mass vs colon origin  PRN analgesics  Serial " abdominal exams  Continue antibiotic as per above  Abdominal lesion biopsy at [facility] is still pending.                     Mesenteric mass     Abdominal lesion biopsy at [facility] is still pending.        Ureteral stricture s/p bilateral ureteral stents secondary to XRT for cervical cancer     Patient is established with urologist.          CKD (chronic kidney disease) stage 3, GFR 30-59 ml/min     Avoid non-essential nephrotoxins, dose medications according to GFR  Monitor I/O, daily weight. Monitor for volume overload                  VTE Risk Mitigation          Ordered       enoxaparin injection 30 mg  Daily     Route:  Subcutaneous

## 2017-09-24 NOTE — PLAN OF CARE
Problem: Patient Care Overview  Goal: Plan of Care Review  Outcome: Ongoing (interventions implemented as appropriate)  POC reviewed with patient. Verbalized understanding. Patient reports pain treated with medication ordered by Md. Patient tolerating a regular diet well. Patient up to the Bathroom independently. Hourly rounding to promote safety. Safety maintained throughout the shift.Patient positions and repositions self independently. No Skin break down.   Bed locked and in lowest position. Call light in reach. Side rails up x2. NON skid socks on when OOB. Patient remained free of falls/ trauma.  Will continue to monitor.

## 2017-09-25 VITALS
RESPIRATION RATE: 18 BRPM | HEART RATE: 101 BPM | BODY MASS INDEX: 21.11 KG/M2 | TEMPERATURE: 99 F | HEIGHT: 66 IN | WEIGHT: 131.38 LBS | SYSTOLIC BLOOD PRESSURE: 109 MMHG | OXYGEN SATURATION: 100 % | DIASTOLIC BLOOD PRESSURE: 64 MMHG

## 2017-09-25 LAB
ANION GAP SERPL CALC-SCNC: 8 MMOL/L
BASOPHILS # BLD AUTO: 0 K/UL
BASOPHILS NFR BLD: 0.2 %
BUN SERPL-MCNC: 21 MG/DL
CALCIUM SERPL-MCNC: 8.4 MG/DL
CHLORIDE SERPL-SCNC: 108 MMOL/L
CO2 SERPL-SCNC: 20 MMOL/L
CREAT SERPL-MCNC: 2.5 MG/DL
DIFFERENTIAL METHOD: ABNORMAL
EOSINOPHIL # BLD AUTO: 0.6 K/UL
EOSINOPHIL NFR BLD: 8.2 %
ERYTHROCYTE [DISTWIDTH] IN BLOOD BY AUTOMATED COUNT: 14.1 %
EST. GFR  (AFRICAN AMERICAN): 27 ML/MIN/1.73 M^2
EST. GFR  (NON AFRICAN AMERICAN): 24 ML/MIN/1.73 M^2
GLUCOSE SERPL-MCNC: 91 MG/DL
HCT VFR BLD AUTO: 23.2 %
HGB BLD-MCNC: 8 G/DL
LYMPHOCYTES # BLD AUTO: 0.9 K/UL
LYMPHOCYTES NFR BLD: 12 %
MCH RBC QN AUTO: 32.9 PG
MCHC RBC AUTO-ENTMCNC: 34.5 G/DL
MCV RBC AUTO: 95 FL
MONOCYTES # BLD AUTO: 0.8 K/UL
MONOCYTES NFR BLD: 10.4 %
NEUTROPHILS # BLD AUTO: 5 K/UL
NEUTROPHILS NFR BLD: 69.2 %
PLATELET # BLD AUTO: 229 K/UL
PMV BLD AUTO: 8.3 FL
POTASSIUM SERPL-SCNC: 3.1 MMOL/L
RBC # BLD AUTO: 2.44 M/UL
SODIUM SERPL-SCNC: 136 MMOL/L
VANCOMYCIN SERPL-MCNC: 24.3 UG/ML
WBC # BLD AUTO: 7.3 K/UL

## 2017-09-25 PROCEDURE — 85025 COMPLETE CBC W/AUTO DIFF WBC: CPT

## 2017-09-25 PROCEDURE — 25000003 PHARM REV CODE 250: Performed by: NURSE PRACTITIONER

## 2017-09-25 PROCEDURE — 80202 ASSAY OF VANCOMYCIN: CPT

## 2017-09-25 PROCEDURE — 80048 BASIC METABOLIC PNL TOTAL CA: CPT

## 2017-09-25 PROCEDURE — 99239 HOSP IP/OBS DSCHRG MGMT >30: CPT | Mod: ,,, | Performed by: INTERNAL MEDICINE

## 2017-09-25 PROCEDURE — 25000003 PHARM REV CODE 250: Performed by: INTERNAL MEDICINE

## 2017-09-25 RX ORDER — PROMETHAZINE HYDROCHLORIDE 25 MG/1
25 TABLET ORAL EVERY 6 HOURS PRN
Qty: 20 TABLET | Refills: 0 | Status: SHIPPED | OUTPATIENT
Start: 2017-09-25 | End: 2017-10-20

## 2017-09-25 RX ORDER — OXYCODONE AND ACETAMINOPHEN 10; 325 MG/1; MG/1
1 TABLET ORAL EVERY 4 HOURS PRN
Qty: 15 TABLET | Refills: 0 | Status: SHIPPED | OUTPATIENT
Start: 2017-09-25 | End: 2017-10-20

## 2017-09-25 RX ORDER — DOXYCYCLINE HYCLATE 100 MG
100 TABLET ORAL EVERY 12 HOURS
Status: DISCONTINUED | OUTPATIENT
Start: 2017-09-25 | End: 2017-09-25 | Stop reason: HOSPADM

## 2017-09-25 RX ORDER — DOXYCYCLINE HYCLATE 100 MG
100 TABLET ORAL EVERY 12 HOURS
Qty: 20 TABLET | Refills: 0 | Status: SHIPPED | OUTPATIENT
Start: 2017-09-25 | End: 2017-10-20

## 2017-09-25 RX ADMIN — ONDANSETRON 4 MG: 4 TABLET, ORALLY DISINTEGRATING ORAL at 12:09

## 2017-09-25 RX ADMIN — OXYCODONE AND ACETAMINOPHEN 1 TABLET: 10; 325 TABLET ORAL at 12:09

## 2017-09-25 RX ADMIN — DOXYCYCLINE HYCLATE 100 MG: 100 TABLET, COATED ORAL at 12:09

## 2017-09-25 RX ADMIN — PROMETHAZINE HYDROCHLORIDE 25 MG: 25 TABLET ORAL at 04:09

## 2017-09-25 RX ADMIN — PANTOPRAZOLE SODIUM 40 MG: 40 TABLET, DELAYED RELEASE ORAL at 09:09

## 2017-09-25 RX ADMIN — STANDARDIZED SENNA CONCENTRATE AND DOCUSATE SODIUM 1 TABLET: 8.6; 5 TABLET, FILM COATED ORAL at 09:09

## 2017-09-25 RX ADMIN — OXYCODONE AND ACETAMINOPHEN 1 TABLET: 10; 325 TABLET ORAL at 04:09

## 2017-09-25 RX ADMIN — PROMETHAZINE HYDROCHLORIDE 25 MG: 25 TABLET ORAL at 12:09

## 2017-09-25 RX ADMIN — OXYCODONE AND ACETAMINOPHEN 1 TABLET: 10; 325 TABLET ORAL at 09:09

## 2017-09-25 NOTE — DISCHARGE SUMMARY
Discharge Summary  Hospital Medicine    Admit Date: 9/18/2017    Date and Time: 9/25/20172:47 PM    Discharge Attending Physician: Cathy Nicholson MD    Primary Care Physician: Harsha Sheppard MD    Diagnoses:  Active Hospital Problems    Diagnosis  POA    *Pyelonephritis [N12]-  E. Coli and Enterococcus Faecium sp  Yes    Sepsis [A41.9]  Yes    Uncomplicated opioid dependence [F11.20]  Yes    Pelvic pain syndrome [N94.89]  Yes    CKD (chronic kidney disease) stage 3, GFR 30-59 ml/min [N18.3]  Yes    Ureteral stricture s/p bilateral ureteral stents secondary to XRT for cervical cancer [N13.5]  Yes      Resolved Hospital Problems    Diagnosis Date Resolved POA   No resolved problems to display.     Discharged Condition: Good    Hospital Course:   Patient is a 37 y.o. female admitted to Hospitalist Service from [facility] with complaint of pelvic pain for 2 days. Patient reportedly has PMH significant for cervical CA, Hydronephrosis, Ureteral strictures with stents, and recurrent pyelonephritis. Patient presented with left, lower abdominal pain associated with back pain, nausea, mild fever, and urinary hesitancy. Patient stated of left abdominal pain occurring 2 days ago and radiating to the whole pelvic region and left-sided back. Patient stated of having a hysterectomy in 2013 and having positive results of a pregnancy test. Patient denied vaginal bleeding, vaginal discharge, vaginal pain, vomiting, diarrhea, SOB, headache, and weakness. Patient denied chest pain, shortness of breath, nausea, vomiting, headache, vision changes, focal neuro-deficits, cough or fever. Patient was admitted to Hospitalist medicine service. Patient was provided supportive care with IVF hydration and narcotics for pain control. Urine culture confirmed presence of E. Coli and Enterococcus Faecium sp. Patient to follow up with Dr. Bocanegra and patient encouraged to have treatment of metastatic cervical cancer at  [facility]. Symptoms improved. Patient was discharged home in stable condition with following discharge plan of care. Total time with the patient was 30 minutes and greater than 50% was spent in counseling and coordination of care. The assessment and plan have been discussed at length. Physicians' notes reviewed. Labs and procedure reviewed.     Consults: None    Significant Diagnostic Studies:   Renal US: Mild bilateral hydronephrosis, noting presence of bilateral double-J ureteral stents on recent CT examination.    Chest X-Ray:   1.  Findings strongly concerning for intraperitoneal metastatic disease without significant change, in this patient with history of cervical cancer.  2.  Bilateral ureteral stents are minimal positioned, with new mild bilateral hydronephrosis.  3.  Small layering gallstones versus milk of calcium.    Microbiology Results (last 7 days)     Procedure Component Value Units Date/Time    Blood Culture #2 [[id number]] Collected:  09/19/17 0015    Order Status:  Completed Specimen:  Blood from Antecubital, Right  Arm Updated:  09/24/17 1212     Blood Culture, Routine No growth after 5 days.    Blood Culture #2 [[id number]] Collected:  09/19/17 0010    Order Status:  Completed Specimen:  Blood from Antecubital, Left  Arm Updated:  09/24/17 1212     Blood Culture, Routine No growth after 5 days.    Urine Culture [[id number]]  (Susceptibility) Collected:  09/19/17 0857    Order Status:  Completed Specimen:  Urine from Urine, Clean Catch Updated:  09/23/17 1515     Urine Culture, Routine --     ESCHERICHIA COLI  >100,000 cfu/ml       Urine Culture, Routine --     ENTEROCOCCUS FAECIUM  > 100,000 cfu/ml      Urine culture [[id number]]  (Susceptibility) Collected:  09/18/17 2312    Order Status:  Completed Specimen:  Urine from Urine, Clean Catch Updated:  09/23/17 1454     Urine Culture, Routine --     ESCHERICHIA COLI  >100,000 cfu/ml       Urine Culture, Routine --     ENTEROCOCCUS FAECIUM VRE  > 100,000  cfu/ml      Urine Culture [[id number]]     Order Status:  Canceled Specimen:  Urine         Special Treatments/Procedures: None  Disposition: Home or Self Care    Medications:  Reconciled Home Medications: Current Discharge Medication List      START taking these medications    Details   doxycycline (VIBRA-TABS) 100 MG tablet Take 1 tablet (100 mg total) by mouth every 12 (twelve) hours.  Qty: 20 tablet, Refills: 0      promethazine (PHENERGAN) 25 MG tablet Take 1 tablet (25 mg total) by mouth every 6 (six) hours as needed.  Qty: 20 tablet, Refills: 0         CONTINUE these medications which have CHANGED    Details   oxycodone-acetaminophen (PERCOCET)  mg per tablet Take 1 tablet by mouth every 4 (four) hours as needed.  Qty: 15 tablet, Refills: 0         CONTINUE these medications which have NOT CHANGED    Details   iron 18 mg Tab Take by mouth.      tolterodine (DETROL LA) 4 MG 24 hr capsule Take 4 mg by mouth once daily.             Discharge Procedure Orders  Diet general   Order Comments: Cardiac/ 2 gram sodium low cholesterol diet     Other restrictions (specify):   Order Comments: Fall precautions     Call MD for:   Order Comments: For worsening symptoms, chest pain, shortness of breath, increased abdominal pain, high grade fever, stroke or stroke like symptoms, immediately go to the nearest Emergency Room or call 911 as soon as possible.       Follow-up Information     Harsha Sheppard MD On 10/2/2017.    Specialty:  Internal Medicine  Why:  @9:15am   Contact information:  [address] [place] [address] [address] Toya [place]A [id number]  [phone]

## 2017-09-25 NOTE — PROGRESS NOTES
Pt remains free of falls, VS stable, RR even and unlabored, Abd non-distended, BS in all four quadrants, clear speech, makes needs known, bed in low position with wheels locked call light in reach,

## 2017-09-25 NOTE — CONSULTS
Cesilia Dozier [id number] is a 37 y.o. female who has been consulted for vancomycin dosing.    The patient has the following labs:     Date Creatinine (mg/dl)    WBC Count   9/25/2017 Estimated Creatinine Clearance: 28.6 mL/min (based on SCr of 2.5 mg/dL (H)). Lab Results   Component Value Date    WBC 7.30 09/25/2017        Current weight is 59.6 kg (131 lb 6.3 oz)    Pt's renal function is not stable.  Pharmacy will dose by level. Target goal is 10-15 mg/dL.   Vanc level 9/25/2017 at 0534 was 24.3 mg/dL.  Pharmacy will hold vanc dose and redose when vanc level is <15.  A vancomycin level will be ordered on 9/26/17 at am draw.   Labs reported both Enterococcus faecium and Enterococcus faecium VRE.  Dr. Nicholson requested clarification from lab.      Patient will be followed by pharmacy for changes in renal function, toxicity, and efficacy.    Thank you for allowing us to participate in this patient's care.     Howie Farley, Pharmacist

## 2017-09-25 NOTE — PROGRESS NOTES
Patient discharging home, no signs of distress at this time, VS stable, IV removed as ordered, tolerated well, discharge instructions given and patient verbalizes understanding, patient leaving the floor assisted by staff member.

## 2017-09-25 NOTE — PLAN OF CARE
Problem: Patient Care Overview  Goal: Plan of Care Review  Outcome: Ongoing (interventions implemented as appropriate)  Plan of care reviewed with pt at beginning of shift-continue TASHA, prn pain nausea medication.  Pt verbalized understanding.  Pt lost IV access and we have not been successful in obtaining an addl PIV access.  Access has been attempted by three RN's up to this point unsuccessfully SEVERO Fortune, NP aware and prn IV medications changed to oral at this time. Hourly/ Q2 hourly rounds completed on this pt throughout shift.  Pain monitored and covered as needed, up to restroom independenlty, repositions self, safety maintained.  Patient has remained free from fall/injury, no skin breakdown noted.  Side rails up x2, bed in locked and lowest position, call light kept within reach.  Needs attended to, will continue to monitor/ update as indicated

## 2017-09-26 NOTE — PLAN OF CARE
09/26/17 0806   Final Note   Assessment Type Final Discharge Note   Discharge Disposition Home   Hospital Follow Up  Appt(s) scheduled? Yes   Discharge plans and expectations educations in teach back method with documentation complete? Yes

## 2017-10-02 ENCOUNTER — HOSPITAL ENCOUNTER (EMERGENCY)
Facility: HOSPITAL | Age: 37
Discharge: HOME | End: 2017-10-02
Attending: EMERGENCY MEDICINE
Payer: MEDICARE

## 2017-10-02 VITALS
HEART RATE: 69 BPM | HEIGHT: 66 IN | DIASTOLIC BLOOD PRESSURE: 60 MMHG | WEIGHT: 132 LBS | RESPIRATION RATE: 14 BRPM | BODY MASS INDEX: 21.21 KG/M2 | TEMPERATURE: 99 F | SYSTOLIC BLOOD PRESSURE: 102 MMHG | OXYGEN SATURATION: 99 %

## 2017-10-02 DIAGNOSIS — N32.1 RECTOVESICAL FISTULA: Primary | ICD-10-CM

## 2017-10-02 LAB
ALBUMIN SERPL BCP-MCNC: 2.6 G/DL
ALP SERPL-CCNC: 743 U/L
ALT SERPL W/O P-5'-P-CCNC: 363 U/L
ANION GAP SERPL CALC-SCNC: 13 MMOL/L
AST SERPL-CCNC: 395 U/L
BASOPHILS # BLD AUTO: 0 K/UL
BASOPHILS NFR BLD: 0.2 %
BILIRUB SERPL-MCNC: 0.8 MG/DL
BUN SERPL-MCNC: 18 MG/DL
CALCIUM SERPL-MCNC: 8.6 MG/DL
CHLORIDE SERPL-SCNC: 104 MMOL/L
CO2 SERPL-SCNC: 22 MMOL/L
CREAT SERPL-MCNC: 1.6 MG/DL
DIFFERENTIAL METHOD: ABNORMAL
EOSINOPHIL # BLD AUTO: 0.7 K/UL
EOSINOPHIL NFR BLD: 5.2 %
ERYTHROCYTE [DISTWIDTH] IN BLOOD BY AUTOMATED COUNT: 14.4 %
EST. GFR  (AFRICAN AMERICAN): 47 ML/MIN/1.73 M^2
EST. GFR  (NON AFRICAN AMERICAN): 41 ML/MIN/1.73 M^2
GLUCOSE SERPL-MCNC: 99 MG/DL
HCT VFR BLD AUTO: 28 %
HGB BLD-MCNC: 9.4 G/DL
LYMPHOCYTES # BLD AUTO: 0.7 K/UL
LYMPHOCYTES NFR BLD: 5.6 %
MCH RBC QN AUTO: 32.2 PG
MCHC RBC AUTO-ENTMCNC: 33.8 G/DL
MCV RBC AUTO: 95 FL
MONOCYTES # BLD AUTO: 0.4 K/UL
MONOCYTES NFR BLD: 3.1 %
NEUTROPHILS # BLD AUTO: 11.2 K/UL
NEUTROPHILS NFR BLD: 85.9 %
PLATELET # BLD AUTO: 336 K/UL
PMV BLD AUTO: 9.2 FL
POTASSIUM SERPL-SCNC: 3.3 MMOL/L
PROT SERPL-MCNC: 7 G/DL
RBC # BLD AUTO: 2.94 M/UL
SODIUM SERPL-SCNC: 139 MMOL/L
WBC # BLD AUTO: 13 K/UL

## 2017-10-02 PROCEDURE — 63600175 PHARM REV CODE 636 W HCPCS: Performed by: EMERGENCY MEDICINE

## 2017-10-02 PROCEDURE — 99285 EMERGENCY DEPT VISIT HI MDM: CPT | Mod: 25

## 2017-10-02 PROCEDURE — S0030 INJECTION, METRONIDAZOLE: HCPCS | Performed by: EMERGENCY MEDICINE

## 2017-10-02 PROCEDURE — 96367 TX/PROPH/DG ADDL SEQ IV INF: CPT

## 2017-10-02 PROCEDURE — 85025 COMPLETE CBC W/AUTO DIFF WBC: CPT

## 2017-10-02 PROCEDURE — 25500020 PHARM REV CODE 255

## 2017-10-02 PROCEDURE — 96375 TX/PRO/DX INJ NEW DRUG ADDON: CPT

## 2017-10-02 PROCEDURE — 80053 COMPREHEN METABOLIC PANEL: CPT

## 2017-10-02 PROCEDURE — 96365 THER/PROPH/DIAG IV INF INIT: CPT

## 2017-10-02 PROCEDURE — 25000003 PHARM REV CODE 250: Performed by: EMERGENCY MEDICINE

## 2017-10-02 PROCEDURE — 96361 HYDRATE IV INFUSION ADD-ON: CPT

## 2017-10-02 PROCEDURE — 36415 COLL VENOUS BLD VENIPUNCTURE: CPT

## 2017-10-02 RX ORDER — MORPHINE SULFATE 4 MG/ML
4 INJECTION, SOLUTION INTRAMUSCULAR; INTRAVENOUS
Status: COMPLETED | OUTPATIENT
Start: 2017-10-02 | End: 2017-10-02

## 2017-10-02 RX ORDER — CIPROFLOXACIN 2 MG/ML
400 INJECTION, SOLUTION INTRAVENOUS
Status: COMPLETED | OUTPATIENT
Start: 2017-10-02 | End: 2017-10-02

## 2017-10-02 RX ORDER — METRONIDAZOLE 500 MG/100ML
500 INJECTION, SOLUTION INTRAVENOUS
Status: COMPLETED | OUTPATIENT
Start: 2017-10-02 | End: 2017-10-02

## 2017-10-02 RX ADMIN — MORPHINE SULFATE 4 MG: 4 INJECTION INTRAVENOUS at 11:10

## 2017-10-02 RX ADMIN — METRONIDAZOLE 500 MG: 500 INJECTION, SOLUTION INTRAVENOUS at 02:10

## 2017-10-02 RX ADMIN — SODIUM CHLORIDE 1000 ML: 0.9 INJECTION, SOLUTION INTRAVENOUS at 11:10

## 2017-10-02 RX ADMIN — SODIUM CHLORIDE 1000 ML: 0.9 INJECTION, SOLUTION INTRAVENOUS at 01:10

## 2017-10-02 RX ADMIN — IOHEXOL 60 ML: 350 INJECTION, SOLUTION INTRAVENOUS at 10:10

## 2017-10-02 RX ADMIN — CIPROFLOXACIN 400 MG: 2 INJECTION, SOLUTION INTRAVENOUS at 01:10

## 2017-10-02 NOTE — ED PROVIDER NOTES
Encounter Date: 10/2/2017       History     Chief Complaint   Patient presents with    Abdominal Pain     Hx cervical cancer. States stool is coming out of vagina.     37-year-old female with a past medical history of cervical cancer, chronic kidney disease, and pyelonephritis presents with a chief complaint of stool coming out of her vagina.  The patient reports that she has never had this previously, and noticed it acutely this morning.  She reports that she also has chronic lower abdominal pain and chronic nausea and vomiting as well.  The patient does not have any prescription pain medication at home.  She reports her nausea and vomiting were relieved with her home Phenergan.  There are no alleviating or  aggravating factors.          Review of patient's allergies indicates:   Allergen Reactions    Pcn [penicillins] Anaphylaxis     Past Medical History:   Diagnosis Date    Anemia     Cervical cancer     cervical    Encounter for blood transfusion     Hydronephrosis     PONV (postoperative nausea and vomiting)     Pyelonephritis      Past Surgical History:   Procedure Laterality Date    COLONOSCOPY N/A 9/22/2016    Procedure: COLONOSCOPY;  Surgeon: Joe Moe MD;  Location: [facility];  Service: Endoscopy;  Laterality: N/A;    HYSTERECTOMY      Partial    TUBAL LIGATION      URETERAL STENT PLACEMENT  07/2016     Family History   Problem Relation Age of Onset    No Known Problems Mother     Drug abuse Father     No Known Problems Sister     Asthma Brother      Social History   Substance Use Topics    Smoking status: Never Smoker    Smokeless tobacco: Never Used    Alcohol use No     Review of Systems   Constitutional: Negative for chills and fever.   HENT: Negative for congestion.    Respiratory: Negative for cough and shortness of breath.    Cardiovascular: Negative for chest pain.   Gastrointestinal: Positive for abdominal pain, nausea and vomiting.   Genitourinary: Negative for dysuria.    Musculoskeletal: Negative for gait problem.   Skin: Negative for color change.   Neurological: Negative for dizziness and numbness.   Psychiatric/Behavioral: Negative for agitation.       Physical Exam     Initial Vitals [10/02/17 0736]   BP Pulse Resp Temp SpO2   (!) 87/53 110 14 98.5 °F (36.9 °C) 99 %      MAP       64.33         Physical Exam    Nursing note and vitals reviewed.  Constitutional: She appears well-developed and well-nourished.   HENT:   Head: Atraumatic.   Eyes: EOM are normal. Pupils are equal, round, and reactive to light.   Neck: Normal range of motion.   Cardiovascular: Normal rate and regular rhythm.   Pulmonary/Chest: Breath sounds normal.   Abdominal: Soft. Bowel sounds are normal. There is tenderness.   Lower abdominal tenderness noted.   Genitourinary:   Genitourinary Comments: Stool noted in vaginal vault.   Musculoskeletal: Normal range of motion.        Right shoulder: Normal.        Left shoulder: Normal.   Neurological: She is alert and oriented to person, place, and time.   Skin: Skin is warm and dry.   Psychiatric: She has a normal mood and affect.         ED Course   Procedures  Labs Reviewed   COMPREHENSIVE METABOLIC PANEL   CBC W/ AUTO DIFFERENTIAL             Medical Decision Making:   Initial Assessment:   37-year-old female presented with a chief complaint of stool coming from her vagina.  Clinical Tests:   Lab Tests: Ordered and Reviewed  Radiological Study: Ordered and Reviewed  ED Management:  The patient was emergently evaluated in the emergency department, her evaluation was significant for a young female with stool noted in her vaginal vault.  The patient also has a chronic mild lower abdominal tenderness noted is well.  The patient's labs were significant for mildly elevated white blood cell count.  The patient's CT scan was significant for a rectovesical fistula, with gas noted in her bladder, tracking up towards bilateral kidneys.  The patient was aggressively  treated with IV fluids, IV Cipro, and IV Flagyl.  We do not have general surgery available today or GYN services available.  I discussed the case with by hospitalist on-call, who felt that the patient should be transferred elsewhere secondary to the lack of these resources.  I am in agreement with this plan.  The patient's oncologist is at [facility].  I discussed the case with the transfer center, who has gotten the patient accepted at [facility] by Dr. Groves.   Other:   I have discussed this case with another health care provider.                   ED Course      Clinical Impression:   The encounter diagnosis was Rectovesical fistula.                           Humberto Dhaliwal MD  10/02/17 1305

## 2017-10-20 ENCOUNTER — HOSPITAL ENCOUNTER (EMERGENCY)
Facility: HOSPITAL | Age: 37
Discharge: ANOTHER HOSPITAL | End: 2017-10-21
Attending: EMERGENCY MEDICINE
Payer: MEDICARE

## 2017-10-20 DIAGNOSIS — N39.0 URINARY TRACT INFECTION WITH HEMATURIA, SITE UNSPECIFIED: ICD-10-CM

## 2017-10-20 DIAGNOSIS — A41.9 SEPSIS, DUE TO UNSPECIFIED ORGANISM: Primary | ICD-10-CM

## 2017-10-20 DIAGNOSIS — C53.9 MALIGNANT NEOPLASM OF CERVIX, UNSPECIFIED SITE: ICD-10-CM

## 2017-10-20 DIAGNOSIS — R07.9 CHEST PAIN: ICD-10-CM

## 2017-10-20 DIAGNOSIS — E86.0 DEHYDRATION: ICD-10-CM

## 2017-10-20 DIAGNOSIS — N99.528: ICD-10-CM

## 2017-10-20 DIAGNOSIS — R31.9 URINARY TRACT INFECTION WITH HEMATURIA, SITE UNSPECIFIED: ICD-10-CM

## 2017-10-20 DIAGNOSIS — Z93.3 COLOSTOMY PRESENT: ICD-10-CM

## 2017-10-20 LAB
ALBUMIN SERPL BCP-MCNC: 2.8 G/DL
ALP SERPL-CCNC: 1430 U/L
ALT SERPL W/O P-5'-P-CCNC: 91 U/L
AMORPH CRY URNS QL MICRO: ABNORMAL
ANION GAP SERPL CALC-SCNC: 23 MMOL/L
AST SERPL-CCNC: 273 U/L
BACTERIA #/AREA URNS HPF: ABNORMAL /HPF
BASOPHILS NFR BLD: 0 %
BILIRUB SERPL-MCNC: 24 MG/DL
BILIRUB UR QL STRIP: ABNORMAL
BUN SERPL-MCNC: 25 MG/DL
CALCIUM SERPL-MCNC: 10.5 MG/DL
CHLORIDE SERPL-SCNC: 89 MMOL/L
CLARITY UR: ABNORMAL
CO2 SERPL-SCNC: 18 MMOL/L
COLOR UR: ABNORMAL
CREAT SERPL-MCNC: 1.5 MG/DL
DIFFERENTIAL METHOD: ABNORMAL
EOSINOPHIL NFR BLD: 3 %
ERYTHROCYTE [DISTWIDTH] IN BLOOD BY AUTOMATED COUNT: 17.4 %
EST. GFR  (AFRICAN AMERICAN): 51 ML/MIN/1.73 M^2
EST. GFR  (NON AFRICAN AMERICAN): 44 ML/MIN/1.73 M^2
GLUCOSE SERPL-MCNC: 77 MG/DL
GLUCOSE UR QL STRIP: NEGATIVE
HCT VFR BLD AUTO: 29.7 %
HGB BLD-MCNC: 10.3 G/DL
HGB UR QL STRIP: ABNORMAL
HYALINE CASTS #/AREA URNS LPF: 0 /LPF
INR PPP: 1.2
KETONES UR QL STRIP: ABNORMAL
LACTATE SERPL-SCNC: 1.1 MMOL/L
LEUKOCYTE ESTERASE UR QL STRIP: ABNORMAL
LIPASE SERPL-CCNC: 97 U/L
LYMPHOCYTES NFR BLD: 7 %
MCH RBC QN AUTO: 31.2 PG
MCHC RBC AUTO-ENTMCNC: 34.7 G/DL
MCV RBC AUTO: 90 FL
MICROSCOPIC COMMENT: ABNORMAL
MONOCYTES NFR BLD: 3 %
NEUTROPHILS NFR BLD: 87 %
NITRITE UR QL STRIP: POSITIVE
PH UR STRIP: 6 [PH] (ref 5–8)
PLATELET # BLD AUTO: 439 K/UL
PLATELET BLD QL SMEAR: ABNORMAL
PMV BLD AUTO: 9.2 FL
POTASSIUM SERPL-SCNC: 4.2 MMOL/L
PROT SERPL-MCNC: 8.8 G/DL
PROT UR QL STRIP: ABNORMAL
PROTHROMBIN TIME: 12.4 SEC
RBC # BLD AUTO: 3.3 M/UL
RBC #/AREA URNS HPF: 25 /HPF (ref 0–4)
SODIUM SERPL-SCNC: 130 MMOL/L
SP GR UR STRIP: 1.02 (ref 1–1.03)
SQUAMOUS #/AREA URNS HPF: 2 /HPF
TROPONIN I SERPL DL<=0.01 NG/ML-MCNC: <0.006 NG/ML
URN SPEC COLLECT METH UR: ABNORMAL
UROBILINOGEN UR STRIP-ACNC: 1 EU/DL
WBC # BLD AUTO: 14.3 K/UL
WBC #/AREA URNS HPF: 15 /HPF (ref 0–5)
YEAST URNS QL MICRO: ABNORMAL

## 2017-10-20 PROCEDURE — 93005 ELECTROCARDIOGRAM TRACING: CPT

## 2017-10-20 PROCEDURE — 85610 PROTHROMBIN TIME: CPT

## 2017-10-20 PROCEDURE — 87040 BLOOD CULTURE FOR BACTERIA: CPT

## 2017-10-20 PROCEDURE — 96365 THER/PROPH/DIAG IV INF INIT: CPT

## 2017-10-20 PROCEDURE — 63600175 PHARM REV CODE 636 W HCPCS: Performed by: NURSE PRACTITIONER

## 2017-10-20 PROCEDURE — 85027 COMPLETE CBC AUTOMATED: CPT

## 2017-10-20 PROCEDURE — 87186 SC STD MICRODIL/AGAR DIL: CPT

## 2017-10-20 PROCEDURE — 25000003 PHARM REV CODE 250: Performed by: NURSE PRACTITIONER

## 2017-10-20 PROCEDURE — 83605 ASSAY OF LACTIC ACID: CPT

## 2017-10-20 PROCEDURE — 96367 TX/PROPH/DG ADDL SEQ IV INF: CPT

## 2017-10-20 PROCEDURE — 36415 COLL VENOUS BLD VENIPUNCTURE: CPT

## 2017-10-20 PROCEDURE — 87106 FUNGI IDENTIFICATION YEAST: CPT

## 2017-10-20 PROCEDURE — 96376 TX/PRO/DX INJ SAME DRUG ADON: CPT

## 2017-10-20 PROCEDURE — 81000 URINALYSIS NONAUTO W/SCOPE: CPT

## 2017-10-20 PROCEDURE — 83690 ASSAY OF LIPASE: CPT

## 2017-10-20 PROCEDURE — 84484 ASSAY OF TROPONIN QUANT: CPT

## 2017-10-20 PROCEDURE — 25500020 PHARM REV CODE 255

## 2017-10-20 PROCEDURE — 87086 URINE CULTURE/COLONY COUNT: CPT

## 2017-10-20 PROCEDURE — 85007 BL SMEAR W/DIFF WBC COUNT: CPT

## 2017-10-20 PROCEDURE — 87077 CULTURE AEROBIC IDENTIFY: CPT

## 2017-10-20 PROCEDURE — 96361 HYDRATE IV INFUSION ADD-ON: CPT

## 2017-10-20 PROCEDURE — 96375 TX/PRO/DX INJ NEW DRUG ADDON: CPT

## 2017-10-20 PROCEDURE — 99285 EMERGENCY DEPT VISIT HI MDM: CPT | Mod: 25

## 2017-10-20 PROCEDURE — 87088 URINE BACTERIA CULTURE: CPT

## 2017-10-20 PROCEDURE — 80053 COMPREHEN METABOLIC PANEL: CPT

## 2017-10-20 RX ORDER — MORPHINE SULFATE 4 MG/ML
4 INJECTION, SOLUTION INTRAMUSCULAR; INTRAVENOUS
Status: COMPLETED | OUTPATIENT
Start: 2017-10-20 | End: 2017-10-20

## 2017-10-20 RX ORDER — SODIUM CHLORIDE 9 MG/ML
1000 INJECTION, SOLUTION INTRAVENOUS
Status: COMPLETED | OUTPATIENT
Start: 2017-10-20 | End: 2017-10-21

## 2017-10-20 RX ADMIN — PROMETHAZINE HYDROCHLORIDE 12.5 MG: 25 INJECTION INTRAMUSCULAR; INTRAVENOUS at 08:10

## 2017-10-20 RX ADMIN — IOHEXOL 75 ML: 350 INJECTION, SOLUTION INTRAVENOUS at 08:10

## 2017-10-20 RX ADMIN — MORPHINE SULFATE 4 MG: 4 INJECTION INTRAVENOUS at 10:10

## 2017-10-20 RX ADMIN — MORPHINE SULFATE 4 MG: 4 INJECTION INTRAVENOUS at 08:10

## 2017-10-20 RX ADMIN — SODIUM CHLORIDE 1000 ML: 0.9 INJECTION, SOLUTION INTRAVENOUS at 08:10

## 2017-10-20 RX ADMIN — SODIUM CHLORIDE 1000 ML: 0.9 INJECTION, SOLUTION INTRAVENOUS at 11:10

## 2017-10-20 RX ADMIN — GENTAMICIN SULFATE 360 MG: 40 INJECTION, SOLUTION INTRAMUSCULAR; INTRAVENOUS at 10:10

## 2017-10-21 VITALS
WEIGHT: 120 LBS | OXYGEN SATURATION: 97 % | RESPIRATION RATE: 16 BRPM | HEIGHT: 68 IN | BODY MASS INDEX: 18.19 KG/M2 | DIASTOLIC BLOOD PRESSURE: 70 MMHG | SYSTOLIC BLOOD PRESSURE: 104 MMHG | HEART RATE: 115 BPM | TEMPERATURE: 98 F

## 2017-10-21 PROCEDURE — 63600175 PHARM REV CODE 636 W HCPCS: Performed by: EMERGENCY MEDICINE

## 2017-10-21 RX ORDER — MORPHINE SULFATE 2 MG/ML
6 INJECTION, SOLUTION INTRAMUSCULAR; INTRAVENOUS
Status: COMPLETED | OUTPATIENT
Start: 2017-10-21 | End: 2017-10-21

## 2017-10-21 RX ADMIN — MORPHINE SULFATE 6 MG: 2 INJECTION, SOLUTION INTRAMUSCULAR; INTRAVENOUS at 02:10

## 2017-10-21 NOTE — ED PROVIDER NOTES
Encounter Date: 10/20/2017       History     Chief Complaint   Patient presents with    Abdominal Pain     Yesterday.  With N/D    Dehydration     X 3 days     Chest Pain     Yesterday     Patient is a 37 y.o. female who presents to the ED 10/20/2017 with a chief complaint of abdominal pain, nausea, vomiting and chest pain that has been going on for a month but worse today. Patient has a history of cervical cancer diagnosed in 2013 when she underwent a ALISON; later that year more cancer cells were found with a tumor impending her ureters; she underwent chemo and radiation at that time and had bi nephrostomy tubes placed; she was asymptomatic without treatment until approximately 2 months ago when she was told her cancer was back and worse. She has not been able to start chemo and radiation due to chronically feeling so ill with nausea, vomiting, and pain.  Patient recently diagnosed with fistula and had LUQ colostomy placed; CT abdomen results reviewed from earlier this month. Patient allergic to PCN.                  Review of patient's allergies indicates:   Allergen Reactions    Pcn [penicillins] Anaphylaxis     Past Medical History:   Diagnosis Date    Anemia     Cervical cancer     cervical    Encounter for blood transfusion     Hydronephrosis     PONV (postoperative nausea and vomiting)     Pyelonephritis      Past Surgical History:   Procedure Laterality Date    COLONOSCOPY N/A 9/22/2016    Procedure: COLONOSCOPY;  Surgeon: Joe Moe MD;  Location: [facility];  Service: Endoscopy;  Laterality: N/A;    HYSTERECTOMY      Partial    TUBAL LIGATION      URETERAL STENT PLACEMENT  07/2016     Family History   Problem Relation Age of Onset    No Known Problems Mother     Drug abuse Father     No Known Problems Sister     Asthma Brother      Social History   Substance Use Topics    Smoking status: Never Smoker    Smokeless tobacco: Never Used    Alcohol use No     Review of Systems    Constitutional: Negative for chills and fever.   Respiratory: Negative for cough, chest tightness and shortness of breath.    Cardiovascular: Positive for chest pain. Negative for palpitations and leg swelling.   Gastrointestinal: Positive for abdominal pain, nausea and vomiting. Negative for abdominal distention, constipation and diarrhea (colostomy).   Genitourinary: Positive for pelvic pain. Negative for decreased urine volume, difficulty urinating, dysuria, flank pain and hematuria.        Bi nephrostomy tubes     Musculoskeletal: Negative for back pain, neck pain and neck stiffness.   Skin: Negative for wound.   Neurological: Negative for weakness and numbness.   Hematological: Bruises/bleeds easily.       Physical Exam     Initial Vitals [10/20/17 1857]   BP Pulse Resp Temp SpO2   117/78 (!) 135 18 98.3 °F (36.8 °C) 99 %      MAP       91         Physical Exam    Nursing note and vitals reviewed.  Constitutional: She is not diaphoretic. She appears cachectic. She is cooperative. She has a sickly appearance. She appears ill. No distress.   HENT:   Head: Normocephalic and atraumatic.   Eyes: Pupils are equal, round, and reactive to light. Scleral icterus is present.   Neck: Neck supple.   Cardiovascular: Regular rhythm, normal heart sounds and intact distal pulses. Tachycardia present.    No murmur heard.  Pulmonary/Chest: Breath sounds normal.   Abdominal: Soft. Normal appearance and bowel sounds are normal. She exhibits no distension, no pulsatile liver, no fluid wave, no abdominal bruit, no ascites, no pulsatile midline mass and no mass. There is hepatosplenomegaly. There is tenderness in the periumbilical area and suprapubic area. There is no rebound, no guarding, no tenderness at McBurney's point and negative Barrett's sign. No hernia.   Colostomy in place intact LUQ with pink stoma and minimal dark brown liquid drainage; no blood; no surrounding erythema or swelling.    Genitourinary:   Genitourinary  Comments: Bi nephrostomy tubes with bright yellow drainage; no sediment.    Neurological: She is alert and oriented to person, place, and time. She has normal strength.   Skin: Skin is warm, dry and intact.   Psychiatric: She has a normal mood and affect. Her speech is normal and behavior is normal.         ED Course   Procedures  Labs Reviewed   CBC W/ AUTO DIFFERENTIAL   COMPREHENSIVE METABOLIC PANEL   LIPASE   LACTIC ACID, PLASMA   TROPONIN I   URINALYSIS   PROTIME-INR             Medical Decision Making:   Differential Diagnosis:   Cancer mets  ACS  Pyelonephritis   Bowel obstruction  Pancreatitis  Sepsis         APC / Resident Notes:   Patient is a 37 y.o. female who presents to the ED 10/20/2017 with a chief complaint of abdominal pain and vomiting. Patient has a history of cervical cancer with mets. Patient currently is presenting with evidence of painless jaundice concerning for an obstructive source.  Currently have concern for metastatic lesions and biliary compression.  Additional screening labs are be obtained rule out evidence of severe dehydration underlying sepsis.    Workup today significant for evidence of a nitrite positive, complicated urinary tract infection.  Patient additionally is progressing with labs and vital signs indicative of severe dehydration.  She received multiple fluid boluses and IV analgesic dosages.  Blood cultures were obtained.  Patient was started on gentamicin for past sensitivities for bacteria including enterococcus and Escherichia coli.  CT scan indicates biliary obstruction secondary to new cancer met lesion.  Currently there is no in-house gastroenterologist to perform biliary stenting and the patient does warrant transfer for this palliative procedure.  She is requesting transfer to [facility] where her specialist or located.  Transfer center contacted and [facility] accepted the patient.  She'll be transferred in serious condition.                   ED Course as of Oct  21 0216   Fri Oct 20, 2017   2312 CT scan results reviewed; discussed with patient; transfer center notified of need for transfer for GI services for possible biliary stent placement due to new obstruction. Patient pain is better but coming back; morphine ordered. Nausea resolved. Patient afebrile. HR down to 115 from 135  [JK]      ED Course User Index  [JK] Sole Isaac NP     Clinical Impression:   The primary encounter diagnosis was Sepsis, due to unspecified organism. Diagnoses of Chest pain, Dehydration, Malignant neoplasm of cervix, unspecified site, Retained nephrostomy tube fragment, Colostomy present, and Urinary tract infection with hematuria, site unspecified were also pertinent to this visit.    Disposition:   Disposition: Transferred  Condition: Serious                         Best Ramirez MD  10/21/17 0255       Best Ramirez MD  10/21/17 0305

## 2017-10-24 LAB
BACTERIA UR CULT: NORMAL
BACTERIA UR CULT: NORMAL

## 2017-10-26 LAB
BACTERIA BLD CULT: NORMAL
BACTERIA BLD CULT: NORMAL

## 2018-11-15 NOTE — PROGRESS NOTES
"Progress Note  Hospital Medicine    Admit Date: 7/20/2017    SUBJECTIVE:     Follow-up For:  Intractable abdominal pain      Interval history (See H&P for complete P,F,SHx) : Patient remains in severe pain, requesting IV dilaudid only. Consulted ID d/t numerous recent infections and readmissions despite ABX. Discussed with ID.    Review of Systems:   Pain scale: 0/10  Gen- Weakness  GI- Pain          OBJECTIVE:     Vital Signs Range (Last 24H):  Temp:  [97.4 °F (36.3 °C)-98.1 °F (36.7 °C)]   Pulse:  [80-97]   Resp:  [18]   BP: ()/(57-78)   SpO2:  [97 %-100 %]     I & O (Last 24H):    Intake/Output Summary (Last 24 hours) at 07/22/17 2048  Last data filed at 07/22/17 1748   Gross per 24 hour   Intake             2340 ml   Output             1900 ml   Net              440 ml       Estimated body mass index is 25.18 kg/m² as calculated from the following:    Height as of this encounter: 5' 6" (1.676 m).    Weight as of this encounter: 70.8 kg (156 lb).    Physical Exam:  General- Patient alert and oriented x3 in NAD + cachexia noted  HEENT- PERRLA, EOMI, OP clear, + temporal wasting  Neck- No JVD, Lymphadenopathy, Thyromegaly  CV- Regular rate and rhythm, No Murmur/minerva/rubs  Resp- Lungs CTA Bilaterally, No increased WOB  GI- + tenderness in pelvis  Extrem- No cyanosis, clubbing, edema. Pulses 2+ and symmetric    Laboratory/Diagnostic Data:  Reviewed and noted in plan where applicable- Please see chart for full lab data.    Medications:  Medication list was reviewed and changes noted under Assessment/Plan.    ASSESSMENT/PLAN:     Active Problems:    Active Hospital Problems    Diagnosis  POA    *Intractable abdominal pain [R10.9]-  Likely related to either UTI versus new cancer growth. Manage with IV narcotics.  Yes    Mesenteric mass [K63.9]-  Patient has appt with oncologist at [facility]  Yes    CKD (chronic kidney disease) stage 3, GFR 30-59 ml/min [N18.3]- Creatine stable for now. Monitor UOP and serial BMP " [facility] -- Glynn Bullock, Claudia Davis Pannella, Patel, Savella  Office # [phone]      Follow Up:    Afib/ sepsis  Subjective/Observations:   No events overnight resting comfortably in bed without complaints     REVIEW OF SYSTEMS: All other review of systems is negative unless indicated above    PAST MEDICAL & SURGICAL HISTORY:  ESRD (end stage renal disease) on dialysis: MWF  CAD (coronary artery disease): s/p stent in 2007  Diabetes  Myocardial infarction  Hypertension  H/O: hysterectomy      MEDICATIONS  (STANDING):  aspirin enteric coated 81 milliGRAM(s) Oral daily  atorvastatin 40 milliGRAM(s) Oral at bedtime  calcium carbonate 1250 mG  + Vitamin D (OsCal 500 + D) 1 Tablet(s) Oral daily  chlorhexidine 2% Cloths 1 Application(s) Topical daily  clopidogrel Tablet 75 milliGRAM(s) Oral daily  dextrose 5%. 1000 milliLiter(s) (50 mL/Hr) IV Continuous <Continuous>  dextrose 50% Injectable 12.5 Gram(s) IV Push once  dextrose 50% Injectable 25 Gram(s) IV Push once  dextrose 50% Injectable 25 Gram(s) IV Push once  docusate sodium 100 milliGRAM(s) Oral three times a day  epoetin analilia Injectable 45265 Unit(s) IV Push <User Schedule>  ferrous    sulfate 325 milliGRAM(s) Oral three times a day  gabapentin 300 milliGRAM(s) Oral daily  insulin lispro (HumaLOG) corrective regimen sliding scale   SubCutaneous Before meals and at bedtime  isosorbide   mononitrate ER Tablet (IMDUR) 60 milliGRAM(s) Oral <User Schedule>  isosorbide   mononitrate ER Tablet (IMDUR) 30 milliGRAM(s) Oral every 24 hours  metoprolol tartrate 50 milliGRAM(s) Oral two times a day  NIFEdipine XL 90 milliGRAM(s) Oral daily  ondansetron Injectable 4 milliGRAM(s) IV Push once  pantoprazole    Tablet 40 milliGRAM(s) Oral before breakfast  polyethylene glycol 3350 17 Gram(s) Oral daily  ursodiol Capsule 300 milliGRAM(s) Oral every 12 hours    MEDICATIONS  (PRN):  acetaminophen   Tablet .. 650 milliGRAM(s) Oral every 6 hours PRN Mild Pain (1 - 3)  dextrose 40% Gel 15 Gram(s) Oral once PRN Blood Glucose LESS THAN 70 milliGRAM(s)/deciliter  glucagon  Injectable 1 milliGRAM(s) IntraMuscular once PRN Glucose LESS THAN 70 milligrams/deciliter  simethicone 80 milliGRAM(s) Chew every 6 hours PRN abdominal bloating      Allergies    No Known Drug Allergies  Purell (Rash)    Intolerances        Vital Signs Last 24 Hrs  T(C): 33.7 (15 Nov 2018 09:40), Max: 36.9 (14 Nov 2018 13:25)  T(F): 92.6 (15 Nov 2018 09:40), Max: 98.4 (14 Nov 2018 13:25)  HR: 61 (15 Nov 2018 09:40) (54 - 73)  BP: 154/71 (15 Nov 2018 09:40) (133/66 - 176/74)  BP(mean): --  RR: 18 (15 Nov 2018 09:40) (16 - 20)  SpO2: 96% (15 Nov 2018 09:40) (94% - 99%)    I&O's Summary    14 Nov 2018 07:01  -  15 Nov 2018 07:00  --------------------------------------------------------  IN: 170 mL / OUT: 0 mL / NET: 170 mL      Weight (kg): 95.3 (11-14 @ 15:44)    PHYSICAL EXAM:  TELE: Off tele   Constitutional: NAD, awake and alert, well-developed  HEENT: Moist Mucous Membranes, Anicteric  Pulmonary: Non-labored, breath sounds are clear bilaterally, No wheezing, crackles or rhonchi  Cardiovascular: Regular, S1 and S2 nl, No murmurs, rubs, gallops or clicks  Gastrointestinal: Bowel Sounds present, soft, nontender.   Lymph: No lymphadenopathy. No peripheral edema.  Skin: No visible rashes or ulcers.  Psych:  Mood & affect appropriate    LABS: All Labs Reviewed:                        8.6    11.20 )-----------( 383      ( 15 Nov 2018 08:30 )             27.7                         8.3    10.34 )-----------( 371      ( 14 Nov 2018 08:27 )             27.3                         8.3    10.42 )-----------( 396      ( 13 Nov 2018 07:46 )             27.4     15 Nov 2018 08:30    132    |  96     |  49     ----------------------------<  161    5.2     |  27     |  7.20   14 Nov 2018 08:27    134    |  96     |  37     ----------------------------<  149    4.8     |  28     |  6.00   13 Nov 2018 07:46    135    |  99     |  26     ----------------------------<  161    4.7     |  27     |  4.60     Ca    8.2        15 Nov 2018 08:30  Ca    8.4        14 Nov 2018 08:27  Ca    8.2        13 Nov 2018 07:46  Phos  4.7       14 Nov 2018 08:27    TPro  7.1    /  Alb  2.1    /  TBili  0.6    /  DBili  .40    /  AST  18     /  ALT  17     /  AlkPhos  232    15 Nov 2018 08:30  TPro  6.9    /  Alb  1.9    /  TBili  0.5    /  DBili  .30    /  AST  17     /  ALT  15     /  AlkPhos  232    14 Nov 2018 08:27  TPro  7.1    /  Alb  1.9    /  TBili  0.6    /  DBili  .40    /  AST  24     /  ALT  17     /  AlkPhos  242    13 Nov 2018 07:46    PT/INR - ( 14 Nov 2018 08:27 )   PT: 14.1 sec;   INR: 1.23 ratio                  ECG:  < from: 12 Lead ECG (11.02.18 @ 08:59) >  Ventricular Rate 72 BPM    Atrial Rate 72 BPM    P-R Interval 156 ms    QRS Duration 156 ms    Q-T Interval 444 ms    QTC Calculation(Bezet) 486 ms    P Axis 50 degrees    R Axis 90 degrees    T Axis -14 degrees    Diagnosis Line Normal sinus rhythm  Right bundle branch block  Inferior infarct (cited on or before 31-OCT-2018)  Abnormal ECG    Confirmed by Jovanna Hunter ([id number]) on 11/3/2018 11:50:16 AM    < end of copied text >    Echo:  < from: TTE Echo Doppler w/o Cont (05.04.18 @ 20:56) >   EXAM:  ECHO TTE W/O CON COMP W/DOPPLR         PROCEDURE DATE:  05/04/2018        INTERPRETATION:  Height 165cm. weight 95kg. blood pressure 129/81.   Technician TE. Indication for study dyspnea.    Aortic root 2.3 cm left atrium 4.4 cm left ventricular end-diastolic   diameter 5.7 cm left ventricular end-systolic diameter 4.3 cm septal wall   thickness 1.2 cm posterior wall thickness 1.2 cm visually equivocally   estimated ejection fraction 50-55%.    The aortic root is calcified and of normaldiameter. 3 distinct leaflets   of the aortic valve are not well demonstrated by this study. The   technician was unable to identify any significant gradient across this   valve to suggest hemodynamically significant aortic stenosis. Left atrium   isenlarged. The left ventricle was difficult to visualize by all   standard echocardiographic windows. Possibly the end-diastolic diameter   is mildly increased. The wall thickness is borderline increased. Any   significant focal wall motion abnormality cannot be ascertained by this   study. Visually estimated ejection fraction 50-55%. The mitral annulus is   calcified. The mitral valve leaflets are mildly thickened with a normal   EF slope and excursion. There is no evidence for hemodynamically   significant mitral stenosis. On the very limited color flow Doppler   portion examination mild mitral regurgitation suggested.    Conclusion:  1. Technically difficult limited supine study. Please note that this is a   portable study and the study is also limited due to patient's body   habitus.  2. Possible fibrocalcific disease of the aortic and mitral valves without   severe stenosis as described above.  3. Enlarged left atrium with associated mild mitral regurgitation.  4. Very limited visualization left ventricle which may represent mild   left ventricular concentric hypertrophy with a well-preserved ejection   fraction as described above.  5. A very small posterior pericardial effusion is suggested but not   diagnostic.  6. Correlate these limited findings clinically.                    SALVADOR PHILLIPS M.D., ATTENDING CARDIOLOGIST  This document has been electronically signed. May  5 2018  9:47AM                < end of copied text >    Radiology:  < from: CT Abdomen No Cont (11.12.18 @ 15:42) >  EXAM:  CT ABDOMEN ONLY                            PROCEDURE DATE:  11/12/2018          INTERPRETATION:  CLINICAL STATEMENT: check gb tube position    TECHNIQUE: CT of the abdomen was performed in the axial plane utilizing   thin slices without IV or oral contrast. Sagittal and coronal   reformatting was performed.    COMPARISON: 10/29/2018    FINDINGS:    The lower chest demonstrates coronary artery calcification. There is   linear atelectasis or scar at the lung bases.    The evaluation of theabdominal viscera and the bowel is limited without   contrast.    The liver is grossly unremarkable. The gallbladder is again remarkable   for a large stone. There is inflammatory change surrounding the   gallbladder. A cholecystostomy tube has been placed and appears to be   outside the gallbladder. There is high density material within the common   duct on image 42 of series 2 suspicious for choledocholithiasis.    The spleen, pancreas and adrenal glands are grossly unremarkable.    There is nohydronephrosis or urinary calculus. There is a midpole left   renal cyst posteriorly.    There is no bowel obstruction.  There is no intraperitoneal free air.    There is no free fluid.    The aorta is not aneurysmal. There is no significant abdominal or   retroperitoneal lymphadenopathy.    The bony structures demonstrate degenerative changes of the spine. There   is moderate compression fracture in L2.    IMPRESSION:    Cholecystostomy tube in the right upper quadrant appears outside the   gallbladder consistent with malpositioning of the tube. There is again   evidence of cholelithiasis and inflammatory change surrounding the   gallbladder consistent with cholecystitis. There is suggestion of   choledocholithiasis with a 5 mm stone suggested in the CBD on coronal   image 57 although this was not corroborated on the recent MRCP.  Small left renal cyst  Results were discussed with the referring physician, Dr. Marek Strickland, at 6:40 PM on November 12, 2018. By this time, the tube had   been removed by the interventional radiologist.                ANTONIA PALMER M.D.,ATTENDING RADIOLOGIST  This document has been electronically signed. Nov 12 2018  6:45PM        < end of copied text >           [facility]   Cardiology and adjust therapy as needed. Renally dose meds.  Yes    Acute cystitis with hematuria [N30.01]-  ID consulted. Enterococcus in previous culture. Continue vancomycin outpatient once pain controlled. Consider PICC line.  Yes    Ureteral stricture s/p bilateral ureteral stents secondary to XRT for cervical cancer [N13.5]-  Chronic. Managed by [facility] urology.  Yes      Resolved Hospital Problems    Diagnosis Date Resolved POA   No resolved problems to display.         Disposition- Home    VTE Risk Mitigation         Ordered     enoxaparin injection 40 mg  Daily     Route:  Subcutaneous        07/21/17 0222     Low Risk of VTE  Once      07/20/17 9960

## 2019-06-16 NOTE — ED TRIAGE NOTES
Patient comes to the ER with pelvic pain. Patient denies dysuria. Patient states her private area hurts. Patient states pain is constant, + nausea.  
patient

## 2020-07-09 NOTE — NURSING
Called and left a detailed message for pt to schedule a visit with primary and to fill out forms.     Sent message via my chart with questions.    DC INSTRUCTIONS EXPLAINED TO AND COPY TO PT, PT VERBALIZES UNDERSTANDING, WAITING FOR SPOUSE, VSS, NO COMPLAINTS VOICED, NO INJURIES NOTED, NAD NOTED

## 2021-04-07 NOTE — PROGRESS NOTES
Urology Consult Progress Note-[place]  Staff: Nico/Angus/Ion/Angelica    Referring physician or department:     Subjective:      Cesilia Dozier is a 37 y.o. female with h/o cercial cancer, b hydro and recurrent pyelo with stents in place, last changed 3/7/17. Seen by  yesterday for persistent left flank pain despite stents being in place. She says whenever she has pain this severe she usually has an infection.     A kub was obtained to ensure the stents were in good position which they are.   Today she states that she is till having left flan pain and groin pain. +frequency.      Objective:     Temp:  [98 °F (36.7 °C)-99.1 °F (37.3 °C)] 98 °F (36.7 °C)  Pulse:  [] 112  Resp:  [16] 16  SpO2:  [95 %-100 %] 100 %  BP: ()/(51-72) 102/67  I/O last 3 completed shifts:  In: 7976.3 [P.O.:2440; I.V.:5286.3; IV Piggyback:250]  Out: 5 [Urine:5]       UOP - nr/5/nr      Physical Exam   Nursing note and vitals reviewed.  Constitutional: Pt is oriented to person, place, and time. Pt appears well-developed and well-nourished.   HENT:   Head: Normocephalic and atraumatic.   Eyes: Pupils are equal, round, and reactive to light.   Cardiovascular: Normal rate and regular rhythm.    Pulmonary/Chest: Effort normal.   Abdominal: Soft.   Musculoskeletal: Normal range of motion.   Neurological: Pt is alert and oriented to person, place, and time.   Skin: Skin is intact.     Psychiatric: Pt has a normal mood and affect.       Labs:       Recent Labs  Lab 04/09/17  1220 04/10/17  0543 04/11/17  0540    139 143   K 4.8 4.4 4.3    110 111*   CO2 26 20* 25   BUN 8 6 6   CREATININE 2.0* 1.8* 1.7*   CALCIUM 8.9 8.9 8.8       Recent Labs  Lab 04/09/17  1220 04/10/17  0543 04/11/17  0540   WBC 6.40 5.30 5.30   HGB 10.4* 10.4* 9.5*   HCT 30.8* 30.7* 28.6*    137* 154     Urine culture  4/7/17 Multiple organisms  3/18/17multiple organisms    Radiology:   kub 4/10/17  b stents in place in  correct position    ctrss 3/20/17  b stents in place with mild residual hydro    Assessment:     Cesilia Dozier is a 37 y.o. female with b hydro secondary to tx for cervical cancer, stents in place now with flank pain.      on rocephin     Plan:     1. Stents in good position  2. Pt still c/o pelvic pain and left flank pain. Recommend catheter to be placed. Send cath urine for urinalysis and cultrue.   Will obtain a rbus in am with catheter in place. . If no significant hydro can fu with dr nieto.   . Cr 1.7 today which is baseline.   3. Otherwise pt can f/u  With dr nieto as scheduled next month.     Aleisha Walsh MD  [facility] ([facility]/Ion/Nico)   Office: [phone]       100.3

## 2022-01-27 NOTE — OR NURSING
Attempted to urinate x2.  Voided a few cc's mona urine.  Pain improving. Rates pain a 5-6   Composite Graft Text: The defect edges were debeveled with a #15 scalpel blade.  Given the location of the defect, shape of the defect, the proximity to free margins and the fact the defect was full thickness a composite graft was deemed most appropriate.  The defect was outline and then transferred to the donor site.  A full thickness graft was then excised from the donor site. The graft was then placed in the primary defect, oriented appropriately and then sutured into place.  The secondary defect was then repaired using a primary closure.

## 2022-05-31 NOTE — DISCHARGE SUMMARY
"Care Coordination - Discharge Note     Line Company:  [employer] [facility] [employer] [phone] for line care supplies   Referral made for line care:  Yes   IV medications needed at discharge:  None   Pharmacy concerns:  None. (Of note, [facility] requires prior auth)     PT/OT/therapies recommended:  Home PT/OT recommended - however, pt declining both home and/or OP therapies   (Of note: was previously on service with [facility] for home [phone])  Referral made for PT/OT/therapies:  No - pt declining therapies at this time; will continue home exercise program    D/c location:  Home - [place]  Has placement need been communicated to Social Work?  NA    NC Teaching time arranged with patient/caregiver:  Completed 5/31 with pt and spouse (and pt's sister-Leisa, via phone)  Notify nurse to schedule line care class/ DM teaching, prior to d/c:  Patient and caregiver previously received teaching, and decline further need     Caregiver:  Tres \"Jerrica\" Linda () [phone]    Leisa Grimaldo (sister) [phone]    Outpatient Nurse Coordinator notified of patient discharge.       Kiara Stewart, RN, BSN  RN Care Coordinator - Inpatient BMT, Unit 5C  Ph [phone]  Pg [phone]   " Discharge Summary  Hospital Medicine    Admit Date: 4/7/2017    Date and Time: 4/17/201711:56 AM    Discharge Attending Physician: Cathy Nicholson MD    Primary Care Physician: Harsha Sheppard MD    Diagnoses:  Active Hospital Problems    Diagnosis  POA    *Pyelonephritis [N12] - Candida sp  Yes    CKD (chronic kidney disease) stage 4, GFR 15-29 ml/min [N18.4]  Yes    Iron deficiency anemia [D50.9]  Yes    Ureteral stricture s/p bilateral ureteral stents secondary to XRT for cervical cancer [N13.5]  Yes    Cervical cancer [C53.9]  Yes      Resolved Hospital Problems    Diagnosis Date Resolved POA    Hypokalemia [E87.6] 04/15/2017 Yes     Discharged Condition: Good    Hospital Course:   Patient is a 36 y.o. female admitted to Hospitalist Service from [facility] Room with complaint of abdominal pain and blood in stool. Patient reportedly has past medical history significant for Cervical cancer who recently hospitalized from 11-03-16 to 11-08-16 related to UTI, hydronephrosis and had stent exchange procedure performed. Patient presented with an acute onset of abdominal cramping which has been ongoing x 2 days. Associated symptoms include fatigue, SOB, right flank pain, and intermittent dizziness. She also noted while wiping following a bowel movement today she noticed bright red blood on toilet paper. She was recently discharged two days ago. Denied fever. Patient denied chest pain, shortness of breath, abdominal pain, nausea, vomiting, headache, vision changes, focal neuro-deficits, cough or fever. Patient was admitted to Hospitalist medicine service. Patient was evaluated by Dr. Greco. Patient was treated with IV antibiotics. Microbiology results showed Candida UTI. During hospital stay, patient exhibited opiate dependance  Behavior and possible opiate seeking behavior Patient encouraged to followup with a pain management specialist upon DC. Patient next week to see Dr. Bocanegra from  urology. Patient was discharged home in stable condition with following discharge plan of care. Total time with the patient was 30 minutes and greater than 50% was spent in counseling and coordination of care. The assessment and plan have been discussed at length. Physicians' notes reviewed. Labs and procedure reviewed.     Consults: Dr. Greco    Significant Diagnostic Studies:   KUB: Bilateral ureteral stents in place.  Possible small proximal left ureteral stone.     Chest X-Ray:  No acute radiographic findings in the chest.      Renal US: Moderate right and mild left hydronephrosis, increased when compared to 11/4/16.  Presence of bilateral ureteral stents.      VQ scan: Normal study, without scintigraphic evidence for pulmonary malignancy.     KUB: Bilateral ureteral stents in place.  Possible small proximal left ureteral stone.     Kidney NM scan:  Abnormal study demonstrating bilateral obstructive renal physiology with decreased bilateral renal flow, moderate left/mild right hydronephrosis, severely decreased overall right renal function and moderately decreased left renal function.  Double-J ureteral stents were present on recent retrograde pyelogram.     Lasix renal scan: Decreased ERPF bilaterally more so on the right with a right ERPF 74 mL per minute and right kidney having 34 percent of total uptake.  the left renal ERPF 146 mL per minute .  T. one half greater than 10 minutes bilaterally consistent with obstruction and more severe on the right.    Microbiology Results (last 7 days)     Procedure Component Value Units Date/Time    Blood culture [[id number]] Collected:  04/12/17 0120    Order Status:  Completed Specimen:  Blood from Peripheral, Left  Hand Updated:  04/16/17 1212     Blood Culture, Routine No Growth to date     Blood Culture, Routine No Growth to date     Blood Culture, Routine No Growth to date     Blood Culture, Routine No Growth to date     Blood Culture, Routine No Growth to date     Narrative:       Take 2 sets.  Take both aerobic and anaerobic bottles.    Urine culture [[id number]] Collected:  04/11/17 2051    Order Status:  Completed Specimen:  Urine from Urine, Catheterized Updated:  04/13/17 1129     Urine Culture, Routine --     CANDIDA ORTHOPSILOSIS  > 100,000 cfu/ml  Treatment of asymptomatic candiduria is not recommended (except for   specific populations). Candida isolated in the urine typically   represents colonization. If an indwelling urinary catheter is present  it should be removed or replaced.          Special Treatments/Procedures: None  Disposition: Home or Self Care    Medications:  Reconciled Home Medications: Current Discharge Medication List      START taking these medications    Details   cyclobenzaprine (FLEXERIL) 10 MG tablet Take 1 tablet (10 mg total) by mouth 2 (two) times daily as needed for Muscle spasms. Watch for sedation - avoid driving, going to heights or operating heavy machinery  Qty: 14 tablet, Refills: 0      fluconazole (DIFLUCAN) 100 MG tablet Take 1 tablet (100 mg total) by mouth once daily.  Qty: 10 tablet, Refills: 0      levoFLOXacin (LEVAQUIN) 250 MG tablet Take 1 tablet (250 mg total) by mouth once daily.  Qty: 10 tablet, Refills: 0      oxycodone-acetaminophen (PERCOCET)  mg per tablet Take 1 tablet by mouth every 6 (six) hours as needed.  Qty: 20 tablet, Refills: 0         CONTINUE these medications which have NOT CHANGED    Details   ferrous sulfate 325 (65 FE) MG EC tablet Take 325 mg by mouth 3 (three) times daily with meals.      tolterodine (DETROL LA) 4 MG 24 hr capsule Take 1 capsule (4 mg total) by mouth once daily.  Qty: 30 capsule, Refills: 11    Associated Diagnoses: Bilateral hydronephrosis         STOP taking these medications       hydrocodone-acetaminophen 5-325mg (NORCO) 5-325 mg per tablet Comments:   Reason for Stopping:         ondansetron (ZOFRAN-ODT) 8 MG TbDL Comments:   Reason for Stopping:               Discharge  Procedure Orders  Diet general   Order Comments: Cardiac/ 2 gram sodium low cholesterol diet     Other restrictions (specify):   Order Comments: Fall precautions     Call MD for:   Order Comments: For worsening symptoms, chest pain, shortness of breath, increased abdominal pain, high grade fever, stroke or stroke like symptoms, immediately go to the nearest Emergency Room or call 911 as soon as possible.       Follow-up Information     Follow up with Harsha Sheppard MD In 1 week.    Specialty:  Internal Medicine    Contact information:    [address] [place] [address] [address]  [facility] [id number] [phone]          Follow up with Turner Bocanegra MD.    Specialty:  Urology    Contact information:    [address] [facility] [address]  [facility] [address]  [phone]          Please follow up.    Contact information:    Follow-up with Pain management at earliest.    Have BMP checked in 3 days.         Please follow up.    Contact information:    Follow up with Oncologic gynecologist in 1-2 weeks
